# Patient Record
Sex: FEMALE | Race: WHITE | NOT HISPANIC OR LATINO | Employment: FULL TIME | URBAN - NONMETROPOLITAN AREA
[De-identification: names, ages, dates, MRNs, and addresses within clinical notes are randomized per-mention and may not be internally consistent; named-entity substitution may affect disease eponyms.]

---

## 2017-02-28 RX ORDER — PANTOPRAZOLE SODIUM 40 MG/1
40 TABLET, DELAYED RELEASE ORAL DAILY
Qty: 90 TABLET | Refills: 3 | Status: CANCELLED | OUTPATIENT
Start: 2017-02-28

## 2017-04-21 ENCOUNTER — TRANSCRIBE ORDERS (OUTPATIENT)
Dept: ADMINISTRATIVE | Facility: HOSPITAL | Age: 52
End: 2017-04-21

## 2017-04-21 ENCOUNTER — LAB (OUTPATIENT)
Dept: LAB | Facility: HOSPITAL | Age: 52
End: 2017-04-21

## 2017-04-21 DIAGNOSIS — E10.8 TYPE 1 DIABETES MELLITUS WITH COMPLICATION (HCC): ICD-10-CM

## 2017-04-21 DIAGNOSIS — I10 BENIGN HYPERTENSION: Primary | ICD-10-CM

## 2017-04-21 DIAGNOSIS — E78.2 MIXED HYPERLIPIDEMIA: ICD-10-CM

## 2017-04-21 DIAGNOSIS — I10 BENIGN HYPERTENSION: ICD-10-CM

## 2017-04-21 LAB
ALBUMIN SERPL-MCNC: 4.4 G/DL (ref 3.5–5)
ALBUMIN/GLOB SERPL: 1.4 G/DL (ref 1.5–2.5)
ALP SERPL-CCNC: 61 U/L (ref 35–104)
ALT SERPL W P-5'-P-CCNC: 25 U/L (ref 10–36)
AMYLASE SERPL-CCNC: 19 U/L (ref 28–100)
ANION GAP SERPL CALCULATED.3IONS-SCNC: 4.7 MMOL/L (ref 3.6–11.2)
ANISOCYTOSIS BLD QL: ABNORMAL
AST SERPL-CCNC: 23 U/L (ref 10–30)
BASOPHILS # BLD MANUAL: 1.34 10*3/MM3 (ref 0–0.3)
BASOPHILS NFR BLD AUTO: 6 % (ref 0–2)
BILIRUB SERPL-MCNC: 0.5 MG/DL (ref 0.2–1.8)
BUN BLD-MCNC: 16 MG/DL (ref 7–21)
BUN/CREAT SERPL: 22.9 (ref 7–25)
CALCIUM SPEC-SCNC: 9.7 MG/DL (ref 7.7–10)
CHLORIDE SERPL-SCNC: 103 MMOL/L (ref 99–112)
CHOLEST SERPL-MCNC: 199 MG/DL (ref 0–200)
CO2 SERPL-SCNC: 29.3 MMOL/L (ref 24.3–31.9)
CREAT BLD-MCNC: 0.7 MG/DL (ref 0.43–1.29)
DEPRECATED RDW RBC AUTO: 50.5 FL (ref 37–54)
EOSINOPHIL # BLD MANUAL: 0.22 10*3/MM3 (ref 0–0.7)
EOSINOPHIL NFR BLD MANUAL: 1 % (ref 0–5)
ERYTHROCYTE [DISTWIDTH] IN BLOOD BY AUTOMATED COUNT: 15.7 % (ref 11.5–14.5)
GFR SERPL CREATININE-BSD FRML MDRD: 88 ML/MIN/1.73
GLOBULIN UR ELPH-MCNC: 3.2 GM/DL
GLUCOSE BLD-MCNC: 256 MG/DL (ref 70–110)
HBA1C MFR BLD: 9.7 % (ref 4.5–5.7)
HCT VFR BLD AUTO: 39.3 % (ref 37–47)
HDLC SERPL-MCNC: 44 MG/DL (ref 60–100)
HGB BLD-MCNC: 12.9 G/DL (ref 12–16)
HYPOCHROMIA BLD QL: ABNORMAL
LDLC SERPL CALC-MCNC: ABNORMAL MG/DL (ref 0–100)
LDLC/HDLC SERPL: ABNORMAL {RATIO}
LIPASE SERPL-CCNC: 33 U/L (ref 13–60)
LYMPHOCYTES # BLD MANUAL: 4.92 10*3/MM3 (ref 1–3)
LYMPHOCYTES NFR BLD MANUAL: 22 % (ref 21–51)
LYMPHOCYTES NFR BLD MANUAL: 5 % (ref 0–10)
MCH RBC QN AUTO: 28.8 PG (ref 27–33)
MCHC RBC AUTO-ENTMCNC: 32.8 G/DL (ref 33–37)
MCV RBC AUTO: 87.7 FL (ref 80–94)
METAMYELOCYTES NFR BLD MANUAL: 1 % (ref 0–0)
MONOCYTES # BLD AUTO: 1.12 10*3/MM3 (ref 0.1–0.9)
MYELOCYTES NFR BLD MANUAL: 4 % (ref 0–0)
NEUTROPHILS # BLD AUTO: 13.41 10*3/MM3 (ref 1.4–6.5)
NEUTROPHILS NFR BLD MANUAL: 60 % (ref 30–70)
OSMOLALITY SERPL CALC.SUM OF ELEC: 283.8 MOSM/KG (ref 273–305)
PLAT MORPH BLD: NORMAL
PLATELET # BLD AUTO: 470 10*3/MM3 (ref 130–400)
PMV BLD AUTO: 10.4 FL (ref 6–10)
POTASSIUM BLD-SCNC: 4.6 MMOL/L (ref 3.5–5.3)
PROMYELOCYTES NFR BLD MANUAL: 1 % (ref 0–0)
PROT SERPL-MCNC: 7.6 G/DL (ref 6–8)
RBC # BLD AUTO: 4.48 10*6/MM3 (ref 4.2–5.4)
SCAN SLIDE: NORMAL
SODIUM BLD-SCNC: 137 MMOL/L (ref 135–153)
T3RU NFR SERPL: 24.6 % (ref 22.5–37)
T4 SERPL-MCNC: 10.1 MCG/DL (ref 4.5–10.9)
TRIGL SERPL-MCNC: 638 MG/DL (ref 0–150)
TSH SERPL DL<=0.05 MIU/L-ACNC: 1.8 MIU/ML (ref 0.55–4.78)
VLDLC SERPL-MCNC: ABNORMAL MG/DL
WBC NRBC COR # BLD: 22.35 10*3/MM3 (ref 4.5–12.5)

## 2017-04-21 PROCEDURE — 80061 LIPID PANEL: CPT

## 2017-04-21 PROCEDURE — 82150 ASSAY OF AMYLASE: CPT

## 2017-04-21 PROCEDURE — 84479 ASSAY OF THYROID (T3 OR T4): CPT

## 2017-04-21 PROCEDURE — 85060 BLOOD SMEAR INTERPRETATION: CPT

## 2017-04-21 PROCEDURE — 80053 COMPREHEN METABOLIC PANEL: CPT

## 2017-04-21 PROCEDURE — 83036 HEMOGLOBIN GLYCOSYLATED A1C: CPT

## 2017-04-21 PROCEDURE — 36415 COLL VENOUS BLD VENIPUNCTURE: CPT

## 2017-04-21 PROCEDURE — 83690 ASSAY OF LIPASE: CPT

## 2017-04-21 PROCEDURE — 84436 ASSAY OF TOTAL THYROXINE: CPT

## 2017-04-21 PROCEDURE — 85025 COMPLETE CBC W/AUTO DIFF WBC: CPT

## 2017-04-21 PROCEDURE — 84443 ASSAY THYROID STIM HORMONE: CPT

## 2017-04-21 PROCEDURE — 85007 BL SMEAR W/DIFF WBC COUNT: CPT

## 2017-04-22 LAB
CYTOLOGIST CVX/VAG CYTO: NORMAL
PATH INTERP BLD-IMP: NORMAL

## 2017-04-24 ENCOUNTER — LAB (OUTPATIENT)
Dept: LAB | Facility: HOSPITAL | Age: 52
End: 2017-04-24

## 2017-04-24 ENCOUNTER — TRANSCRIBE ORDERS (OUTPATIENT)
Dept: ADMINISTRATIVE | Facility: HOSPITAL | Age: 52
End: 2017-04-24

## 2017-04-24 DIAGNOSIS — D72.9 UNSPECIFIED DISEASE OF WHITE BLOOD CELLS: Primary | ICD-10-CM

## 2017-04-24 DIAGNOSIS — D72.9 UNSPECIFIED DISEASE OF WHITE BLOOD CELLS: ICD-10-CM

## 2017-04-24 PROCEDURE — 36415 COLL VENOUS BLD VENIPUNCTURE: CPT

## 2017-04-25 ENCOUNTER — CONSULT (OUTPATIENT)
Dept: ONCOLOGY | Facility: CLINIC | Age: 52
End: 2017-04-25

## 2017-04-25 VITALS
BODY MASS INDEX: 41.17 KG/M2 | DIASTOLIC BLOOD PRESSURE: 71 MMHG | SYSTOLIC BLOOD PRESSURE: 125 MMHG | WEIGHT: 256.2 LBS | OXYGEN SATURATION: 98 % | TEMPERATURE: 97.3 F | HEART RATE: 91 BPM | HEIGHT: 66 IN | RESPIRATION RATE: 20 BRPM

## 2017-04-25 DIAGNOSIS — R16.1 SPLENOMEGALY: ICD-10-CM

## 2017-04-25 DIAGNOSIS — D75.9 DISEASE OF BLOOD AND BLOOD-FORMING ORGANS, UNSPECIFIED: ICD-10-CM

## 2017-04-25 DIAGNOSIS — D72.829 LEUKOCYTOSIS, UNSPECIFIED TYPE: Primary | ICD-10-CM

## 2017-04-25 DIAGNOSIS — Z11.4 SCREENING FOR HIV (HUMAN IMMUNODEFICIENCY VIRUS): ICD-10-CM

## 2017-04-25 DIAGNOSIS — D75.839 THROMBOCYTOSIS: ICD-10-CM

## 2017-04-25 DIAGNOSIS — R53.82 CHRONIC FATIGUE: ICD-10-CM

## 2017-04-25 LAB
ANISOCYTOSIS BLD QL: ABNORMAL
BASOPHILS # BLD MANUAL: 0.47 10*3/MM3 (ref 0–0.3)
BASOPHILS NFR BLD AUTO: 2 % (ref 0–2)
CHROMATIN AB SERPL-ACNC: 10 IU/ML (ref 0–14)
CRP SERPL-MCNC: 1.11 MG/DL (ref 0–0.99)
DEPRECATED RDW RBC AUTO: 50.2 FL (ref 37–54)
ERYTHROCYTE [DISTWIDTH] IN BLOOD BY AUTOMATED COUNT: 15.8 % (ref 11.5–14.5)
ERYTHROCYTE [SEDIMENTATION RATE] IN BLOOD: 33 MM/HR (ref 0–30)
FERRITIN SERPL-MCNC: 122 NG/ML (ref 10–290.3)
HAV IGM SERPL QL IA: NORMAL
HBV CORE IGM SERPL QL IA: NORMAL
HBV SURFACE AG SERPL QL IA: NORMAL
HCT VFR BLD AUTO: 39.2 % (ref 37–47)
HCV AB SER DONR QL: NORMAL
HGB BLD-MCNC: 12.7 G/DL (ref 12–16)
HIV1+2 AB SER QL: NORMAL
HYPOCHROMIA BLD QL: ABNORMAL
IRON 24H UR-MRATE: 62 MCG/DL (ref 49–151)
IRON SATN MFR SERPL: 18 % (ref 15–50)
LARGE PLATELETS: ABNORMAL
LYMPHOCYTES # BLD MANUAL: 5.88 10*3/MM3 (ref 1–3)
LYMPHOCYTES NFR BLD MANUAL: 1 % (ref 0–10)
LYMPHOCYTES NFR BLD MANUAL: 25 % (ref 21–51)
MCH RBC QN AUTO: 28.2 PG (ref 27–33)
MCHC RBC AUTO-ENTMCNC: 32.4 G/DL (ref 33–37)
MCV RBC AUTO: 86.9 FL (ref 80–94)
METAMYELOCYTES NFR BLD MANUAL: 2 % (ref 0–0)
MONOCYTES # BLD AUTO: 0.24 10*3/MM3 (ref 0.1–0.9)
MYELOCYTES NFR BLD MANUAL: 2 % (ref 0–0)
NEUTROPHILS # BLD AUTO: 15.75 10*3/MM3 (ref 1.4–6.5)
NEUTROPHILS NFR BLD MANUAL: 67 % (ref 30–70)
PLATELET # BLD AUTO: 542 10*3/MM3 (ref 130–400)
PMV BLD AUTO: 10.7 FL (ref 6–10)
PROMYELOCYTES NFR BLD MANUAL: 1 % (ref 0–0)
RBC # BLD AUTO: 4.51 10*6/MM3 (ref 4.2–5.4)
SCAN SLIDE: NORMAL
SMALL PLATELETS BLD QL SMEAR: ABNORMAL
TIBC SERPL-MCNC: 351 MCG/DL (ref 241–421)
WBC NRBC COR # BLD: 23.51 10*3/MM3 (ref 4.5–12.5)

## 2017-04-25 PROCEDURE — 81219 CALR GENE COM VARIANTS: CPT | Performed by: INTERNAL MEDICINE

## 2017-04-25 PROCEDURE — 81270 JAK2 GENE: CPT | Performed by: INTERNAL MEDICINE

## 2017-04-25 PROCEDURE — 81206 BCR/ABL1 GENE MAJOR BP: CPT | Performed by: INTERNAL MEDICINE

## 2017-04-25 PROCEDURE — 85007 BL SMEAR W/DIFF WBC COUNT: CPT | Performed by: INTERNAL MEDICINE

## 2017-04-25 PROCEDURE — G0432 EIA HIV-1/HIV-2 SCREEN: HCPCS | Performed by: INTERNAL MEDICINE

## 2017-04-25 PROCEDURE — 86431 RHEUMATOID FACTOR QUANT: CPT | Performed by: INTERNAL MEDICINE

## 2017-04-25 PROCEDURE — 86140 C-REACTIVE PROTEIN: CPT | Performed by: INTERNAL MEDICINE

## 2017-04-25 PROCEDURE — 85025 COMPLETE CBC W/AUTO DIFF WBC: CPT | Performed by: INTERNAL MEDICINE

## 2017-04-25 PROCEDURE — 85652 RBC SED RATE AUTOMATED: CPT | Performed by: INTERNAL MEDICINE

## 2017-04-25 PROCEDURE — 82728 ASSAY OF FERRITIN: CPT | Performed by: INTERNAL MEDICINE

## 2017-04-25 PROCEDURE — 83540 ASSAY OF IRON: CPT | Performed by: INTERNAL MEDICINE

## 2017-04-25 PROCEDURE — 85060 BLOOD SMEAR INTERPRETATION: CPT | Performed by: INTERNAL MEDICINE

## 2017-04-25 PROCEDURE — 80074 ACUTE HEPATITIS PANEL: CPT | Performed by: INTERNAL MEDICINE

## 2017-04-25 PROCEDURE — 83550 IRON BINDING TEST: CPT | Performed by: INTERNAL MEDICINE

## 2017-04-25 PROCEDURE — 81403 MOPATH PROCEDURE LEVEL 4: CPT | Performed by: INTERNAL MEDICINE

## 2017-04-25 PROCEDURE — 86038 ANTINUCLEAR ANTIBODIES: CPT | Performed by: INTERNAL MEDICINE

## 2017-04-25 PROCEDURE — 36415 COLL VENOUS BLD VENIPUNCTURE: CPT | Performed by: INTERNAL MEDICINE

## 2017-04-25 PROCEDURE — 99205 OFFICE O/P NEW HI 60 MIN: CPT | Performed by: INTERNAL MEDICINE

## 2017-04-25 PROCEDURE — 81402 MOPATH PROCEDURE LEVEL 3: CPT | Performed by: INTERNAL MEDICINE

## 2017-04-25 PROCEDURE — 81207 BCR/ABL1 GENE MINOR BP: CPT | Performed by: INTERNAL MEDICINE

## 2017-04-25 NOTE — PROGRESS NOTES
Aric Haro  [id number]  1965  4/25/2017      Referring Provider:   EILEEN Andrade    Reason for Consultation:   1. Leukocytosis  2. Thrombocytosis     Chief Complaint:  Extreme fatigue      History of Present Illness:  Aric Haro is a very pleasant 51 y.o.  female who presents in new consultation at the request of EILEEN Andrade for further management and evaluation of leukocytosis and thrombocytosis.    Ms. Haro reports that she began experiencing extreme fatigue several weeks ago when she began sleeping multiple hours during the day. 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 On laboratory evaluation she was found to have a total white blood cell count of 22.35 and a platelet count 470 thousand. In March 2016 she was also noted to have a leukocytosis (although not as elevated) as well as a thrombocytosis, however reports that these were likely secondary to other medical issues at the time her blood work was drawn. She denies of any significant weight loss however has been gradually losing weight since gastric sleeve procedure. She denies of any fevers or frequent infections but does note that she empirically placed herself on Flagyl one month ago after her daughter was diagnosed with clostridium difficile. She also reports a short course of oral steroids for eczema one month ago. She does note fluctuating neck lymphadenopathy as well as early satiety and taste in change. She denies of any abnormal or spontaneous bleeding.    The following portions of the patient's history were reviewed and updated as appropriate: allergies, current medications, past family history, past medical history, past social history, past surgical history and problem list.      No Known Allergies    Past Medical History:   Diagnosis Date   • Anemia    • Diabetes  mellitus    • Hypertension        Past Surgical History:   Procedure Laterality Date   • SINUS SURGERY     • SLEEVE GASTROPLASTY         Social History     Social History   • Marital status:      Spouse name: N/A   • Number of children: N/A   • Years of education: N/A     Occupational History   • Not on file.     Social History Main Topics   • Smoking status: Former Smoker   • Smokeless tobacco: Never Used   • Alcohol use No   • Drug use: No   • Sexual activity: Not on file     Other Topics Concern   • Not on file     Social History Narrative   • No narrative on file   She lives with her daughter. She denies of any alcohol or illicit drug use. Previous social tobacco use more then 20 years ago.    Family History   Problem Relation Age of Onset   • Anemia Mother    • Hypertension Mother    • Cancer Mother    • COPD Father    • Heart disease Father    Maternal Uncle - CLL  Mother - Malignancy of [place]      Current Outpatient Prescriptions:   •  baclofen (LIORESAL) 20 MG tablet, Take 1 tablet by mouth 3 (Three) Times a Day., Disp: 90 tablet, Rfl: 4  •  fenofibrate 160 MG tablet, Take 1 tablet by mouth Daily., Disp: 90 tablet, Rfl: 3  •  furosemide (LASIX) 40 MG tablet, Take 1 tablet by mouth Daily., Disp: 90 tablet, Rfl: 3  •  HYDROcodone-acetaminophen (NORCO)  MG per tablet, Take 1 tablet by mouth Every 4-6 Hours As Needed., Disp: 60 tablet, Rfl: 0  •  ibuprofen (ADVIL,MOTRIN) 800 MG tablet, Take 1 tablet by mouth 3 (three) times a day as needed for pain., Disp: 270 tablet, Rfl: 3  •  Insulin Glargine (LANTUS SOLOSTAR) 100 UNIT/ML injection pen, Inject 45 units subcutaneously 2 times daily, Disp: 30 mL, Rfl: 6  •  loratadine-pseudoephedrine (CLARITIN-D 12 HOUR) 5-120 MG per 12 hr tablet, Take 1 tablet by mouth two times a day, Disp: 30 tablet, Rfl: 0  •  pantoprazole (PROTONIX) 40 MG EC tablet, Take 1 tablet by mouth Daily., Disp: 90 tablet, Rfl: 3  •  sertraline (ZOLOFT) 50 MG tablet, Take 1  tablet by mouth Daily., Disp: 90 tablet, Rfl: 3  •  TRUETRACK TEST test strip, use to test blood sugar 8 times daily, Disp: 200 each, Rfl: 5        Review of Systems  Constitutional: No fever, chills, +night sweats, + gradual weight loss since sleeve procedure, +fatigue  HEENT:  +chronic headaches, vision changes or hearing changes, no sinus drainage, sore throat.   Cardiovascular:  No chest pain, +intermittant lower extremity edema.   Pulmonary:  +shortness of breath, no cough.   Gastrointestinal: +intermittant nausea, no vomiting. Intermittent constipation and diarrhea. +change in taste, +easrly satiety, No abdominal pain. No melena or hematochezia.   Genitourinary:  No hematuria, or changes in urination.   Musculoskeletal:  No pain, or joint problems.   Skin: No rashes or pruritus.   Endocrine:  No hot flashes or chills   Hematologic:  No history of free bleeding, spontaneous bleeding or clotting problems. +fluctuations in neck LNs.  Immunologic:  +seasonal allergies, no frequent infections.   Neurologic: No numbness, tingling, or weakness.   Psychiatric:  No anxiety, +depression.       Physical Exam:  Vital Signs: These were reviewed and listed as per patient’s electronic medical chart  Vitals:    04/25/17 1356   BP: 125/71   Pulse: 91   Resp: 20   Temp: 97.3 °F (36.3 °C)   SpO2: 98%     General: Awake, alert and oriented, in no distress  HEENT: Head is atraumatic, normocephalic, extraocular movements full, oropharynx clear, no scleral icterus, pink moist mucous membranes  Neck: supple, no jvd, lymphadenopathy or masses  Cardiovascular: regular rate and rhythm without murmurs, rubs or gallops  Pulmonary: non-labored, clear to auscultation bilaterally, no wheezing  Abdomen: soft, non-tender, non-distended, normal active bowel sounds present, +splenomegaly  Extremities: No clubbing, cyanosis or edema  Lymph: No cervical, supraclavicular, axillary, adenopathy - however neck nodule  Neurologic: Mental status as  above, alert, awake and oriented, grossly non-focal exam  Skin: warm, dry, intact, ecchymosis of right upper extremity        Labs / Studies:    Lab on 04/21/2017   Component Date Value   • Glucose 04/21/2017 256*   • BUN 04/21/2017 16    • Creatinine 04/21/2017 0.70    • Sodium 04/21/2017 137    • Potassium 04/21/2017 4.6    • Chloride 04/21/2017 103    • CO2 04/21/2017 29.3    • Calcium 04/21/2017 9.7    • Total Protein 04/21/2017 7.6    • Albumin 04/21/2017 4.40    • ALT (SGPT) 04/21/2017 25    • AST (SGOT) 04/21/2017 23    • Alkaline Phosphatase 04/21/2017 61    • Total Bilirubin 04/21/2017 0.5    • eGFR Non African Amer 04/21/2017 88    • Globulin 04/21/2017 3.2    • A/G Ratio 04/21/2017 1.4*   • BUN/Creatinine Ratio 04/21/2017 22.9    • Anion Gap 04/21/2017 4.7    • Hemoglobin A1C 04/21/2017 9.70*   • T3 Uptake 04/21/2017 24.6    • T4, Total 04/21/2017 10.10    • TSH 04/21/2017 1.798    • Total Cholesterol 04/21/2017 199    • Triglycerides 04/21/2017 638*   • HDL Cholesterol 04/21/2017 44*   • LDL Cholesterol  04/21/2017     • VLDL Cholesterol 04/21/2017     • LDL/HDL Ratio 04/21/2017     • WBC 04/21/2017 22.35*   • RBC 04/21/2017 4.48    • Hemoglobin 04/21/2017 12.9    • Hematocrit 04/21/2017 39.3    • MCV 04/21/2017 87.7    • MCH 04/21/2017 28.8    • MCHC 04/21/2017 32.8*   • RDW 04/21/2017 15.7*   • RDW-SD 04/21/2017 50.5    • MPV 04/21/2017 10.4*   • Platelets 04/21/2017 470*   • Scan Slide 04/21/2017     • Osmolality Calc 04/21/2017 283.8    • Neutrophil % 04/21/2017 60.0    • Lymphocyte % 04/21/2017 22.0    • Monocyte % 04/21/2017 5.0    • Eosinophil % 04/21/2017 1.0    • Basophil % 04/21/2017 6.0*   • Metamyelocyte % 04/21/2017 1.0*   • Myelocyte % 04/21/2017 4.0*   • Promyelocyte % 04/21/2017 1.0*   • Neutrophils Absolute 04/21/2017 13.41*   • Lymphocytes Absolute 04/21/2017 4.92*   • Monocytes Absolute 04/21/2017 1.12*   • Eosinophils Absolute 04/21/2017 0.22    • Basophils Absolute 04/21/2017 1.34*    • Anisocytosis 04/21/2017 Slight/1+    • Hypochromia 04/21/2017 Slight/1+    • Platelet Morphology 04/21/2017 Normal    • Performed by: 04/21/2017 Echo Knight M.D.     • Pathologist Interpretati* 04/21/2017 Leukocytosis with mild basophilia, scattered immature granulocytes, and increased platelets, suggestive of underlying myeloproliferative disorder.  Hematologic consultation is recommended.     • Amylase 04/21/2017 19*   • Lipase 04/21/2017 33               Assessment/Plan :  Aric Haro is a very pleasant 51 y.o.  female who presents in new consultation at the request of EILEEN Andrade for further management and evaluation of leukocytosis and thrombocytosis.    1. Leukocytosis  2. Thrombocytosis    Given the findings of her peripheral smear I would be concerned for an underlying myeloproliferative disorder. I will repeat her complete blood count today as well as obtain an official peripheral smear result. Her primary provider who presents with her today already obtained a flow cytometry with results pending. I will also obtain a BCR ABL PCR. To further evaluate for a myeloproliferative disorder will assess for JAK2 mutations. To assess for underlying inflammation that may be possibly contributing will also obtain an ESR and CRP, EMMANUEL, Rheumatoid factor, as well as an acute hepatitis panel and HIV test. Thrombocytosis can also be caused by an underlying iron deficiency thus will obtain iron panel and ferritin. Will also order an abdominal ultrasound to assess for splenomegaly.    I will have the patient return in follow up appointment to review test results in two weeks. She understands that should she have any questions or concerns prior to her appointment she should give us a call at any time and I would be happy to see her sooner. It was a pleasure to see this patient in clinic today, thank you for allowing me to participate in the care of this patient.    I spent 45 minutes in  regards to this patient’s care today. More than 31 minutes of the time was spent in direct interaction with the patient for the above problems.        Evita Serrano MD  04/25/2017  2:50 PM

## 2017-04-26 LAB
ANA SER QL: NEGATIVE
LEUK/LYMPH PHENO: NORMAL

## 2017-04-27 ENCOUNTER — HOSPITAL ENCOUNTER (OUTPATIENT)
Dept: ULTRASOUND IMAGING | Facility: HOSPITAL | Age: 52
Discharge: HOME | End: 2017-04-27
Attending: INTERNAL MEDICINE | Admitting: INTERNAL MEDICINE

## 2017-04-27 LAB
CYTOLOGIST CVX/VAG CYTO: NORMAL
PATH INTERP BLD-IMP: NORMAL

## 2017-04-27 PROCEDURE — 76700 US EXAM ABDOM COMPLETE: CPT

## 2017-04-27 PROCEDURE — 76700 US EXAM ABDOM COMPLETE: CPT | Performed by: RADIOLOGY

## 2017-05-01 LAB
INTERPRETATION: ABNORMAL
JAK2 EXON 12 MUT TESTED BLD/T: ABNORMAL
LAB DIRECTOR NAME PROVIDER: ABNORMAL
REF LAB TEST METHOD: ABNORMAL
T(ABL1,BCR)B2A2/CONTROL BLD/T: 3.9 %
T(ABL1,BCR)B3A2/CONTROL BLD/T: 46.5 %
T(ABL1,BCR)E1A2/CONTROL BLD/T: 0.01 %

## 2017-05-05 LAB
CALR EXON 9 MUT ANL BLD/T: NORMAL
JAK2 EXON 12 MUT ANL BLD/T: NORMAL
JAK2 EXON 12 MUT TESTED BLD/T: NORMAL
JAK2 P.V617F BLD/T QL: NORMAL
LAB DIRECTOR NAME PROVIDER: NORMAL
Lab: NORMAL
MPL MUTATION ANALYSIS RESULT:: NORMAL
REF LAB TEST METHOD: NORMAL
REF LAB TEST METHOD: NORMAL
REFERENCE: NORMAL
REFLEX: NORMAL
SERVICE CMNT-IMP: NORMAL

## 2017-05-09 ENCOUNTER — HOSPITAL ENCOUNTER (OUTPATIENT)
Dept: RESPIRATORY THERAPY | Facility: HOSPITAL | Age: 52
Discharge: HOME | End: 2017-05-09
Attending: INTERNAL MEDICINE | Admitting: INTERNAL MEDICINE

## 2017-05-09 ENCOUNTER — OFFICE VISIT (OUTPATIENT)
Dept: ONCOLOGY | Facility: CLINIC | Age: 52
End: 2017-05-09

## 2017-05-09 ENCOUNTER — LAB (OUTPATIENT)
Dept: ONCOLOGY | Facility: CLINIC | Age: 52
End: 2017-05-09

## 2017-05-09 ENCOUNTER — APPOINTMENT (OUTPATIENT)
Dept: LAB | Facility: HOSPITAL | Age: 52
End: 2017-05-09
Attending: INTERNAL MEDICINE

## 2017-05-09 VITALS
OXYGEN SATURATION: 98 % | SYSTOLIC BLOOD PRESSURE: 126 MMHG | WEIGHT: 255.3 LBS | BODY MASS INDEX: 41.21 KG/M2 | RESPIRATION RATE: 18 BRPM | DIASTOLIC BLOOD PRESSURE: 87 MMHG | HEART RATE: 80 BPM | TEMPERATURE: 98.6 F

## 2017-05-09 DIAGNOSIS — C92.10 CML (CHRONIC MYELOCYTIC LEUKEMIA) (HCC): Primary | ICD-10-CM

## 2017-05-09 DIAGNOSIS — D75.839 THROMBOCYTOSIS: ICD-10-CM

## 2017-05-09 DIAGNOSIS — D72.829 LEUKOCYTOSIS, UNSPECIFIED TYPE: ICD-10-CM

## 2017-05-09 DIAGNOSIS — D72.829 LEUKOCYTOSIS, UNSPECIFIED TYPE: Primary | ICD-10-CM

## 2017-05-09 PROBLEM — K85.90 PANCREATITIS: Status: ACTIVE | Noted: 2017-05-09

## 2017-05-09 LAB
ALBUMIN SERPL-MCNC: 4.2 G/DL (ref 3.5–5)
ALBUMIN/GLOB SERPL: 1.4 G/DL (ref 1.5–2.5)
ALP SERPL-CCNC: 58 U/L (ref 35–104)
ALT SERPL W P-5'-P-CCNC: 23 U/L (ref 10–36)
ANION GAP SERPL CALCULATED.3IONS-SCNC: 6.2 MMOL/L (ref 3.6–11.2)
ANISOCYTOSIS BLD QL: ABNORMAL
AST SERPL-CCNC: 27 U/L (ref 10–30)
BASOPHILS # BLD MANUAL: 0.88 10*3/MM3 (ref 0–0.3)
BASOPHILS NFR BLD AUTO: 3 % (ref 0–2)
BILIRUB SERPL-MCNC: 0.4 MG/DL (ref 0.2–1.8)
BUN BLD-MCNC: 12 MG/DL (ref 7–21)
BUN/CREAT SERPL: 15.2 (ref 7–25)
CALCIUM SPEC-SCNC: 10.1 MG/DL (ref 7.7–10)
CHLORIDE SERPL-SCNC: 105 MMOL/L (ref 99–112)
CO2 SERPL-SCNC: 27.8 MMOL/L (ref 24.3–31.9)
CREAT BLD-MCNC: 0.79 MG/DL (ref 0.43–1.29)
DEPRECATED RDW RBC AUTO: 50.1 FL (ref 37–54)
EOSINOPHIL # BLD MANUAL: 0.88 10*3/MM3 (ref 0–0.7)
EOSINOPHIL NFR BLD MANUAL: 3 % (ref 0–5)
ERYTHROCYTE [DISTWIDTH] IN BLOOD BY AUTOMATED COUNT: 15.6 % (ref 11.5–14.5)
GFR SERPL CREATININE-BSD FRML MDRD: 77 ML/MIN/1.73
GLOBULIN UR ELPH-MCNC: 3 GM/DL
GLUCOSE BLD-MCNC: 186 MG/DL (ref 70–110)
HCT VFR BLD AUTO: 39 % (ref 37–47)
HGB BLD-MCNC: 12.7 G/DL (ref 12–16)
LDH SERPL-CCNC: 376 U/L (ref 135–225)
LYMPHOCYTES # BLD MANUAL: 4.42 10*3/MM3 (ref 1–3)
LYMPHOCYTES NFR BLD MANUAL: 15 % (ref 21–51)
LYMPHOCYTES NFR BLD MANUAL: 4 % (ref 0–10)
MCH RBC QN AUTO: 28.9 PG (ref 27–33)
MCHC RBC AUTO-ENTMCNC: 32.6 G/DL (ref 33–37)
MCV RBC AUTO: 88.6 FL (ref 80–94)
METAMYELOCYTES NFR BLD MANUAL: 3 % (ref 0–0)
MONOCYTES # BLD AUTO: 1.18 10*3/MM3 (ref 0.1–0.9)
MYELOCYTES NFR BLD MANUAL: 10 % (ref 0–0)
NEUTROPHILS # BLD AUTO: 17.69 10*3/MM3 (ref 1.4–6.5)
NEUTROPHILS NFR BLD MANUAL: 52 % (ref 30–70)
NEUTS BAND NFR BLD MANUAL: 8 % (ref 4–12)
OSMOLALITY SERPL CALC.SUM OF ELEC: 282.2 MOSM/KG (ref 273–305)
PLATELET # BLD AUTO: 607 10*3/MM3 (ref 130–400)
PMV BLD AUTO: 10.5 FL (ref 6–10)
POTASSIUM BLD-SCNC: 4.8 MMOL/L (ref 3.5–5.3)
PROMYELOCYTES NFR BLD MANUAL: 2 % (ref 0–0)
PROT SERPL-MCNC: 7.2 G/DL (ref 6–8)
RBC # BLD AUTO: 4.4 10*6/MM3 (ref 4.2–5.4)
SCAN SLIDE: NORMAL
SMALL PLATELETS BLD QL SMEAR: ABNORMAL
SODIUM BLD-SCNC: 139 MMOL/L (ref 135–153)
URATE SERPL-MCNC: 5.5 MG/DL (ref 2.4–5.7)
WBC NRBC COR # BLD: 29.49 10*3/MM3 (ref 4.5–12.5)

## 2017-05-09 PROCEDURE — 84550 ASSAY OF BLOOD/URIC ACID: CPT | Performed by: INTERNAL MEDICINE

## 2017-05-09 PROCEDURE — 83615 LACTATE (LD) (LDH) ENZYME: CPT | Performed by: INTERNAL MEDICINE

## 2017-05-09 PROCEDURE — 93005 ELECTROCARDIOGRAM TRACING: CPT | Performed by: INTERNAL MEDICINE

## 2017-05-09 PROCEDURE — 85025 COMPLETE CBC W/AUTO DIFF WBC: CPT | Performed by: INTERNAL MEDICINE

## 2017-05-09 PROCEDURE — 36415 COLL VENOUS BLD VENIPUNCTURE: CPT | Performed by: INTERNAL MEDICINE

## 2017-05-09 PROCEDURE — 99215 OFFICE O/P EST HI 40 MIN: CPT | Performed by: INTERNAL MEDICINE

## 2017-05-09 PROCEDURE — 93010 ELECTROCARDIOGRAM REPORT: CPT | Performed by: INTERNAL MEDICINE

## 2017-05-09 PROCEDURE — 85007 BL SMEAR W/DIFF WBC COUNT: CPT | Performed by: INTERNAL MEDICINE

## 2017-05-09 PROCEDURE — 80053 COMPREHEN METABOLIC PANEL: CPT | Performed by: INTERNAL MEDICINE

## 2017-05-10 DIAGNOSIS — C92.10 CML (CHRONIC MYELOCYTIC LEUKEMIA) (HCC): Primary | ICD-10-CM

## 2017-05-12 ENCOUNTER — LAB (OUTPATIENT)
Dept: ONCOLOGY | Facility: CLINIC | Age: 52
End: 2017-05-12

## 2017-05-12 ENCOUNTER — PROCEDURE VISIT (OUTPATIENT)
Dept: ONCOLOGY | Facility: CLINIC | Age: 52
End: 2017-05-12

## 2017-05-12 ENCOUNTER — PROCEDURE VISIT (OUTPATIENT)
Dept: ONCOLOGY | Facility: HOSPITAL | Age: 52
End: 2017-05-12

## 2017-05-12 VITALS
BODY MASS INDEX: 40.96 KG/M2 | SYSTOLIC BLOOD PRESSURE: 127 MMHG | HEART RATE: 99 BPM | WEIGHT: 253.8 LBS | DIASTOLIC BLOOD PRESSURE: 82 MMHG | RESPIRATION RATE: 20 BRPM | TEMPERATURE: 99 F | OXYGEN SATURATION: 97 %

## 2017-05-12 VITALS
HEART RATE: 75 BPM | WEIGHT: 253.8 LBS | OXYGEN SATURATION: 97 % | DIASTOLIC BLOOD PRESSURE: 79 MMHG | RESPIRATION RATE: 20 BRPM | BODY MASS INDEX: 40.96 KG/M2 | SYSTOLIC BLOOD PRESSURE: 133 MMHG | TEMPERATURE: 99 F

## 2017-05-12 DIAGNOSIS — C92.10 CML (CHRONIC MYELOCYTIC LEUKEMIA) (HCC): ICD-10-CM

## 2017-05-12 DIAGNOSIS — C92.10 CML (CHRONIC MYELOCYTIC LEUKEMIA) (HCC): Primary | ICD-10-CM

## 2017-05-12 LAB
ALBUMIN SERPL-MCNC: 4 G/DL (ref 3.5–5)
ALBUMIN/GLOB SERPL: 1.3 G/DL (ref 1.5–2.5)
ALP SERPL-CCNC: 55 U/L (ref 35–104)
ALT SERPL W P-5'-P-CCNC: 23 U/L (ref 10–36)
ANION GAP SERPL CALCULATED.3IONS-SCNC: 10.2 MMOL/L (ref 3.6–11.2)
ANISOCYTOSIS BLD QL: ABNORMAL
AST SERPL-CCNC: 26 U/L (ref 10–30)
BASOPHILS # BLD MANUAL: 1.18 10*3/MM3 (ref 0–0.3)
BASOPHILS NFR BLD AUTO: 4 % (ref 0–2)
BILIRUB SERPL-MCNC: 0.4 MG/DL (ref 0.2–1.8)
BUN BLD-MCNC: 12 MG/DL (ref 7–21)
BUN/CREAT SERPL: 18.5 (ref 7–25)
CALCIUM SPEC-SCNC: 9.1 MG/DL (ref 7.7–10)
CHLORIDE SERPL-SCNC: 102 MMOL/L (ref 99–112)
CO2 SERPL-SCNC: 26.8 MMOL/L (ref 24.3–31.9)
CREAT BLD-MCNC: 0.65 MG/DL (ref 0.43–1.29)
DEPRECATED RDW RBC AUTO: 50.1 FL (ref 37–54)
EOSINOPHIL # BLD MANUAL: 0.3 10*3/MM3 (ref 0–0.7)
EOSINOPHIL NFR BLD MANUAL: 1 % (ref 0–5)
ERYTHROCYTE [DISTWIDTH] IN BLOOD BY AUTOMATED COUNT: 15.5 % (ref 11.5–14.5)
GFR SERPL CREATININE-BSD FRML MDRD: 96 ML/MIN/1.73
GLOBULIN UR ELPH-MCNC: 3 GM/DL
GLUCOSE BLD-MCNC: 220 MG/DL (ref 70–110)
HCT VFR BLD AUTO: 39.7 % (ref 37–47)
HGB BLD-MCNC: 12.6 G/DL (ref 12–16)
HYPOCHROMIA BLD QL: ABNORMAL
LARGE PLATELETS: ABNORMAL
LYMPHOCYTES # BLD MANUAL: 4.73 10*3/MM3 (ref 1–3)
LYMPHOCYTES NFR BLD MANUAL: 16 % (ref 21–51)
LYMPHOCYTES NFR BLD MANUAL: 5 % (ref 0–10)
MCH RBC QN AUTO: 28.3 PG (ref 27–33)
MCHC RBC AUTO-ENTMCNC: 31.7 G/DL (ref 33–37)
MCV RBC AUTO: 89 FL (ref 80–94)
METAMYELOCYTES NFR BLD MANUAL: 5 % (ref 0–0)
MONOCYTES # BLD AUTO: 1.48 10*3/MM3 (ref 0.1–0.9)
MYELOCYTES NFR BLD MANUAL: 5 % (ref 0–0)
NEUTROPHILS # BLD AUTO: 18.94 10*3/MM3 (ref 1.4–6.5)
NEUTROPHILS NFR BLD MANUAL: 64 % (ref 30–70)
OSMOLALITY SERPL CALC.SUM OF ELEC: 284 MOSM/KG (ref 273–305)
PHOSPHATE SERPL-MCNC: 3.4 MG/DL (ref 2.7–4.5)
PLATELET # BLD AUTO: 525 10*3/MM3 (ref 130–400)
PMV BLD AUTO: 10.4 FL (ref 6–10)
POTASSIUM BLD-SCNC: 4.2 MMOL/L (ref 3.5–5.3)
PROT SERPL-MCNC: 7 G/DL (ref 6–8)
RBC # BLD AUTO: 4.46 10*6/MM3 (ref 4.2–5.4)
SCAN SLIDE: NORMAL
SMALL PLATELETS BLD QL SMEAR: ABNORMAL
SODIUM BLD-SCNC: 139 MMOL/L (ref 135–153)
WBC NRBC COR # BLD: 29.59 10*3/MM3 (ref 4.5–12.5)

## 2017-05-12 PROCEDURE — 38221 DX BONE MARROW BIOPSIES: CPT | Performed by: INTERNAL MEDICINE

## 2017-05-12 PROCEDURE — 25010000002 ONDANSETRON PER 1 MG

## 2017-05-12 PROCEDURE — 36415 COLL VENOUS BLD VENIPUNCTURE: CPT | Performed by: INTERNAL MEDICINE

## 2017-05-12 PROCEDURE — 38221 DX BONE MARROW BIOPSIES: CPT

## 2017-05-12 PROCEDURE — G0364 BONE MARROW ASPIRATE &BIOPSY: HCPCS | Performed by: INTERNAL MEDICINE

## 2017-05-12 PROCEDURE — 38220 DX BONE MARROW ASPIRATIONS: CPT

## 2017-05-12 PROCEDURE — 25010000002 MORPHINE SULFATE (PF) 2 MG/ML SOLUTION

## 2017-05-12 PROCEDURE — 25010000002 LORAZEPAM PER 2 MG: Performed by: INTERNAL MEDICINE

## 2017-05-12 RX ORDER — ONDANSETRON 2 MG/ML
INJECTION INTRAMUSCULAR; INTRAVENOUS
Status: COMPLETED
Start: 2017-05-12 | End: 2017-05-12

## 2017-05-12 RX ORDER — LORAZEPAM 2 MG/ML
1 INJECTION INTRAMUSCULAR ONCE
Status: COMPLETED | OUTPATIENT
Start: 2017-05-12 | End: 2017-05-12

## 2017-05-12 RX ORDER — LIDOCAINE HYDROCHLORIDE 20 MG/ML
INJECTION, SOLUTION INFILTRATION; PERINEURAL
Status: COMPLETED
Start: 2017-05-12 | End: 2017-05-12

## 2017-05-12 RX ORDER — MORPHINE SULFATE 2 MG/ML
INJECTION, SOLUTION INTRAMUSCULAR; INTRAVENOUS
Status: COMPLETED
Start: 2017-05-12 | End: 2017-05-12

## 2017-05-12 RX ADMIN — LIDOCAINE HYDROCHLORIDE 20 ML: 20 INJECTION, SOLUTION INFILTRATION; PERINEURAL at 12:27

## 2017-05-12 RX ADMIN — LORAZEPAM 1 MG: 2 INJECTION INTRAMUSCULAR; INTRAVENOUS at 12:26

## 2017-05-12 RX ADMIN — MORPHINE SULFATE 1 MG: 2 INJECTION, SOLUTION INTRAMUSCULAR; INTRAVENOUS at 12:27

## 2017-05-12 RX ADMIN — ONDANSETRON 4 MG: 2 INJECTION, SOLUTION INTRAMUSCULAR; INTRAVENOUS at 13:12

## 2017-05-15 PROCEDURE — 36415 COLL VENOUS BLD VENIPUNCTURE: CPT

## 2017-05-19 ENCOUNTER — OFFICE VISIT (OUTPATIENT)
Dept: ONCOLOGY | Facility: CLINIC | Age: 52
End: 2017-05-19

## 2017-05-19 VITALS
OXYGEN SATURATION: 98 % | HEART RATE: 86 BPM | WEIGHT: 249.4 LBS | TEMPERATURE: 98.1 F | DIASTOLIC BLOOD PRESSURE: 74 MMHG | BODY MASS INDEX: 40.25 KG/M2 | RESPIRATION RATE: 18 BRPM | SYSTOLIC BLOOD PRESSURE: 121 MMHG

## 2017-05-19 DIAGNOSIS — C92.10 CML (CHRONIC MYELOCYTIC LEUKEMIA) (HCC): Primary | ICD-10-CM

## 2017-05-19 DIAGNOSIS — K21.9 GASTROESOPHAGEAL REFLUX DISEASE WITHOUT ESOPHAGITIS: ICD-10-CM

## 2017-05-19 DIAGNOSIS — R51.9 ACUTE INTRACTABLE HEADACHE, UNSPECIFIED HEADACHE TYPE: ICD-10-CM

## 2017-05-19 PROCEDURE — 99211 OFF/OP EST MAY X REQ PHY/QHP: CPT | Performed by: INTERNAL MEDICINE

## 2017-05-19 RX ORDER — SUCRALFATE 1 G/1
1 TABLET ORAL 2 TIMES DAILY
Qty: 60 TABLET | Refills: 5 | Status: SHIPPED | OUTPATIENT
Start: 2017-05-19 | End: 2017-05-31

## 2017-05-19 RX ORDER — GRANISETRON HYDROCHLORIDE 1 MG/1
1 TABLET, FILM COATED ORAL EVERY 12 HOURS PRN
Qty: 60 TABLET | Refills: 5 | Status: SHIPPED | OUTPATIENT
Start: 2017-05-19 | End: 2018-06-19

## 2017-05-19 RX ORDER — PROCHLORPERAZINE MALEATE 10 MG
10 TABLET ORAL EVERY 6 HOURS PRN
Qty: 60 TABLET | Refills: 5 | Status: SHIPPED | OUTPATIENT
Start: 2017-05-19 | End: 2019-08-15

## 2017-05-19 RX ORDER — HYDROCODONE BITARTRATE AND ACETAMINOPHEN 10; 325 MG/1; MG/1
1 TABLET ORAL EVERY 4 HOURS PRN
Qty: 60 TABLET | Refills: 0 | Status: SHIPPED | OUTPATIENT
Start: 2017-05-19 | End: 2018-06-19

## 2017-05-22 ENCOUNTER — OFFICE VISIT (OUTPATIENT)
Dept: ONCOLOGY | Facility: CLINIC | Age: 52
End: 2017-05-22

## 2017-05-22 ENCOUNTER — LAB (OUTPATIENT)
Dept: ONCOLOGY | Facility: CLINIC | Age: 52
End: 2017-05-22

## 2017-05-22 VITALS
RESPIRATION RATE: 18 BRPM | WEIGHT: 252.4 LBS | SYSTOLIC BLOOD PRESSURE: 143 MMHG | OXYGEN SATURATION: 97 % | TEMPERATURE: 98.7 F | HEART RATE: 89 BPM | BODY MASS INDEX: 40.74 KG/M2 | DIASTOLIC BLOOD PRESSURE: 80 MMHG

## 2017-05-22 DIAGNOSIS — C92.10 CML (CHRONIC MYELOCYTIC LEUKEMIA) (HCC): ICD-10-CM

## 2017-05-22 DIAGNOSIS — K21.00 GASTROESOPHAGEAL REFLUX DISEASE WITH ESOPHAGITIS: Primary | ICD-10-CM

## 2017-05-22 LAB
ALBUMIN SERPL-MCNC: 4.2 G/DL (ref 3.5–5)
ALBUMIN/GLOB SERPL: 1.4 G/DL (ref 1.5–2.5)
ALP SERPL-CCNC: 61 U/L (ref 35–104)
ALT SERPL W P-5'-P-CCNC: 22 U/L (ref 10–36)
ANION GAP SERPL CALCULATED.3IONS-SCNC: 10.4 MMOL/L (ref 3.6–11.2)
ANISOCYTOSIS BLD QL: ABNORMAL
AST SERPL-CCNC: 20 U/L (ref 10–30)
BASOPHILS # BLD MANUAL: 0.72 10*3/MM3 (ref 0–0.3)
BASOPHILS NFR BLD AUTO: 6 % (ref 0–2)
BILIRUB SERPL-MCNC: 0.3 MG/DL (ref 0.2–1.8)
BUN BLD-MCNC: 21 MG/DL (ref 7–21)
BUN/CREAT SERPL: 24.4 (ref 7–25)
CALCIUM SPEC-SCNC: 10.3 MG/DL (ref 7.7–10)
CHLORIDE SERPL-SCNC: 102 MMOL/L (ref 99–112)
CO2 SERPL-SCNC: 23.6 MMOL/L (ref 24.3–31.9)
CREAT BLD-MCNC: 0.86 MG/DL (ref 0.43–1.29)
DEPRECATED RDW RBC AUTO: 48.6 FL (ref 37–54)
EOSINOPHIL # BLD MANUAL: 0.24 10*3/MM3 (ref 0–0.7)
EOSINOPHIL NFR BLD MANUAL: 2 % (ref 0–5)
ERYTHROCYTE [DISTWIDTH] IN BLOOD BY AUTOMATED COUNT: 15.5 % (ref 11.5–14.5)
GFR SERPL CREATININE-BSD FRML MDRD: 70 ML/MIN/1.73
GLOBULIN UR ELPH-MCNC: 3 GM/DL
GLUCOSE BLD-MCNC: 302 MG/DL (ref 70–110)
HCT VFR BLD AUTO: 37.1 % (ref 37–47)
HGB BLD-MCNC: 12.1 G/DL (ref 12–16)
HYPOCHROMIA BLD QL: ABNORMAL
LYMPHOCYTES # BLD MANUAL: 2.4 10*3/MM3 (ref 1–3)
LYMPHOCYTES NFR BLD MANUAL: 20 % (ref 21–51)
LYMPHOCYTES NFR BLD MANUAL: 6 % (ref 0–10)
MCH RBC QN AUTO: 28.4 PG (ref 27–33)
MCHC RBC AUTO-ENTMCNC: 32.6 G/DL (ref 33–37)
MCV RBC AUTO: 87.1 FL (ref 80–94)
MONOCYTES # BLD AUTO: 0.72 10*3/MM3 (ref 0.1–0.9)
MYELOCYTES NFR BLD MANUAL: 4 % (ref 0–0)
NEUTROPHILS # BLD AUTO: 7.45 10*3/MM3 (ref 1.4–6.5)
NEUTROPHILS NFR BLD MANUAL: 62 % (ref 30–70)
OSMOLALITY SERPL CALC.SUM OF ELEC: 286.2 MOSM/KG (ref 273–305)
PHOSPHATE SERPL-MCNC: 4.2 MG/DL (ref 2.7–4.5)
PLATELET # BLD AUTO: 484 10*3/MM3 (ref 130–400)
PMV BLD AUTO: 10.9 FL (ref 6–10)
POTASSIUM BLD-SCNC: 4.2 MMOL/L (ref 3.5–5.3)
PROT SERPL-MCNC: 7.2 G/DL (ref 6–8)
RBC # BLD AUTO: 4.26 10*6/MM3 (ref 4.2–5.4)
SCAN SLIDE: NORMAL
SMALL PLATELETS BLD QL SMEAR: ABNORMAL
SODIUM BLD-SCNC: 136 MMOL/L (ref 135–153)
WBC NRBC COR # BLD: 12.01 10*3/MM3 (ref 4.5–12.5)

## 2017-05-22 PROCEDURE — 80053 COMPREHEN METABOLIC PANEL: CPT | Performed by: INTERNAL MEDICINE

## 2017-05-22 PROCEDURE — 85025 COMPLETE CBC W/AUTO DIFF WBC: CPT | Performed by: INTERNAL MEDICINE

## 2017-05-22 PROCEDURE — 85007 BL SMEAR W/DIFF WBC COUNT: CPT | Performed by: INTERNAL MEDICINE

## 2017-05-22 PROCEDURE — 36415 COLL VENOUS BLD VENIPUNCTURE: CPT | Performed by: INTERNAL MEDICINE

## 2017-05-22 PROCEDURE — 84100 ASSAY OF PHOSPHORUS: CPT | Performed by: INTERNAL MEDICINE

## 2017-05-22 PROCEDURE — 99211 OFF/OP EST MAY X REQ PHY/QHP: CPT | Performed by: INTERNAL MEDICINE

## 2017-05-22 RX ORDER — IMATINIB MESYLATE 400 MG/1
400 TABLET, FILM COATED ORAL DAILY
Qty: 30 TABLET | Refills: 1 | Status: SHIPPED | OUTPATIENT
Start: 2017-05-22 | End: 2017-05-24

## 2017-05-26 RX ORDER — IMATINIB MESYLATE 400 MG/1
400 TABLET, FILM COATED ORAL DAILY
Status: DISCONTINUED | OUTPATIENT
Start: 2017-05-26 | End: 2017-05-26

## 2017-05-29 DIAGNOSIS — C92.10 CML (CHRONIC MYELOCYTIC LEUKEMIA) (HCC): Primary | ICD-10-CM

## 2017-05-30 ENCOUNTER — LAB (OUTPATIENT)
Dept: ONCOLOGY | Facility: CLINIC | Age: 52
End: 2017-05-30

## 2017-05-30 VITALS
OXYGEN SATURATION: 97 % | SYSTOLIC BLOOD PRESSURE: 123 MMHG | DIASTOLIC BLOOD PRESSURE: 77 MMHG | RESPIRATION RATE: 20 BRPM | HEART RATE: 98 BPM | TEMPERATURE: 97.9 F

## 2017-05-30 DIAGNOSIS — C92.10 CML (CHRONIC MYELOCYTIC LEUKEMIA) (HCC): ICD-10-CM

## 2017-05-30 LAB
ALBUMIN SERPL-MCNC: 3.8 G/DL (ref 3.5–5)
ALBUMIN/GLOB SERPL: 1.3 G/DL (ref 1.5–2.5)
ALP SERPL-CCNC: 63 U/L (ref 35–104)
ALT SERPL W P-5'-P-CCNC: 23 U/L (ref 10–36)
ANION GAP SERPL CALCULATED.3IONS-SCNC: 10 MMOL/L (ref 3.6–11.2)
AST SERPL-CCNC: 22 U/L (ref 10–30)
BASOPHILS # BLD AUTO: 0.13 10*3/MM3 (ref 0–0.3)
BASOPHILS NFR BLD AUTO: 1.9 % (ref 0–2)
BILIRUB SERPL-MCNC: 0.4 MG/DL (ref 0.2–1.8)
BUN BLD-MCNC: 14 MG/DL (ref 7–21)
BUN/CREAT SERPL: 20 (ref 7–25)
CALCIUM SPEC-SCNC: 8.7 MG/DL (ref 7.7–10)
CHLORIDE SERPL-SCNC: 108 MMOL/L (ref 99–112)
CO2 SERPL-SCNC: 22 MMOL/L (ref 24.3–31.9)
CREAT BLD-MCNC: 0.7 MG/DL (ref 0.43–1.29)
DEPRECATED RDW RBC AUTO: 49.1 FL (ref 37–54)
EOSINOPHIL # BLD AUTO: 0.42 10*3/MM3 (ref 0–0.7)
EOSINOPHIL NFR BLD AUTO: 6 % (ref 0–5)
ERYTHROCYTE [DISTWIDTH] IN BLOOD BY AUTOMATED COUNT: 15.5 % (ref 11.5–14.5)
GFR SERPL CREATININE-BSD FRML MDRD: 88 ML/MIN/1.73
GLOBULIN UR ELPH-MCNC: 3 GM/DL
GLUCOSE BLD-MCNC: 238 MG/DL (ref 70–110)
HCT VFR BLD AUTO: 34.1 % (ref 37–47)
HGB BLD-MCNC: 11.2 G/DL (ref 12–16)
IMM GRANULOCYTES # BLD: 0.02 10*3/MM3 (ref 0–0.03)
IMM GRANULOCYTES NFR BLD: 0.3 % (ref 0–0.5)
LYMPHOCYTES # BLD AUTO: 1.89 10*3/MM3 (ref 1–3)
LYMPHOCYTES NFR BLD AUTO: 27.1 % (ref 21–51)
MCH RBC QN AUTO: 28.9 PG (ref 27–33)
MCHC RBC AUTO-ENTMCNC: 32.8 G/DL (ref 33–37)
MCV RBC AUTO: 87.9 FL (ref 80–94)
MONOCYTES # BLD AUTO: 0.52 10*3/MM3 (ref 0.1–0.9)
MONOCYTES NFR BLD AUTO: 7.4 % (ref 0–10)
NEUTROPHILS # BLD AUTO: 4 10*3/MM3 (ref 1.4–6.5)
NEUTROPHILS NFR BLD AUTO: 57.3 % (ref 30–70)
OSMOLALITY SERPL CALC.SUM OF ELEC: 287.6 MOSM/KG (ref 273–305)
PHOSPHATE SERPL-MCNC: 2.4 MG/DL (ref 2.7–4.5)
PLATELET # BLD AUTO: 267 10*3/MM3 (ref 130–400)
PMV BLD AUTO: 11.9 FL (ref 6–10)
POTASSIUM BLD-SCNC: 4.4 MMOL/L (ref 3.5–5.3)
PROT SERPL-MCNC: 6.8 G/DL (ref 6–8)
RBC # BLD AUTO: 3.88 10*6/MM3 (ref 4.2–5.4)
SODIUM BLD-SCNC: 140 MMOL/L (ref 135–153)
WBC NRBC COR # BLD: 6.98 10*3/MM3 (ref 4.5–12.5)

## 2017-05-30 PROCEDURE — 36415 COLL VENOUS BLD VENIPUNCTURE: CPT

## 2017-05-30 PROCEDURE — 80053 COMPREHEN METABOLIC PANEL: CPT | Performed by: INTERNAL MEDICINE

## 2017-05-30 PROCEDURE — 36415 COLL VENOUS BLD VENIPUNCTURE: CPT | Performed by: INTERNAL MEDICINE

## 2017-05-30 PROCEDURE — 84100 ASSAY OF PHOSPHORUS: CPT | Performed by: INTERNAL MEDICINE

## 2017-05-30 PROCEDURE — 85025 COMPLETE CBC W/AUTO DIFF WBC: CPT | Performed by: INTERNAL MEDICINE

## 2017-05-31 ENCOUNTER — OFFICE VISIT (OUTPATIENT)
Dept: ONCOLOGY | Facility: CLINIC | Age: 52
End: 2017-05-31

## 2017-05-31 VITALS
SYSTOLIC BLOOD PRESSURE: 157 MMHG | BODY MASS INDEX: 41.67 KG/M2 | OXYGEN SATURATION: 98 % | DIASTOLIC BLOOD PRESSURE: 79 MMHG | WEIGHT: 258.2 LBS | TEMPERATURE: 98.6 F | HEART RATE: 98 BPM | RESPIRATION RATE: 20 BRPM

## 2017-05-31 DIAGNOSIS — C92.10 CML (CHRONIC MYELOCYTIC LEUKEMIA) (HCC): Primary | ICD-10-CM

## 2017-05-31 PROCEDURE — 99215 OFFICE O/P EST HI 40 MIN: CPT | Performed by: INTERNAL MEDICINE

## 2017-06-06 ENCOUNTER — HOSPITAL ENCOUNTER (OUTPATIENT)
Dept: CARDIOLOGY | Facility: HOSPITAL | Age: 52
Discharge: HOME | End: 2017-06-06
Attending: INTERNAL MEDICINE | Admitting: INTERNAL MEDICINE

## 2017-06-06 DIAGNOSIS — C92.10 CML (CHRONIC MYELOCYTIC LEUKEMIA) (HCC): ICD-10-CM

## 2017-06-06 LAB
BH CV ECHO MEAS - % IVS THICK: 26.4 %
BH CV ECHO MEAS - % LVPW THICK: 74.5 %
BH CV ECHO MEAS - ACS: 2.1 CM
BH CV ECHO MEAS - AO ROOT AREA (BSA CORRECTED): 1.3
BH CV ECHO MEAS - AO ROOT AREA: 6.6 CM^2
BH CV ECHO MEAS - AO ROOT DIAM: 2.9 CM
BH CV ECHO MEAS - BSA(HAYCOCK): 2.4 M^2
BH CV ECHO MEAS - BSA: 2.2 M^2
BH CV ECHO MEAS - BZI_BMI: 41.6 KILOGRAMS/M^2
BH CV ECHO MEAS - BZI_METRIC_HEIGHT: 167.6 CM
BH CV ECHO MEAS - BZI_METRIC_WEIGHT: 117 KG
BH CV ECHO MEAS - CONTRAST EF 4CH: 60.5 ML/M^2
BH CV ECHO MEAS - EDV(CUBED): 94.9 ML
BH CV ECHO MEAS - EDV(MOD-SP4): 43 ML
BH CV ECHO MEAS - EDV(TEICH): 95.4 ML
BH CV ECHO MEAS - EF(CUBED): 67 %
BH CV ECHO MEAS - EF(MOD-SP4): 60.5 %
BH CV ECHO MEAS - EF(TEICH): 58.7 %
BH CV ECHO MEAS - ESV(CUBED): 31.3 ML
BH CV ECHO MEAS - ESV(MOD-SP4): 17 ML
BH CV ECHO MEAS - ESV(TEICH): 39.5 ML
BH CV ECHO MEAS - FS: 30.9 %
BH CV ECHO MEAS - IVS/LVPW: 1.1
BH CV ECHO MEAS - IVSD: 1.1 CM
BH CV ECHO MEAS - IVSS: 1.4 CM
BH CV ECHO MEAS - LA DIMENSION: 3.2 CM
BH CV ECHO MEAS - LA/AO: 1.1
BH CV ECHO MEAS - LV DIASTOLIC VOL/BSA (35-75): 19.3 ML/M^2
BH CV ECHO MEAS - LV MASS(C)D: 174 GRAMS
BH CV ECHO MEAS - LV MASS(C)DI: 78.1 GRAMS/M^2
BH CV ECHO MEAS - LV MASS(C)S: 190.4 GRAMS
BH CV ECHO MEAS - LV MASS(C)SI: 85.4 GRAMS/M^2
BH CV ECHO MEAS - LV SYSTOLIC VOL/BSA (12-30): 7.6 ML/M^2
BH CV ECHO MEAS - LVIDD: 4.6 CM
BH CV ECHO MEAS - LVIDS: 3.2 CM
BH CV ECHO MEAS - LVLD AP4: 7.3 CM
BH CV ECHO MEAS - LVLS AP4: 6.7 CM
BH CV ECHO MEAS - LVOT AREA (M): 3.1 CM^2
BH CV ECHO MEAS - LVOT AREA: 3.2 CM^2
BH CV ECHO MEAS - LVOT DIAM: 2 CM
BH CV ECHO MEAS - LVPWD: 1 CM
BH CV ECHO MEAS - LVPWS: 1.8 CM
BH CV ECHO MEAS - MV A MAX VEL: 75 CM/SEC
BH CV ECHO MEAS - MV E MAX VEL: 99.7 CM/SEC
BH CV ECHO MEAS - MV E/A: 1.3
BH CV ECHO MEAS - PA ACC SLOPE: 963.8 CM/SEC^2
BH CV ECHO MEAS - PA ACC TIME: 0.14 SEC
BH CV ECHO MEAS - PA PR(ACCEL): 15.6 MMHG
BH CV ECHO MEAS - RAP SYSTOLE: 10 MMHG
BH CV ECHO MEAS - RVSP: 33.3 MMHG
BH CV ECHO MEAS - SI(CUBED): 28.6 ML/M^2
BH CV ECHO MEAS - SI(MOD-SP4): 11.7 ML/M^2
BH CV ECHO MEAS - SI(TEICH): 25.1 ML/M^2
BH CV ECHO MEAS - SV(CUBED): 63.6 ML
BH CV ECHO MEAS - SV(MOD-SP4): 26 ML
BH CV ECHO MEAS - SV(TEICH): 56 ML
BH CV ECHO MEAS - TR MAX VEL: 241.3 CM/SEC

## 2017-06-06 PROCEDURE — 93306 TTE W/DOPPLER COMPLETE: CPT

## 2017-06-06 PROCEDURE — 93306 TTE W/DOPPLER COMPLETE: CPT | Performed by: INTERNAL MEDICINE

## 2017-06-07 ENCOUNTER — OFFICE VISIT (OUTPATIENT)
Dept: ONCOLOGY | Facility: CLINIC | Age: 52
End: 2017-06-07

## 2017-06-07 ENCOUNTER — LAB (OUTPATIENT)
Dept: ONCOLOGY | Facility: CLINIC | Age: 52
End: 2017-06-07

## 2017-06-07 VITALS
HEART RATE: 93 BPM | TEMPERATURE: 98.8 F | WEIGHT: 257 LBS | DIASTOLIC BLOOD PRESSURE: 80 MMHG | SYSTOLIC BLOOD PRESSURE: 143 MMHG | OXYGEN SATURATION: 97 % | RESPIRATION RATE: 20 BRPM | BODY MASS INDEX: 41.48 KG/M2

## 2017-06-07 DIAGNOSIS — C92.10 CML (CHRONIC MYELOCYTIC LEUKEMIA) (HCC): Primary | ICD-10-CM

## 2017-06-07 DIAGNOSIS — C92.10 CML (CHRONIC MYELOCYTIC LEUKEMIA) (HCC): ICD-10-CM

## 2017-06-07 LAB
ALBUMIN SERPL-MCNC: 3.8 G/DL (ref 3.5–5)
ALBUMIN/GLOB SERPL: 1.2 G/DL (ref 1.5–2.5)
ALP SERPL-CCNC: 74 U/L (ref 35–104)
ALT SERPL W P-5'-P-CCNC: 22 U/L (ref 10–36)
ANION GAP SERPL CALCULATED.3IONS-SCNC: 5 MMOL/L (ref 3.6–11.2)
AST SERPL-CCNC: 16 U/L (ref 10–30)
BASOPHILS # BLD AUTO: 0.02 10*3/MM3 (ref 0–0.3)
BASOPHILS NFR BLD AUTO: 0.4 % (ref 0–2)
BILIRUB SERPL-MCNC: 0.5 MG/DL (ref 0.2–1.8)
BUN BLD-MCNC: 13 MG/DL (ref 7–21)
BUN/CREAT SERPL: 16.5 (ref 7–25)
CALCIUM SPEC-SCNC: 9.4 MG/DL (ref 7.7–10)
CHLORIDE SERPL-SCNC: 103 MMOL/L (ref 99–112)
CO2 SERPL-SCNC: 27 MMOL/L (ref 24.3–31.9)
CREAT BLD-MCNC: 0.79 MG/DL (ref 0.43–1.29)
DEPRECATED RDW RBC AUTO: 53.2 FL (ref 37–54)
EOSINOPHIL # BLD AUTO: 0.07 10*3/MM3 (ref 0–0.7)
EOSINOPHIL NFR BLD AUTO: 1.3 % (ref 0–5)
ERYTHROCYTE [DISTWIDTH] IN BLOOD BY AUTOMATED COUNT: 16.3 % (ref 11.5–14.5)
GFR SERPL CREATININE-BSD FRML MDRD: 77 ML/MIN/1.73
GLOBULIN UR ELPH-MCNC: 3.1 GM/DL
GLUCOSE BLD-MCNC: 262 MG/DL (ref 70–110)
HCT VFR BLD AUTO: 33.4 % (ref 37–47)
HGB BLD-MCNC: 10.8 G/DL (ref 12–16)
IMM GRANULOCYTES # BLD: 0.01 10*3/MM3 (ref 0–0.03)
IMM GRANULOCYTES NFR BLD: 0.2 % (ref 0–0.5)
LYMPHOCYTES # BLD AUTO: 1.29 10*3/MM3 (ref 1–3)
LYMPHOCYTES NFR BLD AUTO: 23.2 % (ref 21–51)
MCH RBC QN AUTO: 29 PG (ref 27–33)
MCHC RBC AUTO-ENTMCNC: 32.3 G/DL (ref 33–37)
MCV RBC AUTO: 89.5 FL (ref 80–94)
MONOCYTES # BLD AUTO: 0.35 10*3/MM3 (ref 0.1–0.9)
MONOCYTES NFR BLD AUTO: 6.3 % (ref 0–10)
NEUTROPHILS # BLD AUTO: 3.83 10*3/MM3 (ref 1.4–6.5)
NEUTROPHILS NFR BLD AUTO: 68.6 % (ref 30–70)
OSMOLALITY SERPL CALC.SUM OF ELEC: 279.3 MOSM/KG (ref 273–305)
PHOSPHATE SERPL-MCNC: 3.1 MG/DL (ref 2.7–4.5)
PLATELET # BLD AUTO: 141 10*3/MM3 (ref 130–400)
PMV BLD AUTO: 10.7 FL (ref 6–10)
POTASSIUM BLD-SCNC: 4.3 MMOL/L (ref 3.5–5.3)
PROT SERPL-MCNC: 6.9 G/DL (ref 6–8)
RBC # BLD AUTO: 3.73 10*6/MM3 (ref 4.2–5.4)
SODIUM BLD-SCNC: 135 MMOL/L (ref 135–153)
WBC NRBC COR # BLD: 5.57 10*3/MM3 (ref 4.5–12.5)

## 2017-06-07 PROCEDURE — 99211 OFF/OP EST MAY X REQ PHY/QHP: CPT | Performed by: INTERNAL MEDICINE

## 2017-06-07 PROCEDURE — 80053 COMPREHEN METABOLIC PANEL: CPT | Performed by: INTERNAL MEDICINE

## 2017-06-07 PROCEDURE — 36415 COLL VENOUS BLD VENIPUNCTURE: CPT

## 2017-06-07 PROCEDURE — 84100 ASSAY OF PHOSPHORUS: CPT | Performed by: INTERNAL MEDICINE

## 2017-06-07 PROCEDURE — 36415 COLL VENOUS BLD VENIPUNCTURE: CPT | Performed by: INTERNAL MEDICINE

## 2017-06-07 PROCEDURE — 85025 COMPLETE CBC W/AUTO DIFF WBC: CPT | Performed by: INTERNAL MEDICINE

## 2017-06-07 NOTE — PROGRESS NOTES
"Date: 2017    Patient Name: Aric Haro   : 1965   ID: [id number]    Complaint: Patient arrived to clinic for scheduled acute visit and obtain labs      VS:   /80  Pulse 93  Temp 98.8 °F (37.1 °C) (Oral)   Resp 20  Wt 257 lb (117 kg)  SpO2 97%  BMI 41.48 kg/m2       Labs:       Lab Results   Component Value Date    WBC 5.57 2017    HGB 10.8 (L) 2017    HCT 33.4 (L) 2017    MCV 89.5 2017    RDW 16.3 (H) 2017     2017    NEUTRORELPCT 68.6 2017    LYMPHORELPCT 23.2 2017    MONORELPCT 6.3 2017    EOSRELPCT 1.3 2017    BASORELPCT 0.4 2017    NEUTROABS 3.83 2017    LYMPHSABS 1.29 2017       Lab Results   Component Value Date     2017    K 4.3 2017    CO2 27.0 2017     2017    BUN 13 2017    CREATININE 0.79 2017    GLUCOSE 262 (H) 2017    CALCIUM 9.4 2017    ALKPHOS 74 2017    AST 16 2017    ALT 22 2017    BILITOT 0.5 2017    ALBUMIN 3.80 2017    PROTEINTOT 6.9 2017    PHOS 3.1 2017       Lab Results   Component Value Date     (H) 2017    URICACID 5.5 2017        Imaging Results (last 24 hours)     ** No results found for the last 24 hours. **          Meds Due: None    Action: Patient states doing much better with Gleevec since she is able to take her protonix.  Continues to have trace of extremity edema and fatigue reported.  Patient also complained of \"sinus\" issues.  No fever or congestion reported.    Nurse: Annmarie Barber RN    Physician Notes:   Aric Haro is seen today for an acute visit. Agree with RN's assessment and plan as summarized above.      Electronically Signed By:     Date Signed: 17    "

## 2017-06-14 ENCOUNTER — LAB (OUTPATIENT)
Dept: ONCOLOGY | Facility: CLINIC | Age: 52
End: 2017-06-14

## 2017-06-14 DIAGNOSIS — C92.10 CML (CHRONIC MYELOCYTIC LEUKEMIA) (HCC): ICD-10-CM

## 2017-06-14 LAB
ALBUMIN SERPL-MCNC: 3.9 G/DL (ref 3.5–5)
ALBUMIN/GLOB SERPL: 1.3 G/DL (ref 1.5–2.5)
ALP SERPL-CCNC: 91 U/L (ref 35–104)
ALT SERPL W P-5'-P-CCNC: 17 U/L (ref 10–36)
ANION GAP SERPL CALCULATED.3IONS-SCNC: 6.1 MMOL/L (ref 3.6–11.2)
AST SERPL-CCNC: 20 U/L (ref 10–30)
BASOPHILS # BLD AUTO: 0.04 10*3/MM3 (ref 0–0.3)
BASOPHILS NFR BLD AUTO: 0.9 % (ref 0–2)
BILIRUB SERPL-MCNC: 0.3 MG/DL (ref 0.2–1.8)
BUN BLD-MCNC: 16 MG/DL (ref 7–21)
BUN/CREAT SERPL: 18.8 (ref 7–25)
CALCIUM SPEC-SCNC: 9.1 MG/DL (ref 7.7–10)
CHLORIDE SERPL-SCNC: 104 MMOL/L (ref 99–112)
CO2 SERPL-SCNC: 27.9 MMOL/L (ref 24.3–31.9)
CREAT BLD-MCNC: 0.85 MG/DL (ref 0.43–1.29)
DEPRECATED RDW RBC AUTO: 53.3 FL (ref 37–54)
EOSINOPHIL # BLD AUTO: 0.13 10*3/MM3 (ref 0–0.7)
EOSINOPHIL NFR BLD AUTO: 2.9 % (ref 0–5)
ERYTHROCYTE [DISTWIDTH] IN BLOOD BY AUTOMATED COUNT: 16 % (ref 11.5–14.5)
GFR SERPL CREATININE-BSD FRML MDRD: 71 ML/MIN/1.73
GLOBULIN UR ELPH-MCNC: 3.1 GM/DL
GLUCOSE BLD-MCNC: 258 MG/DL (ref 70–110)
HCT VFR BLD AUTO: 33.2 % (ref 37–47)
HGB BLD-MCNC: 10.7 G/DL (ref 12–16)
IMM GRANULOCYTES # BLD: 0.01 10*3/MM3 (ref 0–0.03)
IMM GRANULOCYTES NFR BLD: 0.2 % (ref 0–0.5)
LYMPHOCYTES # BLD AUTO: 1.42 10*3/MM3 (ref 1–3)
LYMPHOCYTES NFR BLD AUTO: 31.1 % (ref 21–51)
MCH RBC QN AUTO: 29.2 PG (ref 27–33)
MCHC RBC AUTO-ENTMCNC: 32.2 G/DL (ref 33–37)
MCV RBC AUTO: 90.5 FL (ref 80–94)
MONOCYTES # BLD AUTO: 0.33 10*3/MM3 (ref 0.1–0.9)
MONOCYTES NFR BLD AUTO: 7.2 % (ref 0–10)
NEUTROPHILS # BLD AUTO: 2.63 10*3/MM3 (ref 1.4–6.5)
NEUTROPHILS NFR BLD AUTO: 57.7 % (ref 30–70)
OSMOLALITY SERPL CALC.SUM OF ELEC: 285.7 MOSM/KG (ref 273–305)
PHOSPHATE SERPL-MCNC: 3 MG/DL (ref 2.7–4.5)
PLATELET # BLD AUTO: 232 10*3/MM3 (ref 130–400)
PMV BLD AUTO: 10.3 FL (ref 6–10)
POTASSIUM BLD-SCNC: 4.3 MMOL/L (ref 3.5–5.3)
PROT SERPL-MCNC: 7 G/DL (ref 6–8)
RBC # BLD AUTO: 3.67 10*6/MM3 (ref 4.2–5.4)
SODIUM BLD-SCNC: 138 MMOL/L (ref 135–153)
WBC NRBC COR # BLD: 4.56 10*3/MM3 (ref 4.5–12.5)

## 2017-06-14 PROCEDURE — 85025 COMPLETE CBC W/AUTO DIFF WBC: CPT | Performed by: INTERNAL MEDICINE

## 2017-06-14 PROCEDURE — 36415 COLL VENOUS BLD VENIPUNCTURE: CPT | Performed by: INTERNAL MEDICINE

## 2017-06-14 PROCEDURE — 80053 COMPREHEN METABOLIC PANEL: CPT | Performed by: INTERNAL MEDICINE

## 2017-06-14 PROCEDURE — 84100 ASSAY OF PHOSPHORUS: CPT | Performed by: INTERNAL MEDICINE

## 2017-06-16 LAB — KARYOTYP MAR: NORMAL

## 2017-06-21 ENCOUNTER — LAB (OUTPATIENT)
Dept: ONCOLOGY | Facility: CLINIC | Age: 52
End: 2017-06-21

## 2017-06-21 ENCOUNTER — OFFICE VISIT (OUTPATIENT)
Dept: ONCOLOGY | Facility: CLINIC | Age: 52
End: 2017-06-21

## 2017-06-21 VITALS
OXYGEN SATURATION: 97 % | SYSTOLIC BLOOD PRESSURE: 119 MMHG | HEART RATE: 89 BPM | RESPIRATION RATE: 18 BRPM | TEMPERATURE: 98.3 F | DIASTOLIC BLOOD PRESSURE: 70 MMHG

## 2017-06-21 DIAGNOSIS — C92.10 CML (CHRONIC MYELOCYTIC LEUKEMIA) (HCC): Primary | ICD-10-CM

## 2017-06-21 DIAGNOSIS — C92.10 CML (CHRONIC MYELOCYTIC LEUKEMIA) (HCC): ICD-10-CM

## 2017-06-21 LAB
ALBUMIN SERPL-MCNC: 4.2 G/DL (ref 3.5–5)
ALBUMIN/GLOB SERPL: 1.4 G/DL (ref 1.5–2.5)
ALP SERPL-CCNC: 101 U/L (ref 35–104)
ALT SERPL W P-5'-P-CCNC: 15 U/L (ref 10–36)
ANION GAP SERPL CALCULATED.3IONS-SCNC: 9.6 MMOL/L (ref 3.6–11.2)
AST SERPL-CCNC: 17 U/L (ref 10–30)
BASOPHILS # BLD AUTO: 0.03 10*3/MM3 (ref 0–0.3)
BASOPHILS NFR BLD AUTO: 0.5 % (ref 0–2)
BILIRUB SERPL-MCNC: 0.3 MG/DL (ref 0.2–1.8)
BUN BLD-MCNC: 15 MG/DL (ref 7–21)
BUN/CREAT SERPL: 19 (ref 7–25)
CALCIUM SPEC-SCNC: 9.5 MG/DL (ref 7.7–10)
CHLORIDE SERPL-SCNC: 108 MMOL/L (ref 99–112)
CO2 SERPL-SCNC: 24.4 MMOL/L (ref 24.3–31.9)
CREAT BLD-MCNC: 0.79 MG/DL (ref 0.43–1.29)
DEPRECATED RDW RBC AUTO: 52 FL (ref 37–54)
EOSINOPHIL # BLD AUTO: 0.09 10*3/MM3 (ref 0–0.7)
EOSINOPHIL NFR BLD AUTO: 1.5 % (ref 0–5)
ERYTHROCYTE [DISTWIDTH] IN BLOOD BY AUTOMATED COUNT: 16 % (ref 11.5–14.5)
GFR SERPL CREATININE-BSD FRML MDRD: 77 ML/MIN/1.73
GLOBULIN UR ELPH-MCNC: 2.9 GM/DL
GLUCOSE BLD-MCNC: 345 MG/DL (ref 70–110)
HCT VFR BLD AUTO: 34.9 % (ref 37–47)
HGB BLD-MCNC: 11.2 G/DL (ref 12–16)
IMM GRANULOCYTES # BLD: 0.01 10*3/MM3 (ref 0–0.03)
IMM GRANULOCYTES NFR BLD: 0.2 % (ref 0–0.5)
LYMPHOCYTES # BLD AUTO: 1.42 10*3/MM3 (ref 1–3)
LYMPHOCYTES NFR BLD AUTO: 23.7 % (ref 21–51)
MCH RBC QN AUTO: 29.6 PG (ref 27–33)
MCHC RBC AUTO-ENTMCNC: 32.1 G/DL (ref 33–37)
MCV RBC AUTO: 92.1 FL (ref 80–94)
MONOCYTES # BLD AUTO: 0.39 10*3/MM3 (ref 0.1–0.9)
MONOCYTES NFR BLD AUTO: 6.5 % (ref 0–10)
NEUTROPHILS # BLD AUTO: 4.06 10*3/MM3 (ref 1.4–6.5)
NEUTROPHILS NFR BLD AUTO: 67.6 % (ref 30–70)
OSMOLALITY SERPL CALC.SUM OF ELEC: 297.6 MOSM/KG (ref 273–305)
PHOSPHATE SERPL-MCNC: 3.1 MG/DL (ref 2.7–4.5)
PLATELET # BLD AUTO: 331 10*3/MM3 (ref 130–400)
PMV BLD AUTO: 9.9 FL (ref 6–10)
POTASSIUM BLD-SCNC: 4.6 MMOL/L (ref 3.5–5.3)
PROT SERPL-MCNC: 7.1 G/DL (ref 6–8)
RBC # BLD AUTO: 3.79 10*6/MM3 (ref 4.2–5.4)
SODIUM BLD-SCNC: 142 MMOL/L (ref 135–153)
WBC NRBC COR # BLD: 6 10*3/MM3 (ref 4.5–12.5)

## 2017-06-21 PROCEDURE — 80053 COMPREHEN METABOLIC PANEL: CPT | Performed by: INTERNAL MEDICINE

## 2017-06-21 PROCEDURE — 99211 OFF/OP EST MAY X REQ PHY/QHP: CPT | Performed by: INTERNAL MEDICINE

## 2017-06-21 PROCEDURE — 85025 COMPLETE CBC W/AUTO DIFF WBC: CPT | Performed by: INTERNAL MEDICINE

## 2017-06-21 PROCEDURE — 84100 ASSAY OF PHOSPHORUS: CPT | Performed by: INTERNAL MEDICINE

## 2017-06-21 PROCEDURE — 36415 COLL VENOUS BLD VENIPUNCTURE: CPT | Performed by: INTERNAL MEDICINE

## 2017-06-21 NOTE — PROGRESS NOTES
Pt presented today for an acute visit and lab draw. Pt stated that her only complaint is she has occasional fatigue and would like to see if there is an over the counter vitamin she can take for energy or to keep from feeling so fatigued. She also reported mild but tolerable leg cramps. I have discussed this assessment with Dr. Julian. He said it would be ok for pt to take a women's multivitamin daily and as long as she can tolerate her mild occasional leg cramps, to keep taking her oral chemo regimen. He also wants pt to keep her appointments with Dr. Vaughn. I have discussed this with the patient and also gave her some educational material on the side effects of her medication, including leg cramps and fatigue. Patient verbalized understanding.    Agree with RN's assessment and plan as summarized above.

## 2017-06-28 ENCOUNTER — OFFICE VISIT (OUTPATIENT)
Dept: ONCOLOGY | Facility: CLINIC | Age: 52
End: 2017-06-28

## 2017-06-28 ENCOUNTER — LAB (OUTPATIENT)
Dept: ONCOLOGY | Facility: CLINIC | Age: 52
End: 2017-06-28

## 2017-06-28 VITALS
HEART RATE: 92 BPM | WEIGHT: 257.4 LBS | RESPIRATION RATE: 18 BRPM | SYSTOLIC BLOOD PRESSURE: 158 MMHG | DIASTOLIC BLOOD PRESSURE: 80 MMHG | BODY MASS INDEX: 41.55 KG/M2 | TEMPERATURE: 98.6 F | OXYGEN SATURATION: 97 %

## 2017-06-28 DIAGNOSIS — C92.10 CML (CHRONIC MYELOCYTIC LEUKEMIA) (HCC): Primary | ICD-10-CM

## 2017-06-28 DIAGNOSIS — D75.839 THROMBOCYTOSIS: ICD-10-CM

## 2017-06-28 DIAGNOSIS — D72.821 MONOCYTOSIS: ICD-10-CM

## 2017-06-28 DIAGNOSIS — C92.10 CML (CHRONIC MYELOCYTIC LEUKEMIA) (HCC): ICD-10-CM

## 2017-06-28 LAB
ALBUMIN SERPL-MCNC: 4.2 G/DL (ref 3.5–5)
ALBUMIN/GLOB SERPL: 1.4 G/DL (ref 1.5–2.5)
ALP SERPL-CCNC: 87 U/L (ref 35–104)
ALT SERPL W P-5'-P-CCNC: 19 U/L (ref 10–36)
ANION GAP SERPL CALCULATED.3IONS-SCNC: 6.5 MMOL/L (ref 3.6–11.2)
AST SERPL-CCNC: 21 U/L (ref 10–30)
BASOPHILS # BLD AUTO: 0.03 10*3/MM3 (ref 0–0.3)
BASOPHILS NFR BLD AUTO: 0.5 % (ref 0–2)
BILIRUB SERPL-MCNC: 0.4 MG/DL (ref 0.2–1.8)
BUN BLD-MCNC: 14 MG/DL (ref 7–21)
BUN/CREAT SERPL: 19.2 (ref 7–25)
CALCIUM SPEC-SCNC: 9 MG/DL (ref 7.7–10)
CHLORIDE SERPL-SCNC: 106 MMOL/L (ref 99–112)
CO2 SERPL-SCNC: 22.5 MMOL/L (ref 24.3–31.9)
CREAT BLD-MCNC: 0.73 MG/DL (ref 0.43–1.29)
DEPRECATED RDW RBC AUTO: 49.9 FL (ref 37–54)
EOSINOPHIL # BLD AUTO: 0.06 10*3/MM3 (ref 0–0.7)
EOSINOPHIL NFR BLD AUTO: 1.1 % (ref 0–5)
ERYTHROCYTE [DISTWIDTH] IN BLOOD BY AUTOMATED COUNT: 15.6 % (ref 11.5–14.5)
GFR SERPL CREATININE-BSD FRML MDRD: 84 ML/MIN/1.73
GLOBULIN UR ELPH-MCNC: 2.9 GM/DL
GLUCOSE BLD-MCNC: 238 MG/DL (ref 70–110)
HCT VFR BLD AUTO: 33.6 % (ref 37–47)
HGB BLD-MCNC: 11 G/DL (ref 12–16)
IMM GRANULOCYTES # BLD: 0.01 10*3/MM3 (ref 0–0.03)
IMM GRANULOCYTES NFR BLD: 0.2 % (ref 0–0.5)
LYMPHOCYTES # BLD AUTO: 1.64 10*3/MM3 (ref 1–3)
LYMPHOCYTES NFR BLD AUTO: 29 % (ref 21–51)
MCH RBC QN AUTO: 29.3 PG (ref 27–33)
MCHC RBC AUTO-ENTMCNC: 32.7 G/DL (ref 33–37)
MCV RBC AUTO: 89.4 FL (ref 80–94)
MONOCYTES # BLD AUTO: 0.38 10*3/MM3 (ref 0.1–0.9)
MONOCYTES NFR BLD AUTO: 6.7 % (ref 0–10)
NEUTROPHILS # BLD AUTO: 3.54 10*3/MM3 (ref 1.4–6.5)
NEUTROPHILS NFR BLD AUTO: 62.5 % (ref 30–70)
OSMOLALITY SERPL CALC.SUM OF ELEC: 278.3 MOSM/KG (ref 273–305)
PLATELET # BLD AUTO: 227 10*3/MM3 (ref 130–400)
PMV BLD AUTO: 10.5 FL (ref 6–10)
POTASSIUM BLD-SCNC: 4.1 MMOL/L (ref 3.5–5.3)
PROT SERPL-MCNC: 7.1 G/DL (ref 6–8)
RBC # BLD AUTO: 3.76 10*6/MM3 (ref 4.2–5.4)
SODIUM BLD-SCNC: 135 MMOL/L (ref 135–153)
WBC NRBC COR # BLD: 5.66 10*3/MM3 (ref 4.5–12.5)

## 2017-06-28 PROCEDURE — 80053 COMPREHEN METABOLIC PANEL: CPT | Performed by: INTERNAL MEDICINE

## 2017-06-28 PROCEDURE — 99214 OFFICE O/P EST MOD 30 MIN: CPT | Performed by: INTERNAL MEDICINE

## 2017-06-28 PROCEDURE — 36415 COLL VENOUS BLD VENIPUNCTURE: CPT | Performed by: INTERNAL MEDICINE

## 2017-06-28 PROCEDURE — 85025 COMPLETE CBC W/AUTO DIFF WBC: CPT | Performed by: INTERNAL MEDICINE

## 2017-06-28 NOTE — PROGRESS NOTES
Aric Haro  [id number]  1965  6/29/2017      Referring Provider:   EILEEN Andrade    Reason for Follow Up:   1. Chronic Phase - Chronic Myelogenous Leukemia  2. Leukocytosis  3. Thrombocytosis     Chief Complaint:  Fatigue  Muscle Cramps    History of Present Illness:  Aric Haro is a very pleasant 51 y.o.  female who presents in follow up appointment for further management and treatment of chronic phase chronic myelogenous leukemia (CML).    Ms. Haro began experiencing extreme fatigue where she was sleeping multiple hours during the day when she initially began following with me in April 2017. 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 On laboratory evaluation she was found to have a total white blood cell count of 22.35 and a platelet count 470 thousand. In March 2016 she was also noted to have a leukocytosis (although not as elevated) as well as a thrombocytosis, however reports that these were likely secondary to other medical issues at the time her blood work was drawn. She denied of any significant weight loss however has been gradually losing weight since gastric sleeve procedure. She denied of any fevers or frequent infections but did note fluctuating neck lymphadenopathy as well as early satiety and taste in change. She denied of any abnormal or spontaneous bleeding. I did obtain a BCR ABL PCR to further assess her leukocytosis and thrombocytosis and she was positive for the b2a2/b3a2 (p210) and e1a2 (p190) fusion gene transcripts consistent with a diagnosis for CML. After reviewing the toxicities of each tyrosine kinase inhibitor we decided to start Dasatinib treatment. She had a difficult time tolerating the Dasatinib as she was unable to take her PPI with this medication. Since she had to be taken off of her PPI she had worsening reflux  symptoms as well as severe nausea, vomiting, dehydration, and headaches during this period. Given the toxicities she was experiencing she was taken off Dasatinib and this was changed to Gleevac treatment. Since starting Gleevac 400mg daily she has tolerated this much better without significant side effects. The only side effect that she has been able to see is intermittent pedal edema along with some hand swelling and muscle cramps.       Treatment History:  Started Dasatinib on 05/15/17 - experienced nausea, severe reflux, headaches while on medication and had taken 9 doses. This was discontinued due to intolerability and she was thereafter started on Imatinib.   Started Imatinib on 5/26/17    Interim History:  Since the start of the medications her complete blood count continues to respond. While her fatigue is overall improved she reports a different fatigue however is able to go about her daily activities. She does note minor swelling/edema in her upper and lower extremities as well as muscle cramps.    The following portions of the patient's history were reviewed and updated as appropriate: allergies, current medications, past family history, past medical history, past social history, past surgical history and problem list.      No Known Allergies    Past Medical History:   Diagnosis Date   • Anemia    • Diabetes mellitus    • Hypertension        Past Surgical History:   Procedure Laterality Date   • SINUS SURGERY     • SLEEVE GASTROPLASTY         Social History     Social History   • Marital status:      Spouse name: N/A   • Number of children: N/A   • Years of education: N/A     Occupational History   • Not on file.     Social History Main Topics   • Smoking status: Former Smoker   • Smokeless tobacco: Never Used   • Alcohol use No   • Drug use: No   • Sexual activity: Not on file     Other Topics Concern   • Not on file     Social History Narrative   She lives with her daughter. She denies of any  alcohol or illicit drug use. Previous social tobacco use more then 20 years ago.    Family History   Problem Relation Age of Onset   • Anemia Mother    • Hypertension Mother    • Cancer Mother    • COPD Father    • Heart disease Father    Maternal Uncle - CLL  Mother - Malignancy of [place]unction      Current Outpatient Prescriptions:   •  amoxicillin-clavulanate (AUGMENTIN) 875-125 MG per tablet, Take 1 tablet by mouth 2 (Two) Times a Day., Disp: 20 tablet, Rfl: 0  •  baclofen (LIORESAL) 20 MG tablet, Take 1 tablet by mouth 3 (Three) Times a Day., Disp: 90 tablet, Rfl: 4  •  diazePAM (VALIUM) 10 MG tablet, Take 1 tablet by mouth before procedure and daily as needed., Disp: 5 tablet, Rfl: 0  •  DULoxetine (CYMBALTA) 60 MG capsule, Take 1 capsule by mouth Daily., Disp: 30 capsule, Rfl: 3  •  fenofibrate 160 MG tablet, Take 1 tablet by mouth Daily., Disp: 90 tablet, Rfl: 3  •  fluconazole (DIFLUCAN) 200 MG tablet, Take 1 tablet by mouth Daily., Disp: 30 tablet, Rfl: 1  •  furosemide (LASIX) 40 MG tablet, Take 1 tablet by mouth Daily., Disp: 90 tablet, Rfl: 3  •  granisetron (KYTRIL) 1 MG tablet, Take 1 tablet by mouth Every 12 (Twelve) Hours As Needed for Nausea or Vomiting., Disp: 60 tablet, Rfl: 5  •  HYDROcodone-acetaminophen (NORCO)  MG per tablet, Take 1 tablet by mouth Every 4-6 Hours As Needed., Disp: 60 tablet, Rfl: 0  •  ibuprofen (ADVIL,MOTRIN) 800 MG tablet, Take 1 tablet by mouth 3 (three) times a day as needed for pain., Disp: 270 tablet, Rfl: 3  •  imatinib (GLEEVEC) 400 MG chemo tablet, Take 1 tablet by mouth Daily., Disp: 30 tablet, Rfl: 3  •  Insulin Glargine (LANTUS SOLOSTAR) 100 UNIT/ML injection pen, Inject 45 units subcutaneously 2 times daily, Disp: 30 mL, Rfl: 6  •  loratadine-pseudoephedrine (CLARITIN-D 12 HOUR) 5-120 MG per 12 hr tablet, Take 1 tablet by mouth two times a day, Disp: 30 tablet, Rfl: 0  •  pantoprazole (PROTONIX) 40 MG EC tablet, Take 1 tablet by mouth Daily., Disp: 90  tablet, Rfl: 3  •  prochlorperazine (COMPAZINE) 10 MG tablet, Take 1 tablet by mouth Every 6 (Six) Hours As Needed for Nausea or Vomiting., Disp: 60 tablet, Rfl: 5  •  sertraline (ZOLOFT) 50 MG tablet, Take 1 tablet by mouth Daily., Disp: 90 tablet, Rfl: 3  •  TRUETRACK TEST test strip, use to test blood sugar 8 times daily, Disp: 200 each, Rfl: 5        Review of Systems  Constitutional: No fever, chills, no night sweats, + gradual weight loss since sleeve procedure, +fatigue  HEENT:  +chronic headaches, vision changes or hearing changes, no sinus drainage, sore throat.   Cardiovascular:  No chest pain, +intermittant lower extremity edema.   Pulmonary:  No shortness of breath, no cough.   Gastrointestinal: no nausea, no vomiting. Intermittent constipation and diarrhea. No abdominal pain. No melena or hematochezia.   Genitourinary:  No hematuria, or changes in urination.   Musculoskeletal:  No pain, or joint problems. +muscle cramps  Skin: No rashes or pruritus.   Endocrine:  No hot flashes or chills   Hematologic:  No history of free bleeding, spontaneous bleeding or clotting problems.   Immunologic:  +seasonal allergies, no frequent infections.   Neurologic: No numbness, tingling, or weakness.         Physical Exam:  Vital Signs: These were reviewed and listed as per patient’s electronic medical chart  Vitals:    06/28/17 1448   BP: 158/80   Pulse: 92   Resp: 18   Temp: 98.6 °F (37 °C)   SpO2: 97%     General: Awake, alert and oriented, in no distress  HEENT: Head is atraumatic, normocephalic, extraocular movements full, oropharynx clear, no scleral icterus, pink moist mucous membranes  Neck: supple, no jvd, lymphadenopathy or masses  Cardiovascular: regular rate and rhythm without murmurs, rubs or gallops  Pulmonary: non-labored, clear to auscultation bilaterally, no wheezing  Abdomen: soft, non-tender, non-distended, normal active bowel sounds present  Extremities: No clubbing, cyanosis or edema  Lymph: No  cervical, supraclavicular adenopathy   Neurologic: Mental status as above, alert, awake and oriented, grossly non-focal exam  Skin: warm, dry, intact        Labs / Studies:    Lab on 06/28/2017   Component Date Value   • Glucose 06/28/2017 238*   • BUN 06/28/2017 14    • Creatinine 06/28/2017 0.73    • Sodium 06/28/2017 135    • Potassium 06/28/2017 4.1    • Chloride 06/28/2017 106    • CO2 06/28/2017 22.5*   • Calcium 06/28/2017 9.0    • Total Protein 06/28/2017 7.1    • Albumin 06/28/2017 4.20    • ALT (SGPT) 06/28/2017 19    • AST (SGOT) 06/28/2017 21    • Alkaline Phosphatase 06/28/2017 87    • Total Bilirubin 06/28/2017 0.4    • eGFR Non African Amer 06/28/2017 84    • Globulin 06/28/2017 2.9    • A/G Ratio 06/28/2017 1.4*   • BUN/Creatinine Ratio 06/28/2017 19.2    • Anion Gap 06/28/2017 6.5    • WBC 06/28/2017 5.66    • RBC 06/28/2017 3.76*   • Hemoglobin 06/28/2017 11.0*   • Hematocrit 06/28/2017 33.6*   • MCV 06/28/2017 89.4    • MCH 06/28/2017 29.3    • MCHC 06/28/2017 32.7*   • RDW 06/28/2017 15.6*   • RDW-SD 06/28/2017 49.9    • MPV 06/28/2017 10.5*   • Platelets 06/28/2017 227    • Neutrophil % 06/28/2017 62.5    • Lymphocyte % 06/28/2017 29.0    • Monocyte % 06/28/2017 6.7    • Eosinophil % 06/28/2017 1.1    • Basophil % 06/28/2017 0.5    • Immature Grans % 06/28/2017 0.2    • Neutrophils, Absolute 06/28/2017 3.54    • Lymphocytes, Absolute 06/28/2017 1.64    • Monocytes, Absolute 06/28/2017 0.38    • Eosinophils, Absolute 06/28/2017 0.06    • Basophils, Absolute 06/28/2017 0.03    • Immature Grans, Absolute 06/28/2017 0.01    • Osmolality Calc 06/28/2017 278.3    Lab on 06/21/2017   Component Date Value   • Glucose 06/21/2017 345*   • BUN 06/21/2017 15    • Creatinine 06/21/2017 0.79    • Sodium 06/21/2017 142    • Potassium 06/21/2017 4.6    • Chloride 06/21/2017 108    • CO2 06/21/2017 24.4    • Calcium 06/21/2017 9.5    • Total Protein 06/21/2017 7.1    • Albumin 06/21/2017 4.20    • ALT (SGPT)  06/21/2017 15    • AST (SGOT) 06/21/2017 17    • Alkaline Phosphatase 06/21/2017 101    • Total Bilirubin 06/21/2017 0.3    • eGFR Non  Amer 06/21/2017 77    • Globulin 06/21/2017 2.9    • A/G Ratio 06/21/2017 1.4*   • BUN/Creatinine Ratio 06/21/2017 19.0    • Anion Gap 06/21/2017 9.6    • Phosphorus 06/21/2017 3.1    • WBC 06/21/2017 6.00    • RBC 06/21/2017 3.79*   • Hemoglobin 06/21/2017 11.2*   • Hematocrit 06/21/2017 34.9*   • MCV 06/21/2017 92.1    • MCH 06/21/2017 29.6    • MCHC 06/21/2017 32.1*   • RDW 06/21/2017 16.0*   • RDW-SD 06/21/2017 52.0    • MPV 06/21/2017 9.9    • Platelets 06/21/2017 331    • Neutrophil % 06/21/2017 67.6    • Lymphocyte % 06/21/2017 23.7    • Monocyte % 06/21/2017 6.5    • Eosinophil % 06/21/2017 1.5    • Basophil % 06/21/2017 0.5    • Immature Grans % 06/21/2017 0.2    • Neutrophils, Absolute 06/21/2017 4.06    • Lymphocytes, Absolute 06/21/2017 1.42    • Monocytes, Absolute 06/21/2017 0.39    • Eosinophils, Absolute 06/21/2017 0.09    • Basophils, Absolute 06/21/2017 0.03    • Immature Grans, Absolute 06/21/2017 0.01    • Osmolality Calc 06/21/2017 297.6    Lab on 06/14/2017   Component Date Value   • Glucose 06/14/2017 258*   • BUN 06/14/2017 16    • Creatinine 06/14/2017 0.85    • Sodium 06/14/2017 138    • Potassium 06/14/2017 4.3    • Chloride 06/14/2017 104    • CO2 06/14/2017 27.9    • Calcium 06/14/2017 9.1    • Total Protein 06/14/2017 7.0    • Albumin 06/14/2017 3.90    • ALT (SGPT) 06/14/2017 17    • AST (SGOT) 06/14/2017 20    • Alkaline Phosphatase 06/14/2017 91    • Total Bilirubin 06/14/2017 0.3    • eGFR Non  Amer 06/14/2017 71    • Globulin 06/14/2017 3.1    • A/G Ratio 06/14/2017 1.3*   • BUN/Creatinine Ratio 06/14/2017 18.8    • Anion Gap 06/14/2017 6.1    • Phosphorus 06/14/2017 3.0    • WBC 06/14/2017 4.56    • RBC 06/14/2017 3.67*   • Hemoglobin 06/14/2017 10.7*   • Hematocrit 06/14/2017 33.2*   • MCV 06/14/2017 90.5    • MCH 06/14/2017 29.2     • MCHC 06/14/2017 32.2*   • RDW 06/14/2017 16.0*   • RDW-SD 06/14/2017 53.3    • MPV 06/14/2017 10.3*   • Platelets 06/14/2017 232    • Neutrophil % 06/14/2017 57.7    • Lymphocyte % 06/14/2017 31.1    • Monocyte % 06/14/2017 7.2    • Eosinophil % 06/14/2017 2.9    • Basophil % 06/14/2017 0.9    • Immature Grans % 06/14/2017 0.2    • Neutrophils, Absolute 06/14/2017 2.63    • Lymphocytes, Absolute 06/14/2017 1.42    • Monocytes, Absolute 06/14/2017 0.33    • Eosinophils, Absolute 06/14/2017 0.13    • Basophils, Absolute 06/14/2017 0.04    • Immature Grans, Absolute 06/14/2017 0.01    • Osmolality Calc 06/14/2017 285.7    Lab on 06/07/2017   Component Date Value   • Glucose 06/07/2017 262*   • BUN 06/07/2017 13    • Creatinine 06/07/2017 0.79    • Sodium 06/07/2017 135    • Potassium 06/07/2017 4.3    • Chloride 06/07/2017 103    • CO2 06/07/2017 27.0    • Calcium 06/07/2017 9.4    • Total Protein 06/07/2017 6.9    • Albumin 06/07/2017 3.80    • ALT (SGPT) 06/07/2017 22    • AST (SGOT) 06/07/2017 16    • Alkaline Phosphatase 06/07/2017 74    • Total Bilirubin 06/07/2017 0.5    • eGFR Non  Amer 06/07/2017 77    • Globulin 06/07/2017 3.1    • A/G Ratio 06/07/2017 1.2*   • BUN/Creatinine Ratio 06/07/2017 16.5    • Anion Gap 06/07/2017 5.0    • Phosphorus 06/07/2017 3.1    • WBC 06/07/2017 5.57    • RBC 06/07/2017 3.73*   • Hemoglobin 06/07/2017 10.8*   • Hematocrit 06/07/2017 33.4*   • MCV 06/07/2017 89.5    • MCH 06/07/2017 29.0    • MCHC 06/07/2017 32.3*   • RDW 06/07/2017 16.3*   • RDW-SD 06/07/2017 53.2    • MPV 06/07/2017 10.7*   • Platelets 06/07/2017 141    • Neutrophil % 06/07/2017 68.6    • Lymphocyte % 06/07/2017 23.2    • Monocyte % 06/07/2017 6.3    • Eosinophil % 06/07/2017 1.3    • Basophil % 06/07/2017 0.4    • Immature Grans % 06/07/2017 0.2    • Neutrophils, Absolute 06/07/2017 3.83    • Lymphocytes, Absolute 06/07/2017 1.29    • Monocytes, Absolute 06/07/2017 0.35    • Eosinophils, Absolute  06/07/2017 0.07    • Basophils, Absolute 06/07/2017 0.02    • Immature Grans, Absolute 06/07/2017 0.01    • Osmolality Calc 06/07/2017 279.3    Hospital Outpatient Visit on 06/06/2017   Component Date Value   • BSA 06/06/2017 2.2    • IVSd 06/06/2017 1.1    • IVSs 06/06/2017 1.4    • LVIDd 06/06/2017 4.6    • LVIDs 06/06/2017 3.2    • LVPWd 06/06/2017 1.0    •  CV ECHO SHARON - LVPWS 06/06/2017 1.8    • IVS/LVPW 06/06/2017 1.1    • FS 06/06/2017 30.9    • EDV(Teich) 06/06/2017 95.4    • ESV(Teich) 06/06/2017 39.5    • EF(Teich) 06/06/2017 58.7    • EDV(cubed) 06/06/2017 94.9    • ESV(cubed) 06/06/2017 31.3    • EF(cubed) 06/06/2017 67.0    • % IVS thick 06/06/2017 26.4    • % LVPW thick 06/06/2017 74.5    • LV mass(C)d 06/06/2017 174.0    • LV mass(C)dI 06/06/2017 78.1    • LV mass(C)s 06/06/2017 190.4    • LV mass(C)sI 06/06/2017 85.4    • SV(Teich) 06/06/2017 56.0    • SI(Teich) 06/06/2017 25.1    • SV(cubed) 06/06/2017 63.6    • SI(cubed) 06/06/2017 28.6    • Ao root diam 06/06/2017 2.9    • Ao root area 06/06/2017 6.6    • ACS 06/06/2017 2.1    • LA dimension 06/06/2017 3.2    • LA/Ao 06/06/2017 1.1    • LVOT diam 06/06/2017 2.0    • LVOT area 06/06/2017 3.2    • LVOT area(traced) 06/06/2017 3.1    • LVLd ap4 06/06/2017 7.3    • EDV(MOD-sp4) 06/06/2017 43.0    • LVLs ap4 06/06/2017 6.7    • ESV(MOD-sp4) 06/06/2017 17.0    • EF(MOD-sp4) 06/06/2017 60.5    • SV(MOD-sp4) 06/06/2017 26.0    • SI(MOD-sp4) 06/06/2017 11.7    • Ao root area (BSA correc* 06/06/2017 1.3    • CONTRAST EF 4CH 06/06/2017 60.5    • LV Diastolic corrected f* 06/06/2017 19.3    • LV Systolic corrected fo* 06/06/2017 7.6    • MV E max bernarda 06/06/2017 99.7    • MV A max bernarda 06/06/2017 75.0    • MV E/A 06/06/2017 1.3    • PA acc slope 06/06/2017 963.8    • PA acc time 06/06/2017 0.14    • TR max bernarda 06/06/2017 241.3    • RVSP(TR) 06/06/2017 33.3    • RAP systole 06/06/2017 10.0    • PA pr(Accel) 06/06/2017 15.6    • BH CV ECHO SHARON - BZI_BMI  06/06/2017 41.6    •  CV ECHO SHARON - BSA(HA* 06/06/2017 2.4    •  CV ECHO SHARON - BZI_ME* 06/06/2017 117.0    •  CV ECHO SHARON - BZI_ME* 06/06/2017 167.6    Lab on 05/30/2017   Component Date Value   • Glucose 05/30/2017 238*   • BUN 05/30/2017 14    • Creatinine 05/30/2017 0.70    • Sodium 05/30/2017 140    • Potassium 05/30/2017 4.4    • Chloride 05/30/2017 108    • CO2 05/30/2017 22.0*   • Calcium 05/30/2017 8.7    • Total Protein 05/30/2017 6.8    • Albumin 05/30/2017 3.80    • ALT (SGPT) 05/30/2017 23    • AST (SGOT) 05/30/2017 22    • Alkaline Phosphatase 05/30/2017 63    • Total Bilirubin 05/30/2017 0.4    • eGFR Non African Amer 05/30/2017 88    • Globulin 05/30/2017 3.0    • A/G Ratio 05/30/2017 1.3*   • BUN/Creatinine Ratio 05/30/2017 20.0    • Anion Gap 05/30/2017 10.0    • Phosphorus 05/30/2017 2.4*   • WBC 05/30/2017 6.98    • RBC 05/30/2017 3.88*   • Hemoglobin 05/30/2017 11.2*   • Hematocrit 05/30/2017 34.1*   • MCV 05/30/2017 87.9    • MCH 05/30/2017 28.9    • MCHC 05/30/2017 32.8*   • RDW 05/30/2017 15.5*   • RDW-SD 05/30/2017 49.1    • MPV 05/30/2017 11.9*   • Platelets 05/30/2017 267    • Neutrophil % 05/30/2017 57.3    • Lymphocyte % 05/30/2017 27.1    • Monocyte % 05/30/2017 7.4    • Eosinophil % 05/30/2017 6.0*   • Basophil % 05/30/2017 1.9    • Immature Grans % 05/30/2017 0.3    • Neutrophils, Absolute 05/30/2017 4.00    • Lymphocytes, Absolute 05/30/2017 1.89    • Monocytes, Absolute 05/30/2017 0.52    • Eosinophils, Absolute 05/30/2017 0.42    • Basophils, Absolute 05/30/2017 0.13    • Immature Grans, Absolute 05/30/2017 0.02    • Osmolality Calc 05/30/2017 287.6    Office Visit on 05/22/2017   Component Date Value   • Glucose 05/22/2017 302*   • BUN 05/22/2017 21    • Creatinine 05/22/2017 0.86    • Sodium 05/22/2017 136    • Potassium 05/22/2017 4.2    • Chloride 05/22/2017 102    • CO2 05/22/2017 23.6*   • Calcium 05/22/2017 10.3*   • Total Protein 05/22/2017 7.2    • Albumin  05/22/2017 4.20    • ALT (SGPT) 05/22/2017 22    • AST (SGOT) 05/22/2017 20    • Alkaline Phosphatase 05/22/2017 61    • Total Bilirubin 05/22/2017 0.3    • eGFR Non  Amer 05/22/2017 70    • Globulin 05/22/2017 3.0    • A/G Ratio 05/22/2017 1.4*   • BUN/Creatinine Ratio 05/22/2017 24.4    • Anion Gap 05/22/2017 10.4    • Phosphorus 05/22/2017 4.2    • WBC 05/22/2017 12.01    • RBC 05/22/2017 4.26    • Hemoglobin 05/22/2017 12.1    • Hematocrit 05/22/2017 37.1    • MCV 05/22/2017 87.1    • MCH 05/22/2017 28.4    • MCHC 05/22/2017 32.6*   • RDW 05/22/2017 15.5*   • RDW-SD 05/22/2017 48.6    • MPV 05/22/2017 10.9*   • Platelets 05/22/2017 484*   • Scan Slide 05/22/2017     • Osmolality Calc 05/22/2017 286.2    • Neutrophil % 05/22/2017 62.0    • Lymphocyte % 05/22/2017 20.0*   • Monocyte % 05/22/2017 6.0    • Eosinophil % 05/22/2017 2.0    • Basophil % 05/22/2017 6.0*   • Myelocyte % 05/22/2017 4.0*   • Neutrophils Absolute 05/22/2017 7.45*   • Lymphocytes Absolute 05/22/2017 2.40    • Monocytes Absolute 05/22/2017 0.72    • Eosinophils Absolute 05/22/2017 0.24    • Basophils Absolute 05/22/2017 0.72*   • Anisocytosis 05/22/2017 Slight/1+    • Hypochromia 05/22/2017 Slight/1+    • Platelet Estimate 05/22/2017 Increased    Lab on 05/12/2017   Component Date Value   • Glucose 05/12/2017 220*   • BUN 05/12/2017 12    • Creatinine 05/12/2017 0.65    • Sodium 05/12/2017 139    • Potassium 05/12/2017 4.2    • Chloride 05/12/2017 102    • CO2 05/12/2017 26.8    • Calcium 05/12/2017 9.1    • Total Protein 05/12/2017 7.0    • Albumin 05/12/2017 4.00    • ALT (SGPT) 05/12/2017 23    • AST (SGOT) 05/12/2017 26    • Alkaline Phosphatase 05/12/2017 55    • Total Bilirubin 05/12/2017 0.4    • eGFR Non  Amer 05/12/2017 96    • Globulin 05/12/2017 3.0    • A/G Ratio 05/12/2017 1.3*   • BUN/Creatinine Ratio 05/12/2017 18.5    • Anion Gap 05/12/2017 10.2    • Phosphorus 05/12/2017 3.4    • WBC 05/12/2017 29.59*   • RBC  05/12/2017 4.46    • Hemoglobin 05/12/2017 12.6    • Hematocrit 05/12/2017 39.7    • MCV 05/12/2017 89.0    • MCH 05/12/2017 28.3    • MCHC 05/12/2017 31.7*   • RDW 05/12/2017 15.5*   • RDW-SD 05/12/2017 50.1    • MPV 05/12/2017 10.4*   • Platelets 05/12/2017 525*   • Scan Slide 05/12/2017     • Neutrophil % 05/12/2017 64.0    • Lymphocyte % 05/12/2017 16.0*   • Monocyte % 05/12/2017 5.0    • Eosinophil % 05/12/2017 1.0    • Basophil % 05/12/2017 4.0*   • Metamyelocyte % 05/12/2017 5.0*   • Myelocyte % 05/12/2017 5.0*   • Neutrophils Absolute 05/12/2017 18.94*   • Lymphocytes Absolute 05/12/2017 4.73*   • Monocytes Absolute 05/12/2017 1.48*   • Eosinophils Absolute 05/12/2017 0.30    • Basophils Absolute 05/12/2017 1.18*   • Anisocytosis 05/12/2017 Slight/1+    • Hypochromia 05/12/2017 Slight/1+    • Platelet Estimate 05/12/2017 Increased    • Large Platelets 05/12/2017 Slight/1+    • Osmolality Calc 05/12/2017 284.0    Lab on 05/09/2017   Component Date Value   • Uric Acid 05/09/2017 5.5    • LDH 05/09/2017 376*   • Glucose 05/09/2017 186*   • BUN 05/09/2017 12    • Creatinine 05/09/2017 0.79    • Sodium 05/09/2017 139    • Potassium 05/09/2017 4.8    • Chloride 05/09/2017 105    • CO2 05/09/2017 27.8    • Calcium 05/09/2017 10.1*   • Total Protein 05/09/2017 7.2    • Albumin 05/09/2017 4.20    • ALT (SGPT) 05/09/2017 23    • AST (SGOT) 05/09/2017 27    • Alkaline Phosphatase 05/09/2017 58    • Total Bilirubin 05/09/2017 0.4    • eGFR Non African Amer 05/09/2017 77    • Globulin 05/09/2017 3.0    • A/G Ratio 05/09/2017 1.4*   • BUN/Creatinine Ratio 05/09/2017 15.2    • Anion Gap 05/09/2017 6.2    • WBC 05/09/2017 29.49*   • RBC 05/09/2017 4.40    • Hemoglobin 05/09/2017 12.7    • Hematocrit 05/09/2017 39.0    • MCV 05/09/2017 88.6    • MCH 05/09/2017 28.9    • MCHC 05/09/2017 32.6*   • RDW 05/09/2017 15.6*   • RDW-SD 05/09/2017 50.1    • MPV 05/09/2017 10.5*   • Platelets 05/09/2017 607*   • Scan Slide 05/09/2017      • Neutrophil % 05/09/2017 52.0    • Lymphocyte % 05/09/2017 15.0*   • Monocyte % 05/09/2017 4.0    • Eosinophil % 05/09/2017 3.0    • Basophil % 05/09/2017 3.0*   • Bands %  05/09/2017 8.0    • Metamyelocyte % 05/09/2017 3.0*   • Myelocyte % 05/09/2017 10.0*   • Promyelocyte % 05/09/2017 2.0*   • Neutrophils Absolute 05/09/2017 17.69*   • Lymphocytes Absolute 05/09/2017 4.42*   • Monocytes Absolute 05/09/2017 1.18*   • Eosinophils Absolute 05/09/2017 0.88*   • Basophils Absolute 05/09/2017 0.88*   • Anisocytosis 05/09/2017 Slight/1+    • Platelet Estimate 05/09/2017 Increased    • Osmolality Calc 05/09/2017 282.2    Consult on 04/25/2017   Component Date Value   • Performed by: 04/25/2017 Radha Thrasher    • Pathologist Interpretati* 04/25/2017 See scanned report    • C-Reactive Protein 04/25/2017 1.11*   • Sed Rate 04/25/2017 33*   • Ferritin 04/25/2017 122.00    • Iron 04/25/2017 62    • TIBC 04/25/2017 351    • Iron Saturation 04/25/2017 18    • B2A2 transcript 04/25/2017 3.902    • B3A2 transcript 04/25/2017 46.501    • E1A2 transcript 04/25/2017 0.009    • Interpretation 04/25/2017 Comment*   • Director Review 04/25/2017 Comment    • Background 04/25/2017 Comment    • Methodology 04/25/2017 Comment    • JAK2 [id number] Mutation 04/25/2017 Comment    • Background: 04/25/2017 Comment    • Director Review 04/25/2017 Comment    • Reflex 04/25/2017 Comment    • Hepatitis B Surface Ag 04/25/2017 Non-Reactive    • Hep A IgM 04/25/2017 Non-Reactive    • Hep B C IgM 04/25/2017 Non-Reactive    • Hepatitis C Ab 04/25/2017 Non-Reactive    • EMMANUEL Direct 04/25/2017 Negative    • WBC 04/25/2017 23.51*   • RBC 04/25/2017 4.51    • Hemoglobin 04/25/2017 12.7    • Hematocrit 04/25/2017 39.2    • MCV 04/25/2017 86.9    • MCH 04/25/2017 28.2    • MCHC 04/25/2017 32.4*   • RDW 04/25/2017 15.8*   • RDW-SD 04/25/2017 50.2    • MPV 04/25/2017 10.7*   • Platelets 04/25/2017 542*   • HIV-1/ HIV-2 04/25/2017 Non-Reactive    • Scan Slide  04/25/2017     • Rheumatoid Factor Quanti* 04/25/2017 10.0    • Neutrophil % 04/25/2017 67.0    • Lymphocyte % 04/25/2017 25.0    • Monocyte % 04/25/2017 1.0    • Basophil % 04/25/2017 2.0    • Metamyelocyte % 04/25/2017 2.0*   • Myelocyte % 04/25/2017 2.0*   • Promyelocyte % 04/25/2017 1.0*   • Neutrophils Absolute 04/25/2017 15.75*   • Lymphocytes Absolute 04/25/2017 5.88*   • Monocytes Absolute 04/25/2017 0.24    • Basophils Absolute 04/25/2017 0.47*   • Anisocytosis 04/25/2017 Slight/1+    • Hypochromia 04/25/2017 Slight/1+    • Platelet Estimate 04/25/2017 Increased    • Large Platelets 04/25/2017 Mod/2+    • CALR Mutation Detection * 04/25/2017 Comment    • Background: 04/25/2017 Comment    • Methodology 04/25/2017 Comment    • Reference: 04/25/2017 Comment    • Director Review 04/25/2017 Comment    • JAK2 Exon 12 Analysis Re* 04/25/2017 Comment    • Background 04/25/2017 Comment    • Director Review 04/25/2017 Comment    • MPL Mutation Analysis Re* 04/25/2017 Comment    • Background 04/25/2017 Comment    • Method: 04/25/2017 Comment    • Reference 04/25/2017 Comment    • Director Review: 04/25/2017 Comment    There may be more visits with results that are not included.   PATHOLOGY:    05/15/17: Bone Marrow Biopsy & Aspirate                         Assessment/Plan :  Aric Haro is a very pleasant 51 y.o.  female who presents in follow up appointment for further management and treatment of chronic phase chronic myelogenous leukemia.    1. Chronic Myelogenous Leukemia - CML (chronic phase)    2. Leukocytosis  3. Thrombocytosis    Given the findings of her peripheral smear I was concerned for an underlying myeloproliferative disorder. Her primary provider obtained a flow cytometry and this did not show any significant abnormalities. I did also obtain a BCR ABL PCR which was positive for the b2a2/b3a2 (p210) and e1a2 (p190) fusion gene transcripts consistent with a diagnosis for CML. I did also  perform bone marrow biopsy on initial diagnosis prior to starting Dasatinib treatment with results above and consistent with chronic phase CMP.     To further evaluate for a myeloproliferative disorder I did also assess for JAK2 (V617F and exon 12)/MPL/CALR mutations which were negative. To assess for underlying inflammation that may be possibly contributing will also obtain an ESR and CRP, EMMANUEL, Rheumatoid factor, as well as an acute hepatitis panel and HIV test which were all negative. Thrombocytosis can also be caused by an underlying iron deficiency thus will obtain iron panel and ferritin which were normal. I did also order an abdominal ultrasound to assess for splenomegaly which showed spleen size to be 13.9cm.     Given the findings of the BCR ABL PCR I did start the patient on Dasatinib 100mg oral daily. Patient does have a history of pancreatitis and therefore I held on using Nilotinib for now. Patient however was unable to tolerate the medication secondary to worsening reflux while being off of her PPI, nausea, vomiting, and dehydration. She was then started on Imatinib 400mg daily and has been tolerating this well. Her only complaint today is minor peripheral edema as well as intermittent muscle cramps. I have advised her of Ibuprofen and Tylenol use for her myalgias. I did order baseline Echo which showed an intact EF. She will need a complete blood count every two weeks for another two months and will monitor monthly thereafter as well as intermittent liver function tests monitored.     I will have the patient return for CBC, CMP every 2 weeks, with an acute visit in one month and follow up in two months. She understands that should she have any questions or concerns prior to her appointment she should give us a call at any time and I would be happy to see her sooner. It was a pleasure to see this patient in clinic today, thank you for allowing me to participate in the care of this patient.    I spent 26  minutes in regards to this patient’s care today. More than 19 minutes of the time was spent in direct interaction with the patient for the above problems.        Evita Serrano MD  06/28/2017  12:02 PM

## 2017-06-29 PROBLEM — D72.821 MONOCYTOSIS: Status: ACTIVE | Noted: 2017-06-29

## 2017-06-29 PROBLEM — D75.839 THROMBOCYTOSIS: Status: ACTIVE | Noted: 2017-06-29

## 2017-07-12 ENCOUNTER — LAB (OUTPATIENT)
Dept: ONCOLOGY | Facility: CLINIC | Age: 52
End: 2017-07-12

## 2017-07-12 VITALS
SYSTOLIC BLOOD PRESSURE: 131 MMHG | TEMPERATURE: 98.6 F | OXYGEN SATURATION: 97 % | RESPIRATION RATE: 18 BRPM | DIASTOLIC BLOOD PRESSURE: 87 MMHG | HEART RATE: 91 BPM

## 2017-07-12 DIAGNOSIS — C92.10 CML (CHRONIC MYELOCYTIC LEUKEMIA) (HCC): ICD-10-CM

## 2017-07-12 LAB
ALBUMIN SERPL-MCNC: 4.3 G/DL (ref 3.5–5)
ALBUMIN/GLOB SERPL: 1.5 G/DL (ref 1.5–2.5)
ALP SERPL-CCNC: 92 U/L (ref 35–104)
ALT SERPL W P-5'-P-CCNC: 19 U/L (ref 10–36)
ANION GAP SERPL CALCULATED.3IONS-SCNC: 5.6 MMOL/L (ref 3.6–11.2)
AST SERPL-CCNC: 18 U/L (ref 10–30)
BASOPHILS # BLD AUTO: 0.05 10*3/MM3 (ref 0–0.3)
BASOPHILS NFR BLD AUTO: 0.7 % (ref 0–2)
BILIRUB SERPL-MCNC: 0.3 MG/DL (ref 0.2–1.8)
BUN BLD-MCNC: 14 MG/DL (ref 7–21)
BUN/CREAT SERPL: 20.9 (ref 7–25)
CALCIUM SPEC-SCNC: 9.6 MG/DL (ref 7.7–10)
CHLORIDE SERPL-SCNC: 103 MMOL/L (ref 99–112)
CO2 SERPL-SCNC: 27.4 MMOL/L (ref 24.3–31.9)
CREAT BLD-MCNC: 0.67 MG/DL (ref 0.43–1.29)
DEPRECATED RDW RBC AUTO: 52 FL (ref 37–54)
EOSINOPHIL # BLD AUTO: 0.15 10*3/MM3 (ref 0–0.7)
EOSINOPHIL NFR BLD AUTO: 2.2 % (ref 0–5)
ERYTHROCYTE [DISTWIDTH] IN BLOOD BY AUTOMATED COUNT: 15.7 % (ref 11.5–14.5)
GFR SERPL CREATININE-BSD FRML MDRD: 93 ML/MIN/1.73
GLOBULIN UR ELPH-MCNC: 2.9 GM/DL
GLUCOSE BLD-MCNC: 292 MG/DL (ref 70–110)
HCT VFR BLD AUTO: 35.4 % (ref 37–47)
HGB BLD-MCNC: 11.5 G/DL (ref 12–16)
IMM GRANULOCYTES # BLD: 0.01 10*3/MM3 (ref 0–0.03)
IMM GRANULOCYTES NFR BLD: 0.1 % (ref 0–0.5)
LYMPHOCYTES # BLD AUTO: 2.17 10*3/MM3 (ref 1–3)
LYMPHOCYTES NFR BLD AUTO: 31.7 % (ref 21–51)
MCH RBC QN AUTO: 29.4 PG (ref 27–33)
MCHC RBC AUTO-ENTMCNC: 32.5 G/DL (ref 33–37)
MCV RBC AUTO: 90.5 FL (ref 80–94)
MONOCYTES # BLD AUTO: 0.53 10*3/MM3 (ref 0.1–0.9)
MONOCYTES NFR BLD AUTO: 7.7 % (ref 0–10)
NEUTROPHILS # BLD AUTO: 3.94 10*3/MM3 (ref 1.4–6.5)
NEUTROPHILS NFR BLD AUTO: 57.6 % (ref 30–70)
OSMOLALITY SERPL CALC.SUM OF ELEC: 283.2 MOSM/KG (ref 273–305)
PHOSPHATE SERPL-MCNC: 2.8 MG/DL (ref 2.7–4.5)
PLATELET # BLD AUTO: 252 10*3/MM3 (ref 130–400)
PMV BLD AUTO: 10.5 FL (ref 6–10)
POTASSIUM BLD-SCNC: 4.3 MMOL/L (ref 3.5–5.3)
PROT SERPL-MCNC: 7.2 G/DL (ref 6–8)
RBC # BLD AUTO: 3.91 10*6/MM3 (ref 4.2–5.4)
SODIUM BLD-SCNC: 136 MMOL/L (ref 135–153)
WBC NRBC COR # BLD: 6.85 10*3/MM3 (ref 4.5–12.5)

## 2017-07-12 PROCEDURE — 84100 ASSAY OF PHOSPHORUS: CPT | Performed by: INTERNAL MEDICINE

## 2017-07-12 PROCEDURE — 85025 COMPLETE CBC W/AUTO DIFF WBC: CPT | Performed by: INTERNAL MEDICINE

## 2017-07-12 PROCEDURE — 80053 COMPREHEN METABOLIC PANEL: CPT | Performed by: INTERNAL MEDICINE

## 2017-07-27 ENCOUNTER — OFFICE VISIT (OUTPATIENT)
Dept: ONCOLOGY | Facility: CLINIC | Age: 52
End: 2017-07-27

## 2017-07-27 ENCOUNTER — LAB (OUTPATIENT)
Dept: ONCOLOGY | Facility: CLINIC | Age: 52
End: 2017-07-27

## 2017-07-27 VITALS
DIASTOLIC BLOOD PRESSURE: 79 MMHG | HEART RATE: 86 BPM | WEIGHT: 258.9 LBS | BODY MASS INDEX: 41.79 KG/M2 | TEMPERATURE: 97.5 F | SYSTOLIC BLOOD PRESSURE: 136 MMHG | OXYGEN SATURATION: 98 % | RESPIRATION RATE: 18 BRPM

## 2017-07-27 DIAGNOSIS — C92.10 CML (CHRONIC MYELOCYTIC LEUKEMIA) (HCC): ICD-10-CM

## 2017-07-27 LAB
ALBUMIN SERPL-MCNC: 4.3 G/DL (ref 3.5–5)
ALBUMIN/GLOB SERPL: 1.5 G/DL (ref 1.5–2.5)
ALP SERPL-CCNC: 84 U/L (ref 35–104)
ALT SERPL W P-5'-P-CCNC: 21 U/L (ref 10–36)
ANION GAP SERPL CALCULATED.3IONS-SCNC: 8.7 MMOL/L (ref 3.6–11.2)
AST SERPL-CCNC: 24 U/L (ref 10–30)
BASOPHILS # BLD AUTO: 0.03 10*3/MM3 (ref 0–0.3)
BASOPHILS NFR BLD AUTO: 0.4 % (ref 0–2)
BILIRUB SERPL-MCNC: 0.3 MG/DL (ref 0.2–1.8)
BUN BLD-MCNC: 20 MG/DL (ref 7–21)
BUN/CREAT SERPL: 25.3 (ref 7–25)
CALCIUM SPEC-SCNC: 9.3 MG/DL (ref 7.7–10)
CHLORIDE SERPL-SCNC: 107 MMOL/L (ref 99–112)
CO2 SERPL-SCNC: 22.3 MMOL/L (ref 24.3–31.9)
CREAT BLD-MCNC: 0.79 MG/DL (ref 0.43–1.29)
DEPRECATED RDW RBC AUTO: 48.5 FL (ref 37–54)
EOSINOPHIL # BLD AUTO: 0.19 10*3/MM3 (ref 0–0.7)
EOSINOPHIL NFR BLD AUTO: 2.7 % (ref 0–5)
ERYTHROCYTE [DISTWIDTH] IN BLOOD BY AUTOMATED COUNT: 15 % (ref 11.5–14.5)
GFR SERPL CREATININE-BSD FRML MDRD: 77 ML/MIN/1.73
GLOBULIN UR ELPH-MCNC: 2.9 GM/DL
GLUCOSE BLD-MCNC: 263 MG/DL (ref 70–110)
HCT VFR BLD AUTO: 34.6 % (ref 37–47)
HGB BLD-MCNC: 11.4 G/DL (ref 12–16)
IMM GRANULOCYTES # BLD: 0.01 10*3/MM3 (ref 0–0.03)
IMM GRANULOCYTES NFR BLD: 0.1 % (ref 0–0.5)
LYMPHOCYTES # BLD AUTO: 1.88 10*3/MM3 (ref 1–3)
LYMPHOCYTES NFR BLD AUTO: 26.4 % (ref 21–51)
MCH RBC QN AUTO: 29.7 PG (ref 27–33)
MCHC RBC AUTO-ENTMCNC: 32.9 G/DL (ref 33–37)
MCV RBC AUTO: 90.1 FL (ref 80–94)
MONOCYTES # BLD AUTO: 0.61 10*3/MM3 (ref 0.1–0.9)
MONOCYTES NFR BLD AUTO: 8.6 % (ref 0–10)
NEUTROPHILS # BLD AUTO: 4.39 10*3/MM3 (ref 1.4–6.5)
NEUTROPHILS NFR BLD AUTO: 61.8 % (ref 30–70)
OSMOLALITY SERPL CALC.SUM OF ELEC: 287.4 MOSM/KG (ref 273–305)
PHOSPHATE SERPL-MCNC: 3 MG/DL (ref 2.7–4.5)
PLATELET # BLD AUTO: 255 10*3/MM3 (ref 130–400)
PMV BLD AUTO: 10.3 FL (ref 6–10)
POTASSIUM BLD-SCNC: 4.1 MMOL/L (ref 3.5–5.3)
PROT SERPL-MCNC: 7.2 G/DL (ref 6–8)
RBC # BLD AUTO: 3.84 10*6/MM3 (ref 4.2–5.4)
SODIUM BLD-SCNC: 138 MMOL/L (ref 135–153)
WBC NRBC COR # BLD: 7.11 10*3/MM3 (ref 4.5–12.5)

## 2017-07-27 PROCEDURE — 85025 COMPLETE CBC W/AUTO DIFF WBC: CPT | Performed by: INTERNAL MEDICINE

## 2017-07-27 PROCEDURE — 84100 ASSAY OF PHOSPHORUS: CPT | Performed by: INTERNAL MEDICINE

## 2017-07-27 PROCEDURE — 99213 OFFICE O/P EST LOW 20 MIN: CPT | Performed by: NURSE PRACTITIONER

## 2017-07-27 PROCEDURE — 80053 COMPREHEN METABOLIC PANEL: CPT | Performed by: INTERNAL MEDICINE

## 2017-07-27 NOTE — PROGRESS NOTES
Aric Haro  [id number]  1965  7/27/2017    Referring Provider:   EILEEN Andrade    Reason for Follow Up:   1. Chronic Phase - Chronic Myelogenous Leukemia  2. Leukocytosis  3. Thrombocytosis     Chief Complaint:  Follow up of CML    History of Present Illness:  Aric Haro is a very pleasant 51 y.o.  female who presents in follow up appointment for further management and treatment of chronic phase chronic myelogenous leukemia (CML).    Ms. Haro began experiencing extreme fatigue where she was sleeping multiple hours during the day when she initially began following with me in April 2017. 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 On laboratory evaluation she was found to have a total white blood cell count of 22.35 and a platelet count 470 thousand. In March 2016 she was also noted to have a leukocytosis (although not as elevated) as well as a thrombocytosis, however reports that these were likely secondary to other medical issues at the time her blood work was drawn. She denied of any significant weight loss however has been gradually losing weight since gastric sleeve procedure. She denied of any fevers or frequent infections but did note fluctuating neck lymphadenopathy as well as early satiety and taste in change. She denied of any abnormal or spontaneous bleeding. I did obtain a BCR ABL PCR to further assess her leukocytosis and thrombocytosis and she was positive for the b2a2/b3a2 (p210) and e1a2 (p190) fusion gene transcripts consistent with a diagnosis for CML. After reviewing the toxicities of each tyrosine kinase inhibitor we decided to start Dasatinib treatment. She had a difficult time tolerating the Dasatinib as she was unable to take her PPI with this medication. Since she had to be taken off of her PPI she had worsening reflux symptoms  as well as severe nausea, vomiting, dehydration, and headaches during this period. Given the toxicities she was experiencing she was taken off Dasatinib and this was changed to Gleevac treatment. Since starting Gleevac 400mg daily she has tolerated this much better without significant side effects. The only side effect that she has been able to see is intermittent pedal edema along with some hand swelling and muscle cramps.     Treatment History:  Started Dasatinib on 05/15/17 - experienced nausea, severe reflux, headaches while on medication and had taken 9 doses. This was discontinued due to intolerability and she was thereafter started on Imatinib.   Started Imatinib on 5/26/17    Interim History:  Ms. Haro presents today for follow up of CML. Clinically she is doing well. She continues to have repeat labs every two weeks. Since the start of the medication, her complete blood count continues to respond. She continues to complain of chronic fatigue but denies any worsening. She does report that her swelling of bilateral upper and lower extremities as well as muscle cramps have improved. She denies any new complaints. She denies any fevers, chills or drenching night sweats. She reports fair appetite and stable weight.     No Known Allergies    Past Medical History:   Diagnosis Date   • Anemia    • Diabetes mellitus    • Hypertension        Past Surgical History:   Procedure Laterality Date   • SINUS SURGERY     • SLEEVE GASTROPLASTY         Social History     Social History   • Marital status:      Spouse name: N/A   • Number of children: N/A   • Years of education: N/A     Occupational History   • Not on file.     Social History Main Topics   • Smoking status: Former Smoker   • Smokeless tobacco: Never Used   • Alcohol use No   • Drug use: No   • Sexual activity: Not on file     Other Topics Concern   • Not on file     Social History Narrative   She lives with her daughter. She denies of any alcohol or  illicit drug use. Previous social tobacco use more then 20 years ago.    Family History   Problem Relation Age of Onset   • Anemia Mother    • Hypertension Mother    • Cancer Mother    • COPD Father    • Heart disease Father    Maternal Uncle - CLL  Mother - Malignancy of [place]      Current Outpatient Prescriptions:   •  amoxicillin-clavulanate (AUGMENTIN) 875-125 MG per tablet, Take 1 tablet by mouth 2 (Two) Times a Day., Disp: 20 tablet, Rfl: 0  •  baclofen (LIORESAL) 20 MG tablet, Take 1 tablet by mouth 3 (Three) Times a Day., Disp: 90 tablet, Rfl: 4  •  diazePAM (VALIUM) 10 MG tablet, Take 1 tablet by mouth before procedure and daily as needed., Disp: 5 tablet, Rfl: 0  •  DULoxetine (CYMBALTA) 60 MG capsule, Take 1 capsule by mouth Daily., Disp: 30 capsule, Rfl: 3  •  fenofibrate 160 MG tablet, Take 1 tablet by mouth Daily., Disp: 90 tablet, Rfl: 3  •  fluconazole (DIFLUCAN) 200 MG tablet, Take 1 tablet by mouth Daily., Disp: 30 tablet, Rfl: 1  •  furosemide (LASIX) 40 MG tablet, Take 1 tablet by mouth Daily., Disp: 90 tablet, Rfl: 3  •  glucose blood (FREESTYLE LITE) test strip, Use 1 strip to test blood sugar twice daily, Disp: 60 each, Rfl: 6  •  granisetron (KYTRIL) 1 MG tablet, Take 1 tablet by mouth Every 12 (Twelve) Hours As Needed for Nausea or Vomiting., Disp: 60 tablet, Rfl: 5  •  HYDROcodone-acetaminophen (NORCO)  MG per tablet, Take 1 tablet by mouth Every 4-6 Hours As Needed., Disp: 60 tablet, Rfl: 0  •  ibuprofen (ADVIL,MOTRIN) 800 MG tablet, Take 1 tablet by mouth 3 (three) times a day as needed for pain., Disp: 270 tablet, Rfl: 3  •  imatinib (GLEEVEC) 400 MG chemo tablet, Take 1 tablet by mouth Daily., Disp: 30 tablet, Rfl: 3  •  Insulin Glargine (LANTUS SOLOSTAR) 100 UNIT/ML injection pen, Inject 45 units subcutaneously 2 times daily, Disp: 30 mL, Rfl: 6  •  loratadine-pseudoephedrine (CLARITIN-D 12 HOUR) 5-120 MG per 12 hr tablet, Take 1 tablet by mouth two times a day, Disp: 30  tablet, Rfl: 0  •  pantoprazole (PROTONIX) 40 MG EC tablet, Take 1 tablet by mouth Daily., Disp: 90 tablet, Rfl: 3  •  prochlorperazine (COMPAZINE) 10 MG tablet, Take 1 tablet by mouth Every 6 (Six) Hours As Needed for Nausea or Vomiting., Disp: 60 tablet, Rfl: 5  •  sertraline (ZOLOFT) 50 MG tablet, Take 1 tablet by mouth Daily., Disp: 90 tablet, Rfl: 3  •  TRUETRACK TEST test strip, use to test blood sugar 8 times daily, Disp: 200 each, Rfl: 5    Review of Systems  Constitutional: No fever, chills, no night sweats, + gradual weight loss since sleeve procedure, +fatigue  HEENT:  +chronic headaches, vision changes or hearing changes, no sinus drainage, sore throat.   Cardiovascular:  No chest pain, +intermittent lower extremity edema which is improved.   Pulmonary:  No shortness of breath, no cough.   Gastrointestinal: no nausea, no vomiting. Intermittent constipation and diarrhea. No abdominal pain. No melena or hematochezia.   Genitourinary:  No hematuria, or changes in urination.   Musculoskeletal:  No pain, or joint problems. +muscle cramps, now improved.   Skin: No rashes or pruritus.   Endocrine:  No hot flashes or chills   Hematologic:  No history of free bleeding, spontaneous bleeding or clotting problems.   Immunologic:  +seasonal allergies, no frequent infections.   Neurologic: No numbness, tingling, or weakness.     Physical Exam:  Vital Signs: These were reviewed and listed as per patient’s electronic medical chart  Vitals:    07/27/17 1447   BP: 136/79   Pulse: 86   Resp: 18   Temp: 97.5 °F (36.4 °C)   SpO2: 98%   General: Awake, alert and oriented, in no distress  HEENT: Head is atraumatic, normocephalic, extraocular movements full, oropharynx clear, no scleral icterus, pink moist mucous membranes  Neck: supple, no jvd, lymphadenopathy or masses  Cardiovascular: regular rate and rhythm without murmurs, rubs or gallops  Pulmonary: non-labored, clear to auscultation bilaterally, no wheezing  Abdomen:  soft, non-tender, non-distended, normal active bowel sounds present  Extremities: No clubbing, cyanosis or edema  Lymph: No cervical, supraclavicular adenopathy   Neurologic: Mental status as above, alert, awake and oriented, grossly non-focal exam  Skin: warm, dry, intact      Labs / Studies:  WBC   Date Value Ref Range Status   07/27/2017 7.11 4.50 - 12.50 10*3/mm3 Final     RBC   Date Value Ref Range Status   07/27/2017 3.84 (L) 4.20 - 5.40 10*6/mm3 Final     Hemoglobin   Date Value Ref Range Status   07/27/2017 11.4 (L) 12.0 - 16.0 g/dL Final     Hematocrit   Date Value Ref Range Status   07/27/2017 34.6 (L) 37.0 - 47.0 % Final     MCV   Date Value Ref Range Status   07/27/2017 90.1 80.0 - 94.0 fL Final     MCH   Date Value Ref Range Status   07/27/2017 29.7 27.0 - 33.0 pg Final     MCHC   Date Value Ref Range Status   07/27/2017 32.9 (L) 33.0 - 37.0 g/dL Final     RDW   Date Value Ref Range Status   07/27/2017 15.0 (H) 11.5 - 14.5 % Final     RDW-SD   Date Value Ref Range Status   07/27/2017 48.5 37.0 - 54.0 fl Final     MPV   Date Value Ref Range Status   07/27/2017 10.3 (H) 6.0 - 10.0 fL Final     Platelets   Date Value Ref Range Status   07/27/2017 255 130 - 400 10*3/mm3 Final     Neutrophil %   Date Value Ref Range Status   07/27/2017 61.8 30.0 - 70.0 % Final     Lymphocyte %   Date Value Ref Range Status   07/27/2017 26.4 21.0 - 51.0 % Final     Monocyte %   Date Value Ref Range Status   07/27/2017 8.6 0.0 - 10.0 % Final     Eosinophil %   Date Value Ref Range Status   07/27/2017 2.7 0.0 - 5.0 % Final     Basophil %   Date Value Ref Range Status   07/27/2017 0.4 0.0 - 2.0 % Final     Immature Grans %   Date Value Ref Range Status   07/27/2017 0.1 0.0 - 0.5 % Final     Neutrophils, Absolute   Date Value Ref Range Status   07/27/2017 4.39 1.40 - 6.50 10*3/mm3 Final     Lymphocytes, Absolute   Date Value Ref Range Status   07/27/2017 1.88 1.00 - 3.00 10*3/mm3 Final     Monocytes, Absolute   Date Value Ref  Range Status   07/27/2017 0.61 0.10 - 0.90 10*3/mm3 Final     Eosinophils, Absolute   Date Value Ref Range Status   07/27/2017 0.19 0.00 - 0.70 10*3/mm3 Final     Basophils, Absolute   Date Value Ref Range Status   07/27/2017 0.03 0.00 - 0.30 10*3/mm3 Final     Immature Grans, Absolute   Date Value Ref Range Status   07/27/2017 0.01 0.00 - 0.03 10*3/mm3 Final     Lab Results   Component Value Date    GLUCOSE 292 (H) 07/12/2017    BUN 14 07/12/2017    CREATININE 0.67 07/12/2017    EGFRIFNONA 93 07/12/2017    BCR 20.9 07/12/2017    K 4.3 07/12/2017    CO2 27.4 07/12/2017    CALCIUM 9.6 07/12/2017    ALBUMIN 4.30 07/12/2017    LABIL2 1.5 07/12/2017    AST 18 07/12/2017    ALT 19 07/12/2017     PATHOLOGY:    05/15/17: Bone Marrow Biopsy & Aspirate                         Assessment/Plan :  Aric Haro is a very pleasant 51 y.o.  female who presents in follow up appointment for further management and treatment of chronic phase chronic myelogenous leukemia.    1. Chronic Myelogenous Leukemia - CML (chronic phase)    2. Leukocytosis  3. Thrombocytosis    Given the findings of her peripheral smear, we were concerned for an underlying myeloproliferative disorder. Her primary provider obtained a flow cytometry and this did not show any significant abnormalities. Also obtain a BCR ABL PCR which was positive for the b2a2/b3a2 (p210) and e1a2 (p190) fusion gene transcripts consistent with a diagnosis for CML. We performed bone marrow biopsy on initial diagnosis prior to starting Dasatinib treatment with results above and consistent with chronic phase CMP.     To further evaluate for a myeloproliferative disorder, also assessed for JAK2 (V617F and exon 12)/MPL/CALR mutations which were negative. To assess for underlying inflammation that may be possibly contributing also obtained an ESR and CRP, EMMANUEL, Rheumatoid factor, as well as an acute hepatitis panel and HIV test which were all negative. Thrombocytosis can also be  caused by an underlying iron deficiency thus obtained iron panel and ferritin which were normal. Also order an abdominal ultrasound to assess for splenomegaly which showed spleen size to be 13.9cm.     Given the findings of the BCR ABL PCR, started the patient on Dasatinib 100mg oral daily. Patient does have a history of pancreatitis and therefore held on using Nilotinib for now. Patient however was unable to tolerate the medication secondary to worsening reflux while being off of her PPI, nausea, vomiting, and dehydration. She was then started on Imatinib 400mg daily and has been tolerating this well. Her only complaint has been minor peripheral edema as well as intermittent muscle cramps which has improved since last presentation. She was advised to continue Ibuprofen and Tylenol for her myalgias. Baseline Echo showed an intact EF.     Since the start of the medication, her complete blood count continues to respond. We will continue with a complete blood count every two weeks for another month and will monitor monthly thereafter.      I will have the patient return for CBC, CMP every 2 weeks and follow up as scheduled in one month with Dr. Serrano.    . She understands that should she have any questions or concerns prior to her appointment she should give us a call at any time and I would be happy to see her sooner. It was a pleasure to see this patient in clinic today, thank you for allowing me to participate in the care of this patient.    I spent 15 minutes in regards to this patient’s care today. More than 9 minutes of the time was spent in direct interaction with the patient for the above problems.        Mariajose Humphries, EILEEN  06/28/2017  2:41 PM

## 2017-07-31 DIAGNOSIS — C92.10 CML (CHRONIC MYELOCYTIC LEUKEMIA) (HCC): ICD-10-CM

## 2017-08-09 ENCOUNTER — LAB (OUTPATIENT)
Dept: ONCOLOGY | Facility: CLINIC | Age: 52
End: 2017-08-09

## 2017-08-09 DIAGNOSIS — C92.10 CML (CHRONIC MYELOCYTIC LEUKEMIA) (HCC): ICD-10-CM

## 2017-08-09 LAB
ALBUMIN SERPL-MCNC: 4.3 G/DL (ref 3.5–5)
ALBUMIN/GLOB SERPL: 1.6 G/DL (ref 1.5–2.5)
ALP SERPL-CCNC: 75 U/L (ref 35–104)
ALT SERPL W P-5'-P-CCNC: 23 U/L (ref 10–36)
ANION GAP SERPL CALCULATED.3IONS-SCNC: 8.3 MMOL/L (ref 3.6–11.2)
AST SERPL-CCNC: 30 U/L (ref 10–30)
BASOPHILS # BLD AUTO: 0.04 10*3/MM3 (ref 0–0.3)
BASOPHILS NFR BLD AUTO: 0.6 % (ref 0–2)
BILIRUB SERPL-MCNC: 0.4 MG/DL (ref 0.2–1.8)
BUN BLD-MCNC: 15 MG/DL (ref 7–21)
BUN/CREAT SERPL: 22.1 (ref 7–25)
CALCIUM SPEC-SCNC: 9.7 MG/DL (ref 7.7–10)
CHLORIDE SERPL-SCNC: 103 MMOL/L (ref 99–112)
CO2 SERPL-SCNC: 24.7 MMOL/L (ref 24.3–31.9)
CREAT BLD-MCNC: 0.68 MG/DL (ref 0.43–1.29)
DEPRECATED RDW RBC AUTO: 46.7 FL (ref 37–54)
EOSINOPHIL # BLD AUTO: 0.2 10*3/MM3 (ref 0–0.7)
EOSINOPHIL NFR BLD AUTO: 2.8 % (ref 0–5)
ERYTHROCYTE [DISTWIDTH] IN BLOOD BY AUTOMATED COUNT: 14.5 % (ref 11.5–14.5)
GFR SERPL CREATININE-BSD FRML MDRD: 91 ML/MIN/1.73
GLOBULIN UR ELPH-MCNC: 2.7 GM/DL
GLUCOSE BLD-MCNC: 232 MG/DL (ref 70–110)
HCT VFR BLD AUTO: 35.2 % (ref 37–47)
HGB BLD-MCNC: 11.5 G/DL (ref 12–16)
IMM GRANULOCYTES # BLD: 0.01 10*3/MM3 (ref 0–0.03)
IMM GRANULOCYTES NFR BLD: 0.1 % (ref 0–0.5)
LYMPHOCYTES # BLD AUTO: 2.27 10*3/MM3 (ref 1–3)
LYMPHOCYTES NFR BLD AUTO: 31.3 % (ref 21–51)
MCH RBC QN AUTO: 29.6 PG (ref 27–33)
MCHC RBC AUTO-ENTMCNC: 32.7 G/DL (ref 33–37)
MCV RBC AUTO: 90.7 FL (ref 80–94)
MONOCYTES # BLD AUTO: 0.74 10*3/MM3 (ref 0.1–0.9)
MONOCYTES NFR BLD AUTO: 10.2 % (ref 0–10)
NEUTROPHILS # BLD AUTO: 4 10*3/MM3 (ref 1.4–6.5)
NEUTROPHILS NFR BLD AUTO: 55 % (ref 30–70)
OSMOLALITY SERPL CALC.SUM OF ELEC: 280.2 MOSM/KG (ref 273–305)
PLATELET # BLD AUTO: 245 10*3/MM3 (ref 130–400)
PMV BLD AUTO: 10.3 FL (ref 6–10)
POTASSIUM BLD-SCNC: 4.2 MMOL/L (ref 3.5–5.3)
PROT SERPL-MCNC: 7 G/DL (ref 6–8)
RBC # BLD AUTO: 3.88 10*6/MM3 (ref 4.2–5.4)
SODIUM BLD-SCNC: 136 MMOL/L (ref 135–153)
WBC NRBC COR # BLD: 7.26 10*3/MM3 (ref 4.5–12.5)

## 2017-08-09 PROCEDURE — 85025 COMPLETE CBC W/AUTO DIFF WBC: CPT | Performed by: INTERNAL MEDICINE

## 2017-08-09 PROCEDURE — 36415 COLL VENOUS BLD VENIPUNCTURE: CPT

## 2017-08-09 PROCEDURE — 80053 COMPREHEN METABOLIC PANEL: CPT | Performed by: INTERNAL MEDICINE

## 2017-08-30 ENCOUNTER — OFFICE VISIT (OUTPATIENT)
Dept: ONCOLOGY | Facility: CLINIC | Age: 52
End: 2017-08-30

## 2017-08-30 ENCOUNTER — LAB (OUTPATIENT)
Dept: ONCOLOGY | Facility: CLINIC | Age: 52
End: 2017-08-30

## 2017-08-30 VITALS
SYSTOLIC BLOOD PRESSURE: 147 MMHG | TEMPERATURE: 98.4 F | DIASTOLIC BLOOD PRESSURE: 78 MMHG | RESPIRATION RATE: 18 BRPM | HEART RATE: 96 BPM | OXYGEN SATURATION: 97 %

## 2017-08-30 DIAGNOSIS — C92.10 CML (CHRONIC MYELOCYTIC LEUKEMIA) (HCC): Primary | ICD-10-CM

## 2017-08-30 DIAGNOSIS — E11.00 TYPE 2 DIABETES MELLITUS WITH HYPEROSMOLARITY WITHOUT COMA, WITH LONG-TERM CURRENT USE OF INSULIN (HCC): ICD-10-CM

## 2017-08-30 DIAGNOSIS — Z79.4 TYPE 2 DIABETES MELLITUS WITH HYPEROSMOLARITY WITHOUT COMA, WITH LONG-TERM CURRENT USE OF INSULIN (HCC): ICD-10-CM

## 2017-08-30 DIAGNOSIS — C92.10 CML (CHRONIC MYELOCYTIC LEUKEMIA) (HCC): ICD-10-CM

## 2017-08-30 DIAGNOSIS — D75.839 THROMBOCYTOSIS: ICD-10-CM

## 2017-08-30 LAB
ALBUMIN SERPL-MCNC: 4.1 G/DL (ref 3.5–5)
ALBUMIN/GLOB SERPL: 1.4 G/DL (ref 1.5–2.5)
ALP SERPL-CCNC: 82 U/L (ref 35–104)
ALT SERPL W P-5'-P-CCNC: 29 U/L (ref 10–36)
ANION GAP SERPL CALCULATED.3IONS-SCNC: 13.8 MMOL/L (ref 3.6–11.2)
AST SERPL-CCNC: 27 U/L (ref 10–30)
BASOPHILS # BLD AUTO: 0.02 10*3/MM3 (ref 0–0.3)
BASOPHILS NFR BLD AUTO: 0.3 % (ref 0–2)
BILIRUB SERPL-MCNC: 0.4 MG/DL (ref 0.2–1.8)
BUN BLD-MCNC: 14 MG/DL (ref 7–21)
BUN/CREAT SERPL: 21.5 (ref 7–25)
CALCIUM SPEC-SCNC: 9.3 MG/DL (ref 7.7–10)
CHLORIDE SERPL-SCNC: 101 MMOL/L (ref 99–112)
CO2 SERPL-SCNC: 22.2 MMOL/L (ref 24.3–31.9)
CREAT BLD-MCNC: 0.65 MG/DL (ref 0.43–1.29)
DEPRECATED RDW RBC AUTO: 46.4 FL (ref 37–54)
EOSINOPHIL # BLD AUTO: 0.2 10*3/MM3 (ref 0–0.7)
EOSINOPHIL NFR BLD AUTO: 2.8 % (ref 0–5)
ERYTHROCYTE [DISTWIDTH] IN BLOOD BY AUTOMATED COUNT: 14.5 % (ref 11.5–14.5)
GFR SERPL CREATININE-BSD FRML MDRD: 96 ML/MIN/1.73
GLOBULIN UR ELPH-MCNC: 2.9 GM/DL
GLUCOSE BLD-MCNC: 228 MG/DL (ref 70–110)
HBA1C MFR BLD: 7.5 % (ref 4.5–5.7)
HCT VFR BLD AUTO: 34.7 % (ref 37–47)
HGB BLD-MCNC: 11.4 G/DL (ref 12–16)
IMM GRANULOCYTES # BLD: 0.01 10*3/MM3 (ref 0–0.03)
IMM GRANULOCYTES NFR BLD: 0.1 % (ref 0–0.5)
LDH SERPL-CCNC: 177 U/L (ref 135–225)
LYMPHOCYTES # BLD AUTO: 2.27 10*3/MM3 (ref 1–3)
LYMPHOCYTES NFR BLD AUTO: 31.7 % (ref 21–51)
MCH RBC QN AUTO: 30.2 PG (ref 27–33)
MCHC RBC AUTO-ENTMCNC: 32.9 G/DL (ref 33–37)
MCV RBC AUTO: 91.8 FL (ref 80–94)
MONOCYTES # BLD AUTO: 0.62 10*3/MM3 (ref 0.1–0.9)
MONOCYTES NFR BLD AUTO: 8.6 % (ref 0–10)
NEUTROPHILS # BLD AUTO: 4.05 10*3/MM3 (ref 1.4–6.5)
NEUTROPHILS NFR BLD AUTO: 56.5 % (ref 30–70)
OSMOLALITY SERPL CALC.SUM OF ELEC: 281.5 MOSM/KG (ref 273–305)
PLATELET # BLD AUTO: 260 10*3/MM3 (ref 130–400)
PMV BLD AUTO: 10.4 FL (ref 6–10)
POTASSIUM BLD-SCNC: 4.1 MMOL/L (ref 3.5–5.3)
PROT SERPL-MCNC: 7 G/DL (ref 6–8)
RBC # BLD AUTO: 3.78 10*6/MM3 (ref 4.2–5.4)
SODIUM BLD-SCNC: 137 MMOL/L (ref 135–153)
URATE SERPL-MCNC: 4.1 MG/DL (ref 2.4–5.7)
WBC NRBC COR # BLD: 7.17 10*3/MM3 (ref 4.5–12.5)

## 2017-08-30 PROCEDURE — 81479 UNLISTED MOLECULAR PATHOLOGY: CPT

## 2017-08-30 PROCEDURE — 84550 ASSAY OF BLOOD/URIC ACID: CPT | Performed by: INTERNAL MEDICINE

## 2017-08-30 PROCEDURE — 83615 LACTATE (LD) (LDH) ENZYME: CPT | Performed by: INTERNAL MEDICINE

## 2017-08-30 PROCEDURE — 81207 BCR/ABL1 GENE MINOR BP: CPT | Performed by: INTERNAL MEDICINE

## 2017-08-30 PROCEDURE — 80053 COMPREHEN METABOLIC PANEL: CPT | Performed by: INTERNAL MEDICINE

## 2017-08-30 PROCEDURE — 83036 HEMOGLOBIN GLYCOSYLATED A1C: CPT | Performed by: INTERNAL MEDICINE

## 2017-08-30 PROCEDURE — 81206 BCR/ABL1 GENE MAJOR BP: CPT | Performed by: INTERNAL MEDICINE

## 2017-08-30 PROCEDURE — 85025 COMPLETE CBC W/AUTO DIFF WBC: CPT | Performed by: INTERNAL MEDICINE

## 2017-08-30 PROCEDURE — 99215 OFFICE O/P EST HI 40 MIN: CPT | Performed by: INTERNAL MEDICINE

## 2017-08-30 NOTE — PROGRESS NOTES
Airc Haro  [id number]  1965  8/30/2017      Referring Provider:   EILEEN Andrade    Reason for Follow Up:   1. Chronic Phase - Chronic Myelogenous Leukemia  2. Leukocytosis  3. Thrombocytosis     Chief Complaint:  Fatigue  Muscle Cramps    History of Present Illness:  Aric Haro is a very pleasant 51 y.o.  female who presents in follow up appointment for further management and treatment of chronic phase chronic myelogenous leukemia (CML).    Ms. Haro began experiencing extreme fatigue where she was sleeping multiple hours during the day when she initially began following with me in April 2017. 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 On laboratory evaluation she was found to have a total white blood cell count of 22.35 and a platelet count 470 thousand. In March 2016 she was also noted to have a leukocytosis (although not as elevated) as well as a thrombocytosis, however reports that these were likely secondary to other medical issues at the time her blood work was drawn. She denied of any significant weight loss however has been gradually losing weight since gastric sleeve procedure. She denied of any fevers or frequent infections but did note fluctuating neck lymphadenopathy as well as early satiety and taste in change. She denied of any abnormal or spontaneous bleeding. I did obtain a BCR ABL PCR to further assess her leukocytosis and thrombocytosis and she was positive for the b2a2/b3a2 (p210) and e1a2 (p190) fusion gene transcripts consistent with a diagnosis for CML. After reviewing the toxicities of each tyrosine kinase inhibitor we decided to start Dasatinib treatment. She had a difficult time tolerating the Dasatinib as she was unable to take her PPI with this medication. Since she had to be taken off of her PPI she had worsening reflux  symptoms as well as severe nausea, vomiting, dehydration, and headaches during this period. Given the toxicities she was experiencing she was taken off Dasatinib and this was changed to Gleevec treatment. Since starting Gleevac 400mg daily she has tolerated this much better without significant side effects. The only side effect that she has been able to see is intermittent pedal edema along with some hand swelling and muscle cramps.       Treatment History:  Started Dasatinib on 05/15/17 - experienced nausea, severe reflux, headaches while on medication and had taken 9 doses. This was discontinued due to intolerability and she was thereafter started on Imatinib.   Started Imatinib on 5/26/17    Interim History:  Since the start of Dasatinib and later Gleevec she has had a response and normalization in her complete blood counts. She does report ongoing fatigue however notes that this has significantly improved since her initial consultation. She denies of any significant edema however does have intermittent lower extremity edema. She has noted cramping in her extremities since the start of Gleevec however notes that this has improved since her last visit. She does note more frequent loose stools since start of Gleevac which has been stable. She denies of any fevers or infections however notes some upper respiratory symptoms that she relates to seasonal allergies. She denies of any weight changes and notes that her night sweats have significantly improved.      The following portions of the patient's history were reviewed and updated as appropriate: allergies, current medications, past family history, past medical history, past social history, past surgical history and problem list.      No Known Allergies    Past Medical History:   Diagnosis Date   • Anemia    • Diabetes mellitus    • Hypertension        Past Surgical History:   Procedure Laterality Date   • SINUS SURGERY     • SLEEVE GASTROPLASTY         Social  History     Social History   • Marital status:      Spouse name: N/A   • Number of children: N/A   • Years of education: N/A     Occupational History   • Not on file.     Social History Main Topics   • Smoking status: Former Smoker   • Smokeless tobacco: Never Used   • Alcohol use No   • Drug use: No   • Sexual activity: Not on file     Other Topics Concern   • Not on file     Social History Narrative   She lives with her daughter. She denies of any alcohol or illicit drug use. Previous social tobacco use more then 20 years ago.    Family History   Problem Relation Age of Onset   • Anemia Mother    • Hypertension Mother    • Cancer Mother    • COPD Father    • Heart disease Father    Maternal Uncle - CLL  Mother - Malignancy of [place]unction      Current Outpatient Prescriptions:   •  amoxicillin-clavulanate (AUGMENTIN) 875-125 MG per tablet, Take 1 tablet by mouth 2 (Two) Times a Day., Disp: 20 tablet, Rfl: 0  •  baclofen (LIORESAL) 20 MG tablet, Take 1 tablet by mouth 3 (Three) Times a Day., Disp: 90 tablet, Rfl: 4  •  diazePAM (VALIUM) 10 MG tablet, Take 1 tablet by mouth before procedure and daily as needed., Disp: 5 tablet, Rfl: 0  •  DULoxetine (CYMBALTA) 60 MG capsule, Take 1 capsule by mouth Daily., Disp: 30 capsule, Rfl: 3  •  fenofibrate 160 MG tablet, Take 1 tablet by mouth Daily., Disp: 90 tablet, Rfl: 3  •  fluconazole (DIFLUCAN) 200 MG tablet, Take 1 tablet by mouth Daily., Disp: 30 tablet, Rfl: 1  •  furosemide (LASIX) 40 MG tablet, Take 1 tablet by mouth Daily., Disp: 90 tablet, Rfl: 3  •  glucose blood (FREESTYLE LITE) test strip, Use 1 strip to test blood sugar twice daily, Disp: 60 each, Rfl: 6  •  granisetron (KYTRIL) 1 MG tablet, Take 1 tablet by mouth Every 12 (Twelve) Hours As Needed for Nausea or Vomiting., Disp: 60 tablet, Rfl: 5  •  HYDROcodone-acetaminophen (NORCO)  MG per tablet, Take 1 tablet by mouth Every 4-6 Hours As Needed., Disp: 60 tablet, Rfl: 0  •  ibuprofen  (ADVIL,MOTRIN) 800 MG tablet, Take 1 tablet by mouth 3 (three) times a day as needed for pain., Disp: 270 tablet, Rfl: 3  •  imatinib (GLEEVEC) 400 MG chemo tablet, Take 1 tablet by mouth Daily., Disp: 30 tablet, Rfl: 3  •  Insulin Glargine (LANTUS SOLOSTAR) 100 UNIT/ML injection pen, Inject 45 units subcutaneously 2 times daily, Disp: 30 mL, Rfl: 6  •  loratadine-pseudoephedrine (CLARITIN-D 12 HOUR) 5-120 MG per 12 hr tablet, Take 1 tablet by mouth two times a day, Disp: 30 tablet, Rfl: 0  •  pantoprazole (PROTONIX) 40 MG EC tablet, Take 1 tablet by mouth Daily., Disp: 90 tablet, Rfl: 3  •  prochlorperazine (COMPAZINE) 10 MG tablet, Take 1 tablet by mouth Every 6 (Six) Hours As Needed for Nausea or Vomiting., Disp: 60 tablet, Rfl: 5  •  sertraline (ZOLOFT) 50 MG tablet, Take 1 tablet by mouth Daily., Disp: 90 tablet, Rfl: 3  •  TRUETRACK TEST test strip, use to test blood sugar 8 times daily, Disp: 200 each, Rfl: 5        Review of Systems  Constitutional: No fever, chills, no night sweats, + gradual weight loss since sleeve procedure, +fatigue improved  HEENT:  No headaches, vision changes or hearing changes, no sinus drainage, sore throat.   Cardiovascular:  No chest pain, +intermittant lower extremity edema.   Pulmonary:  No shortness of breath, no cough.   Gastrointestinal: no nausea, no vomiting. +diarrhea. No abdominal pain. No melena or hematochezia.   Genitourinary:  No hematuria, or changes in urination.   Musculoskeletal:  No pain, or joint problems. +muscle cramps  Skin: No rashes or pruritus.   Endocrine:  No hot flashes or chills   Hematologic:  No history of free bleeding, spontaneous bleeding or clotting problems.   Immunologic:  +seasonal allergies, no frequent infections.   Neurologic: No numbness, tingling, or weakness.         Physical Exam:  Vital Signs: These were reviewed and listed as per patient’s electronic medical chart  Vitals:    08/30/17 1509   BP: 147/78   Pulse: 96   Resp: 18   Temp:  98.4 °F (36.9 °C)   SpO2: 97%     General: Awake, alert and oriented, in no distress  HEENT: Head is atraumatic, normocephalic, extraocular movements full, oropharynx clear, no scleral icterus, pink moist mucous membranes  Neck: supple, no jvd, lymphadenopathy or masses  Cardiovascular: regular rate and rhythm without murmurs, rubs or gallops  Pulmonary: non-labored, clear to auscultation bilaterally, no wheezing  Abdomen: soft, non-tender, non-distended, normal active bowel sounds present  Extremities: No clubbing, cyanosis or edema  Lymph: No cervical, supraclavicular adenopathy   Neurologic: Mental status as above, alert, awake and oriented, grossly non-focal exam  Skin: warm, dry, intact        Labs / Studies:    Lab on 08/09/2017   Component Date Value   • Glucose 08/09/2017 232*   • BUN 08/09/2017 15    • Creatinine 08/09/2017 0.68    • Sodium 08/09/2017 136    • Potassium 08/09/2017 4.2    • Chloride 08/09/2017 103    • CO2 08/09/2017 24.7    • Calcium 08/09/2017 9.7    • Total Protein 08/09/2017 7.0    • Albumin 08/09/2017 4.30    • ALT (SGPT) 08/09/2017 23    • AST (SGOT) 08/09/2017 30    • Alkaline Phosphatase 08/09/2017 75    • Total Bilirubin 08/09/2017 0.4    • eGFR Non African Amer 08/09/2017 91    • Globulin 08/09/2017 2.7    • A/G Ratio 08/09/2017 1.6    • BUN/Creatinine Ratio 08/09/2017 22.1    • Anion Gap 08/09/2017 8.3    • WBC 08/09/2017 7.26    • RBC 08/09/2017 3.88*   • Hemoglobin 08/09/2017 11.5*   • Hematocrit 08/09/2017 35.2*   • MCV 08/09/2017 90.7    • MCH 08/09/2017 29.6    • MCHC 08/09/2017 32.7*   • RDW 08/09/2017 14.5    • RDW-SD 08/09/2017 46.7    • MPV 08/09/2017 10.3*   • Platelets 08/09/2017 245    • Neutrophil % 08/09/2017 55.0    • Lymphocyte % 08/09/2017 31.3    • Monocyte % 08/09/2017 10.2*   • Eosinophil % 08/09/2017 2.8    • Basophil % 08/09/2017 0.6    • Immature Grans % 08/09/2017 0.1    • Neutrophils, Absolute 08/09/2017 4.00    • Lymphocytes, Absolute 08/09/2017 2.27    •  Monocytes, Absolute 08/09/2017 0.74    • Eosinophils, Absolute 08/09/2017 0.20    • Basophils, Absolute 08/09/2017 0.04    • Immature Grans, Absolute 08/09/2017 0.01    • Osmolality Calc 08/09/2017 280.2    Lab on 07/27/2017   Component Date Value   • Glucose 07/27/2017 263*   • BUN 07/27/2017 20    • Creatinine 07/27/2017 0.79    • Sodium 07/27/2017 138    • Potassium 07/27/2017 4.1    • Chloride 07/27/2017 107    • CO2 07/27/2017 22.3*   • Calcium 07/27/2017 9.3    • Total Protein 07/27/2017 7.2    • Albumin 07/27/2017 4.30    • ALT (SGPT) 07/27/2017 21    • AST (SGOT) 07/27/2017 24    • Alkaline Phosphatase 07/27/2017 84    • Total Bilirubin 07/27/2017 0.3    • eGFR Non  Amer 07/27/2017 77    • Globulin 07/27/2017 2.9    • A/G Ratio 07/27/2017 1.5    • BUN/Creatinine Ratio 07/27/2017 25.3*   • Anion Gap 07/27/2017 8.7    • Phosphorus 07/27/2017 3.0    • WBC 07/27/2017 7.11    • RBC 07/27/2017 3.84*   • Hemoglobin 07/27/2017 11.4*   • Hematocrit 07/27/2017 34.6*   • MCV 07/27/2017 90.1    • MCH 07/27/2017 29.7    • MCHC 07/27/2017 32.9*   • RDW 07/27/2017 15.0*   • RDW-SD 07/27/2017 48.5    • MPV 07/27/2017 10.3*   • Platelets 07/27/2017 255    • Neutrophil % 07/27/2017 61.8    • Lymphocyte % 07/27/2017 26.4    • Monocyte % 07/27/2017 8.6    • Eosinophil % 07/27/2017 2.7    • Basophil % 07/27/2017 0.4    • Immature Grans % 07/27/2017 0.1    • Neutrophils, Absolute 07/27/2017 4.39    • Lymphocytes, Absolute 07/27/2017 1.88    • Monocytes, Absolute 07/27/2017 0.61    • Eosinophils, Absolute 07/27/2017 0.19    • Basophils, Absolute 07/27/2017 0.03    • Immature Grans, Absolute 07/27/2017 0.01    • Osmolality Calc 07/27/2017 287.4    Lab on 07/12/2017   Component Date Value   • Glucose 07/12/2017 292*   • BUN 07/12/2017 14    • Creatinine 07/12/2017 0.67    • Sodium 07/12/2017 136    • Potassium 07/12/2017 4.3    • Chloride 07/12/2017 103    • CO2 07/12/2017 27.4    • Calcium 07/12/2017 9.6    • Total Protein  07/12/2017 7.2    • Albumin 07/12/2017 4.30    • ALT (SGPT) 07/12/2017 19    • AST (SGOT) 07/12/2017 18    • Alkaline Phosphatase 07/12/2017 92    • Total Bilirubin 07/12/2017 0.3    • eGFR Non African Amer 07/12/2017 93    • Globulin 07/12/2017 2.9    • A/G Ratio 07/12/2017 1.5    • BUN/Creatinine Ratio 07/12/2017 20.9    • Anion Gap 07/12/2017 5.6    • Phosphorus 07/12/2017 2.8    • WBC 07/12/2017 6.85    • RBC 07/12/2017 3.91*   • Hemoglobin 07/12/2017 11.5*   • Hematocrit 07/12/2017 35.4*   • MCV 07/12/2017 90.5    • MCH 07/12/2017 29.4    • MCHC 07/12/2017 32.5*   • RDW 07/12/2017 15.7*   • RDW-SD 07/12/2017 52.0    • MPV 07/12/2017 10.5*   • Platelets 07/12/2017 252    • Neutrophil % 07/12/2017 57.6    • Lymphocyte % 07/12/2017 31.7    • Monocyte % 07/12/2017 7.7    • Eosinophil % 07/12/2017 2.2    • Basophil % 07/12/2017 0.7    • Immature Grans % 07/12/2017 0.1    • Neutrophils, Absolute 07/12/2017 3.94    • Lymphocytes, Absolute 07/12/2017 2.17    • Monocytes, Absolute 07/12/2017 0.53    • Eosinophils, Absolute 07/12/2017 0.15    • Basophils, Absolute 07/12/2017 0.05    • Immature Grans, Absolute 07/12/2017 0.01    • Osmolality Calc 07/12/2017 283.2    Lab on 06/28/2017   Component Date Value   • Glucose 06/28/2017 238*   • BUN 06/28/2017 14    • Creatinine 06/28/2017 0.73    • Sodium 06/28/2017 135    • Potassium 06/28/2017 4.1    • Chloride 06/28/2017 106    • CO2 06/28/2017 22.5*   • Calcium 06/28/2017 9.0    • Total Protein 06/28/2017 7.1    • Albumin 06/28/2017 4.20    • ALT (SGPT) 06/28/2017 19    • AST (SGOT) 06/28/2017 21    • Alkaline Phosphatase 06/28/2017 87    • Total Bilirubin 06/28/2017 0.4    • eGFR Non African Amer 06/28/2017 84    • Globulin 06/28/2017 2.9    • A/G Ratio 06/28/2017 1.4*   • BUN/Creatinine Ratio 06/28/2017 19.2    • Anion Gap 06/28/2017 6.5    • WBC 06/28/2017 5.66    • RBC 06/28/2017 3.76*   • Hemoglobin 06/28/2017 11.0*   • Hematocrit 06/28/2017 33.6*   • MCV 06/28/2017  89.4    • MCH 06/28/2017 29.3    • MCHC 06/28/2017 32.7*   • RDW 06/28/2017 15.6*   • RDW-SD 06/28/2017 49.9    • MPV 06/28/2017 10.5*   • Platelets 06/28/2017 227    • Neutrophil % 06/28/2017 62.5    • Lymphocyte % 06/28/2017 29.0    • Monocyte % 06/28/2017 6.7    • Eosinophil % 06/28/2017 1.1    • Basophil % 06/28/2017 0.5    • Immature Grans % 06/28/2017 0.2    • Neutrophils, Absolute 06/28/2017 3.54    • Lymphocytes, Absolute 06/28/2017 1.64    • Monocytes, Absolute 06/28/2017 0.38    • Eosinophils, Absolute 06/28/2017 0.06    • Basophils, Absolute 06/28/2017 0.03    • Immature Grans, Absolute 06/28/2017 0.01    • Osmolality Calc 06/28/2017 278.3    Lab on 06/21/2017   Component Date Value   • Glucose 06/21/2017 345*   • BUN 06/21/2017 15    • Creatinine 06/21/2017 0.79    • Sodium 06/21/2017 142    • Potassium 06/21/2017 4.6    • Chloride 06/21/2017 108    • CO2 06/21/2017 24.4    • Calcium 06/21/2017 9.5    • Total Protein 06/21/2017 7.1    • Albumin 06/21/2017 4.20    • ALT (SGPT) 06/21/2017 15    • AST (SGOT) 06/21/2017 17    • Alkaline Phosphatase 06/21/2017 101    • Total Bilirubin 06/21/2017 0.3    • eGFR Non  Amer 06/21/2017 77    • Globulin 06/21/2017 2.9    • A/G Ratio 06/21/2017 1.4*   • BUN/Creatinine Ratio 06/21/2017 19.0    • Anion Gap 06/21/2017 9.6    • Phosphorus 06/21/2017 3.1    • WBC 06/21/2017 6.00    • RBC 06/21/2017 3.79*   • Hemoglobin 06/21/2017 11.2*   • Hematocrit 06/21/2017 34.9*   • MCV 06/21/2017 92.1    • MCH 06/21/2017 29.6    • MCHC 06/21/2017 32.1*   • RDW 06/21/2017 16.0*   • RDW-SD 06/21/2017 52.0    • MPV 06/21/2017 9.9    • Platelets 06/21/2017 331    • Neutrophil % 06/21/2017 67.6    • Lymphocyte % 06/21/2017 23.7    • Monocyte % 06/21/2017 6.5    • Eosinophil % 06/21/2017 1.5    • Basophil % 06/21/2017 0.5    • Immature Grans % 06/21/2017 0.2    • Neutrophils, Absolute 06/21/2017 4.06    • Lymphocytes, Absolute 06/21/2017 1.42    • Monocytes, Absolute 06/21/2017  0.39    • Eosinophils, Absolute 06/21/2017 0.09    • Basophils, Absolute 06/21/2017 0.03    • Immature Grans, Absolute 06/21/2017 0.01    • Osmolality Calc 06/21/2017 297.6    Lab on 06/14/2017   Component Date Value   • Glucose 06/14/2017 258*   • BUN 06/14/2017 16    • Creatinine 06/14/2017 0.85    • Sodium 06/14/2017 138    • Potassium 06/14/2017 4.3    • Chloride 06/14/2017 104    • CO2 06/14/2017 27.9    • Calcium 06/14/2017 9.1    • Total Protein 06/14/2017 7.0    • Albumin 06/14/2017 3.90    • ALT (SGPT) 06/14/2017 17    • AST (SGOT) 06/14/2017 20    • Alkaline Phosphatase 06/14/2017 91    • Total Bilirubin 06/14/2017 0.3    • eGFR Non  Amer 06/14/2017 71    • Globulin 06/14/2017 3.1    • A/G Ratio 06/14/2017 1.3*   • BUN/Creatinine Ratio 06/14/2017 18.8    • Anion Gap 06/14/2017 6.1    • Phosphorus 06/14/2017 3.0    • WBC 06/14/2017 4.56    • RBC 06/14/2017 3.67*   • Hemoglobin 06/14/2017 10.7*   • Hematocrit 06/14/2017 33.2*   • MCV 06/14/2017 90.5    • MCH 06/14/2017 29.2    • MCHC 06/14/2017 32.2*   • RDW 06/14/2017 16.0*   • RDW-SD 06/14/2017 53.3    • MPV 06/14/2017 10.3*   • Platelets 06/14/2017 232    • Neutrophil % 06/14/2017 57.7    • Lymphocyte % 06/14/2017 31.1    • Monocyte % 06/14/2017 7.2    • Eosinophil % 06/14/2017 2.9    • Basophil % 06/14/2017 0.9    • Immature Grans % 06/14/2017 0.2    • Neutrophils, Absolute 06/14/2017 2.63    • Lymphocytes, Absolute 06/14/2017 1.42    • Monocytes, Absolute 06/14/2017 0.33    • Eosinophils, Absolute 06/14/2017 0.13    • Basophils, Absolute 06/14/2017 0.04    • Immature Grans, Absolute 06/14/2017 0.01    • Osmolality Calc 06/14/2017 285.7    Lab on 06/07/2017   Component Date Value   • Glucose 06/07/2017 262*   • BUN 06/07/2017 13    • Creatinine 06/07/2017 0.79    • Sodium 06/07/2017 135    • Potassium 06/07/2017 4.3    • Chloride 06/07/2017 103    • CO2 06/07/2017 27.0    • Calcium 06/07/2017 9.4    • Total Protein 06/07/2017 6.9    • Albumin  06/07/2017 3.80    • ALT (SGPT) 06/07/2017 22    • AST (SGOT) 06/07/2017 16    • Alkaline Phosphatase 06/07/2017 74    • Total Bilirubin 06/07/2017 0.5    • eGFR Non  Amer 06/07/2017 77    • Globulin 06/07/2017 3.1    • A/G Ratio 06/07/2017 1.2*   • BUN/Creatinine Ratio 06/07/2017 16.5    • Anion Gap 06/07/2017 5.0    • Phosphorus 06/07/2017 3.1    • WBC 06/07/2017 5.57    • RBC 06/07/2017 3.73*   • Hemoglobin 06/07/2017 10.8*   • Hematocrit 06/07/2017 33.4*   • MCV 06/07/2017 89.5    • MCH 06/07/2017 29.0    • MCHC 06/07/2017 32.3*   • RDW 06/07/2017 16.3*   • RDW-SD 06/07/2017 53.2    • MPV 06/07/2017 10.7*   • Platelets 06/07/2017 141    • Neutrophil % 06/07/2017 68.6    • Lymphocyte % 06/07/2017 23.2    • Monocyte % 06/07/2017 6.3    • Eosinophil % 06/07/2017 1.3    • Basophil % 06/07/2017 0.4    • Immature Grans % 06/07/2017 0.2    • Neutrophils, Absolute 06/07/2017 3.83    • Lymphocytes, Absolute 06/07/2017 1.29    • Monocytes, Absolute 06/07/2017 0.35    • Eosinophils, Absolute 06/07/2017 0.07    • Basophils, Absolute 06/07/2017 0.02    • Immature Grans, Absolute 06/07/2017 0.01    • Osmolality Calc 06/07/2017 279.3    Hospital Outpatient Visit on 06/06/2017   Component Date Value   • BSA 06/06/2017 2.2    • IVSd 06/06/2017 1.1    • IVSs 06/06/2017 1.4    • LVIDd 06/06/2017 4.6    • LVIDs 06/06/2017 3.2    • LVPWd 06/06/2017 1.0    • BH CV ECHO SHARON - LVPWS 06/06/2017 1.8    • IVS/LVPW 06/06/2017 1.1    • FS 06/06/2017 30.9    • EDV(Teich) 06/06/2017 95.4    • ESV(Teich) 06/06/2017 39.5    • EF(Teich) 06/06/2017 58.7    • EDV(cubed) 06/06/2017 94.9    • ESV(cubed) 06/06/2017 31.3    • EF(cubed) 06/06/2017 67.0    • % IVS thick 06/06/2017 26.4    • % LVPW thick 06/06/2017 74.5    • LV mass(C)d 06/06/2017 174.0    • LV mass(C)dI 06/06/2017 78.1    • LV mass(C)s 06/06/2017 190.4    • LV mass(C)sI 06/06/2017 85.4    • SV(Teich) 06/06/2017 56.0    • SI(Teich) 06/06/2017 25.1    • SV(cubed) 06/06/2017 63.6     • SI(cubed) 06/06/2017 28.6    • Ao root diam 06/06/2017 2.9    • Ao root area 06/06/2017 6.6    • ACS 06/06/2017 2.1    • LA dimension 06/06/2017 3.2    • LA/Ao 06/06/2017 1.1    • LVOT diam 06/06/2017 2.0    • LVOT area 06/06/2017 3.2    • LVOT area(traced) 06/06/2017 3.1    • LVLd ap4 06/06/2017 7.3    • EDV(MOD-sp4) 06/06/2017 43.0    • LVLs ap4 06/06/2017 6.7    • ESV(MOD-sp4) 06/06/2017 17.0    • EF(MOD-sp4) 06/06/2017 60.5    • SV(MOD-sp4) 06/06/2017 26.0    • SI(MOD-sp4) 06/06/2017 11.7    • Ao root area (BSA correc* 06/06/2017 1.3    • CONTRAST EF 4CH 06/06/2017 60.5    • LV Diastolic corrected f* 06/06/2017 19.3    • LV Systolic corrected fo* 06/06/2017 7.6    • MV E max bernarda 06/06/2017 99.7    • MV A max bernarda 06/06/2017 75.0    • MV E/A 06/06/2017 1.3    • PA acc slope 06/06/2017 963.8    • PA acc time 06/06/2017 0.14    • TR max bernarda 06/06/2017 241.3    • RVSP(TR) 06/06/2017 33.3    • RAP systole 06/06/2017 10.0    • PA pr(Accel) 06/06/2017 15.6    • BH CV ECHO SHARON - BZI_BMI 06/06/2017 41.6    • BH CV ECHO SHARON - BSA(HA* 06/06/2017 2.4    • BH CV ECHO SHARON - BZI_ME* 06/06/2017 117.0    • BH CV ECHO SHARON - BZI_ME* 06/06/2017 167.6    Lab on 05/30/2017   Component Date Value   • Glucose 05/30/2017 238*   • BUN 05/30/2017 14    • Creatinine 05/30/2017 0.70    • Sodium 05/30/2017 140    • Potassium 05/30/2017 4.4    • Chloride 05/30/2017 108    • CO2 05/30/2017 22.0*   • Calcium 05/30/2017 8.7    • Total Protein 05/30/2017 6.8    • Albumin 05/30/2017 3.80    • ALT (SGPT) 05/30/2017 23    • AST (SGOT) 05/30/2017 22    • Alkaline Phosphatase 05/30/2017 63    • Total Bilirubin 05/30/2017 0.4    • eGFR Non African Amer 05/30/2017 88    • Globulin 05/30/2017 3.0    • A/G Ratio 05/30/2017 1.3*   • BUN/Creatinine Ratio 05/30/2017 20.0    • Anion Gap 05/30/2017 10.0    • Phosphorus 05/30/2017 2.4*   • WBC 05/30/2017 6.98    • RBC 05/30/2017 3.88*   • Hemoglobin 05/30/2017 11.2*   • Hematocrit 05/30/2017 34.1*   •  MCV 05/30/2017 87.9    • MCH 05/30/2017 28.9    • MCHC 05/30/2017 32.8*   • RDW 05/30/2017 15.5*   • RDW-SD 05/30/2017 49.1    • MPV 05/30/2017 11.9*   • Platelets 05/30/2017 267    • Neutrophil % 05/30/2017 57.3    • Lymphocyte % 05/30/2017 27.1    • Monocyte % 05/30/2017 7.4    • Eosinophil % 05/30/2017 6.0*   • Basophil % 05/30/2017 1.9    • Immature Grans % 05/30/2017 0.3    • Neutrophils, Absolute 05/30/2017 4.00    • Lymphocytes, Absolute 05/30/2017 1.89    • Monocytes, Absolute 05/30/2017 0.52    • Eosinophils, Absolute 05/30/2017 0.42    • Basophils, Absolute 05/30/2017 0.13    • Immature Grans, Absolute 05/30/2017 0.02    • Osmolality Calc 05/30/2017 287.6    Office Visit on 05/22/2017   Component Date Value   • Glucose 05/22/2017 302*   • BUN 05/22/2017 21    • Creatinine 05/22/2017 0.86    • Sodium 05/22/2017 136    • Potassium 05/22/2017 4.2    • Chloride 05/22/2017 102    • CO2 05/22/2017 23.6*   • Calcium 05/22/2017 10.3*   • Total Protein 05/22/2017 7.2    • Albumin 05/22/2017 4.20    • ALT (SGPT) 05/22/2017 22    • AST (SGOT) 05/22/2017 20    • Alkaline Phosphatase 05/22/2017 61    • Total Bilirubin 05/22/2017 0.3    • eGFR Non  Amer 05/22/2017 70    • Globulin 05/22/2017 3.0    • A/G Ratio 05/22/2017 1.4*   • BUN/Creatinine Ratio 05/22/2017 24.4    • Anion Gap 05/22/2017 10.4    • Phosphorus 05/22/2017 4.2    • WBC 05/22/2017 12.01    • RBC 05/22/2017 4.26    • Hemoglobin 05/22/2017 12.1    • Hematocrit 05/22/2017 37.1    • MCV 05/22/2017 87.1    • MCH 05/22/2017 28.4    • MCHC 05/22/2017 32.6*   • RDW 05/22/2017 15.5*   • RDW-SD 05/22/2017 48.6    • MPV 05/22/2017 10.9*   • Platelets 05/22/2017 484*   • Scan Slide 05/22/2017     • Osmolality Calc 05/22/2017 286.2    • Neutrophil % 05/22/2017 62.0    • Lymphocyte % 05/22/2017 20.0*   • Monocyte % 05/22/2017 6.0    • Eosinophil % 05/22/2017 2.0    • Basophil % 05/22/2017 6.0*   • Myelocyte % 05/22/2017 4.0*   • Neutrophils Absolute 05/22/2017  7.45*   • Lymphocytes Absolute 05/22/2017 2.40    • Monocytes Absolute 05/22/2017 0.72    • Eosinophils Absolute 05/22/2017 0.24    • Basophils Absolute 05/22/2017 0.72*   • Anisocytosis 05/22/2017 Slight/1+    • Hypochromia 05/22/2017 Slight/1+    • Platelet Estimate 05/22/2017 Increased    There may be more visits with results that are not included.   PATHOLOGY:    05/15/17: Bone Marrow Biopsy & Aspirate                         Assessment/Plan :  Arci Haro is a very pleasant 51 y.o.  female who presents in follow up appointment for further management and treatment of chronic phase chronic myelogenous leukemia.    1. Chronic Myelogenous Leukemia - CML (chronic phase)    2. Leukocytosis  3. Thrombocytosis  4. Diabetes Mellitus Type II    Given the findings of her peripheral smear I was concerned for an underlying myeloproliferative disorder. Her primary provider obtained a flow cytometry and this did not show any significant abnormalities. I did also obtain a BCR ABL PCR which was positive for the b2a2/b3a2 (p210) and e1a2 (p190) fusion gene transcripts consistent with a diagnosis for CML. Bone marrow biopsy was performed on initial diagnosis prior to starting Dasatinib treatment with results above and consistent with chronic phase CML.     To further evaluate for a myeloproliferative disorder I did also assess for JAK2 (V617F and exon 12)/MPL/CALR mutations which were negative. To assess for underlying inflammation that may be possibly contributing will also obtain an ESR and CRP, EMMANUEL, Rheumatoid factor, as well as an acute hepatitis panel and HIV test which were all negative. Thrombocytosis can also be caused by an underlying iron deficiency however iron panel and ferritin were normal. I did also order an abdominal ultrasound to assess for splenomegaly which showed spleen size to be 13.9cm.     Given the findings of the BCR ABL PCR I did start the patient on Dasatinib 100mg oral daily. Patient does  have a history of pancreatitis and therefore I held on using Nilotinib. Patient however was unable to tolerate the medication secondary to worsening reflux while being off of her PPI, and experienced severe nausea, vomiting, and dehydration. She was then started on Imatinib 400mg daily and has been tolerating this well. Her only complaint today is minor peripheral edema as well as intermittent muscle cramps which appears to have improved over time. I have advised her of Ibuprofen and Tylenol use for her myalgias. I did order baseline Echo which showed an intact EF. She will need a complete blood count every month given then her blood counts have stabilized as well as intermittent liver function tests monitored.     Routine Healthcare/Diabetes Mellitus Type II  She was strongly advised of routine healthcare maintenance and screening. She reports that it has been four years since her last mammogram and she is unsure as to when her last endoscopic evaluation. She will make a mammogram appointment and discuss with her surgeon whether she is due for repeat colonoscopy. I have also advised her of yearly influenza vaccine. Her glucose has always been elevated on her complete metabolic panel and I explained at length the importance of trying to keep her glucose under better control as well as continuing with routine health care checks and follow ups. I will check an HbA1c at the request of her primary provider which will be followed by her PCP.     I will have the patient return for CBC, CMP every month, with a follow up visit in three months. She understands that should she have any questions or concerns prior to her appointment she should give us a call at any time and I would be happy to see her sooner. It was a pleasure to see this patient in clinic today, thank you for allowing me to participate in the care of this patient.    I spent 41 minutes in regards to this patient’s care today. More than 27 minutes of the time  was spent in direct interaction with the patient for the above problems.      Evita Serrano MD  08/30/2017  3:55 PM

## 2017-09-01 ENCOUNTER — APPOINTMENT (OUTPATIENT)
Dept: MAMMOGRAPHY | Facility: HOSPITAL | Age: 52
End: 2017-09-01

## 2017-09-05 LAB
INTERPRETATION: ABNORMAL
JAK2 EXON 12 MUT TESTED BLD/T: ABNORMAL
LAB DIRECTOR NAME PROVIDER: ABNORMAL
REF LAB TEST METHOD: ABNORMAL
T(ABL1,BCR)B2A2/CONTROL BLD/T: ABNORMAL %
T(ABL1,BCR)B3A2/CONTROL BLD/T: 0.14 %
T(ABL1,BCR)E1A2/CONTROL BLD/T: ABNORMAL %

## 2017-09-11 ENCOUNTER — APPOINTMENT (OUTPATIENT)
Dept: MAMMOGRAPHY | Facility: HOSPITAL | Age: 52
End: 2017-09-11

## 2017-09-25 ENCOUNTER — HOSPITAL ENCOUNTER (OUTPATIENT)
Dept: MAMMOGRAPHY | Facility: HOSPITAL | Age: 52
Discharge: HOME | End: 2017-09-25
Admitting: NURSE PRACTITIONER

## 2017-09-25 DIAGNOSIS — Z13.9 SCREENING: ICD-10-CM

## 2017-09-25 PROCEDURE — 77063 BREAST TOMOSYNTHESIS BI: CPT | Performed by: RADIOLOGY

## 2017-09-25 PROCEDURE — 77063 BREAST TOMOSYNTHESIS BI: CPT

## 2017-09-25 PROCEDURE — G0202 SCR MAMMO BI INCL CAD: HCPCS

## 2017-09-25 PROCEDURE — 77067 SCR MAMMO BI INCL CAD: CPT | Performed by: RADIOLOGY

## 2017-09-25 RX ORDER — IBUPROFEN 800 MG/1
800 TABLET ORAL 3 TIMES DAILY PRN
Qty: 270 TABLET | Refills: 3 | Status: CANCELLED | OUTPATIENT
Start: 2017-09-25

## 2017-09-29 ENCOUNTER — LAB (OUTPATIENT)
Dept: ONCOLOGY | Facility: CLINIC | Age: 52
End: 2017-09-29

## 2017-09-29 DIAGNOSIS — C92.10 CML (CHRONIC MYELOCYTIC LEUKEMIA) (HCC): ICD-10-CM

## 2017-09-29 LAB
ALBUMIN SERPL-MCNC: 4.1 G/DL (ref 3.5–5)
ALBUMIN/GLOB SERPL: 1.6 G/DL (ref 1.5–2.5)
ALP SERPL-CCNC: 102 U/L (ref 35–104)
ALT SERPL W P-5'-P-CCNC: 28 U/L (ref 10–36)
ANION GAP SERPL CALCULATED.3IONS-SCNC: 5.9 MMOL/L (ref 3.6–11.2)
AST SERPL-CCNC: 27 U/L (ref 10–30)
BASOPHILS # BLD AUTO: 0.05 10*3/MM3 (ref 0–0.3)
BASOPHILS NFR BLD AUTO: 0.6 % (ref 0–2)
BILIRUB SERPL-MCNC: 0.2 MG/DL (ref 0.2–1.8)
BUN BLD-MCNC: 14 MG/DL (ref 7–21)
BUN/CREAT SERPL: 18.9 (ref 7–25)
CALCIUM SPEC-SCNC: 8.8 MG/DL (ref 7.7–10)
CHLORIDE SERPL-SCNC: 107 MMOL/L (ref 99–112)
CO2 SERPL-SCNC: 23.1 MMOL/L (ref 24.3–31.9)
CREAT BLD-MCNC: 0.74 MG/DL (ref 0.43–1.29)
DEPRECATED RDW RBC AUTO: 49.2 FL (ref 37–54)
EOSINOPHIL # BLD AUTO: 0.27 10*3/MM3 (ref 0–0.7)
EOSINOPHIL NFR BLD AUTO: 3.3 % (ref 0–5)
ERYTHROCYTE [DISTWIDTH] IN BLOOD BY AUTOMATED COUNT: 14.7 % (ref 11.5–14.5)
GFR SERPL CREATININE-BSD FRML MDRD: 83 ML/MIN/1.73
GLOBULIN UR ELPH-MCNC: 2.5 GM/DL
GLUCOSE BLD-MCNC: 316 MG/DL (ref 70–110)
HCT VFR BLD AUTO: 36.6 % (ref 37–47)
HGB BLD-MCNC: 11.8 G/DL (ref 12–16)
IMM GRANULOCYTES # BLD: 0.03 10*3/MM3 (ref 0–0.03)
IMM GRANULOCYTES NFR BLD: 0.4 % (ref 0–0.5)
LYMPHOCYTES # BLD AUTO: 2.19 10*3/MM3 (ref 1–3)
LYMPHOCYTES NFR BLD AUTO: 27 % (ref 21–51)
MCH RBC QN AUTO: 31.1 PG (ref 27–33)
MCHC RBC AUTO-ENTMCNC: 32.2 G/DL (ref 33–37)
MCV RBC AUTO: 96.3 FL (ref 80–94)
MONOCYTES # BLD AUTO: 0.68 10*3/MM3 (ref 0.1–0.9)
MONOCYTES NFR BLD AUTO: 8.4 % (ref 0–10)
NEUTROPHILS # BLD AUTO: 4.9 10*3/MM3 (ref 1.4–6.5)
NEUTROPHILS NFR BLD AUTO: 60.3 % (ref 30–70)
OSMOLALITY SERPL CALC.SUM OF ELEC: 284.5 MOSM/KG (ref 273–305)
PHOSPHATE SERPL-MCNC: 2.7 MG/DL (ref 2.7–4.5)
PLATELET # BLD AUTO: 247 10*3/MM3 (ref 130–400)
PMV BLD AUTO: 10.9 FL (ref 6–10)
POTASSIUM BLD-SCNC: 4.4 MMOL/L (ref 3.5–5.3)
PROT SERPL-MCNC: 6.6 G/DL (ref 6–8)
RBC # BLD AUTO: 3.8 10*6/MM3 (ref 4.2–5.4)
SODIUM BLD-SCNC: 136 MMOL/L (ref 135–153)
WBC NRBC COR # BLD: 8.12 10*3/MM3 (ref 4.5–12.5)

## 2017-09-29 PROCEDURE — 85025 COMPLETE CBC W/AUTO DIFF WBC: CPT | Performed by: INTERNAL MEDICINE

## 2017-09-29 PROCEDURE — 80053 COMPREHEN METABOLIC PANEL: CPT | Performed by: INTERNAL MEDICINE

## 2017-09-29 PROCEDURE — 84100 ASSAY OF PHOSPHORUS: CPT | Performed by: INTERNAL MEDICINE

## 2017-10-17 ENCOUNTER — SPECIALTY PHARMACY (OUTPATIENT)
Dept: ONCOLOGY | Facility: HOSPITAL | Age: 52
End: 2017-10-17

## 2017-10-17 DIAGNOSIS — C92.10 CML (CHRONIC MYELOCYTIC LEUKEMIA) (HCC): ICD-10-CM

## 2017-10-17 RX ORDER — IMATINIB MESYLATE 400 MG/1
400 TABLET, FILM COATED ORAL DAILY
Qty: 30 TABLET | Refills: 3 | Status: SHIPPED | OUTPATIENT
Start: 2017-10-17 | End: 2018-02-08 | Stop reason: SDUPTHER

## 2017-10-17 NOTE — PROGRESS NOTES
Patient being followed by pharmacy for Gleevec therapy. Refill due today with no refills remaining on previous Rx. New Rx was E-prescribed to [facility] ([facility] today. Patient is scheduled to have office visit with Dr. Serrano in November. Next refill will be due 11/14/17. Pharmacy will follow up and refill Rx on that date if treatment is to continue.

## 2017-10-25 ENCOUNTER — LAB (OUTPATIENT)
Dept: ONCOLOGY | Facility: CLINIC | Age: 52
End: 2017-10-25

## 2017-10-25 DIAGNOSIS — C92.10 CML (CHRONIC MYELOCYTIC LEUKEMIA) (HCC): ICD-10-CM

## 2017-10-25 LAB
ALBUMIN SERPL-MCNC: 3.9 G/DL (ref 3.5–5)
ALBUMIN/GLOB SERPL: 1.3 G/DL (ref 1.5–2.5)
ALP SERPL-CCNC: 76 U/L (ref 35–104)
ALT SERPL W P-5'-P-CCNC: 24 U/L (ref 10–36)
ANION GAP SERPL CALCULATED.3IONS-SCNC: 6 MMOL/L (ref 3.6–11.2)
AST SERPL-CCNC: 28 U/L (ref 10–30)
BASOPHILS # BLD AUTO: 0.04 10*3/MM3 (ref 0–0.3)
BASOPHILS NFR BLD AUTO: 0.7 % (ref 0–2)
BILIRUB SERPL-MCNC: 0.3 MG/DL (ref 0.2–1.8)
BUN BLD-MCNC: 17 MG/DL (ref 7–21)
BUN/CREAT SERPL: 18.3 (ref 7–25)
CALCIUM SPEC-SCNC: 9.2 MG/DL (ref 7.7–10)
CHLORIDE SERPL-SCNC: 107 MMOL/L (ref 99–112)
CO2 SERPL-SCNC: 25 MMOL/L (ref 24.3–31.9)
CREAT BLD-MCNC: 0.93 MG/DL (ref 0.43–1.29)
DEPRECATED RDW RBC AUTO: 43.2 FL (ref 37–54)
EOSINOPHIL # BLD AUTO: 0.22 10*3/MM3 (ref 0–0.7)
EOSINOPHIL NFR BLD AUTO: 3.6 % (ref 0–5)
ERYTHROCYTE [DISTWIDTH] IN BLOOD BY AUTOMATED COUNT: 13.3 % (ref 11.5–14.5)
GFR SERPL CREATININE-BSD FRML MDRD: 63 ML/MIN/1.73
GLOBULIN UR ELPH-MCNC: 2.9 GM/DL
GLUCOSE BLD-MCNC: 223 MG/DL (ref 70–110)
HCT VFR BLD AUTO: 34.6 % (ref 37–47)
HGB BLD-MCNC: 11.2 G/DL (ref 12–16)
IMM GRANULOCYTES # BLD: 0.01 10*3/MM3 (ref 0–0.03)
IMM GRANULOCYTES NFR BLD: 0.2 % (ref 0–0.5)
LYMPHOCYTES # BLD AUTO: 1.61 10*3/MM3 (ref 1–3)
LYMPHOCYTES NFR BLD AUTO: 26.6 % (ref 21–51)
MCH RBC QN AUTO: 29.7 PG (ref 27–33)
MCHC RBC AUTO-ENTMCNC: 32.4 G/DL (ref 33–37)
MCV RBC AUTO: 91.8 FL (ref 80–94)
MONOCYTES # BLD AUTO: 0.6 10*3/MM3 (ref 0.1–0.9)
MONOCYTES NFR BLD AUTO: 9.9 % (ref 0–10)
NEUTROPHILS # BLD AUTO: 3.57 10*3/MM3 (ref 1.4–6.5)
NEUTROPHILS NFR BLD AUTO: 59 % (ref 30–70)
OSMOLALITY SERPL CALC.SUM OF ELEC: 284.1 MOSM/KG (ref 273–305)
PHOSPHATE SERPL-MCNC: 3.6 MG/DL (ref 2.7–4.5)
PLATELET # BLD AUTO: 280 10*3/MM3 (ref 130–400)
PMV BLD AUTO: 10.2 FL (ref 6–10)
POTASSIUM BLD-SCNC: 4.2 MMOL/L (ref 3.5–5.3)
PROT SERPL-MCNC: 6.8 G/DL (ref 6–8)
RBC # BLD AUTO: 3.77 10*6/MM3 (ref 4.2–5.4)
SODIUM BLD-SCNC: 138 MMOL/L (ref 135–153)
WBC NRBC COR # BLD: 6.05 10*3/MM3 (ref 4.5–12.5)

## 2017-10-25 PROCEDURE — 84100 ASSAY OF PHOSPHORUS: CPT | Performed by: INTERNAL MEDICINE

## 2017-10-25 PROCEDURE — 85025 COMPLETE CBC W/AUTO DIFF WBC: CPT | Performed by: INTERNAL MEDICINE

## 2017-10-25 PROCEDURE — 80053 COMPREHEN METABOLIC PANEL: CPT | Performed by: INTERNAL MEDICINE

## 2017-10-30 DIAGNOSIS — C92.10 CML (CHRONIC MYELOCYTIC LEUKEMIA) (HCC): Primary | ICD-10-CM

## 2017-12-05 ENCOUNTER — LAB (OUTPATIENT)
Dept: LAB | Facility: HOSPITAL | Age: 52
End: 2017-12-05

## 2017-12-05 ENCOUNTER — TRANSCRIBE ORDERS (OUTPATIENT)
Dept: ADMINISTRATIVE | Facility: HOSPITAL | Age: 52
End: 2017-12-05

## 2017-12-05 DIAGNOSIS — E10.8 TYPE 1 DIABETES MELLITUS WITH COMPLICATION (HCC): Primary | ICD-10-CM

## 2017-12-05 DIAGNOSIS — E55.9 VITAMIN D DEFICIENCY: ICD-10-CM

## 2017-12-05 DIAGNOSIS — E78.2 MIXED HYPERLIPIDEMIA: ICD-10-CM

## 2017-12-05 DIAGNOSIS — E10.8 TYPE 1 DIABETES MELLITUS WITH COMPLICATION (HCC): ICD-10-CM

## 2017-12-05 DIAGNOSIS — I10 BENIGN HYPERTENSION: ICD-10-CM

## 2017-12-05 DIAGNOSIS — E53.8 VITAMIN B 12 DEFICIENCY: ICD-10-CM

## 2017-12-05 DIAGNOSIS — R53.81 MALAISE AND FATIGUE: ICD-10-CM

## 2017-12-05 DIAGNOSIS — R53.83 MALAISE AND FATIGUE: ICD-10-CM

## 2017-12-05 LAB
ALBUMIN SERPL-MCNC: 4.2 G/DL (ref 3.5–5)
ALBUMIN/GLOB SERPL: 1.4 G/DL (ref 1.5–2.5)
ALP SERPL-CCNC: 73 U/L (ref 35–104)
ALT SERPL W P-5'-P-CCNC: 27 U/L (ref 10–36)
ANION GAP SERPL CALCULATED.3IONS-SCNC: 9.2 MMOL/L (ref 3.6–11.2)
AST SERPL-CCNC: 25 U/L (ref 10–30)
BASOPHILS # BLD AUTO: 0.03 10*3/MM3 (ref 0–0.3)
BASOPHILS NFR BLD AUTO: 0.6 % (ref 0–2)
BILIRUB SERPL-MCNC: 0.6 MG/DL (ref 0.2–1.8)
BUN BLD-MCNC: 14 MG/DL (ref 7–21)
BUN/CREAT SERPL: 16.9 (ref 7–25)
CALCIUM SPEC-SCNC: 9.1 MG/DL (ref 7.7–10)
CHLORIDE SERPL-SCNC: 106 MMOL/L (ref 99–112)
CHOLEST SERPL-MCNC: 177 MG/DL (ref 0–200)
CO2 SERPL-SCNC: 24.8 MMOL/L (ref 24.3–31.9)
CREAT BLD-MCNC: 0.83 MG/DL (ref 0.43–1.29)
DEPRECATED RDW RBC AUTO: 44.7 FL (ref 37–54)
EOSINOPHIL # BLD AUTO: 0.21 10*3/MM3 (ref 0–0.7)
EOSINOPHIL NFR BLD AUTO: 3.9 % (ref 0–5)
ERYTHROCYTE [DISTWIDTH] IN BLOOD BY AUTOMATED COUNT: 13.9 % (ref 11.5–14.5)
FOLATE SERPL-MCNC: 11.33 NG/ML (ref 5.4–20)
GFR SERPL CREATININE-BSD FRML MDRD: 72 ML/MIN/1.73
GLOBULIN UR ELPH-MCNC: 2.9 GM/DL
GLUCOSE BLD-MCNC: 288 MG/DL (ref 70–110)
HBA1C MFR BLD: 8 % (ref 4.5–5.7)
HCT VFR BLD AUTO: 38.4 % (ref 37–47)
HDLC SERPL-MCNC: 49 MG/DL (ref 60–100)
HGB BLD-MCNC: 12.8 G/DL (ref 12–16)
IMM GRANULOCYTES # BLD: 0 10*3/MM3 (ref 0–0.03)
IMM GRANULOCYTES NFR BLD: 0 % (ref 0–0.5)
LDLC SERPL CALC-MCNC: 71 MG/DL (ref 0–100)
LDLC/HDLC SERPL: 1.44 {RATIO}
LYMPHOCYTES # BLD AUTO: 1.72 10*3/MM3 (ref 1–3)
LYMPHOCYTES NFR BLD AUTO: 32.1 % (ref 21–51)
MCH RBC QN AUTO: 29.8 PG (ref 27–33)
MCHC RBC AUTO-ENTMCNC: 33.3 G/DL (ref 33–37)
MCV RBC AUTO: 89.5 FL (ref 80–94)
MONOCYTES # BLD AUTO: 0.61 10*3/MM3 (ref 0.1–0.9)
MONOCYTES NFR BLD AUTO: 11.4 % (ref 0–10)
NEUTROPHILS # BLD AUTO: 2.78 10*3/MM3 (ref 1.4–6.5)
NEUTROPHILS NFR BLD AUTO: 52 % (ref 30–70)
OSMOLALITY SERPL CALC.SUM OF ELEC: 290.4 MOSM/KG (ref 273–305)
PLATELET # BLD AUTO: 249 10*3/MM3 (ref 130–400)
PMV BLD AUTO: 10.6 FL (ref 6–10)
POTASSIUM BLD-SCNC: 4.1 MMOL/L (ref 3.5–5.3)
PROT SERPL-MCNC: 7.1 G/DL (ref 6–8)
RBC # BLD AUTO: 4.29 10*6/MM3 (ref 4.2–5.4)
SODIUM BLD-SCNC: 140 MMOL/L (ref 135–153)
T3RU NFR SERPL: 33 % (ref 22.5–37)
T4 SERPL-MCNC: 10.4 MCG/DL (ref 4.5–10.9)
TRIGL SERPL-MCNC: 287 MG/DL (ref 0–150)
TSH SERPL DL<=0.05 MIU/L-ACNC: 1.19 MIU/ML (ref 0.55–4.78)
VIT B12 BLD-MCNC: 345 PG/ML (ref 211–911)
VLDLC SERPL-MCNC: 57.4 MG/DL
WBC NRBC COR # BLD: 5.35 10*3/MM3 (ref 4.5–12.5)

## 2017-12-05 PROCEDURE — 83036 HEMOGLOBIN GLYCOSYLATED A1C: CPT

## 2017-12-05 PROCEDURE — 84479 ASSAY OF THYROID (T3 OR T4): CPT

## 2017-12-05 PROCEDURE — 82746 ASSAY OF FOLIC ACID SERUM: CPT

## 2017-12-05 PROCEDURE — 84443 ASSAY THYROID STIM HORMONE: CPT

## 2017-12-05 PROCEDURE — 80061 LIPID PANEL: CPT

## 2017-12-05 PROCEDURE — 84436 ASSAY OF TOTAL THYROXINE: CPT

## 2017-12-05 PROCEDURE — 85025 COMPLETE CBC W/AUTO DIFF WBC: CPT

## 2017-12-05 PROCEDURE — 82607 VITAMIN B-12: CPT

## 2017-12-05 PROCEDURE — 36415 COLL VENOUS BLD VENIPUNCTURE: CPT

## 2017-12-05 PROCEDURE — 82306 VITAMIN D 25 HYDROXY: CPT

## 2017-12-05 PROCEDURE — 80053 COMPREHEN METABOLIC PANEL: CPT

## 2017-12-05 RX ORDER — IBUPROFEN 800 MG/1
800 TABLET ORAL 3 TIMES DAILY PRN
Qty: 270 TABLET | Refills: 3 | Status: CANCELLED | OUTPATIENT
Start: 2017-12-05

## 2017-12-07 LAB — 25(OH)D3 SERPL-MCNC: 11.1 NG/ML

## 2017-12-13 ENCOUNTER — OFFICE VISIT (OUTPATIENT)
Dept: ONCOLOGY | Facility: CLINIC | Age: 52
End: 2017-12-13

## 2017-12-13 ENCOUNTER — LAB (OUTPATIENT)
Dept: ONCOLOGY | Facility: CLINIC | Age: 52
End: 2017-12-13

## 2017-12-13 VITALS
RESPIRATION RATE: 18 BRPM | TEMPERATURE: 97.6 F | SYSTOLIC BLOOD PRESSURE: 141 MMHG | DIASTOLIC BLOOD PRESSURE: 88 MMHG | OXYGEN SATURATION: 98 % | HEART RATE: 90 BPM

## 2017-12-13 VITALS
BODY MASS INDEX: 42.74 KG/M2 | DIASTOLIC BLOOD PRESSURE: 88 MMHG | SYSTOLIC BLOOD PRESSURE: 141 MMHG | HEART RATE: 90 BPM | OXYGEN SATURATION: 90 % | WEIGHT: 264.8 LBS | RESPIRATION RATE: 18 BRPM | TEMPERATURE: 97.6 F

## 2017-12-13 DIAGNOSIS — Z00.00 HEALTHCARE MAINTENANCE: ICD-10-CM

## 2017-12-13 DIAGNOSIS — D75.839 THROMBOCYTOSIS: ICD-10-CM

## 2017-12-13 DIAGNOSIS — Z79.4 TYPE 2 DIABETES MELLITUS WITH HYPEROSMOLARITY WITHOUT COMA, WITH LONG-TERM CURRENT USE OF INSULIN (HCC): ICD-10-CM

## 2017-12-13 DIAGNOSIS — E11.00 TYPE 2 DIABETES MELLITUS WITH HYPEROSMOLARITY WITHOUT COMA, WITH LONG-TERM CURRENT USE OF INSULIN (HCC): ICD-10-CM

## 2017-12-13 DIAGNOSIS — C92.10 CML (CHRONIC MYELOCYTIC LEUKEMIA) (HCC): ICD-10-CM

## 2017-12-13 DIAGNOSIS — C92.10 CML (CHRONIC MYELOCYTIC LEUKEMIA) (HCC): Primary | ICD-10-CM

## 2017-12-13 DIAGNOSIS — R53.82 CHRONIC FATIGUE: ICD-10-CM

## 2017-12-13 LAB
ALBUMIN SERPL-MCNC: 4.2 G/DL (ref 3.5–5)
ALBUMIN/GLOB SERPL: 1.5 G/DL (ref 1.5–2.5)
ALP SERPL-CCNC: 81 U/L (ref 35–104)
ALT SERPL W P-5'-P-CCNC: 26 U/L (ref 10–36)
ANION GAP SERPL CALCULATED.3IONS-SCNC: 8.1 MMOL/L (ref 3.6–11.2)
AST SERPL-CCNC: 23 U/L (ref 10–30)
BASOPHILS # BLD AUTO: 0.03 10*3/MM3 (ref 0–0.3)
BASOPHILS NFR BLD AUTO: 0.5 % (ref 0–2)
BILIRUB SERPL-MCNC: 0.6 MG/DL (ref 0.2–1.8)
BUN BLD-MCNC: 12 MG/DL (ref 7–21)
BUN/CREAT SERPL: 16.9 (ref 7–25)
CALCIUM SPEC-SCNC: 9.2 MG/DL (ref 7.7–10)
CHLORIDE SERPL-SCNC: 102 MMOL/L (ref 99–112)
CO2 SERPL-SCNC: 26.9 MMOL/L (ref 24.3–31.9)
CREAT BLD-MCNC: 0.71 MG/DL (ref 0.43–1.29)
DEPRECATED RDW RBC AUTO: 44.3 FL (ref 37–54)
EOSINOPHIL # BLD AUTO: 0.23 10*3/MM3 (ref 0–0.7)
EOSINOPHIL NFR BLD AUTO: 3.8 % (ref 0–5)
ERYTHROCYTE [DISTWIDTH] IN BLOOD BY AUTOMATED COUNT: 13.7 % (ref 11.5–14.5)
GFR SERPL CREATININE-BSD FRML MDRD: 86 ML/MIN/1.73
GLOBULIN UR ELPH-MCNC: 2.8 GM/DL
GLUCOSE BLD-MCNC: 287 MG/DL (ref 70–110)
HCT VFR BLD AUTO: 37.2 % (ref 37–47)
HGB BLD-MCNC: 12.4 G/DL (ref 12–16)
IMM GRANULOCYTES # BLD: 0 10*3/MM3 (ref 0–0.03)
IMM GRANULOCYTES NFR BLD: 0 % (ref 0–0.5)
LYMPHOCYTES # BLD AUTO: 1.46 10*3/MM3 (ref 1–3)
LYMPHOCYTES NFR BLD AUTO: 23.9 % (ref 21–51)
MCH RBC QN AUTO: 30 PG (ref 27–33)
MCHC RBC AUTO-ENTMCNC: 33.3 G/DL (ref 33–37)
MCV RBC AUTO: 90.1 FL (ref 80–94)
MONOCYTES # BLD AUTO: 0.69 10*3/MM3 (ref 0.1–0.9)
MONOCYTES NFR BLD AUTO: 11.3 % (ref 0–10)
NEUTROPHILS # BLD AUTO: 3.71 10*3/MM3 (ref 1.4–6.5)
NEUTROPHILS NFR BLD AUTO: 60.5 % (ref 30–70)
OSMOLALITY SERPL CALC.SUM OF ELEC: 284.1 MOSM/KG (ref 273–305)
PLATELET # BLD AUTO: 206 10*3/MM3 (ref 130–400)
PMV BLD AUTO: 10.8 FL (ref 6–10)
POTASSIUM BLD-SCNC: 4.7 MMOL/L (ref 3.5–5.3)
PROT SERPL-MCNC: 7 G/DL (ref 6–8)
RBC # BLD AUTO: 4.13 10*6/MM3 (ref 4.2–5.4)
SODIUM BLD-SCNC: 137 MMOL/L (ref 135–153)
WBC NRBC COR # BLD: 6.12 10*3/MM3 (ref 4.5–12.5)

## 2017-12-13 PROCEDURE — 85025 COMPLETE CBC W/AUTO DIFF WBC: CPT | Performed by: INTERNAL MEDICINE

## 2017-12-13 PROCEDURE — 81207 BCR/ABL1 GENE MINOR BP: CPT | Performed by: INTERNAL MEDICINE

## 2017-12-13 PROCEDURE — 80053 COMPREHEN METABOLIC PANEL: CPT | Performed by: INTERNAL MEDICINE

## 2017-12-13 PROCEDURE — 81479 UNLISTED MOLECULAR PATHOLOGY: CPT

## 2017-12-13 PROCEDURE — 99214 OFFICE O/P EST MOD 30 MIN: CPT | Performed by: INTERNAL MEDICINE

## 2017-12-13 PROCEDURE — 81206 BCR/ABL1 GENE MAJOR BP: CPT | Performed by: INTERNAL MEDICINE

## 2017-12-13 NOTE — PROGRESS NOTES
Aric Haro  [id number]  1965  12/13/2017      Referring Provider:   EILEEN Andrade    Reason for Follow Up:   1. Chronic Phase - Chronic Myelogenous Leukemia  2. Leukocytosis  3. Thrombocytosis     Chief Complaint:  Fatigue  Muscle Cramps    History of Present Illness:  Aric Haro is a very pleasant 51 y.o.  female who presents in follow up appointment for further management and treatment of chronic phase chronic myelogenous leukemia (CML).    Ms. Haro began experiencing extreme fatigue where she was sleeping multiple hours during the day when she initially began following with me in April 2017. 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 On laboratory evaluation she was found to have a total white blood cell count of 22.35 and a platelet count 470 thousand. In March 2016 she was also noted to have a leukocytosis (although not as elevated) as well as a thrombocytosis, however reports that these were likely secondary to other medical issues at the time her blood work was drawn. She denied of any significant weight loss however has been gradually losing weight since gastric sleeve procedure. She denied of any fevers or frequent infections but did note fluctuating neck lymphadenopathy as well as early satiety and taste in change. She denied of any abnormal or spontaneous bleeding. I did obtain a BCR ABL PCR to further assess her leukocytosis and thrombocytosis and she was positive for the b2a2/b3a2 (p210) and e1a2 (p190) fusion gene transcripts consistent with a diagnosis for CML. After reviewing the toxicities of each tyrosine kinase inhibitor we decided to start Dasatinib treatment. She had a difficult time tolerating the Dasatinib as she was unable to take her PPI with this medication. Since she had to be taken off of her PPI she had worsening reflux  symptoms as well as severe nausea, vomiting, dehydration, and headaches during this period. Given the toxicities she was experiencing she was taken off Dasatinib and this was changed to Gleevec treatment. Since starting Gleevac 400mg daily she has tolerated this much better without significant side effects. The only side effect that she has been able to see is intermittent pedal edema along with some hand swelling and muscle cramps.       Treatment History:  Started Dasatinib on 05/15/17 - experienced nausea, severe reflux, headaches while on medication and had taken 9 doses. This was discontinued due to intolerability and she was thereafter started on Imatinib.   Started Imatinib on 5/26/17    Interim History:  Since the start of Dasatinib and later Gleevec she has had a response and normalization in her complete blood counts with improvement in her BCR ABL PCR. She did report that her fatigue had significantly improved since start of treatment however she recently has been experiencing fatigue again. She denies of any night sweats, weight loss, fevers, infections or lymphadenopathy. She has been tolerating Gleevac. She has been working with her PCP in regards to her Diabetes and her insulin was recently changed back to her previous insulin as her glucose levels have been higher.      The following portions of the patient's history were reviewed and updated as appropriate: allergies, current medications, past family history, past medical history, past social history, past surgical history and problem list.      No Known Allergies    Past Medical History:   Diagnosis Date   • Anemia    • Diabetes mellitus    • Hypertension        Past Surgical History:   Procedure Laterality Date   • SINUS SURGERY     • SLEEVE GASTROPLASTY         Social History     Social History   • Marital status:      Spouse name: N/A   • Number of children: N/A   • Years of education: N/A     Occupational History   • Not on file.      Social History Main Topics   • Smoking status: Former Smoker   • Smokeless tobacco: Never Used   • Alcohol use No   • Drug use: No   • Sexual activity: Not on file     Other Topics Concern   • Not on file     Social History Narrative   She lives with her daughter. She denies of any alcohol or illicit drug use. Previous social tobacco use more then 20 years ago.    Family History   Problem Relation Age of Onset   • Anemia Mother    • Hypertension Mother    • Cancer Mother    • COPD Father    • Heart disease Father    • Breast cancer Neg Hx    Maternal Uncle - CLL  Mother - Malignancy of [place] Junction      Current Outpatient Prescriptions:   •  baclofen (LIORESAL) 20 MG tablet, Take 1 tablet by mouth 3 (Three) Times a Day., Disp: 90 tablet, Rfl: 4  •  diazePAM (VALIUM) 10 MG tablet, Take 1 tablet by mouth before procedure and daily as needed., Disp: 5 tablet, Rfl: 0  •  DULoxetine (CYMBALTA) 60 MG capsule, Take 1 capsule by mouth Daily., Disp: 90 capsule, Rfl: 1  •  fenofibrate 160 MG tablet, Take 1 tablet by mouth Daily., Disp: 90 tablet, Rfl: 3  •  fluconazole (DIFLUCAN) 200 MG tablet, Take 1 tablet by mouth Daily., Disp: 30 tablet, Rfl: 1  •  furosemide (LASIX) 40 MG tablet, Take 1 tablet by mouth Daily., Disp: 90 tablet, Rfl: 3  •  furosemide (LASIX) 40 MG tablet, Take 1 tablet by mouth Daily., Disp: 90 tablet, Rfl: 3  •  glucose blood (FREESTYLE LITE) test strip, Use 1 strip to test blood sugar twice daily, Disp: 60 each, Rfl: 6  •  granisetron (KYTRIL) 1 MG tablet, Take 1 tablet by mouth Every 12 (Twelve) Hours As Needed for Nausea or Vomiting., Disp: 60 tablet, Rfl: 5  •  HYDROcodone-acetaminophen (NORCO)  MG per tablet, Take 1 tablet by mouth Every 4-6 Hours As Needed., Disp: 60 tablet, Rfl: 0  •  ibuprofen (ADVIL,MOTRIN) 800 MG tablet, Take 1 tablet by mouth 3 (Three) Times a Day As Needed., Disp: 270 tablet, Rfl: 3  •  imatinib (GLEEVEC) 400 MG chemo tablet, Take 1 tablet by mouth Daily., Disp: 30  tablet, Rfl: 3  •  Insulin Glargine (LANTUS SOLOSTAR) 100 UNIT/ML injection pen, Inject 45 units subcutaneously 2 times daily, Disp: 30 mL, Rfl: 6  •  loratadine-pseudoephedrine (CLARITIN-D 12 HOUR) 5-120 MG per 12 hr tablet, Take 1 tablet by mouth two times a day, Disp: 30 tablet, Rfl: 0  •  pantoprazole (PROTONIX) 40 MG EC tablet, Take 1 tablet by mouth Daily., Disp: 90 tablet, Rfl: 3  •  prochlorperazine (COMPAZINE) 10 MG tablet, Take 1 tablet by mouth Every 6 (Six) Hours As Needed for Nausea or Vomiting., Disp: 60 tablet, Rfl: 5  •  sertraline (ZOLOFT) 50 MG tablet, Take 1 tablet by mouth Daily., Disp: 90 tablet, Rfl: 3  •  SUMAtriptan (IMITREX) 100 MG tablet, Take 1 tablet by mouth after onset of migraine. May repeat after 2 hours if headache returns. Do not exceed 200mg in 24 hours., Disp: 10 tablet, Rfl: 2  •  TRUETRACK TEST test strip, use to test blood sugar 8 times daily, Disp: 200 each, Rfl: 5  •  vitamin D (ERGOCALCIFEROL) 46331 units capsule capsule, Take 1 capsule by mouth once weekly., Disp: 12 capsule, Rfl: 3        Review of Systems  Constitutional: No fever, chills, no night sweats, +fatigue  HEENT:  No headaches, vision changes or hearing changes, no sinus drainage, sore throat.   Cardiovascular:  No chest pain, no edema  Pulmonary:  No shortness of breath, no cough.   Gastrointestinal: no nausea, no vomiting. No diarrhea. No abdominal pain. No melena or hematochezia.   Genitourinary:  No hematuria, or changes in urination.   Musculoskeletal:  No pain, or joint problems.   Skin: No rashes or pruritus.   Endocrine:  No hot flashes or chills   Hematologic:  No history of free bleeding, spontaneous bleeding or clotting problems.   Immunologic:  +seasonal allergies, no frequent infections.   Neurologic: No numbness, tingling, or weakness.         Physical Exam:  Vital Signs: These were reviewed and listed as per patient’s electronic medical chart  Vitals:    12/13/17 1603   BP: 141/88   Pulse: 90    Resp: 18   Temp: 97.6 °F (36.4 °C)   SpO2: 90%     General: Awake, alert and oriented, in no distress  HEENT: Head is atraumatic, normocephalic, extraocular movements full, oropharynx clear, no scleral icterus, pink moist mucous membranes  Neck: supple, no jvd, lymphadenopathy or masses  Cardiovascular: regular rate and rhythm without murmurs, rubs or gallops  Pulmonary: non-labored, clear to auscultation bilaterally, no wheezing  Abdomen: soft, non-tender, non-distended, normal active bowel sounds present  Extremities: No clubbing, cyanosis or edema  Lymph: No cervical, supraclavicular adenopathy   Neurologic: Mental status as above, alert, awake and oriented, grossly non-focal exam  Skin: warm, dry, intact        Labs / Studies:    Lab on 12/13/2017   Component Date Value   • Glucose 12/13/2017 287*   • BUN 12/13/2017 12    • Creatinine 12/13/2017 0.71    • Sodium 12/13/2017 137    • Potassium 12/13/2017 4.7    • Chloride 12/13/2017 102    • CO2 12/13/2017 26.9    • Calcium 12/13/2017 9.2    • Total Protein 12/13/2017 7.0    • Albumin 12/13/2017 4.20    • ALT (SGPT) 12/13/2017 26    • AST (SGOT) 12/13/2017 23    • Alkaline Phosphatase 12/13/2017 81    • Total Bilirubin 12/13/2017 0.6    • eGFR Non African Amer 12/13/2017 86    • Globulin 12/13/2017 2.8    • A/G Ratio 12/13/2017 1.5    • BUN/Creatinine Ratio 12/13/2017 16.9    • Anion Gap 12/13/2017 8.1    • WBC 12/13/2017 6.12    • RBC 12/13/2017 4.13*   • Hemoglobin 12/13/2017 12.4    • Hematocrit 12/13/2017 37.2    • MCV 12/13/2017 90.1    • MCH 12/13/2017 30.0    • MCHC 12/13/2017 33.3    • RDW 12/13/2017 13.7    • RDW-SD 12/13/2017 44.3    • MPV 12/13/2017 10.8*   • Platelets 12/13/2017 206    • Neutrophil % 12/13/2017 60.5    • Lymphocyte % 12/13/2017 23.9    • Monocyte % 12/13/2017 11.3*   • Eosinophil % 12/13/2017 3.8    • Basophil % 12/13/2017 0.5    • Immature Grans % 12/13/2017 0.0    • Neutrophils, Absolute 12/13/2017 3.71    • Lymphocytes, Absolute  12/13/2017 1.46    • Monocytes, Absolute 12/13/2017 0.69    • Eosinophils, Absolute 12/13/2017 0.23    • Basophils, Absolute 12/13/2017 0.03    • Immature Grans, Absolute 12/13/2017 0.00    • Osmolality Calc 12/13/2017 284.1    Lab on 12/05/2017   Component Date Value   • Glucose 12/05/2017 288*   • BUN 12/05/2017 14    • Creatinine 12/05/2017 0.83    • Sodium 12/05/2017 140    • Potassium 12/05/2017 4.1    • Chloride 12/05/2017 106    • CO2 12/05/2017 24.8    • Calcium 12/05/2017 9.1    • Total Protein 12/05/2017 7.1    • Albumin 12/05/2017 4.20    • ALT (SGPT) 12/05/2017 27    • AST (SGOT) 12/05/2017 25    • Alkaline Phosphatase 12/05/2017 73    • Total Bilirubin 12/05/2017 0.6    • eGFR Non  Amer 12/05/2017 72    • Globulin 12/05/2017 2.9    • A/G Ratio 12/05/2017 1.4*   • BUN/Creatinine Ratio 12/05/2017 16.9    • Anion Gap 12/05/2017 9.2    • Hemoglobin A1C 12/05/2017 8.00*   • Total Cholesterol 12/05/2017 177    • Triglycerides 12/05/2017 287*   • HDL Cholesterol 12/05/2017 49*   • LDL Cholesterol  12/05/2017 71    • VLDL Cholesterol 12/05/2017 57.4    • LDL/HDL Ratio 12/05/2017 1.44    • Vitamin B-12 12/05/2017 345    • Folate 12/05/2017 11.33    • 25 Hydroxy, Vitamin D 12/05/2017 11.1    • TSH 12/05/2017 1.194    • T4, Total 12/05/2017 10.40    • T3 Uptake 12/05/2017 33.0    • WBC 12/05/2017 5.35    • RBC 12/05/2017 4.29    • Hemoglobin 12/05/2017 12.8    • Hematocrit 12/05/2017 38.4    • MCV 12/05/2017 89.5    • MCH 12/05/2017 29.8    • MCHC 12/05/2017 33.3    • RDW 12/05/2017 13.9    • RDW-SD 12/05/2017 44.7    • MPV 12/05/2017 10.6*   • Platelets 12/05/2017 249    • Neutrophil % 12/05/2017 52.0    • Lymphocyte % 12/05/2017 32.1    • Monocyte % 12/05/2017 11.4*   • Eosinophil % 12/05/2017 3.9    • Basophil % 12/05/2017 0.6    • Immature Grans % 12/05/2017 0.0    • Neutrophils, Absolute 12/05/2017 2.78    • Lymphocytes, Absolute 12/05/2017 1.72    • Monocytes, Absolute 12/05/2017 0.61    •  Eosinophils, Absolute 12/05/2017 0.21    • Basophils, Absolute 12/05/2017 0.03    • Immature Grans, Absolute 12/05/2017 0.00    • Osmolality Calc 12/05/2017 290.4    Lab on 10/25/2017   Component Date Value   • Glucose 10/25/2017 223*   • BUN 10/25/2017 17    • Creatinine 10/25/2017 0.93    • Sodium 10/25/2017 138    • Potassium 10/25/2017 4.2    • Chloride 10/25/2017 107    • CO2 10/25/2017 25.0    • Calcium 10/25/2017 9.2    • Total Protein 10/25/2017 6.8    • Albumin 10/25/2017 3.90    • ALT (SGPT) 10/25/2017 24    • AST (SGOT) 10/25/2017 28    • Alkaline Phosphatase 10/25/2017 76    • Total Bilirubin 10/25/2017 0.3    • eGFR Non  Amer 10/25/2017 63    • Globulin 10/25/2017 2.9    • A/G Ratio 10/25/2017 1.3*   • BUN/Creatinine Ratio 10/25/2017 18.3    • Anion Gap 10/25/2017 6.0    • Phosphorus 10/25/2017 3.6    • WBC 10/25/2017 6.05    • RBC 10/25/2017 3.77*   • Hemoglobin 10/25/2017 11.2*   • Hematocrit 10/25/2017 34.6*   • MCV 10/25/2017 91.8    • MCH 10/25/2017 29.7    • MCHC 10/25/2017 32.4*   • RDW 10/25/2017 13.3    • RDW-SD 10/25/2017 43.2    • MPV 10/25/2017 10.2*   • Platelets 10/25/2017 280    • Neutrophil % 10/25/2017 59.0    • Lymphocyte % 10/25/2017 26.6    • Monocyte % 10/25/2017 9.9    • Eosinophil % 10/25/2017 3.6    • Basophil % 10/25/2017 0.7    • Immature Grans % 10/25/2017 0.2    • Neutrophils, Absolute 10/25/2017 3.57    • Lymphocytes, Absolute 10/25/2017 1.61    • Monocytes, Absolute 10/25/2017 0.60    • Eosinophils, Absolute 10/25/2017 0.22    • Basophils, Absolute 10/25/2017 0.04    • Immature Grans, Absolute 10/25/2017 0.01    • Osmolality Calc 10/25/2017 284.1    Lab on 09/29/2017   Component Date Value   • Glucose 09/29/2017 316*   • BUN 09/29/2017 14    • Creatinine 09/29/2017 0.74    • Sodium 09/29/2017 136    • Potassium 09/29/2017 4.4    • Chloride 09/29/2017 107    • CO2 09/29/2017 23.1*   • Calcium 09/29/2017 8.8    • Total Protein 09/29/2017 6.6    • Albumin 09/29/2017  4.10    • ALT (SGPT) 09/29/2017 28    • AST (SGOT) 09/29/2017 27    • Alkaline Phosphatase 09/29/2017 102    • Total Bilirubin 09/29/2017 0.2    • eGFR Non African Amer 09/29/2017 83    • Globulin 09/29/2017 2.5    • A/G Ratio 09/29/2017 1.6    • BUN/Creatinine Ratio 09/29/2017 18.9    • Anion Gap 09/29/2017 5.9    • Phosphorus 09/29/2017 2.7    • WBC 09/29/2017 8.12    • RBC 09/29/2017 3.80*   • Hemoglobin 09/29/2017 11.8*   • Hematocrit 09/29/2017 36.6*   • MCV 09/29/2017 96.3*   • MCH 09/29/2017 31.1    • MCHC 09/29/2017 32.2*   • RDW 09/29/2017 14.7*   • RDW-SD 09/29/2017 49.2    • MPV 09/29/2017 10.9*   • Platelets 09/29/2017 247    • Neutrophil % 09/29/2017 60.3    • Lymphocyte % 09/29/2017 27.0    • Monocyte % 09/29/2017 8.4    • Eosinophil % 09/29/2017 3.3    • Basophil % 09/29/2017 0.6    • Immature Grans % 09/29/2017 0.4    • Neutrophils, Absolute 09/29/2017 4.90    • Lymphocytes, Absolute 09/29/2017 2.19    • Monocytes, Absolute 09/29/2017 0.68    • Eosinophils, Absolute 09/29/2017 0.27    • Basophils, Absolute 09/29/2017 0.05    • Immature Grans, Absolute 09/29/2017 0.03    • Osmolality Calc 09/29/2017 284.5    Office Visit on 08/30/2017   Component Date Value   • Glucose 08/30/2017 228*   • BUN 08/30/2017 14    • Creatinine 08/30/2017 0.65    • Sodium 08/30/2017 137    • Potassium 08/30/2017 4.1    • Chloride 08/30/2017 101    • CO2 08/30/2017 22.2*   • Calcium 08/30/2017 9.3    • Total Protein 08/30/2017 7.0    • Albumin 08/30/2017 4.10    • ALT (SGPT) 08/30/2017 29    • AST (SGOT) 08/30/2017 27    • Alkaline Phosphatase 08/30/2017 82    • Total Bilirubin 08/30/2017 0.4    • eGFR Non  Amer 08/30/2017 96    • Globulin 08/30/2017 2.9    • A/G Ratio 08/30/2017 1.4*   • BUN/Creatinine Ratio 08/30/2017 21.5    • Anion Gap 08/30/2017 13.8*   • Uric Acid 08/30/2017 4.1    • LDH 08/30/2017 177    • B2A2 transcript 08/30/2017 Comment    • B3A2 transcript 08/30/2017 0.135    • E1A2 transcript 08/30/2017  Comment    • Interpretation 08/30/2017 Comment*   • Director Review 08/30/2017 Comment    • Background 08/30/2017 Comment    • Methodology 08/30/2017 Comment    • Hemoglobin A1C 08/30/2017 7.50*   • WBC 08/30/2017 7.17    • RBC 08/30/2017 3.78*   • Hemoglobin 08/30/2017 11.4*   • Hematocrit 08/30/2017 34.7*   • MCV 08/30/2017 91.8    • MCH 08/30/2017 30.2    • MCHC 08/30/2017 32.9*   • RDW 08/30/2017 14.5    • RDW-SD 08/30/2017 46.4    • MPV 08/30/2017 10.4*   • Platelets 08/30/2017 260    • Neutrophil % 08/30/2017 56.5    • Lymphocyte % 08/30/2017 31.7    • Monocyte % 08/30/2017 8.6    • Eosinophil % 08/30/2017 2.8    • Basophil % 08/30/2017 0.3    • Immature Grans % 08/30/2017 0.1    • Neutrophils, Absolute 08/30/2017 4.05    • Lymphocytes, Absolute 08/30/2017 2.27    • Monocytes, Absolute 08/30/2017 0.62    • Eosinophils, Absolute 08/30/2017 0.20    • Basophils, Absolute 08/30/2017 0.02    • Immature Grans, Absolute 08/30/2017 0.01    • Osmolality Calc 08/30/2017 281.5    Lab on 08/09/2017   Component Date Value   • Glucose 08/09/2017 232*   • BUN 08/09/2017 15    • Creatinine 08/09/2017 0.68    • Sodium 08/09/2017 136    • Potassium 08/09/2017 4.2    • Chloride 08/09/2017 103    • CO2 08/09/2017 24.7    • Calcium 08/09/2017 9.7    • Total Protein 08/09/2017 7.0    • Albumin 08/09/2017 4.30    • ALT (SGPT) 08/09/2017 23    • AST (SGOT) 08/09/2017 30    • Alkaline Phosphatase 08/09/2017 75    • Total Bilirubin 08/09/2017 0.4    • eGFR Non African Amer 08/09/2017 91    • Globulin 08/09/2017 2.7    • A/G Ratio 08/09/2017 1.6    • BUN/Creatinine Ratio 08/09/2017 22.1    • Anion Gap 08/09/2017 8.3    • WBC 08/09/2017 7.26    • RBC 08/09/2017 3.88*   • Hemoglobin 08/09/2017 11.5*   • Hematocrit 08/09/2017 35.2*   • MCV 08/09/2017 90.7    • MCH 08/09/2017 29.6    • MCHC 08/09/2017 32.7*   • RDW 08/09/2017 14.5    • RDW-SD 08/09/2017 46.7    • MPV 08/09/2017 10.3*   • Platelets 08/09/2017 245    • Neutrophil % 08/09/2017  55.0    • Lymphocyte % 08/09/2017 31.3    • Monocyte % 08/09/2017 10.2*   • Eosinophil % 08/09/2017 2.8    • Basophil % 08/09/2017 0.6    • Immature Grans % 08/09/2017 0.1    • Neutrophils, Absolute 08/09/2017 4.00    • Lymphocytes, Absolute 08/09/2017 2.27    • Monocytes, Absolute 08/09/2017 0.74    • Eosinophils, Absolute 08/09/2017 0.20    • Basophils, Absolute 08/09/2017 0.04    • Immature Grans, Absolute 08/09/2017 0.01    • Osmolality Calc 08/09/2017 280.2    Lab on 07/27/2017   Component Date Value   • Glucose 07/27/2017 263*   • BUN 07/27/2017 20    • Creatinine 07/27/2017 0.79    • Sodium 07/27/2017 138    • Potassium 07/27/2017 4.1    • Chloride 07/27/2017 107    • CO2 07/27/2017 22.3*   • Calcium 07/27/2017 9.3    • Total Protein 07/27/2017 7.2    • Albumin 07/27/2017 4.30    • ALT (SGPT) 07/27/2017 21    • AST (SGOT) 07/27/2017 24    • Alkaline Phosphatase 07/27/2017 84    • Total Bilirubin 07/27/2017 0.3    • eGFR Non  Amer 07/27/2017 77    • Globulin 07/27/2017 2.9    • A/G Ratio 07/27/2017 1.5    • BUN/Creatinine Ratio 07/27/2017 25.3*   • Anion Gap 07/27/2017 8.7    • Phosphorus 07/27/2017 3.0    • WBC 07/27/2017 7.11    • RBC 07/27/2017 3.84*   • Hemoglobin 07/27/2017 11.4*   • Hematocrit 07/27/2017 34.6*   • MCV 07/27/2017 90.1    • MCH 07/27/2017 29.7    • MCHC 07/27/2017 32.9*   • RDW 07/27/2017 15.0*   • RDW-SD 07/27/2017 48.5    • MPV 07/27/2017 10.3*   • Platelets 07/27/2017 255    • Neutrophil % 07/27/2017 61.8    • Lymphocyte % 07/27/2017 26.4    • Monocyte % 07/27/2017 8.6    • Eosinophil % 07/27/2017 2.7    • Basophil % 07/27/2017 0.4    • Immature Grans % 07/27/2017 0.1    • Neutrophils, Absolute 07/27/2017 4.39    • Lymphocytes, Absolute 07/27/2017 1.88    • Monocytes, Absolute 07/27/2017 0.61    • Eosinophils, Absolute 07/27/2017 0.19    • Basophils, Absolute 07/27/2017 0.03    • Immature Grans, Absolute 07/27/2017 0.01    • Osmolality Calc 07/27/2017 287.4    Lab on 07/12/2017    Component Date Value   • Glucose 07/12/2017 292*   • BUN 07/12/2017 14    • Creatinine 07/12/2017 0.67    • Sodium 07/12/2017 136    • Potassium 07/12/2017 4.3    • Chloride 07/12/2017 103    • CO2 07/12/2017 27.4    • Calcium 07/12/2017 9.6    • Total Protein 07/12/2017 7.2    • Albumin 07/12/2017 4.30    • ALT (SGPT) 07/12/2017 19    • AST (SGOT) 07/12/2017 18    • Alkaline Phosphatase 07/12/2017 92    • Total Bilirubin 07/12/2017 0.3    • eGFR Non African Amer 07/12/2017 93    • Globulin 07/12/2017 2.9    • A/G Ratio 07/12/2017 1.5    • BUN/Creatinine Ratio 07/12/2017 20.9    • Anion Gap 07/12/2017 5.6    • Phosphorus 07/12/2017 2.8    • WBC 07/12/2017 6.85    • RBC 07/12/2017 3.91*   • Hemoglobin 07/12/2017 11.5*   • Hematocrit 07/12/2017 35.4*   • MCV 07/12/2017 90.5    • MCH 07/12/2017 29.4    • MCHC 07/12/2017 32.5*   • RDW 07/12/2017 15.7*   • RDW-SD 07/12/2017 52.0    • MPV 07/12/2017 10.5*   • Platelets 07/12/2017 252    • Neutrophil % 07/12/2017 57.6    • Lymphocyte % 07/12/2017 31.7    • Monocyte % 07/12/2017 7.7    • Eosinophil % 07/12/2017 2.2    • Basophil % 07/12/2017 0.7    • Immature Grans % 07/12/2017 0.1    • Neutrophils, Absolute 07/12/2017 3.94    • Lymphocytes, Absolute 07/12/2017 2.17    • Monocytes, Absolute 07/12/2017 0.53    • Eosinophils, Absolute 07/12/2017 0.15    • Basophils, Absolute 07/12/2017 0.05    • Immature Grans, Absolute 07/12/2017 0.01    • Osmolality Calc 07/12/2017 283.2    Lab on 06/28/2017   Component Date Value   • Glucose 06/28/2017 238*   • BUN 06/28/2017 14    • Creatinine 06/28/2017 0.73    • Sodium 06/28/2017 135    • Potassium 06/28/2017 4.1    • Chloride 06/28/2017 106    • CO2 06/28/2017 22.5*   • Calcium 06/28/2017 9.0    • Total Protein 06/28/2017 7.1    • Albumin 06/28/2017 4.20    • ALT (SGPT) 06/28/2017 19    • AST (SGOT) 06/28/2017 21    • Alkaline Phosphatase 06/28/2017 87    • Total Bilirubin 06/28/2017 0.4    • eGFR Non African Amer 06/28/2017 84    •  Globulin 06/28/2017 2.9    • A/G Ratio 06/28/2017 1.4*   • BUN/Creatinine Ratio 06/28/2017 19.2    • Anion Gap 06/28/2017 6.5    • WBC 06/28/2017 5.66    • RBC 06/28/2017 3.76*   • Hemoglobin 06/28/2017 11.0*   • Hematocrit 06/28/2017 33.6*   • MCV 06/28/2017 89.4    • MCH 06/28/2017 29.3    • MCHC 06/28/2017 32.7*   • RDW 06/28/2017 15.6*   • RDW-SD 06/28/2017 49.9    • MPV 06/28/2017 10.5*   • Platelets 06/28/2017 227    • Neutrophil % 06/28/2017 62.5    • Lymphocyte % 06/28/2017 29.0    • Monocyte % 06/28/2017 6.7    • Eosinophil % 06/28/2017 1.1    • Basophil % 06/28/2017 0.5    • Immature Grans % 06/28/2017 0.2    • Neutrophils, Absolute 06/28/2017 3.54    • Lymphocytes, Absolute 06/28/2017 1.64    • Monocytes, Absolute 06/28/2017 0.38    • Eosinophils, Absolute 06/28/2017 0.06    • Basophils, Absolute 06/28/2017 0.03    • Immature Grans, Absolute 06/28/2017 0.01    • Osmolality Calc 06/28/2017 278.3    Lab on 06/21/2017   Component Date Value   • Glucose 06/21/2017 345*   • BUN 06/21/2017 15    • Creatinine 06/21/2017 0.79    • Sodium 06/21/2017 142    • Potassium 06/21/2017 4.6    • Chloride 06/21/2017 108    • CO2 06/21/2017 24.4    • Calcium 06/21/2017 9.5    • Total Protein 06/21/2017 7.1    • Albumin 06/21/2017 4.20    • ALT (SGPT) 06/21/2017 15    • AST (SGOT) 06/21/2017 17    • Alkaline Phosphatase 06/21/2017 101    • Total Bilirubin 06/21/2017 0.3    • eGFR Non  Amer 06/21/2017 77    • Globulin 06/21/2017 2.9    • A/G Ratio 06/21/2017 1.4*   • BUN/Creatinine Ratio 06/21/2017 19.0    • Anion Gap 06/21/2017 9.6    • Phosphorus 06/21/2017 3.1    • WBC 06/21/2017 6.00    • RBC 06/21/2017 3.79*   • Hemoglobin 06/21/2017 11.2*   • Hematocrit 06/21/2017 34.9*   • MCV 06/21/2017 92.1    • MCH 06/21/2017 29.6    • MCHC 06/21/2017 32.1*   • RDW 06/21/2017 16.0*   • RDW-SD 06/21/2017 52.0    • MPV 06/21/2017 9.9    • Platelets 06/21/2017 331    • Neutrophil % 06/21/2017 67.6    • Lymphocyte % 06/21/2017  23.7    • Monocyte % 2017 6.5    • Eosinophil % 2017 1.5    • Basophil % 2017 0.5    • Immature Grans % 2017 0.2    • Neutrophils, Absolute 2017 4.06    • Lymphocytes, Absolute 2017 1.42    • Monocytes, Absolute 2017 0.39    • Eosinophils, Absolute 2017 0.09    • Basophils, Absolute 2017 0.03    • Immature Grans, Absolute 2017 0.01    • Osmolality Calc 2017 297.6    There may be more visits with results that are not included.         [id number]: US ABDOMEN COMPLETE: Visualized pancreas is unremarkable. The imaged portion of the abdominal aorta is nondilated. The liver is homogeneous. There is no intrahepatic ductal dilatation or focal hepatic mass. The imaged portion of the hepatic vessels and inferior vena cava are patent. The gallbladder has been removed. The common bile duct is normal, measuring 5.7 mm. The kidneys demonstrate no evidence of hydronephrosis or solid renal mass. The spleen is homogeneous and measures 13 cm. IMPRESSION: Spleen measures 13 cm and is homogeneous.           PATHOLOGY:    05/15/17: Bone Marrow Biopsy & Aspirate                     17: BCR ABL PCR        3017: BCR ABL PCR            Assessment/Plan :  Aric Haro is a very pleasant 51 y.o.  female who presents in follow up appointment for further management and treatment of chronic phase chronic myelogenous leukemia.    1. Chronic Myelogenous Leukemia - CML (chronic phase)    2. Leukocytosis  3. Thrombocytosis  4. Healthcare Maintenance    Given the findings of her peripheral smear I was concerned for an underlying myeloproliferative disorder. Her primary provider obtained a flow cytometry and this did not show any significant abnormalities. I did also obtain a BCR ABL PCR which was positive for the b2a2/b3a2 (p210) and e1a2 (p190) fusion gene transcripts consistent with a diagnosis for CML. Bone marrow biopsy was performed on initial diagnosis  prior to starting Dasatinib treatment with results above and consistent with chronic phase CML.     To further evaluate for a myeloproliferative disorder I did also assess for JAK2 (V617F and exon 12)/MPL/CALR mutations which were negative. To assess for underlying inflammation that may be possibly contributing will also obtain an ESR and CRP, EMMANUEL, Rheumatoid factor, as well as an acute hepatitis panel and HIV test which were all negative. Thrombocytosis can also be caused by an underlying iron deficiency however iron panel and ferritin were normal. I did also order an abdominal ultrasound to assess for splenomegaly which showed spleen size to be 13.9cm.     Given the findings of the BCR ABL PCR I did start the patient on Dasatinib 100mg oral daily. Patient does have a history of pancreatitis and therefore I held on using Nilotinib. Patient however was unable to tolerate the medication secondary to worsening reflux while being off of her PPI, and experienced severe nausea, vomiting, and dehydration. She was then started on Imatinib 400mg daily and has been tolerating this well. Her only complaint today is minor peripheral edema as well as intermittent muscle cramps which appears to have improved over time. I have advised her of Ibuprofen and Tylenol use for her myalgias. I did order baseline Echo which showed an intact EF.     She has had a complete hematological response, and BCR ABL PCR has significantly improved since start of Gleevac and this will need to continue to be monitored every three months to assess ongoing  molecular response. She will need a complete blood count every month given then her blood counts have stabilized as well as intermittent liver function tests monitored.       Healthcare Maintenance  She was strongly advised of routine healthcare maintenance and screening. She reports that she has had a mammogram which was negative and reports that she has not yet had a colonoscopy however will  discuss with her primary provider. She has had influenza vaccine. She has also been working on better glucose control with her primary provider.    I will have the patient return for CBC every month, with a follow up visit in three months. She understands that should she have any questions or concerns prior to her appointment she should give us a call at any time and I would be happy to see her sooner. It was a pleasure to see this patient in clinic today, thank you for allowing me to participate in the care of this patient.    I spent 25 minutes in regards to this patient’s care today. More than 20 minutes of the time was spent in direct interaction with the patient for the above problems.      Evtia Serrano MD  12/13/2017  5:12 PM

## 2017-12-15 DIAGNOSIS — C92.10 CML (CHRONIC MYELOCYTIC LEUKEMIA) (HCC): Primary | ICD-10-CM

## 2017-12-19 LAB
INTERPRETATION: ABNORMAL
JAK2 EXON 12 MUT TESTED BLD/T: ABNORMAL
LAB DIRECTOR NAME PROVIDER: ABNORMAL
REF LAB TEST METHOD: ABNORMAL
T(ABL1,BCR)B2A2/CONTROL BLD/T: ABNORMAL %
T(ABL1,BCR)B3A2/CONTROL BLD/T: 0.1 %
T(ABL1,BCR)E1A2/CONTROL BLD/T: ABNORMAL %

## 2018-01-18 ENCOUNTER — LAB (OUTPATIENT)
Dept: ONCOLOGY | Facility: CLINIC | Age: 53
End: 2018-01-18

## 2018-01-18 DIAGNOSIS — C92.10 CML (CHRONIC MYELOCYTIC LEUKEMIA) (HCC): ICD-10-CM

## 2018-01-18 LAB
ALBUMIN SERPL-MCNC: 4.3 G/DL (ref 3.5–5)
ALBUMIN/GLOB SERPL: 1.5 G/DL (ref 1.5–2.5)
ALP SERPL-CCNC: 60 U/L (ref 35–104)
ALT SERPL W P-5'-P-CCNC: 28 U/L (ref 10–36)
ANION GAP SERPL CALCULATED.3IONS-SCNC: 8 MMOL/L (ref 3.6–11.2)
AST SERPL-CCNC: 25 U/L (ref 10–30)
BASOPHILS # BLD AUTO: 0.05 10*3/MM3 (ref 0–0.3)
BASOPHILS NFR BLD AUTO: 0.8 % (ref 0–2)
BILIRUB SERPL-MCNC: 0.5 MG/DL (ref 0.2–1.8)
BUN BLD-MCNC: 16 MG/DL (ref 7–21)
BUN/CREAT SERPL: 25 (ref 7–25)
CALCIUM SPEC-SCNC: 9.3 MG/DL (ref 7.7–10)
CHLORIDE SERPL-SCNC: 106 MMOL/L (ref 99–112)
CO2 SERPL-SCNC: 24 MMOL/L (ref 24.3–31.9)
CREAT BLD-MCNC: 0.64 MG/DL (ref 0.43–1.29)
DEPRECATED RDW RBC AUTO: 46.1 FL (ref 37–54)
EOSINOPHIL # BLD AUTO: 0.25 10*3/MM3 (ref 0–0.7)
EOSINOPHIL NFR BLD AUTO: 4.1 % (ref 0–5)
ERYTHROCYTE [DISTWIDTH] IN BLOOD BY AUTOMATED COUNT: 14 % (ref 11.5–14.5)
GFR SERPL CREATININE-BSD FRML MDRD: 97 ML/MIN/1.73
GLOBULIN UR ELPH-MCNC: 2.9 GM/DL
GLUCOSE BLD-MCNC: 158 MG/DL (ref 70–110)
HCT VFR BLD AUTO: 36.9 % (ref 37–47)
HGB BLD-MCNC: 11.9 G/DL (ref 12–16)
IMM GRANULOCYTES # BLD: 0.01 10*3/MM3 (ref 0–0.03)
IMM GRANULOCYTES NFR BLD: 0.2 % (ref 0–0.5)
LYMPHOCYTES # BLD AUTO: 2.03 10*3/MM3 (ref 1–3)
LYMPHOCYTES NFR BLD AUTO: 33.7 % (ref 21–51)
MCH RBC QN AUTO: 29.5 PG (ref 27–33)
MCHC RBC AUTO-ENTMCNC: 32.2 G/DL (ref 33–37)
MCV RBC AUTO: 91.3 FL (ref 80–94)
MONOCYTES # BLD AUTO: 0.63 10*3/MM3 (ref 0.1–0.9)
MONOCYTES NFR BLD AUTO: 10.4 % (ref 0–10)
NEUTROPHILS # BLD AUTO: 3.06 10*3/MM3 (ref 1.4–6.5)
NEUTROPHILS NFR BLD AUTO: 50.8 % (ref 30–70)
OSMOLALITY SERPL CALC.SUM OF ELEC: 280.2 MOSM/KG (ref 273–305)
PHOSPHATE SERPL-MCNC: 3.4 MG/DL (ref 2.7–4.5)
PLATELET # BLD AUTO: 274 10*3/MM3 (ref 130–400)
PMV BLD AUTO: 10.5 FL (ref 6–10)
POTASSIUM BLD-SCNC: 4.2 MMOL/L (ref 3.5–5.3)
PROT SERPL-MCNC: 7.2 G/DL (ref 6–8)
RBC # BLD AUTO: 4.04 10*6/MM3 (ref 4.2–5.4)
SODIUM BLD-SCNC: 138 MMOL/L (ref 135–153)
WBC NRBC COR # BLD: 6.03 10*3/MM3 (ref 4.5–12.5)

## 2018-01-18 PROCEDURE — 36415 COLL VENOUS BLD VENIPUNCTURE: CPT

## 2018-01-18 PROCEDURE — 84100 ASSAY OF PHOSPHORUS: CPT | Performed by: INTERNAL MEDICINE

## 2018-01-18 PROCEDURE — 85025 COMPLETE CBC W/AUTO DIFF WBC: CPT | Performed by: INTERNAL MEDICINE

## 2018-01-18 PROCEDURE — 80053 COMPREHEN METABOLIC PANEL: CPT | Performed by: INTERNAL MEDICINE

## 2018-02-08 DIAGNOSIS — C92.10 CML (CHRONIC MYELOCYTIC LEUKEMIA) (HCC): ICD-10-CM

## 2018-02-08 RX ORDER — IMATINIB MESYLATE 400 MG/1
400 TABLET, FILM COATED ORAL DAILY
Qty: 30 TABLET | Refills: 5 | Status: SHIPPED | OUTPATIENT
Start: 2018-02-08 | End: 2018-08-01 | Stop reason: SDUPTHER

## 2018-02-15 ENCOUNTER — LAB (OUTPATIENT)
Dept: ONCOLOGY | Facility: CLINIC | Age: 53
End: 2018-02-15

## 2018-02-15 DIAGNOSIS — C92.10 CML (CHRONIC MYELOCYTIC LEUKEMIA) (HCC): ICD-10-CM

## 2018-02-15 LAB
ALBUMIN SERPL-MCNC: 4 G/DL (ref 3.5–5)
ALBUMIN/GLOB SERPL: 1.5 G/DL (ref 1.5–2.5)
ALP SERPL-CCNC: 62 U/L (ref 35–104)
ALT SERPL W P-5'-P-CCNC: 27 U/L (ref 10–36)
ANION GAP SERPL CALCULATED.3IONS-SCNC: 9 MMOL/L (ref 3.6–11.2)
AST SERPL-CCNC: 25 U/L (ref 10–30)
BASOPHILS # BLD AUTO: 0.04 10*3/MM3 (ref 0–0.3)
BASOPHILS NFR BLD AUTO: 0.5 % (ref 0–2)
BILIRUB SERPL-MCNC: 0.4 MG/DL (ref 0.2–1.8)
BUN BLD-MCNC: 13 MG/DL (ref 7–21)
BUN/CREAT SERPL: 17.1 (ref 7–25)
CALCIUM SPEC-SCNC: 8.8 MG/DL (ref 7.7–10)
CHLORIDE SERPL-SCNC: 104 MMOL/L (ref 99–112)
CO2 SERPL-SCNC: 23 MMOL/L (ref 24.3–31.9)
CREAT BLD-MCNC: 0.76 MG/DL (ref 0.43–1.29)
DEPRECATED RDW RBC AUTO: 44 FL (ref 37–54)
EOSINOPHIL # BLD AUTO: 0.29 10*3/MM3 (ref 0–0.7)
EOSINOPHIL NFR BLD AUTO: 3.9 % (ref 0–5)
ERYTHROCYTE [DISTWIDTH] IN BLOOD BY AUTOMATED COUNT: 13.6 % (ref 11.5–14.5)
GFR SERPL CREATININE-BSD FRML MDRD: 80 ML/MIN/1.73
GLOBULIN UR ELPH-MCNC: 2.6 GM/DL
GLUCOSE BLD-MCNC: 217 MG/DL (ref 70–110)
HCT VFR BLD AUTO: 36.3 % (ref 37–47)
HGB BLD-MCNC: 12 G/DL (ref 12–16)
IMM GRANULOCYTES # BLD: 0.01 10*3/MM3 (ref 0–0.03)
IMM GRANULOCYTES NFR BLD: 0.1 % (ref 0–0.5)
LYMPHOCYTES # BLD AUTO: 2.22 10*3/MM3 (ref 1–3)
LYMPHOCYTES NFR BLD AUTO: 30.2 % (ref 21–51)
MCH RBC QN AUTO: 30.1 PG (ref 27–33)
MCHC RBC AUTO-ENTMCNC: 33.1 G/DL (ref 33–37)
MCV RBC AUTO: 91 FL (ref 80–94)
MONOCYTES # BLD AUTO: 0.68 10*3/MM3 (ref 0.1–0.9)
MONOCYTES NFR BLD AUTO: 9.3 % (ref 0–10)
NEUTROPHILS # BLD AUTO: 4.11 10*3/MM3 (ref 1.4–6.5)
NEUTROPHILS NFR BLD AUTO: 56 % (ref 30–70)
OSMOLALITY SERPL CALC.SUM OF ELEC: 278.7 MOSM/KG (ref 273–305)
PHOSPHATE SERPL-MCNC: 3.5 MG/DL (ref 2.7–4.5)
PLATELET # BLD AUTO: 273 10*3/MM3 (ref 130–400)
PMV BLD AUTO: 10.5 FL (ref 6–10)
POTASSIUM BLD-SCNC: 4.2 MMOL/L (ref 3.5–5.3)
PROT SERPL-MCNC: 6.6 G/DL (ref 6–8)
RBC # BLD AUTO: 3.99 10*6/MM3 (ref 4.2–5.4)
SODIUM BLD-SCNC: 136 MMOL/L (ref 135–153)
WBC NRBC COR # BLD: 7.35 10*3/MM3 (ref 4.5–12.5)

## 2018-02-15 PROCEDURE — 85025 COMPLETE CBC W/AUTO DIFF WBC: CPT | Performed by: INTERNAL MEDICINE

## 2018-02-15 PROCEDURE — 80053 COMPREHEN METABOLIC PANEL: CPT | Performed by: INTERNAL MEDICINE

## 2018-02-15 PROCEDURE — 84100 ASSAY OF PHOSPHORUS: CPT | Performed by: INTERNAL MEDICINE

## 2018-02-19 ENCOUNTER — TELEPHONE (OUTPATIENT)
Dept: GASTROENTEROLOGY | Facility: CLINIC | Age: 53
End: 2018-02-19

## 2018-02-19 ENCOUNTER — DOCUMENTATION (OUTPATIENT)
Dept: GASTROENTEROLOGY | Facility: CLINIC | Age: 53
End: 2018-02-19

## 2018-02-19 DIAGNOSIS — K63.5 POLYP OF COLON, UNSPECIFIED PART OF COLON, UNSPECIFIED TYPE: ICD-10-CM

## 2018-02-19 DIAGNOSIS — Z12.11 COLON CANCER SCREENING: Primary | ICD-10-CM

## 2018-02-19 NOTE — PROGRESS NOTES
Patient called and said that she had spoken to her PCP's office and they wanted her to have a Colonoscopy. I looked through her chart and did not see a referral. She wanted to go ahead and schedule it. I scheduled her for 3-27-18 at 8:30. She stated that she had a polyp removed in the past. I asked Dr. Esparza to put in a case request in to have it as it will NOT be a screening. She asked if she could have Magnesium citrate. I went over instructions with her as well as mailed them to her.

## 2018-02-19 NOTE — TELEPHONE ENCOUNTER
Patient called this morning and said her PCP wanted her to have a Colonoscopy. She stated it was time to repeat the one she had previously. She said that the last one she had done, she had a polyp removed. Can you please put a case request in for patient to have this. She is going to use Magnesium Citrate bowel prep kit if this is ok.      Thank you

## 2018-02-20 PROBLEM — K63.5 POLYP OF COLON: Status: ACTIVE | Noted: 2018-02-20

## 2018-02-20 PROBLEM — Z12.11 COLON CANCER SCREENING: Status: ACTIVE | Noted: 2018-02-20

## 2018-02-26 ENCOUNTER — TRANSCRIBE ORDERS (OUTPATIENT)
Dept: ADMINISTRATIVE | Facility: HOSPITAL | Age: 53
End: 2018-02-26

## 2018-02-26 ENCOUNTER — LAB (OUTPATIENT)
Dept: LAB | Facility: HOSPITAL | Age: 53
End: 2018-02-26

## 2018-02-26 DIAGNOSIS — E78.5 HYPERLIPIDEMIA, UNSPECIFIED HYPERLIPIDEMIA TYPE: ICD-10-CM

## 2018-02-26 DIAGNOSIS — I10 BENIGN HYPERTENSION: Primary | ICD-10-CM

## 2018-02-26 DIAGNOSIS — I10 BENIGN HYPERTENSION: ICD-10-CM

## 2018-02-26 DIAGNOSIS — E55.9 VITAMIN D DEFICIENCY: ICD-10-CM

## 2018-02-26 DIAGNOSIS — E53.8 VITAMIN B 12 DEFICIENCY: ICD-10-CM

## 2018-02-26 DIAGNOSIS — E10.8 TYPE 1 DIABETES MELLITUS WITH COMPLICATION (HCC): ICD-10-CM

## 2018-02-26 LAB
25(OH)D3 SERPL-MCNC: 28 NG/ML
ALBUMIN SERPL-MCNC: 4.1 G/DL (ref 3.5–5)
ALBUMIN/GLOB SERPL: 1.5 G/DL (ref 1.5–2.5)
ALP SERPL-CCNC: 60 U/L (ref 35–104)
ALT SERPL W P-5'-P-CCNC: 24 U/L (ref 10–36)
ANION GAP SERPL CALCULATED.3IONS-SCNC: 8.1 MMOL/L (ref 3.6–11.2)
AST SERPL-CCNC: 22 U/L (ref 10–30)
BASOPHILS # BLD AUTO: 0.03 10*3/MM3 (ref 0–0.3)
BASOPHILS NFR BLD AUTO: 0.5 % (ref 0–2)
BILIRUB SERPL-MCNC: 0.4 MG/DL (ref 0.2–1.8)
BUN BLD-MCNC: 12 MG/DL (ref 7–21)
BUN/CREAT SERPL: 20.3 (ref 7–25)
CALCIUM SPEC-SCNC: 9.1 MG/DL (ref 7.7–10)
CHLORIDE SERPL-SCNC: 103 MMOL/L (ref 99–112)
CHOLEST SERPL-MCNC: 160 MG/DL (ref 0–200)
CO2 SERPL-SCNC: 26.9 MMOL/L (ref 24.3–31.9)
CREAT BLD-MCNC: 0.59 MG/DL (ref 0.43–1.29)
DEPRECATED RDW RBC AUTO: 44.3 FL (ref 37–54)
EOSINOPHIL # BLD AUTO: 0.23 10*3/MM3 (ref 0–0.7)
EOSINOPHIL NFR BLD AUTO: 4 % (ref 0–5)
ERYTHROCYTE [DISTWIDTH] IN BLOOD BY AUTOMATED COUNT: 13.6 % (ref 11.5–14.5)
FOLATE SERPL-MCNC: 13.84 NG/ML (ref 5.4–20)
GFR SERPL CREATININE-BSD FRML MDRD: 107 ML/MIN/1.73
GLOBULIN UR ELPH-MCNC: 2.7 GM/DL
GLUCOSE BLD-MCNC: 148 MG/DL (ref 70–110)
HBA1C MFR BLD: 7.4 % (ref 4.5–5.7)
HCT VFR BLD AUTO: 37.6 % (ref 37–47)
HDLC SERPL-MCNC: 69 MG/DL (ref 60–100)
HGB BLD-MCNC: 12.3 G/DL (ref 12–16)
IMM GRANULOCYTES # BLD: 0.01 10*3/MM3 (ref 0–0.03)
IMM GRANULOCYTES NFR BLD: 0.2 % (ref 0–0.5)
LDLC SERPL CALC-MCNC: 64 MG/DL (ref 0–100)
LDLC/HDLC SERPL: 0.93 {RATIO}
LYMPHOCYTES # BLD AUTO: 1.94 10*3/MM3 (ref 1–3)
LYMPHOCYTES NFR BLD AUTO: 33.8 % (ref 21–51)
MCH RBC QN AUTO: 29.6 PG (ref 27–33)
MCHC RBC AUTO-ENTMCNC: 32.7 G/DL (ref 33–37)
MCV RBC AUTO: 90.6 FL (ref 80–94)
MONOCYTES # BLD AUTO: 0.66 10*3/MM3 (ref 0.1–0.9)
MONOCYTES NFR BLD AUTO: 11.5 % (ref 0–10)
NEUTROPHILS # BLD AUTO: 2.87 10*3/MM3 (ref 1.4–6.5)
NEUTROPHILS NFR BLD AUTO: 50 % (ref 30–70)
OSMOLALITY SERPL CALC.SUM OF ELEC: 278.2 MOSM/KG (ref 273–305)
PLATELET # BLD AUTO: 274 10*3/MM3 (ref 130–400)
PMV BLD AUTO: 10.3 FL (ref 6–10)
POTASSIUM BLD-SCNC: 4.6 MMOL/L (ref 3.5–5.3)
PROT SERPL-MCNC: 6.8 G/DL (ref 6–8)
RBC # BLD AUTO: 4.15 10*6/MM3 (ref 4.2–5.4)
SODIUM BLD-SCNC: 138 MMOL/L (ref 135–153)
T3RU NFR SERPL: 30.6 % (ref 22.5–37)
T4 SERPL-MCNC: 10 MCG/DL (ref 4.5–10.9)
TRIGL SERPL-MCNC: 134 MG/DL (ref 0–150)
TSH SERPL DL<=0.05 MIU/L-ACNC: 1.09 MIU/ML (ref 0.55–4.78)
VIT B12 BLD-MCNC: 335 PG/ML (ref 211–911)
VLDLC SERPL-MCNC: 26.8 MG/DL
WBC NRBC COR # BLD: 5.74 10*3/MM3 (ref 4.5–12.5)

## 2018-02-26 PROCEDURE — 80053 COMPREHEN METABOLIC PANEL: CPT

## 2018-02-26 PROCEDURE — 82746 ASSAY OF FOLIC ACID SERUM: CPT

## 2018-02-26 PROCEDURE — 84443 ASSAY THYROID STIM HORMONE: CPT

## 2018-02-26 PROCEDURE — 83036 HEMOGLOBIN GLYCOSYLATED A1C: CPT

## 2018-02-26 PROCEDURE — 82306 VITAMIN D 25 HYDROXY: CPT

## 2018-02-26 PROCEDURE — 84479 ASSAY OF THYROID (T3 OR T4): CPT

## 2018-02-26 PROCEDURE — 85025 COMPLETE CBC W/AUTO DIFF WBC: CPT

## 2018-02-26 PROCEDURE — 80061 LIPID PANEL: CPT

## 2018-02-26 PROCEDURE — 36415 COLL VENOUS BLD VENIPUNCTURE: CPT

## 2018-02-26 PROCEDURE — 82607 VITAMIN B-12: CPT

## 2018-02-26 PROCEDURE — 84436 ASSAY OF TOTAL THYROXINE: CPT

## 2018-03-20 ENCOUNTER — LAB (OUTPATIENT)
Dept: ONCOLOGY | Facility: CLINIC | Age: 53
End: 2018-03-20

## 2018-03-20 VITALS
HEART RATE: 86 BPM | DIASTOLIC BLOOD PRESSURE: 82 MMHG | RESPIRATION RATE: 18 BRPM | TEMPERATURE: 97.6 F | SYSTOLIC BLOOD PRESSURE: 140 MMHG | OXYGEN SATURATION: 98 %

## 2018-03-20 DIAGNOSIS — D75.839 THROMBOCYTOSIS: ICD-10-CM

## 2018-03-20 DIAGNOSIS — Z79.4 TYPE 2 DIABETES MELLITUS WITH HYPEROSMOLARITY WITHOUT COMA, WITH LONG-TERM CURRENT USE OF INSULIN (HCC): ICD-10-CM

## 2018-03-20 DIAGNOSIS — C92.10 CML (CHRONIC MYELOCYTIC LEUKEMIA) (HCC): ICD-10-CM

## 2018-03-20 DIAGNOSIS — E11.00 TYPE 2 DIABETES MELLITUS WITH HYPEROSMOLARITY WITHOUT COMA, WITH LONG-TERM CURRENT USE OF INSULIN (HCC): ICD-10-CM

## 2018-03-20 LAB
ALBUMIN SERPL-MCNC: 3.8 G/DL (ref 3.5–5)
ALBUMIN/GLOB SERPL: 1.4 G/DL (ref 1.5–2.5)
ALP SERPL-CCNC: 60 U/L (ref 35–104)
ALT SERPL W P-5'-P-CCNC: 21 U/L (ref 10–36)
ANION GAP SERPL CALCULATED.3IONS-SCNC: 6.7 MMOL/L (ref 3.6–11.2)
AST SERPL-CCNC: 21 U/L (ref 10–30)
BASOPHILS # BLD AUTO: 0.04 10*3/MM3 (ref 0–0.3)
BASOPHILS NFR BLD AUTO: 0.5 % (ref 0–2)
BILIRUB SERPL-MCNC: 0.4 MG/DL (ref 0.2–1.8)
BUN BLD-MCNC: 17 MG/DL (ref 7–21)
BUN/CREAT SERPL: 23.9 (ref 7–25)
CALCIUM SPEC-SCNC: 8.8 MG/DL (ref 7.7–10)
CHLORIDE SERPL-SCNC: 106 MMOL/L (ref 99–112)
CO2 SERPL-SCNC: 25.3 MMOL/L (ref 24.3–31.9)
CREAT BLD-MCNC: 0.71 MG/DL (ref 0.43–1.29)
DEPRECATED RDW RBC AUTO: 44.8 FL (ref 37–54)
EOSINOPHIL # BLD AUTO: 0.31 10*3/MM3 (ref 0–0.7)
EOSINOPHIL NFR BLD AUTO: 4.2 % (ref 0–5)
ERYTHROCYTE [DISTWIDTH] IN BLOOD BY AUTOMATED COUNT: 14 % (ref 11.5–14.5)
GFR SERPL CREATININE-BSD FRML MDRD: 86 ML/MIN/1.73
GLOBULIN UR ELPH-MCNC: 2.8 GM/DL
GLUCOSE BLD-MCNC: 157 MG/DL (ref 70–110)
HCT VFR BLD AUTO: 37 % (ref 37–47)
HGB BLD-MCNC: 12.2 G/DL (ref 12–16)
IMM GRANULOCYTES # BLD: 0.02 10*3/MM3 (ref 0–0.03)
IMM GRANULOCYTES NFR BLD: 0.3 % (ref 0–0.5)
LYMPHOCYTES # BLD AUTO: 2.18 10*3/MM3 (ref 1–3)
LYMPHOCYTES NFR BLD AUTO: 29.5 % (ref 21–51)
MCH RBC QN AUTO: 29.5 PG (ref 27–33)
MCHC RBC AUTO-ENTMCNC: 33 G/DL (ref 33–37)
MCV RBC AUTO: 89.4 FL (ref 80–94)
MONOCYTES # BLD AUTO: 0.82 10*3/MM3 (ref 0.1–0.9)
MONOCYTES NFR BLD AUTO: 11.1 % (ref 0–10)
NEUTROPHILS # BLD AUTO: 4.03 10*3/MM3 (ref 1.4–6.5)
NEUTROPHILS NFR BLD AUTO: 54.4 % (ref 30–70)
OSMOLALITY SERPL CALC.SUM OF ELEC: 280.5 MOSM/KG (ref 273–305)
PLATELET # BLD AUTO: 268 10*3/MM3 (ref 130–400)
PMV BLD AUTO: 10.7 FL (ref 6–10)
POTASSIUM BLD-SCNC: 4.3 MMOL/L (ref 3.5–5.3)
PROT SERPL-MCNC: 6.6 G/DL (ref 6–8)
RBC # BLD AUTO: 4.14 10*6/MM3 (ref 4.2–5.4)
SODIUM BLD-SCNC: 138 MMOL/L (ref 135–153)
WBC NRBC COR # BLD: 7.4 10*3/MM3 (ref 4.5–12.5)

## 2018-03-20 PROCEDURE — 81206 BCR/ABL1 GENE MAJOR BP: CPT | Performed by: INTERNAL MEDICINE

## 2018-03-20 PROCEDURE — 80053 COMPREHEN METABOLIC PANEL: CPT | Performed by: INTERNAL MEDICINE

## 2018-03-20 PROCEDURE — 81207 BCR/ABL1 GENE MINOR BP: CPT | Performed by: INTERNAL MEDICINE

## 2018-03-20 PROCEDURE — 85025 COMPLETE CBC W/AUTO DIFF WBC: CPT | Performed by: INTERNAL MEDICINE

## 2018-03-20 PROCEDURE — 36415 COLL VENOUS BLD VENIPUNCTURE: CPT

## 2018-03-21 ENCOUNTER — OFFICE VISIT (OUTPATIENT)
Dept: ONCOLOGY | Facility: CLINIC | Age: 53
End: 2018-03-21

## 2018-03-21 VITALS
HEART RATE: 94 BPM | BODY MASS INDEX: 43.55 KG/M2 | DIASTOLIC BLOOD PRESSURE: 80 MMHG | WEIGHT: 269.8 LBS | TEMPERATURE: 98.4 F | RESPIRATION RATE: 18 BRPM | OXYGEN SATURATION: 97 % | SYSTOLIC BLOOD PRESSURE: 133 MMHG

## 2018-03-21 DIAGNOSIS — C92.10 CML (CHRONIC MYELOCYTIC LEUKEMIA) (HCC): Primary | ICD-10-CM

## 2018-03-21 DIAGNOSIS — D75.839 THROMBOCYTOSIS: ICD-10-CM

## 2018-03-21 DIAGNOSIS — R16.1 SPLENOMEGALY: ICD-10-CM

## 2018-03-21 DIAGNOSIS — D72.829 LEUKOCYTOSIS, UNSPECIFIED TYPE: ICD-10-CM

## 2018-03-21 PROCEDURE — 99214 OFFICE O/P EST MOD 30 MIN: CPT | Performed by: INTERNAL MEDICINE

## 2018-03-23 LAB
INTERPRETATION: NORMAL
LAB DIRECTOR NAME PROVIDER: NORMAL
LABORATORY COMMENT REPORT: NORMAL
Lab: NORMAL
T(ABL1,BCR)B2A2/CONTROL (IS) BLD/T: NORMAL %
T(ABL1,BCR)B3A2 MAR QL: 0.04 %
T(ABL1,BCR)E1A2/CONTROL BLD/T: NORMAL %

## 2018-03-26 NOTE — PROGRESS NOTES
Aric Haro  [id number]  1965  3/26/2018      Referring Provider:   EILEEN Andrade    Reason for Follow Up:   1. Chronic Phase - Chronic Myelogenous Leukemia  2. Leukocytosis  3. Thrombocytosis     Chief Complaint:  Fatigue    History of Present Illness:  Aric Haro is a very pleasant 51 y.o.  female who presents in follow up appointment for further management and treatment of chronic phase chronic myelogenous leukemia (CML).    Ms. Haro began experiencing extreme fatigue where she was sleeping multiple hours during the day when she initially began following with me in April 2017. 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 On laboratory evaluation she was found to have a total white blood cell count of 22.35 and a platelet count 470 thousand. In March 2016 she was also noted to have a leukocytosis (although not as elevated) as well as a thrombocytosis, however reports that these were likely secondary to other medical issues at the time her blood work was drawn. She denied of any significant weight loss however has been gradually losing weight since gastric sleeve procedure. She denied of any fevers or frequent infections but did note fluctuating neck lymphadenopathy as well as early satiety and taste in change. She denied of any abnormal or spontaneous bleeding. I did obtain a BCR ABL PCR to further assess her leukocytosis and thrombocytosis and she was positive for the b2a2/b3a2 (p210) and e1a2 (p190) fusion gene transcripts consistent with a diagnosis for CML. After reviewing the toxicities of each tyrosine kinase inhibitor we decided to start Dasatinib treatment. She had a difficult time tolerating the Dasatinib as she was unable to take her PPI with this medication. Since she had to be taken off of her PPI she had worsening reflux symptoms as well  as severe nausea, vomiting, dehydration, and headaches during this period. Given the toxicities she was experiencing she was taken off Dasatinib and this was changed to Gleevec treatment. Since starting Gleevac 400mg daily she has tolerated this much better without significant side effects. The only side effect that she has been able to see is intermittent pedal edema along with some hand swelling and muscle cramps.       Treatment History:  Started Dasatinib on 05/15/17 - experienced nausea, severe reflux, headaches while on medication and had taken 9 doses. This was discontinued due to intolerability and she was thereafter started on Imatinib.   Started Imatinib on 5/26/17    Interim History:  Since the start of Dasatinib and later Gleevec she has had a good response and normalization in her complete blood counts with improvement in her BCR ABL PCR. She did report that her fatigue had significantly improved since start of treatment however she does continue to have fatigue however attributes this to working more which she previously would not have been able to do. She denies of any night sweats, weight loss, fevers, infections or lymphadenopathy. She has been tolerating Gleevac. She denies of any edema or muscle cramping today. She has been working with her PCP in regards to her Diabetes and is excited that her HbA1c has improved as she is currently working on her health.      The following portions of the patient's history were reviewed and updated as appropriate: allergies, current medications, past family history, past medical history, past social history, past surgical history and problem list.      No Known Allergies    Past Medical History:   Diagnosis Date   • Anemia    • Diabetes mellitus    • Hypertension        Past Surgical History:   Procedure Laterality Date   • SINUS SURGERY     • SLEEVE GASTROPLASTY         Social History     Social History   • Marital status:      Spouse name: N/A   •  Number of children: N/A   • Years of education: N/A     Occupational History   • Not on file.     Social History Main Topics   • Smoking status: Former Smoker   • Smokeless tobacco: Never Used   • Alcohol use No   • Drug use: No   • Sexual activity: Not on file     Other Topics Concern   • Not on file     Social History Narrative   • No narrative on file   She lives with her daughter. She denies of any alcohol or illicit drug use. Previous social tobacco use more then 20 years ago.    Family History   Problem Relation Age of Onset   • Anemia Mother    • Hypertension Mother    • Cancer Mother    • COPD Father    • Heart disease Father    • Breast cancer Neg Hx    Maternal Uncle - CLL  Mother - Malignancy of [place]      Current Outpatient Prescriptions:   •  baclofen (LIORESAL) 20 MG tablet, Take 1 tablet by mouth 3 (Three) Times a Day., Disp: 90 tablet, Rfl: 4  •  celecoxib (CELEBREX) 200 MG capsule, Take 1 capsule by mouth 2 (Two) Times a Day., Disp: 180 capsule, Rfl: 0  •  diazePAM (VALIUM) 10 MG tablet, Take 1 tablet by mouth before procedure and daily as needed., Disp: 5 tablet, Rfl: 0  •  DULoxetine (CYMBALTA) 60 MG capsule, Take 1 capsule by mouth Daily., Disp: 90 capsule, Rfl: 1  •  fenofibrate 160 MG tablet, Take 1 tablet by mouth Daily., Disp: 90 tablet, Rfl: 3  •  fluconazole (DIFLUCAN) 200 MG tablet, Take 1 tablet by mouth Daily., Disp: 30 tablet, Rfl: 1  •  furosemide (LASIX) 40 MG tablet, Take 1 tablet by mouth Daily., Disp: 90 tablet, Rfl: 3  •  furosemide (LASIX) 40 MG tablet, Take 1 tablet by mouth Daily., Disp: 90 tablet, Rfl: 3  •  glucose blood (FREESTYLE LITE) test strip, Use 1 strip to test blood sugar twice daily, Disp: 60 each, Rfl: 6  •  granisetron (KYTRIL) 1 MG tablet, Take 1 tablet by mouth Every 12 (Twelve) Hours As Needed for Nausea or Vomiting., Disp: 60 tablet, Rfl: 5  •  HYDROcodone-acetaminophen (NORCO)  MG per tablet, Take 1 tablet by mouth Every 4-6 Hours As Needed.,  Disp: 60 tablet, Rfl: 0  •  ibuprofen (ADVIL,MOTRIN) 800 MG tablet, Take 1 tablet by mouth 3 (Three) Times a Day As Needed., Disp: 270 tablet, Rfl: 3  •  imatinib (GLEEVEC) 400 MG chemo tablet, Take 1 tablet by mouth Daily., Disp: 30 tablet, Rfl: 5  •  Insulin Glargine (LANTUS SOLOSTAR) 100 UNIT/ML injection pen, Inject 45 units subcutaneously 2 times daily, Disp: 30 mL, Rfl: 6  •  Insulin Glargine (LANTUS SOLOSTAR) 100 UNIT/ML injection pen, Inject 45 units Subcutaneously two times daily, Disp: 30 mL, Rfl: 6  •  loratadine-pseudoephedrine (CLARITIN-D 12 HOUR) 5-120 MG per 12 hr tablet, Take 1 tablet by mouth two times a day, Disp: 30 tablet, Rfl: 0  •  pantoprazole (PROTONIX) 40 MG EC tablet, Take 1 tablet by mouth Daily., Disp: 90 tablet, Rfl: 3  •  prochlorperazine (COMPAZINE) 10 MG tablet, Take 1 tablet by mouth Every 6 (Six) Hours As Needed for Nausea or Vomiting., Disp: 60 tablet, Rfl: 5  •  sertraline (ZOLOFT) 50 MG tablet, Take 1 tablet by mouth Daily., Disp: 90 tablet, Rfl: 3  •  SUMAtriptan (IMITREX) 100 MG tablet, Take 1 tablet by mouth after onset of migraine. May repeat after 2 hours if headache returns. Do not exceed 200mg in 24 hours., Disp: 10 tablet, Rfl: 2  •  TRUETRACK TEST test strip, use to test blood sugar 8 times daily, Disp: 200 each, Rfl: 5  •  vitamin D (ERGOCALCIFEROL) 42417 units capsule capsule, Take 1 capsule by mouth once weekly., Disp: 12 capsule, Rfl: 3        Review of Systems  Constitutional: No fever, chills, no night sweats, +fatigue  HEENT:  No headaches, vision changes or hearing changes, no sinus drainage, sore throat.   Cardiovascular:  No chest pain, no edema  Pulmonary:  No shortness of breath, no cough.   Gastrointestinal: no nausea, no vomiting. No diarrhea. No abdominal pain. No melena or hematochezia.   Genitourinary:  No hematuria, or changes in urination.   Musculoskeletal:  No pain, or joint problems.   Skin: No rashes or pruritus.   Endocrine:  No hot flashes or  chills   Hematologic:  No history of free bleeding, spontaneous bleeding or clotting problems.   Immunologic:  No current allergies and no frequent infections.   Neurologic: No numbness, tingling, or weakness.         Physical Exam:  Vital Signs: These were reviewed and listed as per patient’s electronic medical chart  Vitals:    03/21/18 1035   BP: 133/80   Pulse: 94   Resp: 18   Temp: 98.4 °F (36.9 °C)   SpO2: 97%     General: Awake, alert and oriented, in no distress  HEENT: Head is atraumatic, normocephalic, extraocular movements full, oropharynx clear, no scleral icterus, pink moist mucous membranes  Neck: supple, no jvd, lymphadenopathy or masses  Cardiovascular: regular rate and rhythm without murmurs, rubs or gallops  Pulmonary: non-labored, clear to auscultation bilaterally, no wheezing  Abdomen: soft, non-tender, non-distended, normal active bowel sounds present  Extremities: No clubbing, cyanosis or edema  Lymph: No cervical, supraclavicular adenopathy   Neurologic: Mental status as above, alert, awake and oriented, grossly non-focal exam  Skin: warm, dry, intact        Labs / Studies:    Lab on 03/20/2018   Component Date Value   • Glucose 03/20/2018 157*   • BUN 03/20/2018 17    • Creatinine 03/20/2018 0.71    • Sodium 03/20/2018 138    • Potassium 03/20/2018 4.3    • Chloride 03/20/2018 106    • CO2 03/20/2018 25.3    • Calcium 03/20/2018 8.8    • Total Protein 03/20/2018 6.6    • Albumin 03/20/2018 3.80    • ALT (SGPT) 03/20/2018 21    • AST (SGOT) 03/20/2018 21    • Alkaline Phosphatase 03/20/2018 60    • Total Bilirubin 03/20/2018 0.4    • eGFR Non  Amer 03/20/2018 86    • Globulin 03/20/2018 2.8    • A/G Ratio 03/20/2018 1.4*   • BUN/Creatinine Ratio 03/20/2018 23.9    • Anion Gap 03/20/2018 6.7    • WBC 03/20/2018 7.40    • RBC 03/20/2018 4.14*   • Hemoglobin 03/20/2018 12.2    • Hematocrit 03/20/2018 37.0    • MCV 03/20/2018 89.4    • MCH 03/20/2018 29.5    • MCHC 03/20/2018 33.0    • RDW  03/20/2018 14.0    • RDW-SD 03/20/2018 44.8    • MPV 03/20/2018 10.7*   • Platelets 03/20/2018 268    • Neutrophil % 03/20/2018 54.4    • Lymphocyte % 03/20/2018 29.5    • Monocyte % 03/20/2018 11.1*   • Eosinophil % 03/20/2018 4.2    • Basophil % 03/20/2018 0.5    • Immature Grans % 03/20/2018 0.3    • Neutrophils, Absolute 03/20/2018 4.03    • Lymphocytes, Absolute 03/20/2018 2.18    • Monocytes, Absolute 03/20/2018 0.82    • Eosinophils, Absolute 03/20/2018 0.31    • Basophils, Absolute 03/20/2018 0.04    • Immature Grans, Absolute 03/20/2018 0.02    • e13a2 (b2a2) Transcript 03/23/2018 Comment    • e14a2 (b3a2) Transcript 03/23/2018 0.0415    • E1A2 transcript 03/23/2018 Comment    • Interpretation 03/23/2018 Comment    • Director Review 03/23/2018 Comment    • Background: 03/23/2018 Comment    • Methodology: 03/23/2018 Comment    • Osmolality Calc 03/20/2018 280.5    Lab on 02/26/2018   Component Date Value   • Glucose 02/26/2018 148*   • BUN 02/26/2018 12    • Creatinine 02/26/2018 0.59    • Sodium 02/26/2018 138    • Potassium 02/26/2018 4.6    • Chloride 02/26/2018 103    • CO2 02/26/2018 26.9    • Calcium 02/26/2018 9.1    • Total Protein 02/26/2018 6.8    • Albumin 02/26/2018 4.10    • ALT (SGPT) 02/26/2018 24    • AST (SGOT) 02/26/2018 22    • Alkaline Phosphatase 02/26/2018 60    • Total Bilirubin 02/26/2018 0.4    • eGFR Non  Amer 02/26/2018 107    • Globulin 02/26/2018 2.7    • A/G Ratio 02/26/2018 1.5    • BUN/Creatinine Ratio 02/26/2018 20.3    • Anion Gap 02/26/2018 8.1    • Total Cholesterol 02/26/2018 160    • Triglycerides 02/26/2018 134    • HDL Cholesterol 02/26/2018 69    • LDL Cholesterol  02/26/2018 64    • VLDL Cholesterol 02/26/2018 26.8    • LDL/HDL Ratio 02/26/2018 0.93    • TSH 02/26/2018 1.087    • T4, Total 02/26/2018 10.00    • T3 Uptake 02/26/2018 30.6    • 25 Hydroxy, Vitamin D 02/26/2018 28.0    • Vitamin B-12 02/26/2018 335    • Folate 02/26/2018 13.84    • Hemoglobin A1C  02/26/2018 7.40*   • WBC 02/26/2018 5.74    • RBC 02/26/2018 4.15*   • Hemoglobin 02/26/2018 12.3    • Hematocrit 02/26/2018 37.6    • MCV 02/26/2018 90.6    • MCH 02/26/2018 29.6    • MCHC 02/26/2018 32.7*   • RDW 02/26/2018 13.6    • RDW-SD 02/26/2018 44.3    • MPV 02/26/2018 10.3*   • Platelets 02/26/2018 274    • Neutrophil % 02/26/2018 50.0    • Lymphocyte % 02/26/2018 33.8    • Monocyte % 02/26/2018 11.5*   • Eosinophil % 02/26/2018 4.0    • Basophil % 02/26/2018 0.5    • Immature Grans % 02/26/2018 0.2    • Neutrophils, Absolute 02/26/2018 2.87    • Lymphocytes, Absolute 02/26/2018 1.94    • Monocytes, Absolute 02/26/2018 0.66    • Eosinophils, Absolute 02/26/2018 0.23    • Basophils, Absolute 02/26/2018 0.03    • Immature Grans, Absolute 02/26/2018 0.01    • Osmolality Calc 02/26/2018 278.2    Lab on 02/15/2018   Component Date Value   • Glucose 02/15/2018 217*   • BUN 02/15/2018 13    • Creatinine 02/15/2018 0.76    • Sodium 02/15/2018 136    • Potassium 02/15/2018 4.2    • Chloride 02/15/2018 104    • CO2 02/15/2018 23.0*   • Calcium 02/15/2018 8.8    • Total Protein 02/15/2018 6.6    • Albumin 02/15/2018 4.00    • ALT (SGPT) 02/15/2018 27    • AST (SGOT) 02/15/2018 25    • Alkaline Phosphatase 02/15/2018 62    • Total Bilirubin 02/15/2018 0.4    • eGFR Non African Amer 02/15/2018 80    • Globulin 02/15/2018 2.6    • A/G Ratio 02/15/2018 1.5    • BUN/Creatinine Ratio 02/15/2018 17.1    • Anion Gap 02/15/2018 9.0    • Phosphorus 02/15/2018 3.5    • WBC 02/15/2018 7.35    • RBC 02/15/2018 3.99*   • Hemoglobin 02/15/2018 12.0    • Hematocrit 02/15/2018 36.3*   • MCV 02/15/2018 91.0    • MCH 02/15/2018 30.1    • MCHC 02/15/2018 33.1    • RDW 02/15/2018 13.6    • RDW-SD 02/15/2018 44.0    • MPV 02/15/2018 10.5*   • Platelets 02/15/2018 273    • Neutrophil % 02/15/2018 56.0    • Lymphocyte % 02/15/2018 30.2    • Monocyte % 02/15/2018 9.3    • Eosinophil % 02/15/2018 3.9    • Basophil % 02/15/2018 0.5    •  Immature Grans % 02/15/2018 0.1    • Neutrophils, Absolute 02/15/2018 4.11    • Lymphocytes, Absolute 02/15/2018 2.22    • Monocytes, Absolute 02/15/2018 0.68    • Eosinophils, Absolute 02/15/2018 0.29    • Basophils, Absolute 02/15/2018 0.04    • Immature Grans, Absolute 02/15/2018 0.01    • Osmolality Calc 02/15/2018 278.7    Lab on 01/18/2018   Component Date Value   • Glucose 01/18/2018 158*   • BUN 01/18/2018 16    • Creatinine 01/18/2018 0.64    • Sodium 01/18/2018 138    • Potassium 01/18/2018 4.2    • Chloride 01/18/2018 106    • CO2 01/18/2018 24.0*   • Calcium 01/18/2018 9.3    • Total Protein 01/18/2018 7.2    • Albumin 01/18/2018 4.30    • ALT (SGPT) 01/18/2018 28    • AST (SGOT) 01/18/2018 25    • Alkaline Phosphatase 01/18/2018 60    • Total Bilirubin 01/18/2018 0.5    • eGFR Non  Amer 01/18/2018 97    • Globulin 01/18/2018 2.9    • A/G Ratio 01/18/2018 1.5    • BUN/Creatinine Ratio 01/18/2018 25.0    • Anion Gap 01/18/2018 8.0    • Phosphorus 01/18/2018 3.4    • WBC 01/18/2018 6.03    • RBC 01/18/2018 4.04*   • Hemoglobin 01/18/2018 11.9*   • Hematocrit 01/18/2018 36.9*   • MCV 01/18/2018 91.3    • MCH 01/18/2018 29.5    • MCHC 01/18/2018 32.2*   • RDW 01/18/2018 14.0    • RDW-SD 01/18/2018 46.1    • MPV 01/18/2018 10.5*   • Platelets 01/18/2018 274    • Neutrophil % 01/18/2018 50.8    • Lymphocyte % 01/18/2018 33.7    • Monocyte % 01/18/2018 10.4*   • Eosinophil % 01/18/2018 4.1    • Basophil % 01/18/2018 0.8    • Immature Grans % 01/18/2018 0.2    • Neutrophils, Absolute 01/18/2018 3.06    • Lymphocytes, Absolute 01/18/2018 2.03    • Monocytes, Absolute 01/18/2018 0.63    • Eosinophils, Absolute 01/18/2018 0.25    • Basophils, Absolute 01/18/2018 0.05    • Immature Grans, Absolute 01/18/2018 0.01    • Osmolality Calc 01/18/2018 280.2    Office Visit on 12/13/2017   Component Date Value   • B2A2 transcript 12/19/2017 Comment    • B3A2 transcript 12/19/2017 0.100    • E1A2 transcript 12/19/2017  Comment    • Interpretation 12/19/2017 Comment*   • Director Review 12/19/2017 Comment    • Background 12/19/2017 Comment    • Methodology 12/19/2017 Comment    Lab on 12/13/2017   Component Date Value   • Glucose 12/13/2017 287*   • BUN 12/13/2017 12    • Creatinine 12/13/2017 0.71    • Sodium 12/13/2017 137    • Potassium 12/13/2017 4.7    • Chloride 12/13/2017 102    • CO2 12/13/2017 26.9    • Calcium 12/13/2017 9.2    • Total Protein 12/13/2017 7.0    • Albumin 12/13/2017 4.20    • ALT (SGPT) 12/13/2017 26    • AST (SGOT) 12/13/2017 23    • Alkaline Phosphatase 12/13/2017 81    • Total Bilirubin 12/13/2017 0.6    • eGFR Non African Amer 12/13/2017 86    • Globulin 12/13/2017 2.8    • A/G Ratio 12/13/2017 1.5    • BUN/Creatinine Ratio 12/13/2017 16.9    • Anion Gap 12/13/2017 8.1    • WBC 12/13/2017 6.12    • RBC 12/13/2017 4.13*   • Hemoglobin 12/13/2017 12.4    • Hematocrit 12/13/2017 37.2    • MCV 12/13/2017 90.1    • MCH 12/13/2017 30.0    • MCHC 12/13/2017 33.3    • RDW 12/13/2017 13.7    • RDW-SD 12/13/2017 44.3    • MPV 12/13/2017 10.8*   • Platelets 12/13/2017 206    • Neutrophil % 12/13/2017 60.5    • Lymphocyte % 12/13/2017 23.9    • Monocyte % 12/13/2017 11.3*   • Eosinophil % 12/13/2017 3.8    • Basophil % 12/13/2017 0.5    • Immature Grans % 12/13/2017 0.0    • Neutrophils, Absolute 12/13/2017 3.71    • Lymphocytes, Absolute 12/13/2017 1.46    • Monocytes, Absolute 12/13/2017 0.69    • Eosinophils, Absolute 12/13/2017 0.23    • Basophils, Absolute 12/13/2017 0.03    • Immature Grans, Absolute 12/13/2017 0.00    • Osmolality Calc 12/13/2017 284.1    Lab on 12/05/2017   Component Date Value   • Glucose 12/05/2017 288*   • BUN 12/05/2017 14    • Creatinine 12/05/2017 0.83    • Sodium 12/05/2017 140    • Potassium 12/05/2017 4.1    • Chloride 12/05/2017 106    • CO2 12/05/2017 24.8    • Calcium 12/05/2017 9.1    • Total Protein 12/05/2017 7.1    • Albumin 12/05/2017 4.20    • ALT (SGPT) 12/05/2017 27     • AST (SGOT) 12/05/2017 25    • Alkaline Phosphatase 12/05/2017 73    • Total Bilirubin 12/05/2017 0.6    • eGFR Non  Amer 12/05/2017 72    • Globulin 12/05/2017 2.9    • A/G Ratio 12/05/2017 1.4*   • BUN/Creatinine Ratio 12/05/2017 16.9    • Anion Gap 12/05/2017 9.2    • Hemoglobin A1C 12/05/2017 8.00*   • Total Cholesterol 12/05/2017 177    • Triglycerides 12/05/2017 287*   • HDL Cholesterol 12/05/2017 49*   • LDL Cholesterol  12/05/2017 71    • VLDL Cholesterol 12/05/2017 57.4    • LDL/HDL Ratio 12/05/2017 1.44    • Vitamin B-12 12/05/2017 345    • Folate 12/05/2017 11.33    • 25 Hydroxy, Vitamin D 12/07/2017 11.1    • TSH 12/05/2017 1.194    • T4, Total 12/05/2017 10.40    • T3 Uptake 12/05/2017 33.0    • WBC 12/05/2017 5.35    • RBC 12/05/2017 4.29    • Hemoglobin 12/05/2017 12.8    • Hematocrit 12/05/2017 38.4    • MCV 12/05/2017 89.5    • MCH 12/05/2017 29.8    • MCHC 12/05/2017 33.3    • RDW 12/05/2017 13.9    • RDW-SD 12/05/2017 44.7    • MPV 12/05/2017 10.6*   • Platelets 12/05/2017 249    • Neutrophil % 12/05/2017 52.0    • Lymphocyte % 12/05/2017 32.1    • Monocyte % 12/05/2017 11.4*   • Eosinophil % 12/05/2017 3.9    • Basophil % 12/05/2017 0.6    • Immature Grans % 12/05/2017 0.0    • Neutrophils, Absolute 12/05/2017 2.78    • Lymphocytes, Absolute 12/05/2017 1.72    • Monocytes, Absolute 12/05/2017 0.61    • Eosinophils, Absolute 12/05/2017 0.21    • Basophils, Absolute 12/05/2017 0.03    • Immature Grans, Absolute 12/05/2017 0.00    • Osmolality Calc 12/05/2017 290.4    Lab on 10/25/2017   Component Date Value   • Glucose 10/25/2017 223*   • BUN 10/25/2017 17    • Creatinine 10/25/2017 0.93    • Sodium 10/25/2017 138    • Potassium 10/25/2017 4.2    • Chloride 10/25/2017 107    • CO2 10/25/2017 25.0    • Calcium 10/25/2017 9.2    • Total Protein 10/25/2017 6.8    • Albumin 10/25/2017 3.90    • ALT (SGPT) 10/25/2017 24    • AST (SGOT) 10/25/2017 28    • Alkaline Phosphatase 10/25/2017 76    •  Total Bilirubin 10/25/2017 0.3    • eGFR Non  Amer 10/25/2017 63    • Globulin 10/25/2017 2.9    • A/G Ratio 10/25/2017 1.3*   • BUN/Creatinine Ratio 10/25/2017 18.3    • Anion Gap 10/25/2017 6.0    • Phosphorus 10/25/2017 3.6    • WBC 10/25/2017 6.05    • RBC 10/25/2017 3.77*   • Hemoglobin 10/25/2017 11.2*   • Hematocrit 10/25/2017 34.6*   • MCV 10/25/2017 91.8    • MCH 10/25/2017 29.7    • MCHC 10/25/2017 32.4*   • RDW 10/25/2017 13.3    • RDW-SD 10/25/2017 43.2    • MPV 10/25/2017 10.2*   • Platelets 10/25/2017 280    • Neutrophil % 10/25/2017 59.0    • Lymphocyte % 10/25/2017 26.6    • Monocyte % 10/25/2017 9.9    • Eosinophil % 10/25/2017 3.6    • Basophil % 10/25/2017 0.7    • Immature Grans % 10/25/2017 0.2    • Neutrophils, Absolute 10/25/2017 3.57    • Lymphocytes, Absolute 10/25/2017 1.61    • Monocytes, Absolute 10/25/2017 0.60    • Eosinophils, Absolute 10/25/2017 0.22    • Basophils, Absolute 10/25/2017 0.04    • Immature Grans, Absolute 10/25/2017 0.01    • Osmolality Calc 10/25/2017 284.1    Lab on 09/29/2017   Component Date Value   • Glucose 09/29/2017 316*   • BUN 09/29/2017 14    • Creatinine 09/29/2017 0.74    • Sodium 09/29/2017 136    • Potassium 09/29/2017 4.4    • Chloride 09/29/2017 107    • CO2 09/29/2017 23.1*   • Calcium 09/29/2017 8.8    • Total Protein 09/29/2017 6.6    • Albumin 09/29/2017 4.10    • ALT (SGPT) 09/29/2017 28    • AST (SGOT) 09/29/2017 27    • Alkaline Phosphatase 09/29/2017 102    • Total Bilirubin 09/29/2017 0.2    • eGFR Non African Amer 09/29/2017 83    • Globulin 09/29/2017 2.5    • A/G Ratio 09/29/2017 1.6    • BUN/Creatinine Ratio 09/29/2017 18.9    • Anion Gap 09/29/2017 5.9    • Phosphorus 09/29/2017 2.7    • WBC 09/29/2017 8.12    • RBC 09/29/2017 3.80*   • Hemoglobin 09/29/2017 11.8*   • Hematocrit 09/29/2017 36.6*   • MCV 09/29/2017 96.3*   • MCH 09/29/2017 31.1    • MCHC 09/29/2017 32.2*   • RDW 09/29/2017 14.7*   • RDW-SD 09/29/2017 49.2    • MPV  2017 10.9*   • Platelets 2017 247    • Neutrophil % 2017 60.3    • Lymphocyte % 2017 27.0    • Monocyte % 2017 8.4    • Eosinophil % 2017 3.3    • Basophil % 2017 0.6    • Immature Grans % 2017 0.4    • Neutrophils, Absolute 2017 4.90    • Lymphocytes, Absolute 2017 2.19    • Monocytes, Absolute 2017 0.68    • Eosinophils, Absolute 2017 0.27    • Basophils, Absolute 2017 0.05    • Immature Grans, Absolute 2017 0.03    • Osmolality Calc 2017 284.5             [id number]: US ABDOMEN COMPLETE: Visualized pancreas is unremarkable. The imaged portion of the abdominal aorta is nondilated. The liver is homogeneous. There is no intrahepatic ductal dilatation or focal hepatic mass. The imaged portion of the hepatic vessels and inferior vena cava are patent. The gallbladder has been removed. The common bile duct is normal, measuring 5.7 mm. The kidneys demonstrate no evidence of hydronephrosis or solid renal mass. The spleen is homogeneous and measures 13 cm. IMPRESSION: Spleen measures 13 cm and is homogeneous.           PATHOLOGY:    05/15/17: Bone Marrow Biopsy & Aspirate                     17: BCR ABL PCR        17: BCR ABL PCR        17: BCR ABL PCR        18: BCR ABL PCR:              Assessment/Plan :  Aric Haro is a very pleasant 51 y.o.  female who presents in follow up appointment for further management and treatment of chronic phase chronic myelogenous leukemia.    1. Chronic Myelogenous Leukemia - CML (chronic phase)    2. Leukocytosis  3. Thrombocytosis  4. Healthcare Maintenance    Given the findings of her peripheral smear I was concerned for an underlying myeloproliferative disorder. Her primary provider obtained a flow cytometry and this did not show any significant abnormalities. I did also obtain a BCR ABL PCR which was positive for the b2a2/b3a2 (p210) and e1a2 (p190)  fusion gene transcripts consistent with a diagnosis for CML. Bone marrow biopsy was performed on initial diagnosis prior to starting Dasatinib treatment with results above and consistent with chronic phase CML.     To further evaluate for a myeloproliferative disorder I did also assess for JAK2 (V617F and exon 12)/MPL/CALR mutations which were negative. To assess for underlying inflammation that may be possibly contributing will also obtain an ESR and CRP, EMMANUEL, Rheumatoid factor, as well as an acute hepatitis panel and HIV test which were all negative. Thrombocytosis can also be caused by an underlying iron deficiency however iron panel and ferritin were normal. I did also order an abdominal ultrasound to assess for splenomegaly which showed spleen size to be 13.9cm.     Given the findings of the BCR ABL PCR I did start the patient on Dasatinib 100mg oral daily. Patient does have a history of pancreatitis and therefore I held on using Nilotinib. Patient however was unable to tolerate the medication secondary to worsening reflux while being off of her PPI, and experienced severe nausea, vomiting, and dehydration. She was then started on Imatinib 400mg daily and has been tolerating this well. Her only complaint today is fatigue which has significantly improved since start of treatment. I have previously advised her of Ibuprofen and Tylenol use for her myalgias howefer she denies these today. I did order baseline Echo which showed an intact EF and she has no cardiac symptoms.     She has had a complete hematological response, and BCR ABL PCR has significantly improved since start of Gleevac and at less then <12 months her BCR ABL has improved to 0.0415% this will need to continue to be monitored every three months to assess ongoing  molecular response. She will need a complete blood count every month given then her blood counts have stabilized as well as intermittent liver function tests monitored.     ACO Quality  measures  - Aric Haro does not use tobacco products.  - Discussed the patient's BMI with her. BMI is above normal parameters. Follow-up plan includes:  exercise counseling and nutrition counseling. We discussed diet and exercise during her appointment as well as weight loss to help improve her ongoing medical issues such as diabetes.  - Current outpatient and discharge medications have been reconciled for the patient.        I will have the patient return for CBC every month, with a follow up visit in three months. She understands that should she have any questions or concerns prior to her appointment she should give us a call at any time and I would be happy to see her sooner. It was a pleasure to see this patient in clinic today, thank you for allowing me to participate in the care of this patient.    I spent 25 minutes in regards to this patient’s care today. More than 19 minutes of the time was spent in direct interaction with the patient for the above problems.      Evita Serrano MD  03/21/18  9:14 AM

## 2018-03-27 ENCOUNTER — HOSPITAL ENCOUNTER (OUTPATIENT)
Facility: HOSPITAL | Age: 53
Setting detail: HOSPITAL OUTPATIENT SURGERY
Discharge: HOME | End: 2018-03-27
Attending: INTERNAL MEDICINE | Admitting: INTERNAL MEDICINE

## 2018-03-27 ENCOUNTER — ANESTHESIA EVENT (OUTPATIENT)
Dept: PERIOP | Facility: HOSPITAL | Age: 53
End: 2018-03-27

## 2018-03-27 ENCOUNTER — ANESTHESIA (OUTPATIENT)
Dept: PERIOP | Facility: HOSPITAL | Age: 53
End: 2018-03-27

## 2018-03-27 VITALS
RESPIRATION RATE: 20 BRPM | WEIGHT: 269 LBS | HEIGHT: 66 IN | TEMPERATURE: 97.6 F | HEART RATE: 100 BPM | SYSTOLIC BLOOD PRESSURE: 91 MMHG | OXYGEN SATURATION: 97 % | BODY MASS INDEX: 43.23 KG/M2 | DIASTOLIC BLOOD PRESSURE: 66 MMHG

## 2018-03-27 DIAGNOSIS — K63.5 POLYP OF COLON, UNSPECIFIED PART OF COLON, UNSPECIFIED TYPE: ICD-10-CM

## 2018-03-27 DIAGNOSIS — Z12.11 COLON CANCER SCREENING: ICD-10-CM

## 2018-03-27 LAB
B-HCG UR QL: NEGATIVE
GLUCOSE BLDC GLUCOMTR-MCNC: 196 MG/DL (ref 70–130)
INTERNAL NEGATIVE CONTROL: NEGATIVE
INTERNAL POSITIVE CONTROL: POSITIVE
Lab: NORMAL

## 2018-03-27 PROCEDURE — 82962 GLUCOSE BLOOD TEST: CPT

## 2018-03-27 PROCEDURE — 25010000002 PROPOFOL 10 MG/ML EMULSION: Performed by: NURSE ANESTHETIST, CERTIFIED REGISTERED

## 2018-03-27 PROCEDURE — 25010000002 FENTANYL CITRATE (PF) 100 MCG/2ML SOLUTION: Performed by: NURSE ANESTHETIST, CERTIFIED REGISTERED

## 2018-03-27 PROCEDURE — 45380 COLONOSCOPY AND BIOPSY: CPT | Performed by: INTERNAL MEDICINE

## 2018-03-27 PROCEDURE — 45385 COLONOSCOPY W/LESION REMOVAL: CPT | Performed by: INTERNAL MEDICINE

## 2018-03-27 PROCEDURE — 25010000002 MIDAZOLAM PER 1 MG: Performed by: NURSE ANESTHETIST, CERTIFIED REGISTERED

## 2018-03-27 RX ORDER — PROPOFOL 10 MG/ML
VIAL (ML) INTRAVENOUS AS NEEDED
Status: DISCONTINUED | OUTPATIENT
Start: 2018-03-27 | End: 2018-03-27 | Stop reason: SURG

## 2018-03-27 RX ORDER — FENTANYL CITRATE 50 UG/ML
INJECTION, SOLUTION INTRAMUSCULAR; INTRAVENOUS AS NEEDED
Status: DISCONTINUED | OUTPATIENT
Start: 2018-03-27 | End: 2018-03-27 | Stop reason: SURG

## 2018-03-27 RX ORDER — FENTANYL CITRATE 50 UG/ML
50 INJECTION, SOLUTION INTRAMUSCULAR; INTRAVENOUS
Status: DISCONTINUED | OUTPATIENT
Start: 2018-03-27 | End: 2018-03-27 | Stop reason: HOSPADM

## 2018-03-27 RX ORDER — SODIUM CHLORIDE 0.9 % (FLUSH) 0.9 %
1-10 SYRINGE (ML) INJECTION AS NEEDED
Status: DISCONTINUED | OUTPATIENT
Start: 2018-03-27 | End: 2018-03-27 | Stop reason: HOSPADM

## 2018-03-27 RX ORDER — IPRATROPIUM BROMIDE AND ALBUTEROL SULFATE 2.5; .5 MG/3ML; MG/3ML
3 SOLUTION RESPIRATORY (INHALATION) ONCE AS NEEDED
Status: DISCONTINUED | OUTPATIENT
Start: 2018-03-27 | End: 2018-03-27 | Stop reason: HOSPADM

## 2018-03-27 RX ORDER — SODIUM CHLORIDE, SODIUM LACTATE, POTASSIUM CHLORIDE, CALCIUM CHLORIDE 600; 310; 30; 20 MG/100ML; MG/100ML; MG/100ML; MG/100ML
125 INJECTION, SOLUTION INTRAVENOUS CONTINUOUS
Status: DISCONTINUED | OUTPATIENT
Start: 2018-03-27 | End: 2018-03-27 | Stop reason: HOSPADM

## 2018-03-27 RX ORDER — ONDANSETRON 2 MG/ML
4 INJECTION INTRAMUSCULAR; INTRAVENOUS ONCE AS NEEDED
Status: DISCONTINUED | OUTPATIENT
Start: 2018-03-27 | End: 2018-03-27 | Stop reason: HOSPADM

## 2018-03-27 RX ORDER — MIDAZOLAM HYDROCHLORIDE 1 MG/ML
INJECTION INTRAMUSCULAR; INTRAVENOUS AS NEEDED
Status: DISCONTINUED | OUTPATIENT
Start: 2018-03-27 | End: 2018-03-27 | Stop reason: SURG

## 2018-03-27 RX ADMIN — PROPOFOL 50 MG: 10 INJECTION, EMULSION INTRAVENOUS at 07:53

## 2018-03-27 RX ADMIN — MIDAZOLAM HYDROCHLORIDE 2 MG: 1 INJECTION, SOLUTION INTRAMUSCULAR; INTRAVENOUS at 07:42

## 2018-03-27 RX ADMIN — PROPOFOL 50 MG: 10 INJECTION, EMULSION INTRAVENOUS at 07:45

## 2018-03-27 RX ADMIN — FENTANYL CITRATE 100 MCG: 50 INJECTION INTRAMUSCULAR; INTRAVENOUS at 07:42

## 2018-03-27 RX ADMIN — PROPOFOL 80 MG: 10 INJECTION, EMULSION INTRAVENOUS at 07:49

## 2018-03-27 RX ADMIN — PROPOFOL 50 MG: 10 INJECTION, EMULSION INTRAVENOUS at 07:57

## 2018-03-27 RX ADMIN — SODIUM CHLORIDE, POTASSIUM CHLORIDE, SODIUM LACTATE AND CALCIUM CHLORIDE: 600; 310; 30; 20 INJECTION, SOLUTION INTRAVENOUS at 07:43

## 2018-03-27 RX ADMIN — PROPOFOL 50 MG: 10 INJECTION, EMULSION INTRAVENOUS at 08:01

## 2018-03-27 NOTE — OP NOTE
03/27/18    COLONOSCOPY FOR SCREENING with polypectomy Procedure Note    Aric Haro  3/27/2018    Dr. Esparza      Pre-op Diagnosis: History of colon polyps, colon cancer screening, last colonoscopy was performed about 10 years ago      Post-op Diagnosis: Colonic polyps, described below        Anesthesia: Per Anesthesia service, general anesthesia      Estimated Blood Loss: Negligible      Findings: Normal rectal examination.  Colonoscopy was performed to the cecum, confirmed by identification of typical cecal anatomy.  Bowel preparation was satisfactory.  All areas were examined carefully.  The scope was retroflexed within the rectum.  A benign-appearing sessile polyp (estimated 1 cm in size) was found in the rectum and this was removed using the cold snare.  A tiny (5 mm) benign-appearing sessile polyp was found in the proximal transverse colon and this was removed using the cold biopsy forceps.  No other abnormality was seen.  She tolerated the procedure well and there were no complications.  She left the OR in stable and satisfactory condition.      Specimens: Rectal polyp, transverse colon polyp      Grafts/Implants: N/A      Complications: None      Recommendations:   Findings discussed with the patient  Repeat screening/surveillance colonoscopy in 3-5 years      Drew Esparza III, MD     Date: 3/27/2018  Time: 8:06 AM

## 2018-03-27 NOTE — ANESTHESIA PREPROCEDURE EVALUATION
Anesthesia Evaluation     Patient summary reviewed and Nursing notes reviewed   no history of anesthetic complications:  NPO Solid Status: > 8 hours  NPO Liquid Status: > 8 hours           Airway   Mallampati: II  TM distance: >3 FB  Neck ROM: full  No difficulty expected  Dental - normal exam     Pulmonary - negative pulmonary ROS and normal exam   Cardiovascular - normal exam    (+) hypertension,       Neuro/Psych- negative ROS  GI/Hepatic/Renal/Endo    (+)   diabetes mellitus,     Musculoskeletal     Abdominal  - normal exam   Substance History      OB/GYN          Other                        Anesthesia Plan    ASA 3     general     intravenous induction   Anesthetic plan and risks discussed with patient.

## 2018-03-27 NOTE — ANESTHESIA POSTPROCEDURE EVALUATION
Patient: Aric Haro    Procedure Summary     Date:  03/27/18 Room / Location:  [facility] OR  /  [facility] OR    Anesthesia Start:  0743 Anesthesia Stop:  0808    Procedure:  COLONOSCOPY FOR SCREENING  CPTCODE:78831 (N/A ) Diagnosis:       Colon cancer screening      Polyp of colon, unspecified part of colon, unspecified type      (Colon cancer screening [Z12.11])      (Polyp of colon, unspecified part of colon, unspecified type [K63.5])    Surgeon:  Drew Esparza III, MD Provider:  Cirilo Goel MD    Anesthesia Type:  general ASA Status:  3          Anesthesia Type: general  Last vitals  BP   91/66 (03/27/18 0840)   Temp   97.6 °F (36.4 °C) (03/27/18 0810)   Pulse   100 (03/27/18 0840)   Resp   20 (03/27/18 0840)     SpO2   97 % (03/27/18 0840)     Post Anesthesia Care and Evaluation    Patient location during evaluation: PHASE II  Patient participation: complete - patient participated  Level of consciousness: awake  Pain score: 0  Pain management: adequate  Airway patency: patent  Anesthetic complications: No anesthetic complications  PONV Status: none  Cardiovascular status: acceptable  Respiratory status: acceptable  Hydration status: acceptable    Comments: Possible small amount reflux . Patient breathing fine post op

## 2018-03-27 NOTE — H&P
History of presenting illness: 52-year-old white female presents for screening/surveillance colonoscopy.  She has history of colonic polyps.  Last colonoscopy was performed about 10 years ago.  There is no family history of colon cancer.    Review of Systems:   Negative and noncontributory    Past History:  Past Medical History:   Diagnosis Date   • Anemia    • Cancer    • Diabetes mellitus    • Hypertension      Past Surgical History:   Procedure Laterality Date   • SINUS SURGERY     • SINUS SURGERY     • SLEEVE GASTROPLASTY       Family History   Problem Relation Age of Onset   • Anemia Mother    • Hypertension Mother    • Cancer Mother    • COPD Father    • Heart disease Father    • Breast cancer Neg Hx      Social History     Social History   • Marital status:      Social History Main Topics   • Smoking status: Former Smoker     Packs/day: 0.25     Years: 4.00   • Smokeless tobacco: Never Used   • Alcohol use No   • Drug use: No   • Sexual activity: Defer     Other Topics Concern   • Not on file     Prior to Admission medications    Medication Sig Start Date End Date Taking? Authorizing Provider   celecoxib (CELEBREX) 200 MG capsule Take 1 capsule by mouth 2 (Two) Times a Day. 3/9/18  Yes    DULoxetine (CYMBALTA) 60 MG capsule Take 1 capsule by mouth Daily. 12/11/17  Yes    fenofibrate 160 MG tablet Take 1 tablet by mouth Daily. 11/2/17  Yes    imatinib (GLEEVEC) 400 MG chemo tablet Take 1 tablet by mouth Daily. 2/8/18  Yes Evita Serrano MD   Insulin Glargine (LANTUS SOLOSTAR) 100 UNIT/ML injection pen Inject 45 units subcutaneously 2 times daily 11/17/16  Yes EILEEN Andrade   pantoprazole (PROTONIX) 40 MG EC tablet Take 1 tablet by mouth Daily. 11/2/17  Yes    prochlorperazine (COMPAZINE) 10 MG tablet Take 1 tablet by mouth Every 6 (Six) Hours As Needed for Nausea or Vomiting. 5/19/17  Yes Hanh B MD Johana   baclofen (LIORESAL) 20 MG tablet Take 1 tablet by mouth 3 (Three) Times a Day.  11/29/16      fluconazole (DIFLUCAN) 200 MG tablet Take 1 tablet by mouth Daily. 6/8/17      furosemide (LASIX) 40 MG tablet Take 1 tablet by mouth Daily. 2/16/17   EILEEN Andrade   furosemide (LASIX) 40 MG tablet Take 1 tablet by mouth Daily. 10/13/17      glucose blood (FREESTYLE LITE) test strip Use 1 strip to test blood sugar twice daily 7/10/17      granisetron (KYTRIL) 1 MG tablet Take 1 tablet by mouth Every 12 (Twelve) Hours As Needed for Nausea or Vomiting. 5/19/17   Hanhcodie Vaughn MD   HYDROcodone-acetaminophen (NORCO)  MG per tablet Take 1 tablet by mouth Every 4-6 Hours As Needed. 5/19/17   Hanh Vaughn MD   ibuprofen (ADVIL,MOTRIN) 800 MG tablet Take 1 tablet by mouth 3 (Three) Times a Day As Needed. 12/12/17      Insulin Glargine (LANTUS SOLOSTAR) 100 UNIT/ML injection pen Inject 45 units Subcutaneously two times daily 3/2/18      loratadine-pseudoephedrine (CLARITIN-D 12 HOUR) 5-120 MG per 12 hr tablet Take 1 tablet by mouth two times a day 3/15/18      SUMAtriptan (IMITREX) 100 MG tablet Take 1 tablet by mouth after onset of migraine. May repeat after 2 hours if headache returns. Do not exceed 200mg in 24 hours. 10/2/17      TRUETRACK TEST test strip use to test blood sugar 8 times daily 7/22/16   EILEEN Andrade   vitamin D (ERGOCALCIFEROL) 53291 units capsule capsule Take 1 capsule by mouth once weekly. 12/12/17   EILEEN Andrade   diazePAM (VALIUM) 10 MG tablet Take 1 tablet by mouth before procedure and daily as needed. 5/9/17 3/27/18  EILEEN Andrade   sertraline (ZOLOFT) 50 MG tablet Take 1 tablet by mouth Daily. 9/8/16 3/27/18  EILEEN Andrade       Current medication:  I have reviewed the list of current medications.    Allergies:   Review of patient's allergies indicates no known allergies.    Vital Signs  Temp:  [97.9 °F (36.6 °C)] 97.9 °F (36.6 °C)  Heart Rate:  [94] 94  Resp:  [20] 20  BP: (141)/(65) 141/65    Physical  exam:  Alert, oriented ×3  HEENT: Normal  Neck: No mass  Chest: Clear  Heart: Regular rhythm  Abdomen: Soft, nontender, active BS  Extremities: No edema  Neuro: No focal deficit    Assessment/Plan:   History of colon polyps  Colon cancer screening    REC  Screening/surveillance colonoscopy is planned.  Procedure, benefits, risks and alternatives were discussed with the patient.      Drew Esparza III, MD  03/27/18  7:39 AM

## 2018-03-29 LAB
LAB AP CASE REPORT: NORMAL
Lab: NORMAL
PATH REPORT.FINAL DX SPEC: NORMAL

## 2018-04-03 ENCOUNTER — TELEPHONE (OUTPATIENT)
Dept: GASTROENTEROLOGY | Facility: CLINIC | Age: 53
End: 2018-04-03

## 2018-04-03 NOTE — TELEPHONE ENCOUNTER
Spoke with patient and let her know her results. She voiced understanding. I will place her on a recall list for 4 years for colonoscopy.

## 2018-04-03 NOTE — TELEPHONE ENCOUNTER
Colonoscopy showed 2 small benign adenomatous polyps which I removed.  There was no evidence of cancer.  She should have another colonoscopy performed in about 4-5 years.  RADHA

## 2018-05-15 ENCOUNTER — LAB (OUTPATIENT)
Dept: ONCOLOGY | Facility: CLINIC | Age: 53
End: 2018-05-15

## 2018-05-15 VITALS
TEMPERATURE: 97.5 F | OXYGEN SATURATION: 98 % | HEART RATE: 90 BPM | SYSTOLIC BLOOD PRESSURE: 156 MMHG | RESPIRATION RATE: 18 BRPM | DIASTOLIC BLOOD PRESSURE: 89 MMHG

## 2018-05-15 DIAGNOSIS — Z79.4 TYPE 2 DIABETES MELLITUS WITH HYPEROSMOLARITY WITHOUT COMA, WITH LONG-TERM CURRENT USE OF INSULIN (HCC): ICD-10-CM

## 2018-05-15 DIAGNOSIS — E11.00 TYPE 2 DIABETES MELLITUS WITH HYPEROSMOLARITY WITHOUT COMA, WITH LONG-TERM CURRENT USE OF INSULIN (HCC): ICD-10-CM

## 2018-05-15 DIAGNOSIS — C92.10 CML (CHRONIC MYELOCYTIC LEUKEMIA) (HCC): ICD-10-CM

## 2018-05-15 DIAGNOSIS — D75.839 THROMBOCYTOSIS: ICD-10-CM

## 2018-05-15 LAB
ALBUMIN SERPL-MCNC: 4 G/DL (ref 3.5–5)
ALBUMIN/GLOB SERPL: 1.5 G/DL (ref 1.5–2.5)
ALP SERPL-CCNC: 63 U/L (ref 35–104)
ALT SERPL W P-5'-P-CCNC: 20 U/L (ref 10–36)
ANION GAP SERPL CALCULATED.3IONS-SCNC: 7.8 MMOL/L (ref 3.6–11.2)
AST SERPL-CCNC: 20 U/L (ref 10–30)
BASOPHILS # BLD AUTO: 0.02 10*3/MM3 (ref 0–0.3)
BASOPHILS NFR BLD AUTO: 0.3 % (ref 0–2)
BILIRUB SERPL-MCNC: 0.5 MG/DL (ref 0.2–1.8)
BUN BLD-MCNC: 12 MG/DL (ref 7–21)
BUN/CREAT SERPL: 19 (ref 7–25)
CALCIUM SPEC-SCNC: 8.6 MG/DL (ref 7.7–10)
CHLORIDE SERPL-SCNC: 106 MMOL/L (ref 99–112)
CO2 SERPL-SCNC: 23.2 MMOL/L (ref 24.3–31.9)
CREAT BLD-MCNC: 0.63 MG/DL (ref 0.43–1.29)
DEPRECATED RDW RBC AUTO: 45.6 FL (ref 37–54)
EOSINOPHIL # BLD AUTO: 0.26 10*3/MM3 (ref 0–0.7)
EOSINOPHIL NFR BLD AUTO: 4.2 % (ref 0–5)
ERYTHROCYTE [DISTWIDTH] IN BLOOD BY AUTOMATED COUNT: 14.2 % (ref 11.5–14.5)
GFR SERPL CREATININE-BSD FRML MDRD: 99 ML/MIN/1.73
GLOBULIN UR ELPH-MCNC: 2.7 GM/DL
GLUCOSE BLD-MCNC: 167 MG/DL (ref 70–110)
HCT VFR BLD AUTO: 36.4 % (ref 37–47)
HGB BLD-MCNC: 11.9 G/DL (ref 12–16)
IMM GRANULOCYTES # BLD: 0.01 10*3/MM3 (ref 0–0.03)
IMM GRANULOCYTES NFR BLD: 0.2 % (ref 0–0.5)
LYMPHOCYTES # BLD AUTO: 1.82 10*3/MM3 (ref 1–3)
LYMPHOCYTES NFR BLD AUTO: 29.6 % (ref 21–51)
MCH RBC QN AUTO: 28.8 PG (ref 27–33)
MCHC RBC AUTO-ENTMCNC: 32.7 G/DL (ref 33–37)
MCV RBC AUTO: 88.1 FL (ref 80–94)
MONOCYTES # BLD AUTO: 0.59 10*3/MM3 (ref 0.1–0.9)
MONOCYTES NFR BLD AUTO: 9.6 % (ref 0–10)
NEUTROPHILS # BLD AUTO: 3.44 10*3/MM3 (ref 1.4–6.5)
NEUTROPHILS NFR BLD AUTO: 56.1 % (ref 30–70)
OSMOLALITY SERPL CALC.SUM OF ELEC: 277.4 MOSM/KG (ref 273–305)
PLATELET # BLD AUTO: 246 10*3/MM3 (ref 130–400)
PMV BLD AUTO: 10.4 FL (ref 6–10)
POTASSIUM BLD-SCNC: 4.2 MMOL/L (ref 3.5–5.3)
PROT SERPL-MCNC: 6.7 G/DL (ref 6–8)
RBC # BLD AUTO: 4.13 10*6/MM3 (ref 4.2–5.4)
SODIUM BLD-SCNC: 137 MMOL/L (ref 135–153)
WBC NRBC COR # BLD: 6.14 10*3/MM3 (ref 4.5–12.5)

## 2018-05-15 PROCEDURE — 85025 COMPLETE CBC W/AUTO DIFF WBC: CPT | Performed by: INTERNAL MEDICINE

## 2018-05-15 PROCEDURE — 80053 COMPREHEN METABOLIC PANEL: CPT | Performed by: INTERNAL MEDICINE

## 2018-05-23 DIAGNOSIS — C92.10 CML (CHRONIC MYELOCYTIC LEUKEMIA) (HCC): ICD-10-CM

## 2018-06-18 NOTE — PROGRESS NOTES
Aric Haro  [id number]  1965  6/21/2018      Referring Provider:   EILEEN Andrade    Reason for Follow Up:   1. Chronic Phase - Chronic Myelogenous Leukemia  2. Leukocytosis  3. Thrombocytosis     Chief Complaint:  Fatigue    History of Present Illness:  Aric Haro is a very pleasant 52 y.o.  female who presents in follow up appointment for further management and treatment of chronic phase chronic myelogenous leukemia (CML).    Ms. Haro began experiencing extreme fatigue where she was sleeping multiple hours during the day when she initially began following with me in April 2017. 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 On laboratory evaluation she was found to have a total white blood cell count of 22.35 and a platelet count 470 thousand. In March 2016 she was also noted to have a leukocytosis (although not as elevated) as well as a thrombocytosis, however reports that these were likely secondary to other medical issues at the time her blood work was drawn. She denied of any significant weight loss however has been gradually losing weight since gastric sleeve procedure. She denied of any fevers or frequent infections but did note fluctuating neck lymphadenopathy as well as early satiety and taste in change. She denied of any abnormal or spontaneous bleeding. I did obtain a BCR ABL PCR to further assess her leukocytosis and thrombocytosis and she was positive for the b2a2/b3a2 (p210) and e1a2 (p190) fusion gene transcripts consistent with a diagnosis for CML. After reviewing the toxicities of each tyrosine kinase inhibitor we decided to start Dasatinib treatment. She had a difficult time tolerating the Dasatinib as she was unable to take her PPI with this medication. Since she had to be taken off of her PPI she had worsening reflux symptoms as well  as severe nausea, vomiting, dehydration, and headaches during this period. Given the toxicities she was experiencing she was taken off Dasatinib and this was changed to Gleevec treatment. Since starting Gleevac 400mg daily she has tolerated this much better without significant side effects. The only side effect that she has been able to see is intermittent pedal edema along with some hand swelling and muscle cramps.       Treatment History:  Started Dasatinib on 05/15/17 - experienced nausea, severe reflux, headaches while on medication and had taken 9 doses. This was discontinued due to intolerability and she was thereafter started on Imatinib.   Started Imatinib on 5/26/17    Interim History:  Since the start of Dasatinib and later Gleevec she has had a good response and normalization in her complete blood counts with improvement in her BCR ABL PCR. She does report ongoing fatigue which has worsened lately and believes this may be due to the heat however has also been worried that this may be due to her CML. She denies of any significant changes and notes that her lower extremity edema is worsened by the heat.       The following portions of the patient's history were reviewed and updated as appropriate: allergies, current medications, past family history, past medical history, past social history, past surgical history and problem list.      No Known Allergies    Past Medical History:   Diagnosis Date   • Anemia    • Cancer     leukemia   • Diabetes mellitus    • Hypertension    • PONV (postoperative nausea and vomiting)        Past Surgical History:   Procedure Laterality Date   • COLONOSCOPY N/A 3/27/2018    Procedure: COLONOSCOPY FOR SCREENING  CPTCODE:24111;  Surgeon: Drew Esparza III, MD;  Location: [facility];  Service: Gastroenterology   • SINUS SURGERY     • SINUS SURGERY     • SLEEVE GASTROPLASTY         Social History     Social History   • Marital status:      Spouse name: N/A   • Number  of children: N/A   • Years of education: N/A     Occupational History   • Not on file.     Social History Main Topics   • Smoking status: Former Smoker     Packs/day: 0.25     Years: 4.00   • Smokeless tobacco: Never Used   • Alcohol use No   • Drug use: No   • Sexual activity: Defer     Other Topics Concern   • Not on file     Social History Narrative   • No narrative on file   She lives with her daughter. She denies of any alcohol or illicit drug use. Previous social tobacco use more then 20 years ago.    Family History   Problem Relation Age of Onset   • Anemia Mother    • Hypertension Mother    • Cancer Mother    • COPD Father    • Heart disease Father    • Breast cancer Neg Hx    Maternal Uncle - CLL  Mother - Malignancy of GE Junction      Current Outpatient Prescriptions:   •  baclofen (LIORESAL) 20 MG tablet, Take 1 tablet by mouth 3 (Three) Times a Day., Disp: 90 tablet, Rfl: 4  •  celecoxib (CELEBREX) 200 MG capsule, Take 1 capsule by mouth 2 (Two) Times a Day., Disp: 180 capsule, Rfl: 0  •  DULoxetine (CYMBALTA) 60 MG capsule, Take 1 capsule by mouth Daily., Disp: 90 capsule, Rfl: 1  •  fenofibrate 160 MG tablet, Take 1 tablet by mouth Daily., Disp: 90 tablet, Rfl: 3  •  fluconazole (DIFLUCAN) 200 MG tablet, Take 1 tablet by mouth Daily., Disp: 30 tablet, Rfl: 1  •  furosemide (LASIX) 40 MG tablet, Take 1 tablet by mouth Daily., Disp: 90 tablet, Rfl: 3  •  furosemide (LASIX) 40 MG tablet, Take 1 tablet by mouth Daily., Disp: 90 tablet, Rfl: 3  •  glucose blood (FREESTYLE LITE) test strip, Use 1 strip to test blood sugar twice daily, Disp: 60 each, Rfl: 6  •  ibuprofen (ADVIL,MOTRIN) 800 MG tablet, Take 1 tablet by mouth 3 (Three) Times a Day As Needed., Disp: 270 tablet, Rfl: 3  •  imatinib (GLEEVEC) 400 MG chemo tablet, Take 1 tablet by mouth Daily., Disp: 30 tablet, Rfl: 5  •  Insulin Glargine (LANTUS SOLOSTAR) 100 UNIT/ML injection pen, Inject 45 units subcutaneously 2 times daily, Disp: 30 mL, Rfl:  6  •  Insulin Glargine (LANTUS SOLOSTAR) 100 UNIT/ML injection pen, Inject 45 units Subcutaneously two times daily, Disp: 30 mL, Rfl: 6  •  ketotifen (ZADITOR) 0.025 % ophthalmic solution, INSTILL 1 DROP INTO BOTH EYES 2 TIMES DAILY., Disp: 5 mL, Rfl: 3  •  loratadine-pseudoephedrine (CLARITIN-D 12 HOUR) 5-120 MG per 12 hr tablet, Take 1 tablet by mouth two times a day, Disp: 30 tablet, Rfl: 0  •  pantoprazole (PROTONIX) 40 MG EC tablet, Take 1 tablet by mouth Daily., Disp: 90 tablet, Rfl: 3  •  prochlorperazine (COMPAZINE) 10 MG tablet, Take 1 tablet by mouth Every 6 (Six) Hours As Needed for Nausea or Vomiting., Disp: 60 tablet, Rfl: 5  •  SUMAtriptan (IMITREX) 100 MG tablet, Take 1 tablet by mouth after onset of migraine. May repeat after 2 hours if headache returns. Do not exceed 200mg in 24 hours., Disp: 10 tablet, Rfl: 2  •  triamcinolone (KENALOG) 0.1 % cream, Apply topically to affected area(s) 2 Times a Day., Disp: 30 g, Rfl: 0  •  TRUETRACK TEST test strip, use to test blood sugar 8 times daily, Disp: 200 each, Rfl: 5  •  vitamin D (ERGOCALCIFEROL) 49331 units capsule capsule, Take 1 capsule by mouth once weekly., Disp: 12 capsule, Rfl: 3        Review of Systems  Constitutional: No fever, chills, no night sweats, +fatigue  HEENT:  No headaches, vision changes or hearing changes, no sinus drainage, sore throat.   Cardiovascular:  No chest pain, no edema  Pulmonary:  No shortness of breath, no cough.   Gastrointestinal: no nausea, no vomiting. No diarrhea. No abdominal pain. No melena or hematochezia.   Genitourinary:  No hematuria, or changes in urination.   Musculoskeletal:  No pain, or joint problems.   Skin: No rashes or pruritus.   Endocrine:  No hot flashes or chills   Hematologic:  No history of free bleeding, spontaneous bleeding or clotting problems.   Immunologic:  No current allergies and no frequent infections.   Neurologic: No numbness, tingling, or weakness.         Physical Exam:  Vital  Signs: These were reviewed and listed as per patient’s electronic medical chart  Vitals:    06/20/18 1511   BP: 153/87   Pulse: 104   Resp: 20   Temp: 99 °F (37.2 °C)   SpO2: 96%     General: Awake, alert and oriented, in no distress, obese  HEENT: Head is atraumatic, normocephalic, extraocular movements full, oropharynx clear, no scleral icterus, pink moist mucous membranes  Neck: supple, no jvd, lymphadenopathy or masses  Cardiovascular: regular rhythm, tachycardic, without murmurs, rubs or gallops  Pulmonary: non-labored, clear to auscultation bilaterally, no wheezing  Abdomen: soft, non-tender, non-distended, normal active bowel sounds present  Extremities: No clubbing, cyanosis or edema  Lymph: No cervical, supraclavicular adenopathy   Neurologic: Mental status as above, alert, awake and oriented, grossly non-focal exam  Skin: warm, dry, intact        Labs / Studies:    Lab on 06/20/2018   Component Date Value   • Glucose 06/20/2018 137*   • BUN 06/20/2018 11    • Creatinine 06/20/2018 0.61    • Sodium 06/20/2018 139    • Potassium 06/20/2018 4.7    • Chloride 06/20/2018 106    • CO2 06/20/2018 26.2    • Calcium 06/20/2018 8.6    • Total Protein 06/20/2018 6.7    • Albumin 06/20/2018 4.00    • ALT (SGPT) 06/20/2018 19    • AST (SGOT) 06/20/2018 22    • Alkaline Phosphatase 06/20/2018 70    • Total Bilirubin 06/20/2018 0.6    • eGFR Non  Amer 06/20/2018 103    • Globulin 06/20/2018 2.7    • A/G Ratio 06/20/2018 1.5    • BUN/Creatinine Ratio 06/20/2018 18.0    • Anion Gap 06/20/2018 6.8    • WBC 06/20/2018 6.17    • RBC 06/20/2018 4.09*   • Hemoglobin 06/20/2018 12.2    • Hematocrit 06/20/2018 36.9*   • MCV 06/20/2018 90.2    • MCH 06/20/2018 29.8    • MCHC 06/20/2018 33.1    • RDW 06/20/2018 14.0    • RDW-SD 06/20/2018 45.2    • MPV 06/20/2018 10.3*   • Platelets 06/20/2018 252    • Neutrophil % 06/20/2018 57.8    • Lymphocyte % 06/20/2018 28.7    • Monocyte % 06/20/2018 9.9    • Eosinophil % 06/20/2018  2.9    • Basophil % 06/20/2018 0.5    • Immature Grans % 06/20/2018 0.2    • Neutrophils, Absolute 06/20/2018 3.57    • Lymphocytes, Absolute 06/20/2018 1.77    • Monocytes, Absolute 06/20/2018 0.61    • Eosinophils, Absolute 06/20/2018 0.18    • Basophils, Absolute 06/20/2018 0.03    • Immature Grans, Absolute 06/20/2018 0.01    • Osmolality Calc 06/20/2018 279.1    Lab on 05/15/2018   Component Date Value   • Glucose 05/15/2018 167*   • BUN 05/15/2018 12    • Creatinine 05/15/2018 0.63    • Sodium 05/15/2018 137    • Potassium 05/15/2018 4.2    • Chloride 05/15/2018 106    • CO2 05/15/2018 23.2*   • Calcium 05/15/2018 8.6    • Total Protein 05/15/2018 6.7    • Albumin 05/15/2018 4.00    • ALT (SGPT) 05/15/2018 20    • AST (SGOT) 05/15/2018 20    • Alkaline Phosphatase 05/15/2018 63    • Total Bilirubin 05/15/2018 0.5    • eGFR Non African Amer 05/15/2018 99    • Globulin 05/15/2018 2.7    • A/G Ratio 05/15/2018 1.5    • BUN/Creatinine Ratio 05/15/2018 19.0    • Anion Gap 05/15/2018 7.8    • WBC 05/15/2018 6.14    • RBC 05/15/2018 4.13*   • Hemoglobin 05/15/2018 11.9*   • Hematocrit 05/15/2018 36.4*   • MCV 05/15/2018 88.1    • MCH 05/15/2018 28.8    • MCHC 05/15/2018 32.7*   • RDW 05/15/2018 14.2    • RDW-SD 05/15/2018 45.6    • MPV 05/15/2018 10.4*   • Platelets 05/15/2018 246    • Neutrophil % 05/15/2018 56.1    • Lymphocyte % 05/15/2018 29.6    • Monocyte % 05/15/2018 9.6    • Eosinophil % 05/15/2018 4.2    • Basophil % 05/15/2018 0.3    • Immature Grans % 05/15/2018 0.2    • Neutrophils, Absolute 05/15/2018 3.44    • Lymphocytes, Absolute 05/15/2018 1.82    • Monocytes, Absolute 05/15/2018 0.59    • Eosinophils, Absolute 05/15/2018 0.26    • Basophils, Absolute 05/15/2018 0.02    • Immature Grans, Absolute 05/15/2018 0.01    • Osmolality Calc 05/15/2018 277.4    Admission on 03/27/2018, Discharged on 03/27/2018   Component Date Value   • HCG, Urine, QL 03/27/2018 Negative    • Lot Number 03/27/2018 [id number]     • Internal Positive Control 03/27/2018 Positive    • Internal Negative Control 03/27/2018 Negative    • Glucose 03/27/2018 196*   • Case Report 03/27/2018                      Value:Surgical Pathology Report                         Case: [id number]                                  Authorizing Provider:  Drew Esparza III,   Collected:           03/27/2018 08:00 AM                                 MD                                                                           Ordering Location:     [facility]      Received:            03/27/2018 10:13 AM                                 OPERATING ROOM DEPARTMENT                                                    Pathologist:           Rinku Sorenson MD                                                           Specimens:   1) - Large Intestine, Transverse Colon                                                              2) - Large Intestine, Rectum                                                              • Final Diagnosis 03/27/2018                      Value:This result contains rich text formatting which cannot be displayed here.   Lab on 03/20/2018   Component Date Value   • Glucose 03/20/2018 157*   • BUN 03/20/2018 17    • Creatinine 03/20/2018 0.71    • Sodium 03/20/2018 138    • Potassium 03/20/2018 4.3    • Chloride 03/20/2018 106    • CO2 03/20/2018 25.3    • Calcium 03/20/2018 8.8    • Total Protein 03/20/2018 6.6    • Albumin 03/20/2018 3.80    • ALT (SGPT) 03/20/2018 21    • AST (SGOT) 03/20/2018 21    • Alkaline Phosphatase 03/20/2018 60    • Total Bilirubin 03/20/2018 0.4    • eGFR Non  Amer 03/20/2018 86    • Globulin 03/20/2018 2.8    • A/G Ratio 03/20/2018 1.4*   • BUN/Creatinine Ratio 03/20/2018 23.9    • Anion Gap 03/20/2018 6.7    • WBC 03/20/2018 7.40    • RBC 03/20/2018 4.14*   • Hemoglobin 03/20/2018 12.2    • Hematocrit 03/20/2018 37.0    • MCV 03/20/2018 89.4    • MCH 03/20/2018 29.5    • MCHC 03/20/2018 33.0    •  RDW 03/20/2018 14.0    • RDW-SD 03/20/2018 44.8    • MPV 03/20/2018 10.7*   • Platelets 03/20/2018 268    • Neutrophil % 03/20/2018 54.4    • Lymphocyte % 03/20/2018 29.5    • Monocyte % 03/20/2018 11.1*   • Eosinophil % 03/20/2018 4.2    • Basophil % 03/20/2018 0.5    • Immature Grans % 03/20/2018 0.3    • Neutrophils, Absolute 03/20/2018 4.03    • Lymphocytes, Absolute 03/20/2018 2.18    • Monocytes, Absolute 03/20/2018 0.82    • Eosinophils, Absolute 03/20/2018 0.31    • Basophils, Absolute 03/20/2018 0.04    • Immature Grans, Absolute 03/20/2018 0.02    • e13a2 (b2a2) Transcript 03/20/2018 Comment    • e14a2 (b3a2) Transcript 03/20/2018 0.0415    • E1A2 transcript 03/20/2018 Comment    • Interpretation 03/20/2018 Comment    • Director Review 03/20/2018 Comment    • Background: 03/20/2018 Comment    • Methodology: 03/20/2018 Comment    • Osmolality Calc 03/20/2018 280.5    Lab on 02/26/2018   Component Date Value   • Glucose 02/26/2018 148*   • BUN 02/26/2018 12    • Creatinine 02/26/2018 0.59    • Sodium 02/26/2018 138    • Potassium 02/26/2018 4.6    • Chloride 02/26/2018 103    • CO2 02/26/2018 26.9    • Calcium 02/26/2018 9.1    • Total Protein 02/26/2018 6.8    • Albumin 02/26/2018 4.10    • ALT (SGPT) 02/26/2018 24    • AST (SGOT) 02/26/2018 22    • Alkaline Phosphatase 02/26/2018 60    • Total Bilirubin 02/26/2018 0.4    • eGFR Non  Amer 02/26/2018 107    • Globulin 02/26/2018 2.7    • A/G Ratio 02/26/2018 1.5    • BUN/Creatinine Ratio 02/26/2018 20.3    • Anion Gap 02/26/2018 8.1    • Total Cholesterol 02/26/2018 160    • Triglycerides 02/26/2018 134    • HDL Cholesterol 02/26/2018 69    • LDL Cholesterol  02/26/2018 64    • VLDL Cholesterol 02/26/2018 26.8    • LDL/HDL Ratio 02/26/2018 0.93    • TSH 02/26/2018 1.087    • T4, Total 02/26/2018 10.00    • T3 Uptake 02/26/2018 30.6    • 25 Hydroxy, Vitamin D 02/26/2018 28.0    • Vitamin B-12 02/26/2018 335    • Folate 02/26/2018 13.84    • Hemoglobin  A1C 02/26/2018 7.40*   • WBC 02/26/2018 5.74    • RBC 02/26/2018 4.15*   • Hemoglobin 02/26/2018 12.3    • Hematocrit 02/26/2018 37.6    • MCV 02/26/2018 90.6    • MCH 02/26/2018 29.6    • MCHC 02/26/2018 32.7*   • RDW 02/26/2018 13.6    • RDW-SD 02/26/2018 44.3    • MPV 02/26/2018 10.3*   • Platelets 02/26/2018 274    • Neutrophil % 02/26/2018 50.0    • Lymphocyte % 02/26/2018 33.8    • Monocyte % 02/26/2018 11.5*   • Eosinophil % 02/26/2018 4.0    • Basophil % 02/26/2018 0.5    • Immature Grans % 02/26/2018 0.2    • Neutrophils, Absolute 02/26/2018 2.87    • Lymphocytes, Absolute 02/26/2018 1.94    • Monocytes, Absolute 02/26/2018 0.66    • Eosinophils, Absolute 02/26/2018 0.23    • Basophils, Absolute 02/26/2018 0.03    • Immature Grans, Absolute 02/26/2018 0.01    • Osmolality Calc 02/26/2018 278.2    Lab on 02/15/2018   Component Date Value   • Glucose 02/15/2018 217*   • BUN 02/15/2018 13    • Creatinine 02/15/2018 0.76    • Sodium 02/15/2018 136    • Potassium 02/15/2018 4.2    • Chloride 02/15/2018 104    • CO2 02/15/2018 23.0*   • Calcium 02/15/2018 8.8    • Total Protein 02/15/2018 6.6    • Albumin 02/15/2018 4.00    • ALT (SGPT) 02/15/2018 27    • AST (SGOT) 02/15/2018 25    • Alkaline Phosphatase 02/15/2018 62    • Total Bilirubin 02/15/2018 0.4    • eGFR Non African Amer 02/15/2018 80    • Globulin 02/15/2018 2.6    • A/G Ratio 02/15/2018 1.5    • BUN/Creatinine Ratio 02/15/2018 17.1    • Anion Gap 02/15/2018 9.0    • Phosphorus 02/15/2018 3.5    • WBC 02/15/2018 7.35    • RBC 02/15/2018 3.99*   • Hemoglobin 02/15/2018 12.0    • Hematocrit 02/15/2018 36.3*   • MCV 02/15/2018 91.0    • MCH 02/15/2018 30.1    • MCHC 02/15/2018 33.1    • RDW 02/15/2018 13.6    • RDW-SD 02/15/2018 44.0    • MPV 02/15/2018 10.5*   • Platelets 02/15/2018 273    • Neutrophil % 02/15/2018 56.0    • Lymphocyte % 02/15/2018 30.2    • Monocyte % 02/15/2018 9.3    • Eosinophil % 02/15/2018 3.9    • Basophil % 02/15/2018 0.5    •  Immature Grans % 02/15/2018 0.1    • Neutrophils, Absolute 02/15/2018 4.11    • Lymphocytes, Absolute 02/15/2018 2.22    • Monocytes, Absolute 02/15/2018 0.68    • Eosinophils, Absolute 02/15/2018 0.29    • Basophils, Absolute 02/15/2018 0.04    • Immature Grans, Absolute 02/15/2018 0.01    • Osmolality Calc 02/15/2018 278.7    Lab on 2018   Component Date Value   • Glucose 2018 158*   • BUN 2018 16    • Creatinine 2018 0.64    • Sodium 2018 138    • Potassium 2018 4.2    • Chloride 2018 106    • CO2 2018 24.0*   • Calcium 2018 9.3    • Total Protein 2018 7.2    • Albumin 2018 4.30    • ALT (SGPT) 2018 28    • AST (SGOT) 2018 25    • Alkaline Phosphatase 2018 60    • Total Bilirubin 2018 0.5    • eGFR Non  Amer 2018 97    • Globulin 2018 2.9    • A/G Ratio 2018 1.5    • BUN/Creatinine Ratio 2018 25.0    • Anion Gap 2018 8.0    • Phosphorus 2018 3.4    • WBC 2018 6.03    • RBC 2018 4.04*   • Hemoglobin 2018 11.9*   • Hematocrit 2018 36.9*   • MCV 2018 91.3    • MCH 2018 29.5    • MCHC 2018 32.2*   • RDW 2018 14.0    • RDW-SD 2018 46.1    • MPV 2018 10.5*   • Platelets 2018 274    • Neutrophil % 2018 50.8    • Lymphocyte % 2018 33.7    • Monocyte % 2018 10.4*   • Eosinophil % 2018 4.1    • Basophil % 2018 0.8    • Immature Grans % 2018 0.2    • Neutrophils, Absolute 2018 3.06    • Lymphocytes, Absolute 2018 2.03    • Monocytes, Absolute 2018 0.63    • Eosinophils, Absolute 2018 0.25    • Basophils, Absolute 2018 0.05    • Immature Grans, Absolute 2018 0.01    • Osmolality Calc 2018 280.2             [id number]: US ABDOMEN COMPLETE: Visualized pancreas is unremarkable. The imaged portion of the abdominal aorta is nondilated. The liver is  homogeneous. There is no intrahepatic ductal dilatation or focal hepatic mass. The imaged portion of the hepatic vessels and inferior vena cava are patent. The gallbladder has been removed. The common bile duct is normal, measuring 5.7 mm. The kidneys demonstrate no evidence of hydronephrosis or solid renal mass. The spleen is homogeneous and measures 13 cm. IMPRESSION: Spleen measures 13 cm and is homogeneous.           PATHOLOGY:    05/15/17: Bone Marrow Biopsy & Aspirate                     04/25/17: BCR ABL PCR        08/30/17: BCR ABL PCR        12/13/17: BCR ABL PCR        03/20/18: BCR ABL PCR:              Assessment/Plan :  Aric Haro is a very pleasant 52 y.o.  female who presents in follow up appointment for further management and treatment of chronic phase chronic myelogenous leukemia.    1. Chronic Myelogenous Leukemia - CML (chronic phase)    2. Leukocytosis  3. Thrombocytosis  4. Healthcare Maintenance    Given the findings of her peripheral smear I was concerned for an underlying myeloproliferative disorder. Her primary provider obtained a flow cytometry and this did not show any significant abnormalities. I did also obtain a BCR ABL PCR which was positive for the b2a2/b3a2 (p210) and e1a2 (p190) fusion gene transcripts consistent with a diagnosis for CML. Bone marrow biopsy was performed on initial diagnosis prior to starting Dasatinib treatment with results above and consistent with chronic phase CML.     To further evaluate for a myeloproliferative disorder I did also assess for JAK2 (V617F and exon 12)/MPL/CALR mutations which were negative. To assess for underlying inflammation that may be possibly contributing will also obtain an ESR and CRP, EMMANUEL, Rheumatoid factor, as well as an acute hepatitis panel and HIV test which were all negative. Thrombocytosis can also be caused by an underlying iron deficiency however iron panel and ferritin were normal. I did also order an abdominal  ultrasound to assess for splenomegaly which showed spleen size to be 13.9cm.     Given the findings of the BCR ABL PCR I did start the patient on Dasatinib 100mg oral daily. Patient does have a history of pancreatitis and therefore I held on using Nilotinib. Patient however was unable to tolerate the medication secondary to worsening reflux while being off of her PPI, and experienced severe nausea, vomiting, and dehydration. She was then started on Imatinib 400mg daily and has been tolerating this well. Her only complaint today is fatigue which has significantly improved since start of treatment. I have previously advised her of Ibuprofen and Tylenol use for her myalgias howefer she denies these today. I did order baseline Echo which showed an intact EF and she has no cardiac symptoms.     She has had a complete hematological response, and BCR ABL PCR has significantly improved since start of Gleevac and at less then <12 months her BCR ABL has improved to 0.0415% this will need to continue to be monitored every three months to assess ongoing  molecular response. The blood counts have now stabilized therefore will monitor every 3 months given stability.    ACO Quality measures  - Aric Haro does not use tobacco products.  - Discussed the patient's BMI with her. BMI is above normal parameters. Follow-up plan includes:  exercise counseling and nutrition counseling. I again discussed diet and exercise during her appointment as well as weight loss to help improve her ongoing medical issues such as diabetes.  - Current outpatient and discharge medications have been reconciled for the patient.        I will have the patient return for follow up visit in three months. She understands that should she have any questions or concerns prior to her appointment she should give us a call at any time and I would be happy to see her sooner. It was a pleasure to see this patient in clinic today, thank you for allowing me to  participate in the care of this patient.    I spent 27 minutes in regards to this patient’s care today. More than 20 minutes of the time was spent in direct interaction with the patient for the above problems.      Evita Serrano MD  06/20/18  10:34 AM

## 2018-06-20 ENCOUNTER — OFFICE VISIT (OUTPATIENT)
Dept: ONCOLOGY | Facility: CLINIC | Age: 53
End: 2018-06-20

## 2018-06-20 ENCOUNTER — LAB (OUTPATIENT)
Dept: ONCOLOGY | Facility: CLINIC | Age: 53
End: 2018-06-20

## 2018-06-20 VITALS
OXYGEN SATURATION: 96 % | HEART RATE: 104 BPM | DIASTOLIC BLOOD PRESSURE: 87 MMHG | WEIGHT: 277.3 LBS | SYSTOLIC BLOOD PRESSURE: 153 MMHG | RESPIRATION RATE: 20 BRPM | TEMPERATURE: 99 F | BODY MASS INDEX: 44.76 KG/M2

## 2018-06-20 VITALS
HEART RATE: 94 BPM | DIASTOLIC BLOOD PRESSURE: 92 MMHG | SYSTOLIC BLOOD PRESSURE: 153 MMHG | TEMPERATURE: 98.1 F | RESPIRATION RATE: 20 BRPM | OXYGEN SATURATION: 100 %

## 2018-06-20 DIAGNOSIS — E11.00 TYPE 2 DIABETES MELLITUS WITH HYPEROSMOLARITY WITHOUT COMA, WITH LONG-TERM CURRENT USE OF INSULIN (HCC): ICD-10-CM

## 2018-06-20 DIAGNOSIS — C92.10 CML (CHRONIC MYELOCYTIC LEUKEMIA) (HCC): ICD-10-CM

## 2018-06-20 DIAGNOSIS — C92.10 CML (CHRONIC MYELOCYTIC LEUKEMIA) (HCC): Primary | ICD-10-CM

## 2018-06-20 DIAGNOSIS — C92.10: Primary | ICD-10-CM

## 2018-06-20 DIAGNOSIS — D75.839 THROMBOCYTOSIS: ICD-10-CM

## 2018-06-20 DIAGNOSIS — Z79.4 TYPE 2 DIABETES MELLITUS WITH HYPEROSMOLARITY WITHOUT COMA, WITH LONG-TERM CURRENT USE OF INSULIN (HCC): ICD-10-CM

## 2018-06-20 LAB
ALBUMIN SERPL-MCNC: 4 G/DL (ref 3.5–5)
ALBUMIN/GLOB SERPL: 1.5 G/DL (ref 1.5–2.5)
ALP SERPL-CCNC: 70 U/L (ref 35–104)
ALT SERPL W P-5'-P-CCNC: 19 U/L (ref 10–36)
ANION GAP SERPL CALCULATED.3IONS-SCNC: 6.8 MMOL/L (ref 3.6–11.2)
AST SERPL-CCNC: 22 U/L (ref 10–30)
BASOPHILS # BLD AUTO: 0.03 10*3/MM3 (ref 0–0.3)
BASOPHILS NFR BLD AUTO: 0.5 % (ref 0–2)
BILIRUB SERPL-MCNC: 0.6 MG/DL (ref 0.2–1.8)
BUN BLD-MCNC: 11 MG/DL (ref 7–21)
BUN/CREAT SERPL: 18 (ref 7–25)
CALCIUM SPEC-SCNC: 8.6 MG/DL (ref 7.7–10)
CHLORIDE SERPL-SCNC: 106 MMOL/L (ref 99–112)
CO2 SERPL-SCNC: 26.2 MMOL/L (ref 24.3–31.9)
CREAT BLD-MCNC: 0.61 MG/DL (ref 0.43–1.29)
DEPRECATED RDW RBC AUTO: 45.2 FL (ref 37–54)
EOSINOPHIL # BLD AUTO: 0.18 10*3/MM3 (ref 0–0.7)
EOSINOPHIL NFR BLD AUTO: 2.9 % (ref 0–5)
ERYTHROCYTE [DISTWIDTH] IN BLOOD BY AUTOMATED COUNT: 14 % (ref 11.5–14.5)
GFR SERPL CREATININE-BSD FRML MDRD: 103 ML/MIN/1.73
GLOBULIN UR ELPH-MCNC: 2.7 GM/DL
GLUCOSE BLD-MCNC: 137 MG/DL (ref 70–110)
HCT VFR BLD AUTO: 36.9 % (ref 37–47)
HGB BLD-MCNC: 12.2 G/DL (ref 12–16)
IMM GRANULOCYTES # BLD: 0.01 10*3/MM3 (ref 0–0.03)
IMM GRANULOCYTES NFR BLD: 0.2 % (ref 0–0.5)
LYMPHOCYTES # BLD AUTO: 1.77 10*3/MM3 (ref 1–3)
LYMPHOCYTES NFR BLD AUTO: 28.7 % (ref 21–51)
MCH RBC QN AUTO: 29.8 PG (ref 27–33)
MCHC RBC AUTO-ENTMCNC: 33.1 G/DL (ref 33–37)
MCV RBC AUTO: 90.2 FL (ref 80–94)
MONOCYTES # BLD AUTO: 0.61 10*3/MM3 (ref 0.1–0.9)
MONOCYTES NFR BLD AUTO: 9.9 % (ref 0–10)
NEUTROPHILS # BLD AUTO: 3.57 10*3/MM3 (ref 1.4–6.5)
NEUTROPHILS NFR BLD AUTO: 57.8 % (ref 30–70)
OSMOLALITY SERPL CALC.SUM OF ELEC: 279.1 MOSM/KG (ref 273–305)
PLATELET # BLD AUTO: 252 10*3/MM3 (ref 130–400)
PMV BLD AUTO: 10.3 FL (ref 6–10)
POTASSIUM BLD-SCNC: 4.7 MMOL/L (ref 3.5–5.3)
PROT SERPL-MCNC: 6.7 G/DL (ref 6–8)
RBC # BLD AUTO: 4.09 10*6/MM3 (ref 4.2–5.4)
SODIUM BLD-SCNC: 139 MMOL/L (ref 135–153)
WBC NRBC COR # BLD: 6.17 10*3/MM3 (ref 4.5–12.5)

## 2018-06-20 PROCEDURE — 85025 COMPLETE CBC W/AUTO DIFF WBC: CPT | Performed by: INTERNAL MEDICINE

## 2018-06-20 PROCEDURE — 81206 BCR/ABL1 GENE MAJOR BP: CPT | Performed by: INTERNAL MEDICINE

## 2018-06-20 PROCEDURE — 80053 COMPREHEN METABOLIC PANEL: CPT | Performed by: INTERNAL MEDICINE

## 2018-06-20 PROCEDURE — 99214 OFFICE O/P EST MOD 30 MIN: CPT | Performed by: INTERNAL MEDICINE

## 2018-06-20 PROCEDURE — 81207 BCR/ABL1 GENE MINOR BP: CPT | Performed by: INTERNAL MEDICINE

## 2018-06-22 ENCOUNTER — ANESTHESIA EVENT (OUTPATIENT)
Dept: PERIOP | Facility: HOSPITAL | Age: 53
End: 2018-06-22

## 2018-06-22 ENCOUNTER — HOSPITAL ENCOUNTER (OUTPATIENT)
Facility: HOSPITAL | Age: 53
Setting detail: HOSPITAL OUTPATIENT SURGERY
Discharge: HOME | End: 2018-06-22
Attending: OPHTHALMOLOGY | Admitting: OPHTHALMOLOGY

## 2018-06-22 ENCOUNTER — ANESTHESIA (OUTPATIENT)
Dept: PERIOP | Facility: HOSPITAL | Age: 53
End: 2018-06-22

## 2018-06-22 VITALS
HEIGHT: 66 IN | TEMPERATURE: 97.1 F | DIASTOLIC BLOOD PRESSURE: 81 MMHG | RESPIRATION RATE: 20 BRPM | SYSTOLIC BLOOD PRESSURE: 148 MMHG | OXYGEN SATURATION: 96 % | BODY MASS INDEX: 43.39 KG/M2 | HEART RATE: 81 BPM | WEIGHT: 270 LBS

## 2018-06-22 LAB
B-HCG UR QL: NEGATIVE
GLUCOSE BLDC GLUCOMTR-MCNC: 126 MG/DL (ref 70–130)
INTERNAL NEGATIVE CONTROL: NEGATIVE
INTERNAL POSITIVE CONTROL: POSITIVE
Lab: NORMAL

## 2018-06-22 PROCEDURE — 82962 GLUCOSE BLOOD TEST: CPT

## 2018-06-22 PROCEDURE — 25010000002 FENTANYL CITRATE (PF) 100 MCG/2ML SOLUTION: Performed by: NURSE ANESTHETIST, CERTIFIED REGISTERED

## 2018-06-22 PROCEDURE — 25010000002 MIDAZOLAM PER 1 MG: Performed by: NURSE ANESTHETIST, CERTIFIED REGISTERED

## 2018-06-22 PROCEDURE — 25010000002 ONDANSETRON PER 1 MG: Performed by: NURSE ANESTHETIST, CERTIFIED REGISTERED

## 2018-06-22 PROCEDURE — 25010000002 PROPOFOL 10 MG/ML EMULSION: Performed by: NURSE ANESTHETIST, CERTIFIED REGISTERED

## 2018-06-22 RX ORDER — TETRACAINE HYDROCHLORIDE 5 MG/ML
SOLUTION OPHTHALMIC AS NEEDED
Status: DISCONTINUED | OUTPATIENT
Start: 2018-06-22 | End: 2018-06-22 | Stop reason: HOSPADM

## 2018-06-22 RX ORDER — SODIUM CHLORIDE, SODIUM LACTATE, POTASSIUM CHLORIDE, CALCIUM CHLORIDE 600; 310; 30; 20 MG/100ML; MG/100ML; MG/100ML; MG/100ML
125 INJECTION, SOLUTION INTRAVENOUS CONTINUOUS
Status: DISCONTINUED | OUTPATIENT
Start: 2018-06-22 | End: 2018-06-22 | Stop reason: HOSPADM

## 2018-06-22 RX ORDER — MIDAZOLAM HYDROCHLORIDE 1 MG/ML
INJECTION INTRAMUSCULAR; INTRAVENOUS AS NEEDED
Status: DISCONTINUED | OUTPATIENT
Start: 2018-06-22 | End: 2018-06-22 | Stop reason: SURG

## 2018-06-22 RX ORDER — ONDANSETRON 2 MG/ML
INJECTION INTRAMUSCULAR; INTRAVENOUS AS NEEDED
Status: DISCONTINUED | OUTPATIENT
Start: 2018-06-22 | End: 2018-06-22 | Stop reason: SURG

## 2018-06-22 RX ORDER — POLYMYXIN B SULFATE AND TRIMETHOPRIM 1; 10000 MG/ML; [USP'U]/ML
1 SOLUTION OPHTHALMIC
Status: COMPLETED | OUTPATIENT
Start: 2018-06-22 | End: 2018-06-22

## 2018-06-22 RX ORDER — LABETALOL HYDROCHLORIDE 5 MG/ML
INJECTION, SOLUTION INTRAVENOUS AS NEEDED
Status: DISCONTINUED | OUTPATIENT
Start: 2018-06-22 | End: 2018-06-22 | Stop reason: SURG

## 2018-06-22 RX ORDER — BALANCED SALT SOLUTION 6.4; .75; .48; .3; 3.9; 1.7 MG/ML; MG/ML; MG/ML; MG/ML; MG/ML; MG/ML
SOLUTION OPHTHALMIC AS NEEDED
Status: DISCONTINUED | OUTPATIENT
Start: 2018-06-22 | End: 2018-06-22 | Stop reason: HOSPADM

## 2018-06-22 RX ORDER — FENTANYL CITRATE 50 UG/ML
INJECTION, SOLUTION INTRAMUSCULAR; INTRAVENOUS AS NEEDED
Status: DISCONTINUED | OUTPATIENT
Start: 2018-06-22 | End: 2018-06-22 | Stop reason: SURG

## 2018-06-22 RX ORDER — ONDANSETRON 2 MG/ML
4 INJECTION INTRAMUSCULAR; INTRAVENOUS ONCE AS NEEDED
Status: DISCONTINUED | OUTPATIENT
Start: 2018-06-22 | End: 2018-06-22 | Stop reason: HOSPADM

## 2018-06-22 RX ORDER — SODIUM CHLORIDE 0.9 % (FLUSH) 0.9 %
1-10 SYRINGE (ML) INJECTION AS NEEDED
Status: DISCONTINUED | OUTPATIENT
Start: 2018-06-22 | End: 2018-06-22 | Stop reason: HOSPADM

## 2018-06-22 RX ORDER — METOPROLOL TARTRATE 5 MG/5ML
3 INJECTION INTRAVENOUS EVERY 6 HOURS
Status: DISCONTINUED | OUTPATIENT
Start: 2018-06-22 | End: 2018-06-22 | Stop reason: HOSPADM

## 2018-06-22 RX ORDER — IPRATROPIUM BROMIDE AND ALBUTEROL SULFATE 2.5; .5 MG/3ML; MG/3ML
3 SOLUTION RESPIRATORY (INHALATION) ONCE AS NEEDED
Status: DISCONTINUED | OUTPATIENT
Start: 2018-06-22 | End: 2018-06-22 | Stop reason: HOSPADM

## 2018-06-22 RX ORDER — OXYCODONE HYDROCHLORIDE AND ACETAMINOPHEN 5; 325 MG/1; MG/1
1 TABLET ORAL ONCE AS NEEDED
Status: DISCONTINUED | OUTPATIENT
Start: 2018-06-22 | End: 2018-06-22 | Stop reason: HOSPADM

## 2018-06-22 RX ORDER — FENTANYL CITRATE 50 UG/ML
50 INJECTION, SOLUTION INTRAMUSCULAR; INTRAVENOUS
Status: DISCONTINUED | OUTPATIENT
Start: 2018-06-22 | End: 2018-06-22 | Stop reason: HOSPADM

## 2018-06-22 RX ORDER — VERAPAMIL HYDROCHLORIDE 2.5 MG/ML
5 INJECTION, SOLUTION INTRAVENOUS ONCE
Status: COMPLETED | OUTPATIENT
Start: 2018-06-22 | End: 2018-06-22

## 2018-06-22 RX ORDER — PROPOFOL 10 MG/ML
VIAL (ML) INTRAVENOUS AS NEEDED
Status: DISCONTINUED | OUTPATIENT
Start: 2018-06-22 | End: 2018-06-22 | Stop reason: SURG

## 2018-06-22 RX ORDER — FAMOTIDINE 10 MG/ML
INJECTION, SOLUTION INTRAVENOUS AS NEEDED
Status: DISCONTINUED | OUTPATIENT
Start: 2018-06-22 | End: 2018-06-22 | Stop reason: SURG

## 2018-06-22 RX ORDER — LIDOCAINE HYDROCHLORIDE AND EPINEPHRINE BITARTRATE 20; .01 MG/ML; MG/ML
INJECTION, SOLUTION SUBCUTANEOUS AS NEEDED
Status: DISCONTINUED | OUTPATIENT
Start: 2018-06-22 | End: 2018-06-22 | Stop reason: HOSPADM

## 2018-06-22 RX ORDER — MEPERIDINE HYDROCHLORIDE 50 MG/ML
12.5 INJECTION INTRAMUSCULAR; INTRAVENOUS; SUBCUTANEOUS
Status: DISCONTINUED | OUTPATIENT
Start: 2018-06-22 | End: 2018-06-22 | Stop reason: HOSPADM

## 2018-06-22 RX ORDER — TETRACAINE HYDROCHLORIDE 5 MG/ML
1 SOLUTION OPHTHALMIC ONCE
Status: COMPLETED | OUTPATIENT
Start: 2018-06-22 | End: 2018-06-22

## 2018-06-22 RX ADMIN — MIDAZOLAM HYDROCHLORIDE 1 MG: 1 INJECTION, SOLUTION INTRAMUSCULAR; INTRAVENOUS at 08:59

## 2018-06-22 RX ADMIN — METOROPROLOL TARTRATE 3 MG: 5 INJECTION, SOLUTION INTRAVENOUS at 10:50

## 2018-06-22 RX ADMIN — SODIUM CHLORIDE, POTASSIUM CHLORIDE, SODIUM LACTATE AND CALCIUM CHLORIDE 125 ML/HR: 600; 310; 30; 20 INJECTION, SOLUTION INTRAVENOUS at 07:49

## 2018-06-22 RX ADMIN — MIDAZOLAM HYDROCHLORIDE 2 MG: 1 INJECTION, SOLUTION INTRAMUSCULAR; INTRAVENOUS at 08:28

## 2018-06-22 RX ADMIN — MIDAZOLAM HYDROCHLORIDE 1 MG: 1 INJECTION, SOLUTION INTRAMUSCULAR; INTRAVENOUS at 09:42

## 2018-06-22 RX ADMIN — POLYMYXIN B SULFATE AND TRIMETHOPRIM 1 DROP: 1; 10000 SOLUTION OPHTHALMIC at 08:04

## 2018-06-22 RX ADMIN — LABETALOL HYDROCHLORIDE 5 MG: 5 INJECTION, SOLUTION INTRAVENOUS at 09:29

## 2018-06-22 RX ADMIN — MIDAZOLAM HYDROCHLORIDE 1 MG: 1 INJECTION, SOLUTION INTRAMUSCULAR; INTRAVENOUS at 09:35

## 2018-06-22 RX ADMIN — PROPOFOL 50 MG: 10 INJECTION, EMULSION INTRAVENOUS at 08:45

## 2018-06-22 RX ADMIN — POLYMYXIN B SULFATE AND TRIMETHOPRIM 1 DROP: 1; 10000 SOLUTION OPHTHALMIC at 07:49

## 2018-06-22 RX ADMIN — FENTANYL CITRATE 100 MCG: 50 INJECTION INTRAMUSCULAR; INTRAVENOUS at 08:28

## 2018-06-22 RX ADMIN — VERAPAMIL HYDROCHLORIDE 5 MG: 2.5 INJECTION, SOLUTION INTRAVENOUS at 10:34

## 2018-06-22 RX ADMIN — LABETALOL HYDROCHLORIDE 5 MG: 5 INJECTION, SOLUTION INTRAVENOUS at 09:03

## 2018-06-22 RX ADMIN — MIDAZOLAM HYDROCHLORIDE 2 MG: 1 INJECTION, SOLUTION INTRAMUSCULAR; INTRAVENOUS at 08:22

## 2018-06-22 RX ADMIN — ONDANSETRON 4 MG: 2 INJECTION, SOLUTION INTRAMUSCULAR; INTRAVENOUS at 08:28

## 2018-06-22 RX ADMIN — TETRACAINE HYDROCHLORIDE 1 DROP: 5 SOLUTION OPHTHALMIC at 07:49

## 2018-06-22 RX ADMIN — FENTANYL CITRATE 50 MCG: 50 INJECTION INTRAMUSCULAR; INTRAVENOUS at 11:07

## 2018-06-22 RX ADMIN — FAMOTIDINE 20 MG: 10 INJECTION, SOLUTION INTRAVENOUS at 08:31

## 2018-06-22 RX ADMIN — POLYMYXIN B SULFATE AND TRIMETHOPRIM 1 DROP: 1; 10000 SOLUTION OPHTHALMIC at 07:55

## 2018-06-22 RX ADMIN — MIDAZOLAM HYDROCHLORIDE 1 MG: 1 INJECTION, SOLUTION INTRAMUSCULAR; INTRAVENOUS at 08:36

## 2018-06-22 NOTE — ANESTHESIA PREPROCEDURE EVALUATION
Anesthesia Evaluation     Patient summary reviewed and Nursing notes reviewed   history of anesthetic complications: PONV  NPO Solid Status: > 8 hours  NPO Liquid Status: > 8 hours           Airway   Mallampati: II  TM distance: >3 FB  Neck ROM: full  No difficulty expected  Dental - normal exam     Pulmonary - negative pulmonary ROS and normal exam   Cardiovascular - normal exam    (+) hypertension,       Neuro/Psych- negative ROS  GI/Hepatic/Renal/Endo    (+)  GERD,  diabetes mellitus,     Musculoskeletal     Abdominal  - normal exam   Substance History      OB/GYN          Other      history of cancer active                      Anesthesia Plan    ASA 3     general and MAC     intravenous induction   Anesthetic plan and risks discussed with patient.    Plan discussed with CRNA.

## 2018-06-22 NOTE — ANESTHESIA POSTPROCEDURE EVALUATION
Patient: Aric Haro    Procedure Summary     Date:  06/22/18 Room / Location:   [facility] /  [facility]    Anesthesia Start:  0828 Anesthesia Stop:  1009    Procedure:  BLEPHAROPLASTY BOTH EYES UPPER EYELIDS (PT REQUESTED TERESITA WILKES (Bilateral Eye) Diagnosis:  (H02.831)    Surgeon:  Chris Haile MD Provider:  Cirilo Goel MD    Anesthesia Type:  general, MAC ASA Status:  3          Anesthesia Type: general, MAC  Last vitals  BP   (!) 108/106 (Dr Mckinley notified, orders noted.) (06/22/18 1025)   Temp   97.1 °F (36.2 °C) (06/22/18 1010)   Pulse   91 (06/22/18 1025)   Resp   20 (06/22/18 1025)     SpO2   97 % (06/22/18 1025)     Post Anesthesia Care and Evaluation    Patient location during evaluation: PHASE II  Patient participation: complete - patient participated  Level of consciousness: awake and alert  Pain score: 1  Pain management: adequate  Airway patency: patent  Anesthetic complications: No anesthetic complications  PONV Status: controlled  Cardiovascular status: acceptable  Respiratory status: acceptable  Hydration status: acceptable

## 2018-06-22 NOTE — ANESTHESIA POSTPROCEDURE EVALUATION
Patient: Aric Haro    Procedure Summary     Date:  06/22/18 Room / Location:   [facility] /  [facility]    Anesthesia Start:  0828 Anesthesia Stop:  1009    Procedure:  BLEPHAROPLASTY BOTH EYES UPPER EYELIDS (PT REQUESTED TERESITA WILKES (Bilateral Eye) Diagnosis:  (H02.831)    Surgeon:  Chris Haile MD Provider:  Cirilo Goel MD    Anesthesia Type:  general, MAC ASA Status:  3          Anesthesia Type: general, MAC  Last vitals  BP   (!) 108/106 (Dr Mckinley notified, orders noted.) (06/22/18 1025)   Temp   97.1 °F (36.2 °C) (06/22/18 1010)   Pulse   91 (06/22/18 1025)   Resp   20 (06/22/18 1025)     SpO2   97 % (06/22/18 1025)     Post Anesthesia Care and Evaluation    Patient location during evaluation: PHASE II  Patient participation: complete - patient participated  Level of consciousness: awake and alert  Pain score: 0  Pain management: adequate  Airway patency: patent  Anesthetic complications: No anesthetic complications    Cardiovascular status: acceptable  Respiratory status: acceptable  Hydration status: acceptable

## 2018-06-25 LAB
INTERPRETATION: NORMAL
LAB DIRECTOR NAME PROVIDER: NORMAL
LABORATORY COMMENT REPORT: NORMAL
Lab: NORMAL
T(ABL1,BCR)B2A2/CONTROL (IS) BLD/T: NORMAL %
T(ABL1,BCR)B3A2 MAR QL: 0.02 %
T(ABL1,BCR)E1A2/CONTROL BLD/T: NORMAL %

## 2018-09-10 ENCOUNTER — LAB (OUTPATIENT)
Dept: LAB | Facility: HOSPITAL | Age: 53
End: 2018-09-10

## 2018-09-10 ENCOUNTER — TRANSCRIBE ORDERS (OUTPATIENT)
Dept: ADMINISTRATIVE | Facility: HOSPITAL | Age: 53
End: 2018-09-10

## 2018-09-10 DIAGNOSIS — C92.10 CML (CHRONIC MYELOCYTIC LEUKEMIA) (HCC): ICD-10-CM

## 2018-09-10 DIAGNOSIS — E53.8 VITAMIN B12 DEFICIENCY (NON ANEMIC): ICD-10-CM

## 2018-09-10 DIAGNOSIS — J32.9 CHRONIC SINUSITIS, UNSPECIFIED LOCATION: ICD-10-CM

## 2018-09-10 DIAGNOSIS — I10 ESSENTIAL HYPERTENSION, BENIGN: ICD-10-CM

## 2018-09-10 DIAGNOSIS — I10 ESSENTIAL HYPERTENSION, BENIGN: Primary | ICD-10-CM

## 2018-09-10 DIAGNOSIS — E10.9 TYPE 1 DIABETES MELLITUS WITHOUT COMPLICATION (HCC): ICD-10-CM

## 2018-09-10 DIAGNOSIS — E61.1 IRON DEFICIENCY: ICD-10-CM

## 2018-09-10 LAB
25(OH)D3 SERPL-MCNC: 16 NG/ML
ALBUMIN SERPL-MCNC: 4.2 G/DL (ref 3.5–5)
ALBUMIN/GLOB SERPL: 1.6 G/DL (ref 1.5–2.5)
ALP SERPL-CCNC: 77 U/L (ref 35–104)
ALT SERPL W P-5'-P-CCNC: 21 U/L (ref 10–36)
ANION GAP SERPL CALCULATED.3IONS-SCNC: 6.8 MMOL/L (ref 3.6–11.2)
AST SERPL-CCNC: 20 U/L (ref 10–30)
BASOPHILS # BLD AUTO: 0.03 10*3/MM3 (ref 0–0.3)
BASOPHILS NFR BLD AUTO: 0.3 % (ref 0–2)
BILIRUB SERPL-MCNC: 0.3 MG/DL (ref 0.2–1.8)
BUN BLD-MCNC: 14 MG/DL (ref 7–21)
BUN/CREAT SERPL: 16.7 (ref 7–25)
CALCIUM SPEC-SCNC: 9.1 MG/DL (ref 7.7–10)
CHLORIDE SERPL-SCNC: 107 MMOL/L (ref 99–112)
CO2 SERPL-SCNC: 24.2 MMOL/L (ref 24.3–31.9)
CREAT BLD-MCNC: 0.84 MG/DL (ref 0.43–1.29)
DEPRECATED RDW RBC AUTO: 45 FL (ref 37–54)
EOSINOPHIL # BLD AUTO: 0.2 10*3/MM3 (ref 0–0.7)
EOSINOPHIL NFR BLD AUTO: 2.1 % (ref 0–5)
ERYTHROCYTE [DISTWIDTH] IN BLOOD BY AUTOMATED COUNT: 13.9 % (ref 11.5–14.5)
FOLATE SERPL-MCNC: 17.5 NG/ML (ref 5.4–20)
GFR SERPL CREATININE-BSD FRML MDRD: 71 ML/MIN/1.73
GLOBULIN UR ELPH-MCNC: 2.7 GM/DL
GLUCOSE BLD-MCNC: 225 MG/DL (ref 70–110)
HBA1C MFR BLD: 7.6 % (ref 4.5–5.7)
HCT VFR BLD AUTO: 36.1 % (ref 37–47)
HETEROPH AB SER QL LA: NEGATIVE
HGB BLD-MCNC: 11.9 G/DL (ref 12–16)
IMM GRANULOCYTES # BLD: 0.02 10*3/MM3 (ref 0–0.03)
IMM GRANULOCYTES NFR BLD: 0.2 % (ref 0–0.5)
IRON 24H UR-MRATE: 36 MCG/DL (ref 49–151)
IRON SATN MFR SERPL: 9 % (ref 15–50)
LYMPHOCYTES # BLD AUTO: 2.22 10*3/MM3 (ref 1–3)
LYMPHOCYTES NFR BLD AUTO: 23 % (ref 21–51)
MCH RBC QN AUTO: 29.2 PG (ref 27–33)
MCHC RBC AUTO-ENTMCNC: 33 G/DL (ref 33–37)
MCV RBC AUTO: 88.5 FL (ref 80–94)
MONOCYTES # BLD AUTO: 0.83 10*3/MM3 (ref 0.1–0.9)
MONOCYTES NFR BLD AUTO: 8.6 % (ref 0–10)
NEUTROPHILS # BLD AUTO: 6.34 10*3/MM3 (ref 1.4–6.5)
NEUTROPHILS NFR BLD AUTO: 65.8 % (ref 30–70)
OSMOLALITY SERPL CALC.SUM OF ELEC: 283.2 MOSM/KG (ref 273–305)
PLATELET # BLD AUTO: 265 10*3/MM3 (ref 130–400)
PMV BLD AUTO: 10.6 FL (ref 6–10)
POTASSIUM BLD-SCNC: 4.2 MMOL/L (ref 3.5–5.3)
PROT SERPL-MCNC: 6.9 G/DL (ref 6–8)
RBC # BLD AUTO: 4.08 10*6/MM3 (ref 4.2–5.4)
SODIUM BLD-SCNC: 138 MMOL/L (ref 135–153)
T3RU NFR SERPL: 26.5 % (ref 22.5–37)
T4 SERPL-MCNC: 9.5 MCG/DL (ref 4.5–10.9)
TIBC SERPL-MCNC: 386 MCG/DL (ref 241–421)
TSH SERPL DL<=0.05 MIU/L-ACNC: 1.37 MIU/ML (ref 0.55–4.78)
VIT B12 BLD-MCNC: 299 PG/ML (ref 211–911)
WBC NRBC COR # BLD: 9.64 10*3/MM3 (ref 4.5–12.5)

## 2018-09-10 PROCEDURE — 82746 ASSAY OF FOLIC ACID SERUM: CPT

## 2018-09-10 PROCEDURE — 80053 COMPREHEN METABOLIC PANEL: CPT

## 2018-09-10 PROCEDURE — 84436 ASSAY OF TOTAL THYROXINE: CPT

## 2018-09-10 PROCEDURE — 85025 COMPLETE CBC W/AUTO DIFF WBC: CPT

## 2018-09-10 PROCEDURE — 82306 VITAMIN D 25 HYDROXY: CPT

## 2018-09-10 PROCEDURE — 86308 HETEROPHILE ANTIBODY SCREEN: CPT

## 2018-09-10 PROCEDURE — 84479 ASSAY OF THYROID (T3 OR T4): CPT

## 2018-09-10 PROCEDURE — 83550 IRON BINDING TEST: CPT

## 2018-09-10 PROCEDURE — 82607 VITAMIN B-12: CPT

## 2018-09-10 PROCEDURE — 83540 ASSAY OF IRON: CPT

## 2018-09-10 PROCEDURE — 36415 COLL VENOUS BLD VENIPUNCTURE: CPT

## 2018-09-10 PROCEDURE — 84443 ASSAY THYROID STIM HORMONE: CPT

## 2018-09-10 PROCEDURE — 83036 HEMOGLOBIN GLYCOSYLATED A1C: CPT

## 2018-10-03 ENCOUNTER — OFFICE VISIT (OUTPATIENT)
Dept: ONCOLOGY | Facility: CLINIC | Age: 53
End: 2018-10-03

## 2018-10-03 ENCOUNTER — LAB (OUTPATIENT)
Dept: ONCOLOGY | Facility: CLINIC | Age: 53
End: 2018-10-03

## 2018-10-03 VITALS
BODY MASS INDEX: 44.74 KG/M2 | SYSTOLIC BLOOD PRESSURE: 174 MMHG | DIASTOLIC BLOOD PRESSURE: 81 MMHG | WEIGHT: 277.2 LBS | TEMPERATURE: 97.6 F | OXYGEN SATURATION: 100 % | HEART RATE: 87 BPM | RESPIRATION RATE: 18 BRPM

## 2018-10-03 VITALS
SYSTOLIC BLOOD PRESSURE: 174 MMHG | TEMPERATURE: 97.6 F | HEART RATE: 87 BPM | DIASTOLIC BLOOD PRESSURE: 81 MMHG | OXYGEN SATURATION: 100 % | RESPIRATION RATE: 18 BRPM

## 2018-10-03 DIAGNOSIS — C92.10 CML (CHRONIC MYELOCYTIC LEUKEMIA) (HCC): ICD-10-CM

## 2018-10-03 DIAGNOSIS — C92.10 CML (CHRONIC MYELOCYTIC LEUKEMIA) (HCC): Primary | ICD-10-CM

## 2018-10-03 LAB
ALBUMIN SERPL-MCNC: 4.2 G/DL (ref 3.5–5)
ALBUMIN/GLOB SERPL: 1.5 G/DL (ref 1.5–2.5)
ALP SERPL-CCNC: 60 U/L (ref 35–104)
ALT SERPL W P-5'-P-CCNC: 24 U/L (ref 10–36)
ANION GAP SERPL CALCULATED.3IONS-SCNC: 6.4 MMOL/L (ref 3.6–11.2)
AST SERPL-CCNC: 26 U/L (ref 10–30)
BASOPHILS # BLD AUTO: 0.04 10*3/MM3 (ref 0–0.3)
BASOPHILS NFR BLD AUTO: 0.6 % (ref 0–2)
BILIRUB SERPL-MCNC: 0.5 MG/DL (ref 0.2–1.8)
BUN BLD-MCNC: 13 MG/DL (ref 7–21)
BUN/CREAT SERPL: 19.1 (ref 7–25)
CALCIUM SPEC-SCNC: 9 MG/DL (ref 7.7–10)
CHLORIDE SERPL-SCNC: 104 MMOL/L (ref 99–112)
CO2 SERPL-SCNC: 28.6 MMOL/L (ref 24.3–31.9)
CREAT BLD-MCNC: 0.68 MG/DL (ref 0.43–1.29)
DEPRECATED RDW RBC AUTO: 45.5 FL (ref 37–54)
EOSINOPHIL # BLD AUTO: 0.19 10*3/MM3 (ref 0–0.7)
EOSINOPHIL NFR BLD AUTO: 2.7 % (ref 0–5)
ERYTHROCYTE [DISTWIDTH] IN BLOOD BY AUTOMATED COUNT: 14.1 % (ref 11.5–14.5)
GFR SERPL CREATININE-BSD FRML MDRD: 91 ML/MIN/1.73
GLOBULIN UR ELPH-MCNC: 2.8 GM/DL
GLUCOSE BLD-MCNC: 142 MG/DL (ref 70–110)
HCT VFR BLD AUTO: 37.3 % (ref 37–47)
HGB BLD-MCNC: 12.4 G/DL (ref 12–16)
IMM GRANULOCYTES # BLD: 0.01 10*3/MM3 (ref 0–0.03)
IMM GRANULOCYTES NFR BLD: 0.1 % (ref 0–0.5)
LYMPHOCYTES # BLD AUTO: 2.28 10*3/MM3 (ref 1–3)
LYMPHOCYTES NFR BLD AUTO: 32.6 % (ref 21–51)
MCH RBC QN AUTO: 29.6 PG (ref 27–33)
MCHC RBC AUTO-ENTMCNC: 33.2 G/DL (ref 33–37)
MCV RBC AUTO: 89 FL (ref 80–94)
MONOCYTES # BLD AUTO: 0.97 10*3/MM3 (ref 0.1–0.9)
MONOCYTES NFR BLD AUTO: 13.9 % (ref 0–10)
NEUTROPHILS # BLD AUTO: 3.51 10*3/MM3 (ref 1.4–6.5)
NEUTROPHILS NFR BLD AUTO: 50.1 % (ref 30–70)
OSMOLALITY SERPL CALC.SUM OF ELEC: 280.1 MOSM/KG (ref 273–305)
PHOSPHATE SERPL-MCNC: 3.4 MG/DL (ref 2.7–4.5)
PLATELET # BLD AUTO: 258 10*3/MM3 (ref 130–400)
PMV BLD AUTO: 10.5 FL (ref 6–10)
POTASSIUM BLD-SCNC: 4.4 MMOL/L (ref 3.5–5.3)
PROT SERPL-MCNC: 7 G/DL (ref 6–8)
RBC # BLD AUTO: 4.19 10*6/MM3 (ref 4.2–5.4)
SODIUM BLD-SCNC: 139 MMOL/L (ref 135–153)
WBC NRBC COR # BLD: 7 10*3/MM3 (ref 4.5–12.5)

## 2018-10-03 PROCEDURE — 99214 OFFICE O/P EST MOD 30 MIN: CPT | Performed by: INTERNAL MEDICINE

## 2018-10-03 PROCEDURE — 85025 COMPLETE CBC W/AUTO DIFF WBC: CPT | Performed by: INTERNAL MEDICINE

## 2018-10-03 PROCEDURE — 36415 COLL VENOUS BLD VENIPUNCTURE: CPT

## 2018-10-03 PROCEDURE — 80053 COMPREHEN METABOLIC PANEL: CPT | Performed by: INTERNAL MEDICINE

## 2018-10-03 PROCEDURE — 84100 ASSAY OF PHOSPHORUS: CPT | Performed by: INTERNAL MEDICINE

## 2018-10-03 NOTE — PROGRESS NOTES
Aric Haro  [id number]  1965  10/3/2018      Referring Provider:   EILEEN Andrade    Reason for Follow Up:   1. Chronic Phase - Chronic Myelogenous Leukemia  2. Leukocytosis  3. Thrombocytosis     Chief Complaint:  Fatigue    History of Present Illness:  Aric Haro is a very pleasant 52 y.o.  female who presents in follow up appointment for further management and treatment of chronic phase chronic myelogenous leukemia (CML).    Ms. Haro began experiencing extreme fatigue where she was sleeping multiple hours during the day when she initially began following with me in April 2017. 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 On laboratory evaluation she was found to have a total white blood cell count of 22.35 and a platelet count 470 thousand. In March 2016 she was also noted to have a leukocytosis (although not as elevated) as well as a thrombocytosis, however reports that these were likely secondary to other medical issues at the time her blood work was drawn. She denied of any significant weight loss however has been gradually losing weight since gastric sleeve procedure. She denied of any fevers or frequent infections but did note fluctuating neck lymphadenopathy as well as early satiety and taste in change. She denied of any abnormal or spontaneous bleeding. I did obtain a BCR ABL PCR to further assess her leukocytosis and thrombocytosis and she was positive for the b2a2/b3a2 (p210) and e1a2 (p190) fusion gene transcripts consistent with a diagnosis for CML. After reviewing the toxicities of each tyrosine kinase inhibitor we decided to start Dasatinib treatment. She had a difficult time tolerating the Dasatinib as she was unable to take her PPI with this medication. Since she had to be taken off of her PPI she had worsening reflux symptoms as well  as severe nausea, vomiting, dehydration, and headaches during this period. Given the toxicities she was experiencing she was taken off Dasatinib and this was changed to Gleevec treatment. Since starting Gleevac 400mg daily she has tolerated this much better without significant side effects. The only side effect that she has been able to see is intermittent pedal edema along with some hand swelling and muscle cramps.     Treatment History:  Started Dasatinib on 05/15/17 - experienced nausea, severe reflux, headaches while on medication and had taken 9 doses. This was discontinued due to intolerability and she was thereafter started on Imatinib.   Started Imatinib on 5/26/17    Interim History:  Since the start of Dasatinib and later Gleevec she has had a good response and normalization in her complete blood counts with improvement in her BCR ABL PCR.   She continues to note fatigue which has intermittently been worse however she was recently found to be iron, B12 and Vitamin D deficient and has started replacement with oral iron once daily, oral Vitamin D twice weekly and B12 injections. She recently underwent eyelid surgery and is currently happy with the changes. She denies of any diarrhea that is worse than baseline. Her most significant complaint is due to ongoing sinus issues and she is currently on an antibiotic for a sinus infection.       The following portions of the patient's history were reviewed and updated as appropriate: allergies, current medications, past family history, past medical history, past social history, past surgical history and problem list.      No Known Allergies    Past Medical History:   Diagnosis Date   • Anemia    • Cancer (CMS/HCC)     leukemia   • Diabetes mellitus (CMS/HCC)    • Elevated cholesterol    • GERD (gastroesophageal reflux disease)    • Hypertension    • PONV (postoperative nausea and vomiting)        Past Surgical History:   Procedure Laterality Date   • BLEPHAROPLASTY  Bilateral 6/22/2018    Procedure: BLEPHAROPLASTY BOTH EYES UPPER EYELIDS (PT REQUESTED TERESITA KATRIN;  Surgeon: Chris Haile MD;  Location: [facility];  Service: Ophthalmology   • COLONOSCOPY N/A 3/27/2018    Procedure: COLONOSCOPY FOR SCREENING  CPTCODE:56088;  Surgeon: Drew Esparza III, MD;  Location: [facility];  Service: Gastroenterology   • SINUS SURGERY     • SINUS SURGERY     • SLEEVE GASTROPLASTY         Social History     Social History   • Marital status:      Spouse name: N/A   • Number of children: N/A   • Years of education: N/A     Occupational History   • Not on file.     Social History Main Topics   • Smoking status: Former Smoker     Packs/day: 0.25     Years: 4.00   • Smokeless tobacco: Never Used   • Alcohol use No   • Drug use: No   • Sexual activity: Defer     Other Topics Concern   • Not on file     Social History Narrative   • No narrative on file   She lives with her daughter. She denies of any alcohol or illicit drug use. Previous social tobacco use more then 20 years ago.    Family History   Problem Relation Age of Onset   • Anemia Mother    • Hypertension Mother    • Cancer Mother    • COPD Father    • Heart disease Father    • Breast cancer Neg Hx    Maternal Uncle - CLL  Mother - Malignancy of [place]      Current Outpatient Prescriptions:   •  amoxicillin-clavulanate (AUGMENTIN) 875-125 MG per tablet, Take 1 tablet by mouth 2 (Two) Times a Day., Disp: 20 tablet, Rfl: 0  •  amoxicillin-clavulanate (AUGMENTIN) 875-125 MG per tablet, Take 1 tablet by mouth 2 (Two) Times a Day., Disp: 20 tablet, Rfl: 0  •  baclofen (LIORESAL) 20 MG tablet, Take 1 tablet by mouth 3 (Three) Times a Day., Disp: 90 tablet, Rfl: 4  •  celecoxib (CELEBREX) 200 MG capsule, Take 1 capsule by mouth 2 (Two) Times a Day., Disp: 180 capsule, Rfl: 0  •  cyanocobalamin 1000 MCG/ML injection, Inject 1 mL intramuscularly weekly for 8 weeks, then 1 ml monthly thereafter., Disp: 8 mL, Rfl: 1  •   cyanocobalamin 1000 MCG/ML injection, Inject 1 mL intramuscularly monthly., Disp: 1 mL, Rfl: 1  •  DULoxetine (CYMBALTA) 60 MG capsule, Take 1 capsule by mouth Daily., Disp: 90 capsule, Rfl: 1  •  erythromycin (ROMYCIN) 5 MG/GM ophthalmic ointment, Apply 1 application to eyelid wounds four times a day for one week, Disp: 3.5 g, Rfl: 5  •  fenofibrate 160 MG tablet, Take 1 tablet by mouth Daily., Disp: 90 tablet, Rfl: 3  •  ferrous sulfate 325 (65 FE) MG EC tablet, Take 1 tablet by mouth Daily., Disp: 30 tablet, Rfl: 3  •  fluconazole (DIFLUCAN) 200 MG tablet, Take 1 tablet by mouth Daily., Disp: 30 tablet, Rfl: 1  •  furosemide (LASIX) 40 MG tablet, Take 1 tablet by mouth Daily., Disp: 90 tablet, Rfl: 3  •  furosemide (LASIX) 40 MG tablet, Take 1 tablet by mouth Daily., Disp: 90 tablet, Rfl: 3  •  glucose blood test strip, USE 1 STRIP 2 TIMES DAILY AS DIRECTED, Disp: 60 each, Rfl: 6  •  ibuprofen (ADVIL,MOTRIN) 800 MG tablet, Take 1 tablet by mouth 3 (Three) Times a Day As Needed., Disp: 270 tablet, Rfl: 3  •  imatinib (GLEEVEC) 400 MG chemo tablet, Take 1 tablet by mouth Daily., Disp: 30 tablet, Rfl: 5  •  Insulin Glargine (LANTUS SOLOSTAR) 100 UNIT/ML injection pen, Inject 45 units subcutaneously 2 times daily, Disp: 30 mL, Rfl: 6  •  Insulin Glargine (LANTUS SOLOSTAR) 100 UNIT/ML injection pen, Inject 45 units Subcutaneously two times daily, Disp: 30 mL, Rfl: 6  •  ketotifen (ZADITOR) 0.025 % ophthalmic solution, INSTILL 1 DROP INTO BOTH EYES 2 TIMES DAILY., Disp: 5 mL, Rfl: 3  •  loratadine-pseudoephedrine (CLARITIN-D 12 HOUR) 5-120 MG per 12 hr tablet, Take 1 tablet by mouth two times a day, Disp: 30 tablet, Rfl: 0  •  loratadine-pseudoephedrine (CLARITIN-D 12-hour) 5-120 MG per 12 hr tablet, Take 1 tablet by mouth 2 (Two) Times a Day., Disp: 20 tablet, Rfl: 0  •  MethylPREDNISolone (MEDROL, MICHAEL,) 4 MG tablet, Take as directed on package., Disp: 21 tablet, Rfl: 0  •  pantoprazole (PROTONIX) 40 MG EC tablet,  "Take 1 tablet by mouth Daily., Disp: 90 tablet, Rfl: 3  •  prochlorperazine (COMPAZINE) 10 MG tablet, Take 1 tablet by mouth Every 6 (Six) Hours As Needed for Nausea or Vomiting., Disp: 60 tablet, Rfl: 5  •  SUMAtriptan (IMITREX) 100 MG tablet, Take 1 tablet by mouth after onset of migraine. May repeat after 2 hours if headache returns. Do not exceed 200mg in 24 hours., Disp: 10 tablet, Rfl: 2  •  Syringe/Needle, Disp, 25G X 1\" 3 ML misc, USE 1 AS DIRECTED WITH B12, Disp: 1 each, Rfl: 7  •  triamcinolone (KENALOG) 0.1 % cream, Apply topically to affected area(s) 2 Times a Day., Disp: 30 g, Rfl: 0  •  TRUETRACK TEST test strip, use to test blood sugar 8 times daily, Disp: 200 each, Rfl: 5  •  vitamin D (ERGOCALCIFEROL) 00759 units capsule capsule, Take 1 capsule by mouth once weekly., Disp: 12 capsule, Rfl: 3  •  vitamin D (ERGOCALCIFEROL) 55409 units capsule capsule, Take 1 capsule by mouth 2 (Two) Times a Week., Disp: 24 capsule, Rfl: 0        Review of Systems  Constitutional: No fever, chills, no night sweats, ongoing fatigue  HEENT:  No headaches, vision changes or hearing changes, positive sinus drainage, no sore throat.   Cardiovascular:  No chest pain, intermittent lower extremity edema  Pulmonary:  No shortness of breath, no cough.   Gastrointestinal: no nausea, no vomiting. No diarrhea. No abdominal pain. No melena or hematochezia.   Genitourinary:  No hematuria, or changes in urination.   Musculoskeletal:  No pain, or joint problems with exception of lower back pain and occasional muscle cramps  Skin: No rashes or pruritus.   Endocrine:  No hot flashes or chills   Hematologic:  No history of free bleeding, spontaneous bleeding or clotting problems.   Immunologic:  No current allergies and no frequent infections.   Neurologic: No numbness, tingling, or weakness.         Physical Exam:  Vital Signs: These were reviewed and listed as per patient’s electronic medical chart  Vitals:    10/03/18 1529   BP: " 174/81   Pulse: 87   Resp: 18   Temp: 97.6 °F (36.4 °C)   SpO2: 100%     General: Awake, alert and oriented, in no distress, obese  HEENT: Head is atraumatic, normocephalic, extraocular movements full, oropharynx clear, no scleral icterus, pink moist mucous membranes, recent eyelid surgery  Neck: supple, no jvd, lymphadenopathy or masses  Cardiovascular: regular rhythm, tachycardic, without murmurs, rubs or gallops  Pulmonary: non-labored, clear to auscultation bilaterally, no wheezing  Abdomen: soft, non-tender, non-distended, normal active bowel sounds present  Extremities: No clubbing, cyanosis or edema  Lymph: No cervical, supraclavicular adenopathy   Neurologic: Mental status as above, alert, awake and oriented, grossly non-focal exam  Skin: warm, dry, intact  Patient was examined on 10/3/18 and changes are reflected and noted above.       Labs / Studies:    Lab on 10/03/2018   Component Date Value   • Glucose 10/03/2018 142*   • BUN 10/03/2018 13    • Creatinine 10/03/2018 0.68    • Sodium 10/03/2018 139    • Potassium 10/03/2018 4.4    • Chloride 10/03/2018 104    • CO2 10/03/2018 28.6    • Calcium 10/03/2018 9.0    • Total Protein 10/03/2018 7.0    • Albumin 10/03/2018 4.20    • ALT (SGPT) 10/03/2018 24    • AST (SGOT) 10/03/2018 26    • Alkaline Phosphatase 10/03/2018 60    • Total Bilirubin 10/03/2018 0.5    • eGFR Non African Amer 10/03/2018 91    • Globulin 10/03/2018 2.8    • A/G Ratio 10/03/2018 1.5    • BUN/Creatinine Ratio 10/03/2018 19.1    • Anion Gap 10/03/2018 6.4    • Phosphorus 10/03/2018 3.4    • WBC 10/03/2018 7.00    • RBC 10/03/2018 4.19*   • Hemoglobin 10/03/2018 12.4    • Hematocrit 10/03/2018 37.3    • MCV 10/03/2018 89.0    • MCH 10/03/2018 29.6    • MCHC 10/03/2018 33.2    • RDW 10/03/2018 14.1    • RDW-SD 10/03/2018 45.5    • MPV 10/03/2018 10.5*   • Platelets 10/03/2018 258    • Neutrophil % 10/03/2018 50.1    • Lymphocyte % 10/03/2018 32.6    • Monocyte % 10/03/2018 13.9*   •  Eosinophil % 10/03/2018 2.7    • Basophil % 10/03/2018 0.6    • Immature Grans % 10/03/2018 0.1    • Neutrophils, Absolute 10/03/2018 3.51    • Lymphocytes, Absolute 10/03/2018 2.28    • Monocytes, Absolute 10/03/2018 0.97*   • Eosinophils, Absolute 10/03/2018 0.19    • Basophils, Absolute 10/03/2018 0.04    • Immature Grans, Absolute 10/03/2018 0.01    • Osmolality Calc 10/03/2018 280.1    Lab on 09/10/2018   Component Date Value   • Glucose 09/10/2018 225*   • BUN 09/10/2018 14    • Creatinine 09/10/2018 0.84    • Sodium 09/10/2018 138    • Potassium 09/10/2018 4.2    • Chloride 09/10/2018 107    • CO2 09/10/2018 24.2*   • Calcium 09/10/2018 9.1    • Total Protein 09/10/2018 6.9    • Albumin 09/10/2018 4.20    • ALT (SGPT) 09/10/2018 21    • AST (SGOT) 09/10/2018 20    • Alkaline Phosphatase 09/10/2018 77    • Total Bilirubin 09/10/2018 0.3    • eGFR Non  Amer 09/10/2018 71    • Globulin 09/10/2018 2.7    • A/G Ratio 09/10/2018 1.6    • BUN/Creatinine Ratio 09/10/2018 16.7    • Anion Gap 09/10/2018 6.8    • Hemoglobin A1C 09/10/2018 7.60*   • Iron 09/10/2018 36*   • TIBC 09/10/2018 386    • Iron Saturation 09/10/2018 9*   • Vitamin B-12 09/10/2018 299    • Folate 09/10/2018 17.50    • TSH 09/10/2018 1.369    • T3 Uptake 09/10/2018 26.5    • T4, Total 09/10/2018 9.50    • Monospot 09/10/2018 Negative    • 25 Hydroxy, Vitamin D 09/10/2018 16.0    • WBC 09/10/2018 9.64    • RBC 09/10/2018 4.08*   • Hemoglobin 09/10/2018 11.9*   • Hematocrit 09/10/2018 36.1*   • MCV 09/10/2018 88.5    • MCH 09/10/2018 29.2    • MCHC 09/10/2018 33.0    • RDW 09/10/2018 13.9    • RDW-SD 09/10/2018 45.0    • MPV 09/10/2018 10.6*   • Platelets 09/10/2018 265    • Neutrophil % 09/10/2018 65.8    • Lymphocyte % 09/10/2018 23.0    • Monocyte % 09/10/2018 8.6    • Eosinophil % 09/10/2018 2.1    • Basophil % 09/10/2018 0.3    • Immature Grans % 09/10/2018 0.2    • Neutrophils, Absolute 09/10/2018 6.34    • Lymphocytes, Absolute  09/10/2018 2.22    • Monocytes, Absolute 09/10/2018 0.83    • Eosinophils, Absolute 09/10/2018 0.20    • Basophils, Absolute 09/10/2018 0.03    • Immature Grans, Absolute 09/10/2018 0.02    • Osmolality Calc 09/10/2018 283.2    Admission on 06/22/2018, Discharged on 06/22/2018   Component Date Value   • HCG, Urine, QL 06/22/2018 Negative    • Lot Number 06/22/2018 [id number]    • Internal Positive Control 06/22/2018 Positive    • Internal Negative Control 06/22/2018 Negative    • Glucose 06/22/2018 126    Lab on 06/20/2018   Component Date Value   • e13a2 (b2a2) Transcript 06/20/2018 Comment    • e14a2 (b3a2) Transcript 06/20/2018 0.0193    • E1A2 transcript 06/20/2018 Comment    • Interpretation 06/20/2018 Comment    • Director Review 06/20/2018 Comment    • Background: 06/20/2018 Comment    • Methodology: 06/20/2018 Comment    • Glucose 06/20/2018 137*   • BUN 06/20/2018 11    • Creatinine 06/20/2018 0.61    • Sodium 06/20/2018 139    • Potassium 06/20/2018 4.7    • Chloride 06/20/2018 106    • CO2 06/20/2018 26.2    • Calcium 06/20/2018 8.6    • Total Protein 06/20/2018 6.7    • Albumin 06/20/2018 4.00    • ALT (SGPT) 06/20/2018 19    • AST (SGOT) 06/20/2018 22    • Alkaline Phosphatase 06/20/2018 70    • Total Bilirubin 06/20/2018 0.6    • eGFR Non  Amer 06/20/2018 103    • Globulin 06/20/2018 2.7    • A/G Ratio 06/20/2018 1.5    • BUN/Creatinine Ratio 06/20/2018 18.0    • Anion Gap 06/20/2018 6.8    • WBC 06/20/2018 6.17    • RBC 06/20/2018 4.09*   • Hemoglobin 06/20/2018 12.2    • Hematocrit 06/20/2018 36.9*   • MCV 06/20/2018 90.2    • MCH 06/20/2018 29.8    • MCHC 06/20/2018 33.1    • RDW 06/20/2018 14.0    • RDW-SD 06/20/2018 45.2    • MPV 06/20/2018 10.3*   • Platelets 06/20/2018 252    • Neutrophil % 06/20/2018 57.8    • Lymphocyte % 06/20/2018 28.7    • Monocyte % 06/20/2018 9.9    • Eosinophil % 06/20/2018 2.9    • Basophil % 06/20/2018 0.5    • Immature Grans % 06/20/2018 0.2    • Neutrophils,  Absolute 2018 3.57    • Lymphocytes, Absolute 2018 1.77    • Monocytes, Absolute 2018 0.61    • Eosinophils, Absolute 2018 0.18    • Basophils, Absolute 2018 0.03    • Immature Grans, Absolute 2018 0.01    • Osmolality Calc 2018 279.1    Lab on 05/15/2018   Component Date Value   • Glucose 05/15/2018 167*   • BUN 05/15/2018 12    • Creatinine 05/15/2018 0.63    • Sodium 05/15/2018 137    • Potassium 05/15/2018 4.2    • Chloride 05/15/2018 106    • CO2 05/15/2018 23.2*   • Calcium 05/15/2018 8.6    • Total Protein 05/15/2018 6.7    • Albumin 05/15/2018 4.00    • ALT (SGPT) 05/15/2018 20    • AST (SGOT) 05/15/2018 20    • Alkaline Phosphatase 05/15/2018 63    • Total Bilirubin 05/15/2018 0.5    • eGFR Non African Amer 05/15/2018 99    • Globulin 05/15/2018 2.7    • A/G Ratio 05/15/2018 1.5    • BUN/Creatinine Ratio 05/15/2018 19.0    • Anion Gap 05/15/2018 7.8    • WBC 05/15/2018 6.14    • RBC 05/15/2018 4.13*   • Hemoglobin 05/15/2018 11.9*   • Hematocrit 05/15/2018 36.4*   • MCV 05/15/2018 88.1    • MCH 05/15/2018 28.8    • MCHC 05/15/2018 32.7*   • RDW 05/15/2018 14.2    • RDW-SD 05/15/2018 45.6    • MPV 05/15/2018 10.4*   • Platelets 05/15/2018 246    • Neutrophil % 05/15/2018 56.1    • Lymphocyte % 05/15/2018 29.6    • Monocyte % 05/15/2018 9.6    • Eosinophil % 05/15/2018 4.2    • Basophil % 05/15/2018 0.3    • Immature Grans % 05/15/2018 0.2    • Neutrophils, Absolute 05/15/2018 3.44    • Lymphocytes, Absolute 05/15/2018 1.82    • Monocytes, Absolute 05/15/2018 0.59    • Eosinophils, Absolute 05/15/2018 0.26    • Basophils, Absolute 05/15/2018 0.02    • Immature Grans, Absolute 05/15/2018 0.01    • Osmolality Calc 05/15/2018 277.4             [id number]: US ABDOMEN COMPLETE: Visualized pancreas is unremarkable. The imaged portion of the abdominal aorta is nondilated. The liver is homogeneous. There is no intrahepatic ductal dilatation or focal hepatic mass. The  imaged portion of the hepatic vessels and inferior vena cava are patent. The gallbladder has been removed. The common bile duct is normal, measuring 5.7 mm. The kidneys demonstrate no evidence of hydronephrosis or solid renal mass. The spleen is homogeneous and measures 13 cm. IMPRESSION: Spleen measures 13 cm and is homogeneous.           PATHOLOGY:    05/15/17: Bone Marrow Biopsy & Aspirate                     04/25/17: BCR ABL PCR        08/30/17: BCR ABL PCR        12/13/17: BCR ABL PCR        03/20/18: BCR ABL PCR:                                                                                                         06/20/18:                                      Assessment/Plan :  Aric Haro is a very pleasant 52 y.o.  female who presents in follow up appointment for further management and treatment of chronic phase chronic myelogenous leukemia.    1. Chronic Myelogenous Leukemia - CML (chronic phase)    2. Leukocytosis  3. Thrombocytosis  4. Healthcare Maintenance    Given the findings of her peripheral smear I was concerned for an underlying myeloproliferative disorder. Her primary provider obtained a flow cytometry and this did not show any significant abnormalities. I did also obtain a BCR ABL PCR which was positive for the b2a2/b3a2 (p210) and e1a2 (p190) fusion gene transcripts consistent with a diagnosis for CML. Bone marrow biopsy was performed on initial diagnosis prior to starting Dasatinib treatment with results above and consistent with chronic phase CML.     To further evaluate for a myeloproliferative disorder I did also assess for JAK2 (V617F and exon 12)/MPL/CALR mutations which were negative. To assess for underlying inflammation that may be possibly contributing will also obtain an ESR and CRP, EMMANUEL, Rheumatoid factor, as well as an acute hepatitis panel and HIV test which were all negative. Thrombocytosis can also be caused by an underlying iron deficiency however iron panel and  ferritin were normal. I did also order an abdominal ultrasound to assess for splenomegaly which showed spleen size to be 13.9cm.     Given the findings of the BCR ABL PCR I did start the patient on Dasatinib 100mg oral daily. Patient does have a history of pancreatitis and therefore I held on using Nilotinib. Patient however was unable to tolerate the medication secondary to worsening reflux while being off of her PPI, and experienced severe nausea, vomiting, and dehydration. She was then started on Imatinib 400mg daily and has been tolerating this well. Her only complaint today is fatigue which has significantly improved since start of treatment. I have previously advised her of Ibuprofen and Tylenol use for her myalgias howefer she denies these today. I did order baseline Echo which showed an intact EF and she currently has no cardiac symptoms or signs of fluid retention.     She has had a complete hematological response, and BCR ABL PCR has significantly improved since start of Gleevac and at less then <12 months her last BCR ABL continues to improve to 0.193% this will need to continue to be monitored every three months to assess ongoing  molecular response. The blood counts have now stabilized therefore will monitor every 3 months given stability.    She reports that she is due for a mammogram soon, and she recently had a colonoscopy.    5. ACO Quality measures  - Aric Haro does not use tobacco products.  - Discussed the patient's BMI with her. BMI is above normal parameters. Follow-up plan includes:  exercise counseling and nutrition counseling. I again discussed diet and exercise during her appointment as well as weight loss to help improve her ongoing medical issues such as diabetes. We also discussed calorie counting.  - Current outpatient and discharge medications have been reconciled for the patient.      I will have the patient return for follow up visit in three months. She understands that should  she have any questions or concerns prior to her appointment she should give us a call at any time and I would be happy to see her sooner. It was a pleasure to see this patient in clinic today, thank you for allowing me to participate in the care of this patient.      Evita Serrano MD  10/03/18  5:07 PM

## 2019-01-07 NOTE — PROGRESS NOTES
Aric Haro  [id number]  1965  1/9/2019      Referring Provider:   EILEEN Andrade    Reason for Follow Up:   1. Chronic Phase - Chronic Myelogenous Leukemia  2. Leukocytosis  3. Thrombocytosis     Chief Complaint:  Fatigue    History of Present Illness:  Aric Haro is a very pleasant 53 y.o.  female who presents in follow up appointment for further management and treatment of chronic phase chronic myelogenous leukemia (CML).    Ms. Haro began experiencing extreme fatigue where she was sleeping multiple hours during the day when she initially began following with me in April 2017. 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 On laboratory evaluation she was found to have a total white blood cell count of 22.35 and a platelet count 470 thousand. In March 2016 she was also noted to have a leukocytosis (although not as elevated) as well as a thrombocytosis, however reports that these were likely secondary to other medical issues at the time her blood work was drawn. She denied of any significant weight loss however has been gradually losing weight since gastric sleeve procedure. She denied of any fevers or frequent infections but did note fluctuating neck lymphadenopathy as well as early satiety and taste in change. She denied of any abnormal or spontaneous bleeding. I did obtain a BCR ABL PCR to further assess her leukocytosis and thrombocytosis and she was positive for the b2a2/b3a2 (p210) and e1a2 (p190) fusion gene transcripts consistent with a diagnosis for CML. After reviewing the toxicities of each tyrosine kinase inhibitor we decided to start Dasatinib treatment. She had a difficult time tolerating the Dasatinib as she was unable to take her PPI with this medication. Since she had to be taken off of her PPI she had worsening reflux symptoms as well  as severe nausea, vomiting, dehydration, and headaches during this period. Given the toxicities she was experiencing she was taken off Dasatinib and this was changed to Gleevec treatment. Since starting Gleevac 400mg daily she has tolerated this much better without significant side effects. The only side effect that she has been able to see is intermittent pedal edema along with some hand swelling and muscle cramps.     Treatment History:  Started Dasatinib on 05/15/17 - experienced nausea, severe reflux, headaches while on medication and had taken 9 doses. This was discontinued due to intolerability and she was thereafter started on Imatinib.   Started Imatinib on 5/26/17    Interim History:  Since the start of Dasatinib and later Gleevec she has had a good response and normalization in her complete blood counts with improvement in her BCR ABL PCR.     While her complaints of severe fatigue initially improved with treatment she has noted ongoing fatigue which she reports has been worse with the change in weather along her arthralgias and myalgias. She continues to be on oral iron once daily as well as B12 injections and Vitamin D. She also notes mild edema in her extremities which has also been worse with the weather change as well as some periorbital edema. No fevers, infections, night sweats, lymphadenopathy, chest pain, dyspnea, nausea. She has lost one pound however has been watching her diet.      The following portions of the patient's history were reviewed and updated as appropriate: allergies, current medications, past family history, past medical history, past social history, past surgical history and problem list.      No Known Allergies    Past Medical History:   Diagnosis Date   • Anemia    • Cancer (CMS/HCC)     leukemia   • Diabetes mellitus (CMS/HCC)    • Elevated cholesterol    • GERD (gastroesophageal reflux disease)    • Hypertension    • PONV (postoperative nausea and vomiting)        Past  Surgical History:   Procedure Laterality Date   • BLEPHAROPLASTY Bilateral 6/22/2018    Procedure: BLEPHAROPLASTY BOTH EYES UPPER EYELIDS (PT REQUESTED TERESITA WILKES;  Surgeon: Chris Haile MD;  Location: [facility];  Service: Ophthalmology   • COLONOSCOPY N/A 3/27/2018    Procedure: COLONOSCOPY FOR SCREENING  CPTCODE:45058;  Surgeon: Drew Esparza III, MD;  Location: [facility];  Service: Gastroenterology   • SINUS SURGERY     • SINUS SURGERY     • SLEEVE GASTROPLASTY         Social History     Socioeconomic History   • Marital status:      Spouse name: Not on file   • Number of children: Not on file   • Years of education: Not on file   • Highest education level: Not on file   Social Needs   • Financial resource strain: Not on file   • Food insecurity - worry: Not on file   • Food insecurity - inability: Not on file   • Transportation needs - medical: Not on file   • Transportation needs - non-medical: Not on file   Occupational History   • Not on file   Tobacco Use   • Smoking status: Former Smoker     Packs/day: 0.25     Years: 4.00     Pack years: 1.00   • Smokeless tobacco: Never Used   Substance and Sexual Activity   • Alcohol use: No   • Drug use: No   • Sexual activity: Defer   Other Topics Concern   • Not on file   Social History Narrative   • Not on file   She lives with her daughter. She denies of any alcohol or illicit drug use. Previous social tobacco use more then 20 years ago.    Family History   Problem Relation Age of Onset   • Anemia Mother    • Hypertension Mother    • Cancer Mother    • COPD Father    • Heart disease Father    • Breast cancer Neg Hx    Maternal Uncle - CLL  Mother - Malignancy of [place]      Current Outpatient Medications:   •  amoxicillin-clavulanate (AUGMENTIN) 875-125 MG per tablet, Take 1 tablet by mouth 2 (Two) Times a Day., Disp: 20 tablet, Rfl: 0  •  amoxicillin-clavulanate (AUGMENTIN) 875-125 MG per tablet, Take 1 tablet by mouth 2 (Two)  Times a Day., Disp: 20 tablet, Rfl: 0  •  baclofen (LIORESAL) 20 MG tablet, Take 1 tablet by mouth 3 (Three) Times a Day., Disp: 90 tablet, Rfl: 4  •  celecoxib (CELEBREX) 200 MG capsule, Take 1 capsule by mouth 2 (Two) Times a Day., Disp: 180 capsule, Rfl: 0  •  Cholecalciferol (VITAMIN D3) 72777 units capsule, Take 1 capsule by mouth 2 (Two) Times a Week., Disp: 24 capsule, Rfl: 3  •  cyanocobalamin 1000 MCG/ML injection, Inject 1 mL intramuscularly monthly., Disp: 1 mL, Rfl: 1  •  cyanocobalamin 1000 MCG/ML injection, Inject 1 mL into the appropriate muscle as directed by prescriber weekly for 8 weeks then monthly thereafter., Disp: 30 mL, Rfl: 4  •  DULoxetine (CYMBALTA) 60 MG capsule, Take 1 capsule by mouth Daily., Disp: 90 capsule, Rfl: 3  •  erythromycin (ROMYCIN) 5 MG/GM ophthalmic ointment, Apply 1 application to eyelid wounds four times a day for one week, Disp: 3.5 g, Rfl: 5  •  fenofibrate 160 MG tablet, Take 1 tablet by mouth Daily., Disp: 90 tablet, Rfl: 3  •  ferrous sulfate 325 (65 FE) MG EC tablet, Take 1 tablet by mouth Daily., Disp: 30 tablet, Rfl: 3  •  fluconazole (DIFLUCAN) 200 MG tablet, Take 1 tablet by mouth Daily., Disp: 30 tablet, Rfl: 1  •  furosemide (LASIX) 40 MG tablet, Take 1 tablet by mouth Daily., Disp: 90 tablet, Rfl: 3  •  furosemide (LASIX) 40 MG tablet, Take 1 tablet by mouth Daily., Disp: 90 tablet, Rfl: 3  •  furosemide (LASIX) 40 MG tablet, Take 1 tablet by mouth Daily., Disp: 90 tablet, Rfl: 3  •  glucose blood test strip, USE 1 STRIP 2 TIMES DAILY AS DIRECTED, Disp: 60 each, Rfl: 6  •  ibuprofen (ADVIL,MOTRIN) 800 MG tablet, Take 1 tablet by mouth 3 (Three) Times a Day As Needed., Disp: 270 tablet, Rfl: 3  •  imatinib (GLEEVEC) 400 MG chemo tablet, Take 1 tablet by mouth Daily., Disp: 30 tablet, Rfl: 5  •  Insulin Glargine (LANTUS SOLOSTAR) 100 UNIT/ML injection pen, Inject 45 units subcutaneously 2 times daily, Disp: 30 mL, Rfl: 6  •  Insulin Glargine (LANTUS SOLOSTAR)  "100 UNIT/ML injection pen, Inject 45 units Subcutaneously two times daily, Disp: 30 mL, Rfl: 6  •  Insulin Glargine (LANTUS SOLOSTAR) 100 UNIT/ML injection pen, Inject 45 Units subcutaneously 2 times daily., Disp: 30 mL, Rfl: 6  •  ketotifen (ZADITOR) 0.025 % ophthalmic solution, INSTILL 1 DROP INTO BOTH EYES 2 TIMES DAILY., Disp: 5 mL, Rfl: 3  •  LANTUS SOLOSTAR 100 UNIT/ML injection pen, Inject 45 Units under the skin 2 (Two) Times a Day., Disp: 30 mL, Rfl: 6  •  LANTUS SOLOSTAR 100 UNIT/ML injection pen, Inject 45 Units under the skin 2 (Two) Times a Day., Disp: 30 mL, Rfl: 6  •  LANTUS SOLOSTAR 100 UNIT/ML injection pen, Inject 45 Units under the skin into the appropriate area as directed 2 (Two) Times a Day., Disp: 45 mL, Rfl: 6  •  loratadine-pseudoephedrine (CLARITIN-D 12 HOUR) 5-120 MG per 12 hr tablet, Take 1 tablet by mouth two times a day, Disp: 30 tablet, Rfl: 0  •  loratadine-pseudoephedrine (CLARITIN-D 12-hour) 5-120 MG per 12 hr tablet, Take 1 tablet by mouth Every 12 (Twelve) Hours As Needed., Disp: 30 tablet, Rfl: 1  •  MethylPREDNISolone (MEDROL, MICHAEL,) 4 MG tablet, Take as directed on package., Disp: 21 tablet, Rfl: 0  •  pantoprazole (PROTONIX) 40 MG EC tablet, Take 1 tablet by mouth every day, Disp: 90 tablet, Rfl: 3  •  prochlorperazine (COMPAZINE) 10 MG tablet, Take 1 tablet by mouth Every 6 (Six) Hours As Needed for Nausea or Vomiting., Disp: 60 tablet, Rfl: 5  •  SUMAtriptan (IMITREX) 100 MG tablet, Take 1 tablet by mouth after onset of migraine. May repeat after 2 hours if headache returns. Do not exceed 200mg in 24 hours., Disp: 10 tablet, Rfl: 2  •  Syringe/Needle, Disp, 25G X 1\" 3 ML misc, USE 1 AS DIRECTED WITH B12, Disp: 1 each, Rfl: 7  •  triamcinolone (KENALOG) 0.1 % cream, Apply topically to affected area(s) 2 Times a Day., Disp: 30 g, Rfl: 0  •  triamcinolone (KENALOG) 0.1 % cream, Apply to affected area(s) 2 times per day, Disp: 30 g, Rfl: 0  •  TRUETRACK TEST test strip, use to " test blood sugar 8 times daily, Disp: 200 each, Rfl: 5  •  vitamin D (ERGOCALCIFEROL) 22894 units capsule capsule, Take 1 capsule by mouth once weekly., Disp: 12 capsule, Rfl: 3  •  vitamin D (ERGOCALCIFEROL) 35409 units capsule capsule, Take 1 capsule by mouth 2 (Two) Times a Week., Disp: 24 capsule, Rfl: 0  •  vitamin D (ERGOCALCIFEROL) 76599 units capsule capsule, Take 1 capsule by mouth 2 (Two) Times a Week., Disp: 24 capsule, Rfl: 0  •  zolpidem (AMBIEN) 10 MG tablet, Take 1 tablet by mouth Every Night., Disp: 90 tablet, Rfl: 0        Review of Systems  Pertinent positives are listed as per history of present of illness, all other systems reviewed and are negative.        Physical Exam:  Vital Signs: These were reviewed and listed as per patient’s electronic medical chart  Vitals:    01/09/19 1501   BP: 146/85   Pulse: 98   Resp: 18   Temp: 97.1 °F (36.2 °C)   SpO2: 98%     General: Awake, alert and oriented, in no distress, obese  HEENT: Head is atraumatic, normocephalic, extraocular movements full, oropharynx clear, no scleral icterus, pink moist mucous membranes,   Neck: supple, no jvd, lymphadenopathy or masses  Cardiovascular: regular rhythm, tachycardic, without murmurs, rubs or gallops  Pulmonary: non-labored, clear to auscultation bilaterally, no wheezing  Abdomen: soft, non-tender, non-distended, normal active bowel sounds present  Extremities: No clubbing, cyanosis or edema  Lymph: No cervical, supraclavicular adenopathy   Neurologic: Mental status as above, alert, awake and oriented, grossly non-focal exam  Skin: warm, dry, intact  Patient was examined on 01/09/19 and changes are reflected and noted above.        Labs / Studies:    Office Visit on 01/09/2019   Component Date Value   • Glucose 01/09/2019 127*   • BUN 01/09/2019 14    • Creatinine 01/09/2019 0.74    • Sodium 01/09/2019 141    • Potassium 01/09/2019 4.4    • Chloride 01/09/2019 106    • CO2 01/09/2019 26.8    • Calcium 01/09/2019 9.1     • Total Protein 01/09/2019 7.0    • Albumin 01/09/2019 4.20    • ALT (SGPT) 01/09/2019 31    • AST (SGOT) 01/09/2019 27    • Alkaline Phosphatase 01/09/2019 63    • Total Bilirubin 01/09/2019 0.5    • eGFR Non African Amer 01/09/2019 82    • Globulin 01/09/2019 2.8    • A/G Ratio 01/09/2019 1.5    • BUN/Creatinine Ratio 01/09/2019 18.9    • Anion Gap 01/09/2019 8.2    • LDH 01/09/2019 172    • Uric Acid 01/09/2019 5.2    • WBC 01/09/2019 6.78    • RBC 01/09/2019 4.36    • Hemoglobin 01/09/2019 13.3    • Hematocrit 01/09/2019 39.6    • MCV 01/09/2019 90.8    • MCH 01/09/2019 30.5    • MCHC 01/09/2019 33.6    • RDW 01/09/2019 13.7    • RDW-SD 01/09/2019 45.3    • MPV 01/09/2019 10.5*   • Platelets 01/09/2019 272    • Neutrophil % 01/09/2019 55.2    • Lymphocyte % 01/09/2019 31.0    • Monocyte % 01/09/2019 10.8*   • Eosinophil % 01/09/2019 2.5    • Basophil % 01/09/2019 0.4    • Immature Grans % 01/09/2019 0.1    • Neutrophils, Absolute 01/09/2019 3.74    • Lymphocytes, Absolute 01/09/2019 2.10    • Monocytes, Absolute 01/09/2019 0.73    • Eosinophils, Absolute 01/09/2019 0.17    • Basophils, Absolute 01/09/2019 0.03    • Immature Grans, Absolute 01/09/2019 0.01    • Osmolality Calc 01/09/2019 283.3    Lab on 10/03/2018   Component Date Value   • Glucose 10/03/2018 142*   • BUN 10/03/2018 13    • Creatinine 10/03/2018 0.68    • Sodium 10/03/2018 139    • Potassium 10/03/2018 4.4    • Chloride 10/03/2018 104    • CO2 10/03/2018 28.6    • Calcium 10/03/2018 9.0    • Total Protein 10/03/2018 7.0    • Albumin 10/03/2018 4.20    • ALT (SGPT) 10/03/2018 24    • AST (SGOT) 10/03/2018 26    • Alkaline Phosphatase 10/03/2018 60    • Total Bilirubin 10/03/2018 0.5    • eGFR Non African Amer 10/03/2018 91    • Globulin 10/03/2018 2.8    • A/G Ratio 10/03/2018 1.5    • BUN/Creatinine Ratio 10/03/2018 19.1    • Anion Gap 10/03/2018 6.4    • Phosphorus 10/03/2018 3.4    • WBC 10/03/2018 7.00    • RBC 10/03/2018 4.19*   •  Hemoglobin 10/03/2018 12.4    • Hematocrit 10/03/2018 37.3    • MCV 10/03/2018 89.0    • MCH 10/03/2018 29.6    • MCHC 10/03/2018 33.2    • RDW 10/03/2018 14.1    • RDW-SD 10/03/2018 45.5    • MPV 10/03/2018 10.5*   • Platelets 10/03/2018 258    • Neutrophil % 10/03/2018 50.1    • Lymphocyte % 10/03/2018 32.6    • Monocyte % 10/03/2018 13.9*   • Eosinophil % 10/03/2018 2.7    • Basophil % 10/03/2018 0.6    • Immature Grans % 10/03/2018 0.1    • Neutrophils, Absolute 10/03/2018 3.51    • Lymphocytes, Absolute 10/03/2018 2.28    • Monocytes, Absolute 10/03/2018 0.97*   • Eosinophils, Absolute 10/03/2018 0.19    • Basophils, Absolute 10/03/2018 0.04    • Immature Grans, Absolute 10/03/2018 0.01    • Osmolality Calc 10/03/2018 280.1    Lab on 09/10/2018   Component Date Value   • Glucose 09/10/2018 225*   • BUN 09/10/2018 14    • Creatinine 09/10/2018 0.84    • Sodium 09/10/2018 138    • Potassium 09/10/2018 4.2    • Chloride 09/10/2018 107    • CO2 09/10/2018 24.2*   • Calcium 09/10/2018 9.1    • Total Protein 09/10/2018 6.9    • Albumin 09/10/2018 4.20    • ALT (SGPT) 09/10/2018 21    • AST (SGOT) 09/10/2018 20    • Alkaline Phosphatase 09/10/2018 77    • Total Bilirubin 09/10/2018 0.3    • eGFR Non  Amer 09/10/2018 71    • Globulin 09/10/2018 2.7    • A/G Ratio 09/10/2018 1.6    • BUN/Creatinine Ratio 09/10/2018 16.7    • Anion Gap 09/10/2018 6.8    • Hemoglobin A1C 09/10/2018 7.60*   • Iron 09/10/2018 36*   • TIBC 09/10/2018 386    • Iron Saturation 09/10/2018 9*   • Vitamin B-12 09/10/2018 299    • Folate 09/10/2018 17.50    • TSH 09/10/2018 1.369    • T3 Uptake 09/10/2018 26.5    • T4, Total 09/10/2018 9.50    • Monospot 09/10/2018 Negative    • 25 Hydroxy, Vitamin D 09/10/2018 16.0    • WBC 09/10/2018 9.64    • RBC 09/10/2018 4.08*   • Hemoglobin 09/10/2018 11.9*   • Hematocrit 09/10/2018 36.1*   • MCV 09/10/2018 88.5    • MCH 09/10/2018 29.2    • MCHC 09/10/2018 33.0    • RDW 09/10/2018 13.9    • RDW-SD  09/10/2018 45.0    • MPV 09/10/2018 10.6*   • Platelets 09/10/2018 265    • Neutrophil % 09/10/2018 65.8    • Lymphocyte % 09/10/2018 23.0    • Monocyte % 09/10/2018 8.6    • Eosinophil % 09/10/2018 2.1    • Basophil % 09/10/2018 0.3    • Immature Grans % 09/10/2018 0.2    • Neutrophils, Absolute 09/10/2018 6.34    • Lymphocytes, Absolute 09/10/2018 2.22    • Monocytes, Absolute 09/10/2018 0.83    • Eosinophils, Absolute 09/10/2018 0.20    • Basophils, Absolute 09/10/2018 0.03    • Immature Grans, Absolute 09/10/2018 0.02    • Osmolality Calc 09/10/2018 283.2             [id number]: US ABDOMEN COMPLETE: Visualized pancreas is unremarkable. The imaged portion of the abdominal aorta is nondilated. The liver is homogeneous. There is no intrahepatic ductal dilatation or focal hepatic mass. The imaged portion of the hepatic vessels and inferior vena cava are patent. The gallbladder has been removed. The common bile duct is normal, measuring 5.7 mm. The kidneys demonstrate no evidence of hydronephrosis or solid renal mass. The spleen is homogeneous and measures 13 cm. IMPRESSION: Spleen measures 13 cm and is homogeneous.           PATHOLOGY:    05/15/17: Bone Marrow Biopsy & Aspirate                     17: BCR ABL PCR        17: BCR ABL PCR        17: BCR ABL PCR        18: BCR ABL PCR:                                                                                                         18:                                          Assessment/Plan :  Aric Haro is a very pleasant 53 y.o.  female who presents in follow up appointment for further management and treatment of chronic phase chronic myelogenous leukemia.    1. Chronic Myelogenous Leukemia - CML (chronic phase)    2. Leukocytosis  3. Thrombocytosis  4. Healthcare Maintenance    Given the findings of her peripheral smear I was concerned for an underlying myeloproliferative disorder. Her primary provider  obtained a flow cytometry and this did not show any significant abnormalities. I did also obtain a quantitative BCR ABL PCR which was positive for the b2a2/b3a2 (p210) and e1a2 (p190) fusion gene transcripts consistent with a diagnosis for CML. Bone marrow biopsy was performed on initial diagnosis prior to starting Dasatinib treatment with results above and consistent with chronic phase CML.     To further evaluate for a myeloproliferative disorder I did also assess for JAK2 (V617F and exon 12)/MPL/CALR mutations which were negative. To assess for underlying inflammation that may be possibly contributing will also obtain an ESR and CRP, EMMANUEL, Rheumatoid factor, as well as an acute hepatitis panel and HIV test which were all negative. Thrombocytosis can also be caused by an underlying iron deficiency however iron panel and ferritin were normal. I did also order an abdominal ultrasound to assess for splenomegaly which showed spleen size to be 13.9cm.     Given the findings of the BCR ABL PCR I did start the patient on Dasatinib 100mg oral daily. Patient does have a history of pancreatitis and therefore I held on using Nilotinib. Patient however was unable to tolerate the medication secondary to worsening reflux while being off of her PPI, and experienced severe nausea, vomiting, and dehydration. She was then started on Imatinib 400mg daily and has been tolerating this well. Her only complaint today is fatigue which has significantly improved since start of treatment. I have previously advised her of Ibuprofen and Tylenol use for her myalgias howefer she denies these today. I did order baseline Echo which showed an intact EF and she currently has no cardiac symptoms or signs of fluid retention.     She has had a complete hematological response, and BCR ABL PCR has significantly improved since start of Gleevac and her last BCR ABL continues to improve to 0.0193% this will need to continue to be monitored every three  months to assess ongoing molecular response, today's level is pending. The blood counts have now stabilized therefore will monitor every 3 months given stability.    She underwent a colonoscopy February 2018 with Dr. Esparza who recommended repeat colonoscopy in 3-5 years due to polyps that were removed. She has also received her Influenza and Hepatitis A vaccine.    5. ACO Quality measures  - Aric Haro does not use tobacco products.  - Discussed the patient's BMI with her. BMI is above normal parameters. I again discussed diet and exercise during her appointment as well as weight loss to help improve her ongoing medical issues such as diabetes. We also discussed calorie counting. She reports that she has lost one pound.  - Current outpatient and discharge medications have been reconciled for the patient.      I will have the patient return for follow up visit in three months with the nurse practitioner and in six months for follow up with myself. Lab orders have been placed. She understands that should she have any questions or concerns prior to her appointment she should give us a call at any time and I would be happy to see her sooner. It was a pleasure to see this patient in clinic today, thank you for allowing me to participate in the care of this patient.      Evita Serrano MD  01/09/19  3:38 PM

## 2019-01-09 ENCOUNTER — LAB (OUTPATIENT)
Dept: ONCOLOGY | Facility: CLINIC | Age: 54
End: 2019-01-09

## 2019-01-09 ENCOUNTER — SPECIALTY PHARMACY (OUTPATIENT)
Dept: PHARMACY | Facility: HOSPITAL | Age: 54
End: 2019-01-09

## 2019-01-09 ENCOUNTER — OFFICE VISIT (OUTPATIENT)
Dept: ONCOLOGY | Facility: CLINIC | Age: 54
End: 2019-01-09

## 2019-01-09 VITALS
DIASTOLIC BLOOD PRESSURE: 85 MMHG | TEMPERATURE: 97.1 F | WEIGHT: 276.6 LBS | SYSTOLIC BLOOD PRESSURE: 146 MMHG | OXYGEN SATURATION: 98 % | RESPIRATION RATE: 18 BRPM | HEART RATE: 98 BPM | BODY MASS INDEX: 44.64 KG/M2

## 2019-01-09 VITALS
DIASTOLIC BLOOD PRESSURE: 85 MMHG | TEMPERATURE: 97.1 F | HEART RATE: 98 BPM | OXYGEN SATURATION: 98 % | RESPIRATION RATE: 18 BRPM | SYSTOLIC BLOOD PRESSURE: 146 MMHG

## 2019-01-09 DIAGNOSIS — C92.10 CML (CHRONIC MYELOCYTIC LEUKEMIA) (HCC): ICD-10-CM

## 2019-01-09 DIAGNOSIS — C92.10 CML (CHRONIC MYELOCYTIC LEUKEMIA) (HCC): Primary | ICD-10-CM

## 2019-01-09 LAB
ALBUMIN SERPL-MCNC: 4.2 G/DL (ref 3.5–5)
ALBUMIN/GLOB SERPL: 1.5 G/DL (ref 1.5–2.5)
ALP SERPL-CCNC: 63 U/L (ref 35–104)
ALT SERPL W P-5'-P-CCNC: 31 U/L (ref 10–36)
ANION GAP SERPL CALCULATED.3IONS-SCNC: 8.2 MMOL/L (ref 3.6–11.2)
AST SERPL-CCNC: 27 U/L (ref 10–30)
BASOPHILS # BLD AUTO: 0.03 10*3/MM3 (ref 0–0.3)
BASOPHILS NFR BLD AUTO: 0.4 % (ref 0–2)
BILIRUB SERPL-MCNC: 0.5 MG/DL (ref 0.2–1.8)
BUN BLD-MCNC: 14 MG/DL (ref 7–21)
BUN/CREAT SERPL: 18.9 (ref 7–25)
CALCIUM SPEC-SCNC: 9.1 MG/DL (ref 7.7–10)
CHLORIDE SERPL-SCNC: 106 MMOL/L (ref 99–112)
CO2 SERPL-SCNC: 26.8 MMOL/L (ref 24.3–31.9)
CREAT BLD-MCNC: 0.74 MG/DL (ref 0.43–1.29)
DEPRECATED RDW RBC AUTO: 45.3 FL (ref 37–54)
EOSINOPHIL # BLD AUTO: 0.17 10*3/MM3 (ref 0–0.7)
EOSINOPHIL NFR BLD AUTO: 2.5 % (ref 0–5)
ERYTHROCYTE [DISTWIDTH] IN BLOOD BY AUTOMATED COUNT: 13.7 % (ref 11.5–14.5)
GFR SERPL CREATININE-BSD FRML MDRD: 82 ML/MIN/1.73
GLOBULIN UR ELPH-MCNC: 2.8 GM/DL
GLUCOSE BLD-MCNC: 127 MG/DL (ref 70–110)
HCT VFR BLD AUTO: 39.6 % (ref 37–47)
HGB BLD-MCNC: 13.3 G/DL (ref 12–16)
IMM GRANULOCYTES # BLD AUTO: 0.01 10*3/MM3 (ref 0–0.03)
IMM GRANULOCYTES NFR BLD AUTO: 0.1 % (ref 0–0.5)
LDH SERPL-CCNC: 172 U/L (ref 135–225)
LYMPHOCYTES # BLD AUTO: 2.1 10*3/MM3 (ref 1–3)
LYMPHOCYTES NFR BLD AUTO: 31 % (ref 21–51)
MCH RBC QN AUTO: 30.5 PG (ref 27–33)
MCHC RBC AUTO-ENTMCNC: 33.6 G/DL (ref 33–37)
MCV RBC AUTO: 90.8 FL (ref 80–94)
MONOCYTES # BLD AUTO: 0.73 10*3/MM3 (ref 0.1–0.9)
MONOCYTES NFR BLD AUTO: 10.8 % (ref 0–10)
NEUTROPHILS # BLD AUTO: 3.74 10*3/MM3 (ref 1.4–6.5)
NEUTROPHILS NFR BLD AUTO: 55.2 % (ref 30–70)
OSMOLALITY SERPL CALC.SUM OF ELEC: 283.3 MOSM/KG (ref 273–305)
PLATELET # BLD AUTO: 272 10*3/MM3 (ref 130–400)
PMV BLD AUTO: 10.5 FL (ref 6–10)
POTASSIUM BLD-SCNC: 4.4 MMOL/L (ref 3.5–5.3)
PROT SERPL-MCNC: 7 G/DL (ref 6–8)
RBC # BLD AUTO: 4.36 10*6/MM3 (ref 4.2–5.4)
SODIUM BLD-SCNC: 141 MMOL/L (ref 135–153)
URATE SERPL-MCNC: 5.2 MG/DL (ref 2.4–5.7)
WBC NRBC COR # BLD: 6.78 10*3/MM3 (ref 4.5–12.5)

## 2019-01-09 PROCEDURE — 83615 LACTATE (LD) (LDH) ENZYME: CPT | Performed by: INTERNAL MEDICINE

## 2019-01-09 PROCEDURE — 99214 OFFICE O/P EST MOD 30 MIN: CPT | Performed by: INTERNAL MEDICINE

## 2019-01-09 PROCEDURE — 85025 COMPLETE CBC W/AUTO DIFF WBC: CPT | Performed by: INTERNAL MEDICINE

## 2019-01-09 PROCEDURE — 84550 ASSAY OF BLOOD/URIC ACID: CPT | Performed by: INTERNAL MEDICINE

## 2019-01-09 PROCEDURE — 81207 BCR/ABL1 GENE MINOR BP: CPT | Performed by: INTERNAL MEDICINE

## 2019-01-09 PROCEDURE — 80053 COMPREHEN METABOLIC PANEL: CPT | Performed by: INTERNAL MEDICINE

## 2019-01-09 PROCEDURE — 81206 BCR/ABL1 GENE MAJOR BP: CPT | Performed by: INTERNAL MEDICINE

## 2019-01-09 PROCEDURE — 36415 COLL VENOUS BLD VENIPUNCTURE: CPT | Performed by: INTERNAL MEDICINE

## 2019-01-09 RX ORDER — IMATINIB MESYLATE 400 MG/1
400 TABLET, FILM COATED ORAL DAILY
Qty: 30 TABLET | Refills: 5 | Status: SHIPPED | OUTPATIENT
Start: 2019-01-09 | End: 2019-06-24 | Stop reason: SDUPTHER

## 2019-01-09 NOTE — PROGRESS NOTES
Specialty Pharmacy Note      Name:  Aric Haro  :  1965  Date:  2019         Past Medical History:   Diagnosis Date   • Anemia    • Cancer (CMS/HCC)     leukemia   • Diabetes mellitus (CMS/HCC)    • Elevated cholesterol    • GERD (gastroesophageal reflux disease)    • Hypertension    • PONV (postoperative nausea and vomiting)        Past Surgical History:   Procedure Laterality Date   • BLEPHAROPLASTY Bilateral 2018    Procedure: BLEPHAROPLASTY BOTH EYES UPPER EYELIDS (PT REQUESTED TERESITA WILKES;  Surgeon: Chris Haile MD;  Location: [facility];  Service: Ophthalmology   • COLONOSCOPY N/A 3/27/2018    Procedure: COLONOSCOPY FOR SCREENING  CPTCODE:76001;  Surgeon: Drew Esparza III, MD;  Location: [facility];  Service: Gastroenterology   • SINUS SURGERY     • SINUS SURGERY     • SLEEVE GASTROPLASTY         Social History     Socioeconomic History   • Marital status:      Spouse name: Not on file   • Number of children: Not on file   • Years of education: Not on file   • Highest education level: Not on file   Social Needs   • Financial resource strain: Not on file   • Food insecurity - worry: Not on file   • Food insecurity - inability: Not on file   • Transportation needs - medical: Not on file   • Transportation needs - non-medical: Not on file   Occupational History   • Not on file   Tobacco Use   • Smoking status: Former Smoker     Packs/day: 0.25     Years: 4.00     Pack years: 1.00   • Smokeless tobacco: Never Used   Substance and Sexual Activity   • Alcohol use: No   • Drug use: No   • Sexual activity: Defer   Other Topics Concern   • Not on file   Social History Narrative   • Not on file       Family History   Problem Relation Age of Onset   • Anemia Mother    • Hypertension Mother    • Cancer Mother    • COPD Father    • Heart disease Father    • Breast cancer Neg Hx        No Known Allergies    Current Outpatient Medications   Medication Sig Dispense Refill    • amoxicillin-clavulanate (AUGMENTIN) 875-125 MG per tablet Take 1 tablet by mouth 2 (Two) Times a Day. 20 tablet 0   • amoxicillin-clavulanate (AUGMENTIN) 875-125 MG per tablet Take 1 tablet by mouth 2 (Two) Times a Day. 20 tablet 0   • baclofen (LIORESAL) 20 MG tablet Take 1 tablet by mouth 3 (Three) Times a Day. 90 tablet 4   • celecoxib (CELEBREX) 200 MG capsule Take 1 capsule by mouth 2 (Two) Times a Day. 180 capsule 0   • Cholecalciferol (VITAMIN D3) 37800 units capsule Take 1 capsule by mouth 2 (Two) Times a Week. 24 capsule 3   • cyanocobalamin 1000 MCG/ML injection Inject 1 mL intramuscularly monthly. 1 mL 1   • cyanocobalamin 1000 MCG/ML injection Inject 1 mL into the appropriate muscle as directed by prescriber weekly for 8 weeks then monthly thereafter. 30 mL 4   • DULoxetine (CYMBALTA) 60 MG capsule Take 1 capsule by mouth Daily. 90 capsule 3   • erythromycin (ROMYCIN) 5 MG/GM ophthalmic ointment Apply 1 application to eyelid wounds four times a day for one week 3.5 g 5   • fenofibrate 160 MG tablet Take 1 tablet by mouth Daily. 90 tablet 3   • ferrous sulfate 325 (65 FE) MG EC tablet Take 1 tablet by mouth Daily. 30 tablet 3   • fluconazole (DIFLUCAN) 200 MG tablet Take 1 tablet by mouth Daily. 30 tablet 1   • furosemide (LASIX) 40 MG tablet Take 1 tablet by mouth Daily. 90 tablet 3   • furosemide (LASIX) 40 MG tablet Take 1 tablet by mouth Daily. 90 tablet 3   • furosemide (LASIX) 40 MG tablet Take 1 tablet by mouth Daily. 90 tablet 3   • glucose blood test strip USE 1 STRIP 2 TIMES DAILY AS DIRECTED 60 each 6   • ibuprofen (ADVIL,MOTRIN) 800 MG tablet Take 1 tablet by mouth 3 (Three) Times a Day As Needed. 270 tablet 3   • imatinib (GLEEVEC) 400 MG chemo tablet Take 1 tablet by mouth Daily. 30 tablet 5   • Insulin Glargine (LANTUS SOLOSTAR) 100 UNIT/ML injection pen Inject 45 units subcutaneously 2 times daily 30 mL 6   • Insulin Glargine (LANTUS SOLOSTAR) 100 UNIT/ML injection pen Inject 45 units  "Subcutaneously two times daily 30 mL 6   • Insulin Glargine (LANTUS SOLOSTAR) 100 UNIT/ML injection pen Inject 45 Units subcutaneously 2 times daily. 30 mL 6   • ketotifen (ZADITOR) 0.025 % ophthalmic solution INSTILL 1 DROP INTO BOTH EYES 2 TIMES DAILY. 5 mL 3   • LANTUS SOLOSTAR 100 UNIT/ML injection pen Inject 45 Units under the skin 2 (Two) Times a Day. 30 mL 6   • LANTUS SOLOSTAR 100 UNIT/ML injection pen Inject 45 Units under the skin 2 (Two) Times a Day. 30 mL 6   • LANTUS SOLOSTAR 100 UNIT/ML injection pen Inject 45 Units under the skin into the appropriate area as directed 2 (Two) Times a Day. 45 mL 6   • loratadine-pseudoephedrine (CLARITIN-D 12 HOUR) 5-120 MG per 12 hr tablet Take 1 tablet by mouth two times a day 30 tablet 0   • loratadine-pseudoephedrine (CLARITIN-D 12-hour) 5-120 MG per 12 hr tablet Take 1 tablet by mouth Every 12 (Twelve) Hours As Needed. 30 tablet 1   • MethylPREDNISolone (MEDROL, MICHAEL,) 4 MG tablet Take as directed on package. 21 tablet 0   • pantoprazole (PROTONIX) 40 MG EC tablet Take 1 tablet by mouth every day 90 tablet 3   • prochlorperazine (COMPAZINE) 10 MG tablet Take 1 tablet by mouth Every 6 (Six) Hours As Needed for Nausea or Vomiting. 60 tablet 5   • SUMAtriptan (IMITREX) 100 MG tablet Take 1 tablet by mouth after onset of migraine. May repeat after 2 hours if headache returns. Do not exceed 200mg in 24 hours. 10 tablet 2   • Syringe/Needle, Disp, 25G X 1\" 3 ML misc USE 1 AS DIRECTED WITH B12 1 each 7   • triamcinolone (KENALOG) 0.1 % cream Apply topically to affected area(s) 2 Times a Day. 30 g 0   • triamcinolone (KENALOG) 0.1 % cream Apply to affected area(s) 2 times per day 30 g 0   • TRUETRACK TEST test strip use to test blood sugar 8 times daily 200 each 5   • vitamin D (ERGOCALCIFEROL) 76631 units capsule capsule Take 1 capsule by mouth once weekly. 12 capsule 3   • vitamin D (ERGOCALCIFEROL) 46769 units capsule capsule Take 1 capsule by mouth 2 (Two) Times a Week. " 24 capsule 0   • vitamin D (ERGOCALCIFEROL) 72528 units capsule capsule Take 1 capsule by mouth 2 (Two) Times a Week. 24 capsule 0   • zolpidem (AMBIEN) 10 MG tablet Take 1 tablet by mouth Every Night. 90 tablet 0     No current facility-administered medications for this visit.          LABORATORY:    Lab Results   Component Value Date    IRON 36 (L) 09/10/2018    TSH 1.369 09/10/2018    TIBC 386 09/10/2018     No results found for: PROTIME, INR, PTT  No results found for: HAV, HCVQUANT, GPKLZM38, HCVINFO, HCVGENOTYPE  No results found for: AMPHETSCREEN, BARBITSCNUR, LABBENZSCN, COCAINEUR, LABMETHSCN, CLOPE7P, [id number], HEPBSAB, OXYCODONESCN, 6ACETYLMORP, BUPRENORSCNU, LABOPIASCN, PCPUR, THCURSCR  Last Urine Toxicity     There is no flowsheet data to display.          Patient Update Assessment (new medications, allergies, medical history): No changes.      Medication(s): Gleevec 400 mg chemo tablet.      Currently Taking Medication(s): Patient reports taking medication as directed, 1 tablet daily by mouth.     Experiencing Side Effects: None expressed.      Prior Authorization Status: Active.      Financial Assistance Status: Not needed at this time.  Patient co-pay is $5.     Any Issues Identified: None at this time.      Appropriate to Process Prescription(s):  Yes.  After discussion with patient, it is appropriate to fill.  Medication will be dispensed to patient from [facility].     Counseling Offered: Patient declined.      Next Specialty Pharmacy Visit: Scheduled for 2/4/2019

## 2019-01-15 LAB
INTERPRETATION: NORMAL
LAB DIRECTOR NAME PROVIDER: NORMAL
LABORATORY COMMENT REPORT: NORMAL
Lab: NORMAL
T(ABL1,BCR)B2A2/CONTROL (IS) BLD/T: NORMAL %
T(ABL1,BCR)B3A2 MAR QL: NORMAL %
T(ABL1,BCR)E1A2/CONTROL BLD/T: NORMAL %

## 2019-02-28 RX ORDER — ERGOCALCIFEROL 1.25 MG/1
50000 CAPSULE ORAL
Qty: 12 CAPSULE | Refills: 3 | OUTPATIENT
Start: 2019-02-28

## 2019-05-07 ENCOUNTER — LAB (OUTPATIENT)
Dept: ONCOLOGY | Facility: CLINIC | Age: 54
End: 2019-05-07

## 2019-05-07 VITALS
DIASTOLIC BLOOD PRESSURE: 87 MMHG | RESPIRATION RATE: 18 BRPM | HEART RATE: 89 BPM | OXYGEN SATURATION: 98 % | TEMPERATURE: 97.8 F | SYSTOLIC BLOOD PRESSURE: 136 MMHG

## 2019-05-07 DIAGNOSIS — C92.10 CML (CHRONIC MYELOCYTIC LEUKEMIA) (HCC): ICD-10-CM

## 2019-05-07 LAB
ALBUMIN SERPL-MCNC: 3.99 G/DL (ref 3.5–5.2)
ALBUMIN/GLOB SERPL: 1.3 G/DL
ALP SERPL-CCNC: 86 U/L (ref 39–117)
ALT SERPL W P-5'-P-CCNC: 18 U/L (ref 1–33)
ANION GAP SERPL CALCULATED.3IONS-SCNC: 14.4 MMOL/L
AST SERPL-CCNC: 16 U/L (ref 1–32)
BASOPHILS # BLD AUTO: 0.04 10*3/MM3 (ref 0–0.2)
BASOPHILS NFR BLD AUTO: 0.6 % (ref 0–1.5)
BILIRUB SERPL-MCNC: 0.4 MG/DL (ref 0.2–1.2)
BUN BLD-MCNC: 9 MG/DL (ref 6–20)
BUN/CREAT SERPL: 15 (ref 7–25)
CALCIUM SPEC-SCNC: 8.5 MG/DL (ref 8.6–10.5)
CHLORIDE SERPL-SCNC: 100 MMOL/L (ref 98–107)
CO2 SERPL-SCNC: 22.6 MMOL/L (ref 22–29)
CREAT BLD-MCNC: 0.6 MG/DL (ref 0.57–1)
DEPRECATED RDW RBC AUTO: 42.9 FL (ref 37–54)
EOSINOPHIL # BLD AUTO: 0.22 10*3/MM3 (ref 0–0.4)
EOSINOPHIL NFR BLD AUTO: 3.2 % (ref 0.3–6.2)
ERYTHROCYTE [DISTWIDTH] IN BLOOD BY AUTOMATED COUNT: 13.1 % (ref 12.3–15.4)
GFR SERPL CREATININE-BSD FRML MDRD: 105 ML/MIN/1.73
GLOBULIN UR ELPH-MCNC: 3 GM/DL
GLUCOSE BLD-MCNC: 196 MG/DL (ref 65–99)
HCT VFR BLD AUTO: 36.7 % (ref 34–46.6)
HGB BLD-MCNC: 12.3 G/DL (ref 12–15.9)
IMM GRANULOCYTES # BLD AUTO: 0.01 10*3/MM3 (ref 0–0.05)
IMM GRANULOCYTES NFR BLD AUTO: 0.1 % (ref 0–0.5)
LYMPHOCYTES # BLD AUTO: 1.85 10*3/MM3 (ref 0.7–3.1)
LYMPHOCYTES NFR BLD AUTO: 26.7 % (ref 19.6–45.3)
MCH RBC QN AUTO: 30.7 PG (ref 26.6–33)
MCHC RBC AUTO-ENTMCNC: 33.5 G/DL (ref 31.5–35.7)
MCV RBC AUTO: 91.5 FL (ref 79–97)
MONOCYTES # BLD AUTO: 0.65 10*3/MM3 (ref 0.1–0.9)
MONOCYTES NFR BLD AUTO: 9.4 % (ref 5–12)
NEUTROPHILS # BLD AUTO: 4.17 10*3/MM3 (ref 1.7–7)
NEUTROPHILS NFR BLD AUTO: 60 % (ref 42.7–76)
PLATELET # BLD AUTO: 264 10*3/MM3 (ref 140–450)
PMV BLD AUTO: 10.5 FL (ref 6–12)
POTASSIUM BLD-SCNC: 4.1 MMOL/L (ref 3.5–5.2)
PROT SERPL-MCNC: 7 G/DL (ref 6–8.5)
RBC # BLD AUTO: 4.01 10*6/MM3 (ref 3.77–5.28)
SODIUM BLD-SCNC: 137 MMOL/L (ref 136–145)
WBC NRBC COR # BLD: 6.94 10*3/MM3 (ref 3.4–10.8)

## 2019-05-07 PROCEDURE — 81207 BCR/ABL1 GENE MINOR BP: CPT

## 2019-05-07 PROCEDURE — 80053 COMPREHEN METABOLIC PANEL: CPT | Performed by: INTERNAL MEDICINE

## 2019-05-07 PROCEDURE — 85025 COMPLETE CBC W/AUTO DIFF WBC: CPT | Performed by: INTERNAL MEDICINE

## 2019-05-07 PROCEDURE — 81206 BCR/ABL1 GENE MAJOR BP: CPT

## 2019-05-10 ENCOUNTER — OFFICE VISIT (OUTPATIENT)
Dept: ONCOLOGY | Facility: CLINIC | Age: 54
End: 2019-05-10

## 2019-05-10 VITALS
DIASTOLIC BLOOD PRESSURE: 83 MMHG | BODY MASS INDEX: 44.22 KG/M2 | SYSTOLIC BLOOD PRESSURE: 144 MMHG | TEMPERATURE: 97.5 F | OXYGEN SATURATION: 100 % | HEART RATE: 96 BPM | RESPIRATION RATE: 20 BRPM | WEIGHT: 274 LBS

## 2019-05-10 DIAGNOSIS — C92.10 CML (CHRONIC MYELOCYTIC LEUKEMIA) (HCC): Primary | ICD-10-CM

## 2019-05-10 PROCEDURE — 99214 OFFICE O/P EST MOD 30 MIN: CPT | Performed by: NURSE PRACTITIONER

## 2019-05-10 NOTE — PROGRESS NOTES
Aric Haro  [id number]  1965  5/10/2019    Referring Provider:   EILEEN Andrade    Reason for Follow Up:   Chronic Phase - Chronic Myelogenous Leukemia     Chief Complaint:  Chronic fatigue, joint aches/pains    History of Present Illness:  Aric Haro is a very pleasant 53 y.o.  female who presents in follow up appointment for further management and treatment of chronic phase chronic myelogenous leukemia (CML).    Ms. Haro began experiencing extreme fatigue where she was sleeping multiple hours during the day when she initially began following with me in April 2017. 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 On laboratory evaluation she was found to have a total white blood cell count of 22.35 and a platelet count 470 thousand. In March 2016 she was also noted to have a leukocytosis (although not as elevated) as well as a thrombocytosis, however reports that these were likely secondary to other medical issues at the time her blood work was drawn. She denied of any significant weight loss however has been gradually losing weight since gastric sleeve procedure. She denied of any fevers or frequent infections but did note fluctuating neck lymphadenopathy as well as early satiety and taste in change. She denied of any abnormal or spontaneous bleeding. I did obtain a BCR ABL PCR to further assess her leukocytosis and thrombocytosis and she was positive for the b2a2/b3a2 (p210) and e1a2 (p190) fusion gene transcripts consistent with a diagnosis for CML. After reviewing the toxicities of each tyrosine kinase inhibitor we decided to start Dasatinib treatment. She had a difficult time tolerating the Dasatinib as she was unable to take her PPI with this medication. Since she had to be taken off of her PPI she had worsening reflux symptoms as well as severe  "nausea, vomiting, dehydration, and headaches during this period. Given the toxicities she was experiencing she was taken off Dasatinib and this was changed to Gleevec treatment. Since starting Gleevac 400mg daily she has tolerated this much better without significant side effects. The only side effect that she has been able to see is intermittent pedal edema along with some hand swelling and muscle cramps.     Treatment History:  Started Dasatinib on 05/15/17 - experienced nausea, severe reflux, headaches while on medication and had taken 9 doses. This was discontinued due to intolerability and she was thereafter started on Imatinib.   Started Imatinib on 5/26/17    Interim History:  Ms. Haro presents today for follow up. Since her last visit, she reports she has been doing well. She remains on Gleevac 400 mg daily and is tolerating this well with no noticeable SE. She continues to have chronic fatigue and joint aches/pain but says her symptoms are currently much better. She says her symptoms \"go in phases\". She denies and fevers/chills or drenching NS. Denies LAD. Reports good appetite and stable weight. Denies any other complaints today.     No Known Allergies    Past Medical History:   Diagnosis Date   • Anemia    • Cancer (CMS/HCC)     leukemia   • Diabetes mellitus (CMS/HCC)    • Elevated cholesterol    • GERD (gastroesophageal reflux disease)    • Hypertension    • PONV (postoperative nausea and vomiting)        Past Surgical History:   Procedure Laterality Date   • BLEPHAROPLASTY Bilateral 6/22/2018    Procedure: BLEPHAROPLASTY BOTH EYES UPPER EYELIDS (PT REQUESTED TERESITA WILKES;  Surgeon: Chris Haile MD;  Location: [facility];  Service: Ophthalmology   • COLONOSCOPY N/A 3/27/2018    Procedure: COLONOSCOPY FOR SCREENING  CPTCODE:47069;  Surgeon: Drew Esparza III, MD;  Location: [facility] OR;  Service: Gastroenterology   • SINUS SURGERY     • SINUS SURGERY     • SLEEVE GASTROPLASTY   "       Social History     Socioeconomic History   • Marital status:      Spouse name: Not on file   • Number of children: Not on file   • Years of education: Not on file   • Highest education level: Not on file   Tobacco Use   • Smoking status: Former Smoker     Packs/day: 0.25     Years: 4.00     Pack years: 1.00   • Smokeless tobacco: Never Used   Substance and Sexual Activity   • Alcohol use: No   • Drug use: No   • Sexual activity: Defer   She lives with her daughter. She denies of any alcohol or illicit drug use. Previous social tobacco use more then 20 years ago.    Family History   Problem Relation Age of Onset   • Anemia Mother    • Hypertension Mother    • Cancer Mother    • COPD Father    • Heart disease Father    • Breast cancer Neg Hx    Maternal Uncle - CLL  Mother - Malignancy of [place]      Current Outpatient Medications:   •  amoxicillin-clavulanate (AUGMENTIN) 875-125 MG per tablet, Take 1 tablet by mouth 2 (Two) Times a Day., Disp: 20 tablet, Rfl: 0  •  amoxicillin-clavulanate (AUGMENTIN) 875-125 MG per tablet, Take 1 tablet by mouth 2 (Two) Times a Day., Disp: 20 tablet, Rfl: 0  •  baclofen (LIORESAL) 20 MG tablet, Take 1 tablet by mouth 3 (Three) Times a Day., Disp: 90 tablet, Rfl: 4  •  celecoxib (CELEBREX) 200 MG capsule, Take 1 capsule by mouth 2 (Two) Times a Day., Disp: 180 capsule, Rfl: 0  •  Cholecalciferol (VITAMIN D3) 38014 units capsule, Take 1 capsule by mouth 2 (Two) Times a Week., Disp: 24 capsule, Rfl: 3  •  cyanocobalamin 1000 MCG/ML injection, Inject 1 mL intramuscularly monthly., Disp: 1 mL, Rfl: 1  •  cyanocobalamin 1000 MCG/ML injection, Inject 1 mL into the appropriate muscle as directed by prescriber weekly for 8 weeks then monthly thereafter., Disp: 30 mL, Rfl: 4  •  doxycycline (MONODOX) 100 MG capsule, Take 1 capsule by mouth 2 (Two) Times a Day., Disp: 20 capsule, Rfl: 0  •  DULoxetine (CYMBALTA) 60 MG capsule, Take 1 capsule by mouth Daily., Disp: 90 capsule,  Rfl: 3  •  erythromycin (ROMYCIN) 5 MG/GM ophthalmic ointment, Apply 1 application to eyelid wounds four times a day for one week, Disp: 3.5 g, Rfl: 5  •  fenofibrate (TRICOR) 145 MG tablet, Take 1 tablet by mouth Daily., Disp: 90 tablet, Rfl: 3  •  fenofibrate 160 MG tablet, Take 1 tablet by mouth Daily., Disp: 90 tablet, Rfl: 3  •  ferrous sulfate 325 (65 FE) MG EC tablet, Take 1 tablet by mouth Daily., Disp: 30 tablet, Rfl: 3  •  fluconazole (DIFLUCAN) 200 MG tablet, Take 1 tablet by mouth Daily., Disp: 30 tablet, Rfl: 1  •  furosemide (LASIX) 40 MG tablet, Take 1 tablet by mouth Daily., Disp: 90 tablet, Rfl: 3  •  furosemide (LASIX) 40 MG tablet, Take 1 tablet by mouth Daily., Disp: 90 tablet, Rfl: 3  •  furosemide (LASIX) 40 MG tablet, Take 1 tablet by mouth Daily., Disp: 90 tablet, Rfl: 3  •  glucose blood test strip, USE 1 STRIP 2 TIMES DAILY AS DIRECTED, Disp: 60 enema, Rfl: 6  •  ibuprofen (ADVIL,MOTRIN) 800 MG tablet, Take 1 tablet by mouth 3 (Three) Times a Day As Needed., Disp: 270 tablet, Rfl: 3  •  ibuprofen (ADVIL,MOTRIN) 800 MG tablet, Take 1 tablet by mouth 3 (Three) Times a Day As Needed with food., Disp: 270 tablet, Rfl: 3  •  imatinib (GLEEVEC) 400 MG chemo tablet, Take 1 tablet by mouth Daily., Disp: 30 tablet, Rfl: 5  •  Insulin Glargine (LANTUS SOLOSTAR) 100 UNIT/ML injection pen, Inject 45 units subcutaneously 2 times daily, Disp: 30 mL, Rfl: 6  •  Insulin Glargine (LANTUS SOLOSTAR) 100 UNIT/ML injection pen, Inject 45 units Subcutaneously two times daily, Disp: 30 mL, Rfl: 6  •  Insulin Glargine (LANTUS SOLOSTAR) 100 UNIT/ML injection pen, Inject 45 Units subcutaneously 2 times daily., Disp: 30 mL, Rfl: 6  •  ketotifen (ZADITOR) 0.025 % ophthalmic solution, INSTILL 1 DROP INTO BOTH EYES 2 TIMES DAILY., Disp: 5 mL, Rfl: 3  •  LANTUS SOLOSTAR 100 UNIT/ML injection pen, Inject 45 Units under the skin 2 (Two) Times a Day., Disp: 30 mL, Rfl: 6  •  LANTUS SOLOSTAR 100 UNIT/ML injection pen, Inject  "45 Units under the skin 2 (Two) Times a Day., Disp: 30 mL, Rfl: 6  •  LANTUS SOLOSTAR 100 UNIT/ML injection pen, Inject 45 Units under the skin into the appropriate area as directed 2 (Two) Times a Day., Disp: 45 mL, Rfl: 6  •  loratadine-pseudoephedrine (CLARITIN-D 12 HOUR) 5-120 MG per 12 hr tablet, Take 1 tablet by mouth two times a day, Disp: 30 tablet, Rfl: 0  •  loratadine-pseudoephedrine (CLARITIN-D 12-hour) 5-120 MG per 12 hr tablet, Take 1 tablet by mouth Every 12 (Twelve) Hours As Needed., Disp: 30 tablet, Rfl: 1  •  MethylPREDNISolone (MEDROL, MICHAEL,) 4 MG tablet, Take as directed on package., Disp: 21 tablet, Rfl: 0  •  pantoprazole (PROTONIX) 40 MG EC tablet, Take 1 tablet by mouth every day, Disp: 90 tablet, Rfl: 3  •  prochlorperazine (COMPAZINE) 10 MG tablet, Take 1 tablet by mouth Every 6 (Six) Hours As Needed for Nausea or Vomiting., Disp: 60 tablet, Rfl: 5  •  SUMAtriptan (IMITREX) 100 MG tablet, Take 1 tablet by mouth after onset of migraine. May repeat after 2 hours if headache returns. Do not exceed 200mg in 24 hours., Disp: 10 tablet, Rfl: 2  •  Syringe/Needle, Disp, 25G X 1\" 3 ML misc, USE 1 AS DIRECTED WITH B12, Disp: 1 each, Rfl: 7  •  triamcinolone (KENALOG) 0.1 % cream, Apply topically to affected area(s) 2 Times a Day., Disp: 30 g, Rfl: 0  •  triamcinolone (KENALOG) 0.1 % cream, Apply to affected area(s) 2 times per day, Disp: 30 g, Rfl: 0  •  triamcinolone (KENALOG) 0.1 % cream, Apply a Thin Layer to Affected Area Topically Twice Daily, Disp: 80 g, Rfl: 5  •  TRUETRACK TEST test strip, use to test blood sugar 8 times daily, Disp: 200 each, Rfl: 5  •  vitamin D (ERGOCALCIFEROL) 09460 units capsule capsule, Take 1 capsule by mouth once weekly., Disp: 12 capsule, Rfl: 3  •  vitamin D (ERGOCALCIFEROL) 33239 units capsule capsule, Take 1 capsule by mouth 2 (Two) Times a Week., Disp: 24 capsule, Rfl: 0  •  cholecalciferol (VITAMIN D3) 59272 units capsule, Take 1 capsule by mouth 2 (Two) Times a " Week., Disp: 24 capsule, Rfl: 0  •  zolpidem (AMBIEN) 10 MG tablet, Take 1 tablet by mouth Every Night., Disp: 90 tablet, Rfl: 0    Review of Systems  Pertinent positives are listed as above, all other systems reviewed and are negative.    Physical Exam:  Vital Signs: These were reviewed and listed as per patient’s electronic medical chart  Vitals:    05/10/19 1057   BP: 144/83   Pulse: 96   Resp: 20   Temp: 97.5 °F (36.4 °C)   SpO2: 100%   General: Awake, alert and oriented, in no distress, obese  HEENT: Head is atraumatic, normocephalic, extraocular movements full, oropharynx clear, no scleral icterus, pink moist mucous membranes,   Neck: supple, no jvd, lymphadenopathy or masses  Cardiovascular: RRR, without murmurs, rubs or gallops  Pulmonary: non-labored, clear to auscultation bilaterally, no wheezing  Abdomen: soft, non-tender, non-distended, normal active bowel sounds present  Extremities: No clubbing, cyanosis or edema  Lymph: No cervical, supraclavicular adenopathy   Neurologic: Mental status as above, alert, awake and oriented, grossly non-focal exam  Skin: warm, dry, intact    Labs / Studies:    Lab on 05/07/2019   Component Date Value   • Glucose 05/07/2019 196*   • BUN 05/07/2019 9    • Creatinine 05/07/2019 0.60    • Sodium 05/07/2019 137    • Potassium 05/07/2019 4.1    • Chloride 05/07/2019 100    • CO2 05/07/2019 22.6    • Calcium 05/07/2019 8.5*   • Total Protein 05/07/2019 7.0    • Albumin 05/07/2019 3.99    • ALT (SGPT) 05/07/2019 18    • AST (SGOT) 05/07/2019 16    • Alkaline Phosphatase 05/07/2019 86    • Total Bilirubin 05/07/2019 0.4    • eGFR Non African Amer 05/07/2019 105    • Globulin 05/07/2019 3.0    • A/G Ratio 05/07/2019 1.3    • BUN/Creatinine Ratio 05/07/2019 15.0    • Anion Gap 05/07/2019 14.4    • WBC 05/07/2019 6.94    • RBC 05/07/2019 4.01    • Hemoglobin 05/07/2019 12.3    • Hematocrit 05/07/2019 36.7    • MCV 05/07/2019 91.5    • MCH 05/07/2019 30.7    • MCHC 05/07/2019 33.5     • RDW 05/07/2019 13.1    • RDW-SD 05/07/2019 42.9    • MPV 05/07/2019 10.5    • Platelets 05/07/2019 264    • Neutrophil % 05/07/2019 60.0    • Lymphocyte % 05/07/2019 26.7    • Monocyte % 05/07/2019 9.4    • Eosinophil % 05/07/2019 3.2    • Basophil % 05/07/2019 0.6    • Immature Grans % 05/07/2019 0.1    • Neutrophils, Absolute 05/07/2019 4.17    • Lymphocytes, Absolute 05/07/2019 1.85    • Monocytes, Absolute 05/07/2019 0.65    • Eosinophils, Absolute 05/07/2019 0.22    • Basophils, Absolute 05/07/2019 0.04    • Immature Grans, Absolute 05/07/2019 0.01    Office Visit on 01/09/2019   Component Date Value   • Glucose 01/09/2019 127*   • BUN 01/09/2019 14    • Creatinine 01/09/2019 0.74    • Sodium 01/09/2019 141    • Potassium 01/09/2019 4.4    • Chloride 01/09/2019 106    • CO2 01/09/2019 26.8    • Calcium 01/09/2019 9.1    • Total Protein 01/09/2019 7.0    • Albumin 01/09/2019 4.20    • ALT (SGPT) 01/09/2019 31    • AST (SGOT) 01/09/2019 27    • Alkaline Phosphatase 01/09/2019 63    • Total Bilirubin 01/09/2019 0.5    • eGFR Non African Amer 01/09/2019 82    • Globulin 01/09/2019 2.8    • A/G Ratio 01/09/2019 1.5    • BUN/Creatinine Ratio 01/09/2019 18.9    • Anion Gap 01/09/2019 8.2    • LDH 01/09/2019 172    • Uric Acid 01/09/2019 5.2    • e13a2 (b2a2) Transcript 01/09/2019 Comment    • e14a2 (b3a2) Transcript 01/09/2019 Comment    • E1A2 transcript 01/09/2019 Comment    • Interpretation 01/09/2019 Comment    • Director Review 01/09/2019 Comment    • Background: 01/09/2019 Comment    • Methodology: 01/09/2019 Comment    • WBC 01/09/2019 6.78    • RBC 01/09/2019 4.36    • Hemoglobin 01/09/2019 13.3    • Hematocrit 01/09/2019 39.6    • MCV 01/09/2019 90.8    • MCH 01/09/2019 30.5    • MCHC 01/09/2019 33.6    • RDW 01/09/2019 13.7    • RDW-SD 01/09/2019 45.3    • MPV 01/09/2019 10.5*   • Platelets 01/09/2019 272    • Neutrophil % 01/09/2019 55.2    • Lymphocyte % 01/09/2019 31.0    • Monocyte % 01/09/2019 10.8*    • Eosinophil % 2019 2.5    • Basophil % 2019 0.4    • Immature Grans % 2019 0.1    • Neutrophils, Absolute 2019 3.74    • Lymphocytes, Absolute 2019 2.10    • Monocytes, Absolute 2019 0.73    • Eosinophils, Absolute 2019 0.17    • Basophils, Absolute 2019 0.03    • Immature Grans, Absolute 2019 0.01    • Osmolality Calc 2019 283.3       [id number]: US ABDOMEN COMPLETE: Visualized pancreas is unremarkable. The imaged portion of the abdominal aorta is nondilated. The liver is homogeneous. There is no intrahepatic ductal dilatation or focal hepatic mass. The imaged portion of the hepatic vessels and inferior vena cava are patent. The gallbladder has been removed. The common bile duct is normal, measuring 5.7 mm. The kidneys demonstrate no evidence of hydronephrosis or solid renal mass. The spleen is homogeneous and measures 13 cm. IMPRESSION: Spleen measures 13 cm and is homogeneous.     PATHOLOGY:    05/15/17: Bone Marrow Biopsy & Aspirate                     17: BCR ABL PCR        17: BCR ABL PCR        17: BCR ABL PCR        18: BCR ABL PCR:                                                                                                         18:                              19      Assessment/Plan :  Aric Haro is a very pleasant 53 y.o.  female who presents in follow up appointment for further management and treatment of chronic phase chronic myelogenous leukemia.    1. Chronic Myelogenous Leukemia - CML (chronic phase)      Given the findings of her peripheral smear, we were concerned for an underlying myeloproliferative disorder. Her primary provider obtained a flow cytometry and this did not show any significant abnormalities. Also obtain a BCR ABL PCR which was positive for the b2a2/b3a2 (p210) and e1a2 (p190) fusion gene transcripts consistent with a diagnosis for CML. We performed bone marrow  biopsy on initial diagnosis prior to starting Dasatinib treatment with results above and consistent with chronic phase CMP.      To further evaluate for a myeloproliferative disorder, also assessed for JAK2 (V617F and exon 12)/MPL/CALR mutations which were negative. To assess for underlying inflammation that may be possibly contributing also obtained an ESR and CRP, EMMANUEL, Rheumatoid factor, as well as an acute hepatitis panel and HIV test which were all negative. Thrombocytosis can also be caused by an underlying iron deficiency thus obtained iron panel and ferritin which were normal. Also order an abdominal ultrasound to assess for splenomegaly which showed spleen size to be 13.9cm.     Given the findings of the BCR ABL PCR started the patient on Dasatinib 100mg oral daily. Patient does have a history of pancreatitis and therefore  held on using Nilotinib. Patient however was unable to tolerate the medication secondary to worsening reflux while being off of her PPI, and experienced severe nausea, vomiting, and dehydration. She was then started on Imatinib 400mg daily and has been tolerating this well. Baseline Echo showed an intact EF and she currently has no cardiac symptoms or signs of fluid retention.     She has had a complete hematological response since start of Gleevac and her most recent BCR ABL from January is showing molecular remission. This will need to continue to be monitored every three months to assess ongoing molecular response, repeat level is pending. The blood counts remain normalized.     Prophylaxis:  She underwent a colonoscopy February 2018 with Dr. Esparza who recommended repeat colonoscopy in 3-5 years due to polyps that were removed. She has also received her Influenza and Hepatitis A vaccine.    5. ACO Quality measures  - Aric Haro does not use tobacco products.  -She received flu vaccine.   - Current outpatient and discharge medications have been reconciled for the patient.    She  will follow up with Dr. Serrano in 3 months with labs. She understands that should she have any questions or concerns prior to her appointment she should give us a call at any time and I would be happy to see her sooner. It was a pleasure to see this patient in clinic today, thank you for allowing me to participate in the care of this patient.    I spent 25 minutes with Ms. Haro today. More than 50% of the time was spent in counseling / coordination of care for the above problems.    Mariajose Humphries, APRN  5/10/19  10:56 AM

## 2019-06-24 DIAGNOSIS — C92.10 CML (CHRONIC MYELOCYTIC LEUKEMIA) (HCC): ICD-10-CM

## 2019-06-24 RX ORDER — IMATINIB MESYLATE 400 MG/1
400 TABLET, FILM COATED ORAL DAILY
Qty: 30 TABLET | Refills: 5 | Status: CANCELLED | OUTPATIENT
Start: 2019-06-24

## 2019-06-26 ENCOUNTER — LAB (OUTPATIENT)
Dept: LAB | Facility: HOSPITAL | Age: 54
End: 2019-06-26

## 2019-06-26 ENCOUNTER — TRANSCRIBE ORDERS (OUTPATIENT)
Dept: ADMINISTRATIVE | Facility: HOSPITAL | Age: 54
End: 2019-06-26

## 2019-06-26 DIAGNOSIS — E55.9 VITAMIN D DEFICIENCY: ICD-10-CM

## 2019-06-26 DIAGNOSIS — E11.9 DIABETES MELLITUS WITHOUT COMPLICATION (HCC): Primary | ICD-10-CM

## 2019-06-26 DIAGNOSIS — J34.89 DRY NOSE: ICD-10-CM

## 2019-06-26 DIAGNOSIS — E53.8 VITAMIN B 12 DEFICIENCY: ICD-10-CM

## 2019-06-26 DIAGNOSIS — I10 HYPERTENSION, ESSENTIAL: ICD-10-CM

## 2019-06-26 DIAGNOSIS — E78.2 MIXED HYPERLIPIDEMIA: ICD-10-CM

## 2019-06-26 DIAGNOSIS — E11.9 DIABETES MELLITUS WITHOUT COMPLICATION (HCC): ICD-10-CM

## 2019-06-26 DIAGNOSIS — Z79.4 ENCOUNTER FOR LONG-TERM (CURRENT) USE OF INSULIN (HCC): ICD-10-CM

## 2019-06-26 LAB
25(OH)D3 SERPL-MCNC: 48.8 NG/ML (ref 30–100)
ALBUMIN SERPL-MCNC: 4 G/DL (ref 3.5–5.2)
ALBUMIN UR-MCNC: 9.6 MG/L
ALBUMIN/GLOB SERPL: 1.4 G/DL
ALP SERPL-CCNC: 73 U/L (ref 39–117)
ALT SERPL W P-5'-P-CCNC: 19 U/L (ref 1–33)
ANION GAP SERPL CALCULATED.3IONS-SCNC: 13.1 MMOL/L (ref 5–15)
AST SERPL-CCNC: 16 U/L (ref 1–32)
BASOPHILS # BLD AUTO: 0.03 10*3/MM3 (ref 0–0.2)
BASOPHILS NFR BLD AUTO: 0.4 % (ref 0–1.5)
BILIRUB SERPL-MCNC: 0.5 MG/DL (ref 0.2–1.2)
BUN BLD-MCNC: 17 MG/DL (ref 6–20)
BUN/CREAT SERPL: 30.4 (ref 7–25)
CALCIUM SPEC-SCNC: 9.9 MG/DL (ref 8.6–10.5)
CHLORIDE SERPL-SCNC: 104 MMOL/L (ref 98–107)
CHOLEST SERPL-MCNC: 184 MG/DL (ref 0–200)
CO2 SERPL-SCNC: 23.9 MMOL/L (ref 22–29)
CREAT BLD-MCNC: 0.56 MG/DL (ref 0.57–1)
DEPRECATED RDW RBC AUTO: 50.1 FL (ref 37–54)
EOSINOPHIL # BLD AUTO: 0.23 10*3/MM3 (ref 0–0.4)
EOSINOPHIL NFR BLD AUTO: 3.2 % (ref 0.3–6.2)
ERYTHROCYTE [DISTWIDTH] IN BLOOD BY AUTOMATED COUNT: 14.1 % (ref 12.3–15.4)
FOLATE SERPL-MCNC: 8.44 NG/ML (ref 4.78–24.2)
GFR SERPL CREATININE-BSD FRML MDRD: 113 ML/MIN/1.73
GLOBULIN UR ELPH-MCNC: 2.9 GM/DL
GLUCOSE BLD-MCNC: 181 MG/DL (ref 65–99)
HBA1C MFR BLD: 7.6 % (ref 4.8–5.6)
HCT VFR BLD AUTO: 39.7 % (ref 34–46.6)
HDLC SERPL-MCNC: 72 MG/DL (ref 40–60)
HGB BLD-MCNC: 12.7 G/DL (ref 12–15.9)
IMM GRANULOCYTES # BLD AUTO: 0.01 10*3/MM3 (ref 0–0.05)
IMM GRANULOCYTES NFR BLD AUTO: 0.1 % (ref 0–0.5)
LDLC SERPL CALC-MCNC: 90 MG/DL (ref 0–100)
LDLC/HDLC SERPL: 1.25 {RATIO}
LYMPHOCYTES # BLD AUTO: 2.15 10*3/MM3 (ref 0.7–3.1)
LYMPHOCYTES NFR BLD AUTO: 30 % (ref 19.6–45.3)
MCH RBC QN AUTO: 30.8 PG (ref 26.6–33)
MCHC RBC AUTO-ENTMCNC: 32 G/DL (ref 31.5–35.7)
MCV RBC AUTO: 96.1 FL (ref 79–97)
MONOCYTES # BLD AUTO: 0.76 10*3/MM3 (ref 0.1–0.9)
MONOCYTES NFR BLD AUTO: 10.6 % (ref 5–12)
NEUTROPHILS # BLD AUTO: 4 10*3/MM3 (ref 1.7–7)
NEUTROPHILS NFR BLD AUTO: 55.8 % (ref 42.7–76)
PLATELET # BLD AUTO: 277 10*3/MM3 (ref 140–450)
PMV BLD AUTO: 11.1 FL (ref 6–12)
POTASSIUM BLD-SCNC: 4.6 MMOL/L (ref 3.5–5.2)
PROT SERPL-MCNC: 6.9 G/DL (ref 6–8.5)
RBC # BLD AUTO: 4.13 10*6/MM3 (ref 3.77–5.28)
SODIUM BLD-SCNC: 141 MMOL/L (ref 136–145)
T3 SERPL-MCNC: 121 NG/DL (ref 80–200)
T4 SERPL-MCNC: 7.9 MCG/DL (ref 4.5–11.7)
TRIGL SERPL-MCNC: 110 MG/DL (ref 0–150)
TSH SERPL DL<=0.05 MIU/L-ACNC: 2.01 MIU/ML (ref 0.27–4.2)
VIT B12 BLD-MCNC: 441 PG/ML (ref 211–946)
VLDLC SERPL-MCNC: 22 MG/DL (ref 5–40)
WBC NRBC COR # BLD: 7.17 10*3/MM3 (ref 3.4–10.8)

## 2019-06-26 PROCEDURE — 83036 HEMOGLOBIN GLYCOSYLATED A1C: CPT

## 2019-06-26 PROCEDURE — 80053 COMPREHEN METABOLIC PANEL: CPT

## 2019-06-26 PROCEDURE — 82607 VITAMIN B-12: CPT

## 2019-06-26 PROCEDURE — 82043 UR ALBUMIN QUANTITATIVE: CPT

## 2019-06-26 PROCEDURE — 82746 ASSAY OF FOLIC ACID SERUM: CPT

## 2019-06-26 PROCEDURE — 82306 VITAMIN D 25 HYDROXY: CPT

## 2019-06-26 PROCEDURE — 80061 LIPID PANEL: CPT

## 2019-06-26 PROCEDURE — 36415 COLL VENOUS BLD VENIPUNCTURE: CPT

## 2019-06-26 PROCEDURE — 84436 ASSAY OF TOTAL THYROXINE: CPT

## 2019-06-26 PROCEDURE — 84443 ASSAY THYROID STIM HORMONE: CPT

## 2019-06-26 PROCEDURE — 84480 ASSAY TRIIODOTHYRONINE (T3): CPT

## 2019-06-26 PROCEDURE — 85025 COMPLETE CBC W/AUTO DIFF WBC: CPT

## 2019-08-15 ENCOUNTER — OFFICE VISIT (OUTPATIENT)
Dept: ONCOLOGY | Facility: CLINIC | Age: 54
End: 2019-08-15

## 2019-08-15 ENCOUNTER — LAB (OUTPATIENT)
Dept: ONCOLOGY | Facility: CLINIC | Age: 54
End: 2019-08-15

## 2019-08-15 VITALS
TEMPERATURE: 98.4 F | DIASTOLIC BLOOD PRESSURE: 73 MMHG | OXYGEN SATURATION: 97 % | HEART RATE: 86 BPM | BODY MASS INDEX: 44.55 KG/M2 | WEIGHT: 276 LBS | SYSTOLIC BLOOD PRESSURE: 143 MMHG | RESPIRATION RATE: 20 BRPM

## 2019-08-15 DIAGNOSIS — C92.10 CML (CHRONIC MYELOCYTIC LEUKEMIA) (HCC): ICD-10-CM

## 2019-08-15 DIAGNOSIS — D72.829 LEUKOCYTOSIS, UNSPECIFIED TYPE: ICD-10-CM

## 2019-08-15 DIAGNOSIS — E61.1 IRON DEFICIENCY: Primary | ICD-10-CM

## 2019-08-15 DIAGNOSIS — D69.6 THROMBOCYTOPENIA (HCC): ICD-10-CM

## 2019-08-15 LAB
ALBUMIN SERPL-MCNC: 4.05 G/DL (ref 3.5–5.2)
ALBUMIN/GLOB SERPL: 1.3 G/DL
ALP SERPL-CCNC: 84 U/L (ref 39–117)
ALT SERPL W P-5'-P-CCNC: 17 U/L (ref 1–33)
ANION GAP SERPL CALCULATED.3IONS-SCNC: 13.1 MMOL/L (ref 5–15)
AST SERPL-CCNC: 15 U/L (ref 1–32)
BASOPHILS # BLD AUTO: 0.03 10*3/MM3 (ref 0–0.2)
BASOPHILS NFR BLD AUTO: 0.3 % (ref 0–1.5)
BILIRUB SERPL-MCNC: 0.3 MG/DL (ref 0.2–1.2)
BUN BLD-MCNC: 14 MG/DL (ref 6–20)
BUN/CREAT SERPL: 17.1 (ref 7–25)
CALCIUM SPEC-SCNC: 9.6 MG/DL (ref 8.6–10.5)
CHLORIDE SERPL-SCNC: 102 MMOL/L (ref 98–107)
CO2 SERPL-SCNC: 24.9 MMOL/L (ref 22–29)
CREAT BLD-MCNC: 0.82 MG/DL (ref 0.57–1)
DEPRECATED RDW RBC AUTO: 44.7 FL (ref 37–54)
EOSINOPHIL # BLD AUTO: 0.31 10*3/MM3 (ref 0–0.4)
EOSINOPHIL NFR BLD AUTO: 3.4 % (ref 0.3–6.2)
ERYTHROCYTE [DISTWIDTH] IN BLOOD BY AUTOMATED COUNT: 13.7 % (ref 12.3–15.4)
FERRITIN SERPL-MCNC: 145.2 NG/ML (ref 13–150)
GFR SERPL CREATININE-BSD FRML MDRD: 73 ML/MIN/1.73
GLOBULIN UR ELPH-MCNC: 3.2 GM/DL
GLUCOSE BLD-MCNC: 194 MG/DL (ref 65–99)
HCT VFR BLD AUTO: 38.6 % (ref 34–46.6)
HGB BLD-MCNC: 12.6 G/DL (ref 12–15.9)
IMM GRANULOCYTES # BLD AUTO: 0.02 10*3/MM3 (ref 0–0.05)
IMM GRANULOCYTES NFR BLD AUTO: 0.2 % (ref 0–0.5)
IRON 24H UR-MRATE: 58 MCG/DL (ref 37–145)
IRON SATN MFR SERPL: 14 % (ref 20–50)
LDH SERPL-CCNC: 207 U/L (ref 135–214)
LYMPHOCYTES # BLD AUTO: 2.01 10*3/MM3 (ref 0.7–3.1)
LYMPHOCYTES NFR BLD AUTO: 21.9 % (ref 19.6–45.3)
MCH RBC QN AUTO: 30.4 PG (ref 26.6–33)
MCHC RBC AUTO-ENTMCNC: 32.6 G/DL (ref 31.5–35.7)
MCV RBC AUTO: 93.2 FL (ref 79–97)
MONOCYTES # BLD AUTO: 0.86 10*3/MM3 (ref 0.1–0.9)
MONOCYTES NFR BLD AUTO: 9.4 % (ref 5–12)
NEUTROPHILS # BLD AUTO: 5.95 10*3/MM3 (ref 1.7–7)
NEUTROPHILS NFR BLD AUTO: 64.8 % (ref 42.7–76)
PLATELET # BLD AUTO: 274 10*3/MM3 (ref 140–450)
PMV BLD AUTO: 10.5 FL (ref 6–12)
POTASSIUM BLD-SCNC: 4.6 MMOL/L (ref 3.5–5.2)
PROT SERPL-MCNC: 7.2 G/DL (ref 6–8.5)
RBC # BLD AUTO: 4.14 10*6/MM3 (ref 3.77–5.28)
SODIUM BLD-SCNC: 140 MMOL/L (ref 136–145)
TIBC SERPL-MCNC: 417 MCG/DL (ref 298–536)
TRANSFERRIN SERPL-MCNC: 280 MG/DL (ref 200–360)
URATE SERPL-MCNC: 4.3 MG/DL (ref 2.4–5.7)
WBC NRBC COR # BLD: 9.18 10*3/MM3 (ref 3.4–10.8)

## 2019-08-15 PROCEDURE — 84550 ASSAY OF BLOOD/URIC ACID: CPT | Performed by: INTERNAL MEDICINE

## 2019-08-15 PROCEDURE — 84466 ASSAY OF TRANSFERRIN: CPT | Performed by: INTERNAL MEDICINE

## 2019-08-15 PROCEDURE — 80053 COMPREHEN METABOLIC PANEL: CPT | Performed by: INTERNAL MEDICINE

## 2019-08-15 PROCEDURE — 81206 BCR/ABL1 GENE MAJOR BP: CPT

## 2019-08-15 PROCEDURE — 99214 OFFICE O/P EST MOD 30 MIN: CPT | Performed by: INTERNAL MEDICINE

## 2019-08-15 PROCEDURE — 83615 LACTATE (LD) (LDH) ENZYME: CPT | Performed by: INTERNAL MEDICINE

## 2019-08-15 PROCEDURE — 82728 ASSAY OF FERRITIN: CPT | Performed by: INTERNAL MEDICINE

## 2019-08-15 PROCEDURE — 83540 ASSAY OF IRON: CPT | Performed by: INTERNAL MEDICINE

## 2019-08-15 PROCEDURE — 81207 BCR/ABL1 GENE MINOR BP: CPT

## 2019-08-15 PROCEDURE — 85025 COMPLETE CBC W/AUTO DIFF WBC: CPT | Performed by: INTERNAL MEDICINE

## 2019-08-15 NOTE — PROGRESS NOTES
Aric Haro  [id number]  1965  8/15/2019      Referring Provider:   EILEEN Andrade    Reason for Follow Up:   1. Chronic Phase - Chronic Myelogenous Leukemia  2. Leukocytosis  3. Thrombocytosis     Chief Complaint:  Fatigue    History of Present Illness:  Aric Haro is a very pleasant 53 y.o.  female who presents in follow up appointment for further management and treatment of chronic phase chronic myelogenous leukemia (CML).    Ms. Haro began experiencing extreme fatigue where she was sleeping multiple hours during the day when she initially began following with me in April 2017. 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 On laboratory evaluation she was found to have a total white blood cell count of 22.35 and a platelet count 470 thousand. In March 2016 she was also noted to have a leukocytosis (although not as elevated) as well as a thrombocytosis, however reports that these were likely secondary to other medical issues at the time her blood work was drawn. She denied of any significant weight loss however has been gradually losing weight since gastric sleeve procedure. She denied of any fevers or frequent infections but did note fluctuating neck lymphadenopathy as well as early satiety and taste in change. She denied of any abnormal or spontaneous bleeding. I did obtain a BCR ABL PCR to further assess her leukocytosis and thrombocytosis and she was positive for the b2a2/b3a2 (p210) and e1a2 (p190) fusion gene transcripts consistent with a diagnosis for CML. After reviewing the toxicities of each tyrosine kinase inhibitor we decided to start Dasatinib treatment. She had a difficult time tolerating the Dasatinib as she was unable to take her PPI with this medication. Since she had to be taken off of her PPI she had worsening reflux symptoms as well  as severe nausea, vomiting, dehydration, and headaches during this period. Given the toxicities she was experiencing she was taken off Dasatinib and this was changed to Gleevec treatment. Since starting Gleevac 400mg daily she has tolerated this much better without significant side effects. The only side effect that she has been able to see is intermittent pedal edema along with some hand swelling and muscle cramps.     Treatment History:  Started Dasatinib on 05/15/17 - experienced nausea, severe reflux, headaches while on medication and had taken 9 doses. This was discontinued due to intolerability and she was thereafter started on Imatinib.   Started Imatinib on 5/26/17    Interim History:  Since the start of Dasatinib and later Gleevec she has had a good response and normalization in her complete blood counts with improvement in her BCR ABL PCR. She has been having hot flashes and night sweats the last six months and menstrual cycles have been decreasing. She continues to be on oral iron once daily as prescribed by her primary provider. She does note edema in her extremities which become evident after standing for long periods of time otherwise no fevers, infections, lymphadenopathy, chest pain, dyspnea, nausea. She notes and oral laceration which is improving.      The following portions of the patient's history were reviewed and updated as appropriate: allergies, current medications, past family history, past medical history, past social history, past surgical history and problem list.      No Known Allergies    Past Medical History:   Diagnosis Date   • Anemia    • Cancer (CMS/HCC)     leukemia   • Diabetes mellitus (CMS/HCC)    • Elevated cholesterol    • GERD (gastroesophageal reflux disease)    • Hypertension    • PONV (postoperative nausea and vomiting)        Past Surgical History:   Procedure Laterality Date   • BLEPHAROPLASTY Bilateral 6/22/2018    Procedure: BLEPHAROPLASTY BOTH EYES UPPER EYELIDS (PT  REQUESTED TERESITA WILKES;  Surgeon: Chris Haile MD;  Location: [facility];  Service: Ophthalmology   • COLONOSCOPY N/A 3/27/2018    Procedure: COLONOSCOPY FOR SCREENING  CPTCODE:25047;  Surgeon: Drew Esparza III, MD;  Location: [facility];  Service: Gastroenterology   • SINUS SURGERY     • SINUS SURGERY     • SLEEVE GASTROPLASTY         Social History     Socioeconomic History   • Marital status:      Spouse name: Not on file   • Number of children: Not on file   • Years of education: Not on file   • Highest education level: Not on file   Tobacco Use   • Smoking status: Former Smoker     Packs/day: 0.25     Years: 4.00     Pack years: 1.00   • Smokeless tobacco: Never Used   Substance and Sexual Activity   • Alcohol use: No   • Drug use: No   • Sexual activity: Defer   She lives with her daughter. She denies of any alcohol or illicit drug use. Previous social tobacco use more then 20 years ago.    Family History   Problem Relation Age of Onset   • Anemia Mother    • Hypertension Mother    • Cancer Mother    • COPD Father    • Heart disease Father    • Breast cancer Neg Hx    Maternal Uncle - CLL  Mother - Malignancy of [place]      Current Outpatient Medications:   •  baclofen (LIORESAL) 20 MG tablet, Take 1 tablet by mouth 3 (Three) Times a Day., Disp: 90 tablet, Rfl: 4  •  celecoxib (CELEBREX) 200 MG capsule, Take 1 capsule by mouth 2 (Two) Times a Day., Disp: 180 capsule, Rfl: 0  •  cholecalciferol (VITAMIN D3) 18761 units capsule, Take 1 capsule by mouth 2 (Two) Times a Week., Disp: 24 capsule, Rfl: 0  •  Cholecalciferol (VITAMIN D3) 89357 units capsule, Take 1 capsule by mouth 2 (Two) Times a Week., Disp: 24 capsule, Rfl: 3  •  cyanocobalamin 1000 MCG/ML injection, Inject 1 mL intramuscularly monthly., Disp: 1 mL, Rfl: 1  •  cyanocobalamin 1000 MCG/ML injection, Inject 1 mL into the appropriate muscle as directed by prescriber weekly for 8 weeks then monthly thereafter., Disp: 30  mL, Rfl: 4  •  DULoxetine (CYMBALTA) 60 MG capsule, Take 1 capsule by mouth Daily., Disp: 90 capsule, Rfl: 3  •  fenofibrate (TRICOR) 145 MG tablet, Take 1 tablet by mouth Daily., Disp: 90 tablet, Rfl: 3  •  fenofibrate 160 MG tablet, Take 1 tablet by mouth Daily., Disp: 90 tablet, Rfl: 3  •  ferrous sulfate 325 (65 FE) MG EC tablet, Take 1 tablet by mouth Daily., Disp: 30 tablet, Rfl: 3  •  fluconazole (DIFLUCAN) 200 MG tablet, Take 1 tablet by mouth Daily., Disp: 30 tablet, Rfl: 1  •  furosemide (LASIX) 40 MG tablet, Take 1 tablet by mouth Daily., Disp: 90 tablet, Rfl: 3  •  furosemide (LASIX) 40 MG tablet, Take 1 tablet by mouth Daily., Disp: 90 tablet, Rfl: 3  •  furosemide (LASIX) 40 MG tablet, Take 1 tablet by mouth Daily., Disp: 90 tablet, Rfl: 3  •  ibuprofen (ADVIL,MOTRIN) 800 MG tablet, Take 1 tablet by mouth 3 (Three) Times a Day As Needed., Disp: 270 tablet, Rfl: 3  •  ibuprofen (ADVIL,MOTRIN) 800 MG tablet, Take 1 tablet by mouth 3 (Three) Times a Day As Needed with food., Disp: 270 tablet, Rfl: 3  •  imatinib (GLEEVEC) 400 MG chemo tablet, Take 1 tablet by mouth Daily., Disp: 30 tablet, Rfl: 5  •  Insulin Glargine (LANTUS SOLOSTAR) 100 UNIT/ML injection pen, Inject 45 units subcutaneously 2 times daily, Disp: 30 mL, Rfl: 6  •  Insulin Glargine (LANTUS SOLOSTAR) 100 UNIT/ML injection pen, Inject 45 units Subcutaneously two times daily, Disp: 30 mL, Rfl: 6  •  Insulin Glargine (LANTUS SOLOSTAR) 100 UNIT/ML injection pen, Inject 45 Units subcutaneously 2 times daily., Disp: 30 mL, Rfl: 6  •  LANTUS SOLOSTAR 100 UNIT/ML injection pen, Inject 45 Units under the skin 2 (Two) Times a Day., Disp: 30 mL, Rfl: 6  •  LANTUS SOLOSTAR 100 UNIT/ML injection pen, Inject 45 Units under the skin 2 (Two) Times a Day., Disp: 30 mL, Rfl: 6  •  LANTUS SOLOSTAR 100 UNIT/ML injection pen, Inject 45 Units under the skin into the appropriate area as directed 2 (Two) Times a Day., Disp: 45 mL, Rfl: 6  •   "loratadine-pseudoephedrine (CLARITIN-D 12 HOUR) 5-120 MG per 12 hr tablet, Take 1 tablet by mouth two times a day, Disp: 30 tablet, Rfl: 0  •  loratadine-pseudoephedrine (CLARITIN-D 12-hour) 5-120 MG per 12 hr tablet, Take 1 tablet by mouth Every 12 (Twelve) Hours As Needed., Disp: 30 tablet, Rfl: 1  •  pantoprazole (PROTONIX) 40 MG EC tablet, Take 1 tablet by mouth every day, Disp: 90 tablet, Rfl: 3  •  SUMAtriptan (IMITREX) 100 MG tablet, Take 1 tablet by mouth after onset of migraine. May repeat after 2 hours if headache returns. Do not exceed 200mg in 24 hours., Disp: 10 tablet, Rfl: 2  •  Syringe/Needle, Disp, 25G X 1\" 3 ML misc, USE 1 AS DIRECTED WITH B12, Disp: 1 each, Rfl: 7  •  triamcinolone (KENALOG) 0.1 % cream, Apply topically to affected area(s) 2 Times a Day., Disp: 30 g, Rfl: 0  •  triamcinolone (KENALOG) 0.1 % cream, Apply to affected area(s) 2 times per day, Disp: 30 g, Rfl: 0  •  triamcinolone (KENALOG) 0.1 % cream, Apply a Thin Layer to Affected Area Topically Twice Daily, Disp: 80 g, Rfl: 5  •  TRUETRACK TEST test strip, use to test blood sugar 8 times daily, Disp: 200 each, Rfl: 5  •  vitamin D (ERGOCALCIFEROL) 20823 units capsule capsule, Take 1 capsule by mouth once weekly., Disp: 12 capsule, Rfl: 3  •  vitamin D (ERGOCALCIFEROL) 57431 units capsule capsule, Take 1 capsule by mouth 2 (Two) Times a Week., Disp: 24 capsule, Rfl: 0  •  zolpidem (AMBIEN) 10 MG tablet, Take 1 tablet by mouth Every Night., Disp: 90 tablet, Rfl: 0  •  zolpidem (AMBIEN) 10 MG tablet, Take 1 tablet by mouth Every Night at bedtime for insomnia, Disp: 30 tablet, Rfl: 1  •  glucose blood test strip, USE 1 STRIP 2 TIMES DAILY AS DIRECTED, Disp: 60 enema, Rfl: 6  •  mupirocin (BACTROBAN) 2 % ointment, 1 application into each nostril as directed by provider 2 (Two) Times a Day., Disp: 22 g, Rfl: 2        Review of Systems  Pertinent positives are listed as per history of present of illness, all other systems reviewed and are " negative.        Physical Exam:  Vital Signs: These were reviewed and listed as per patient’s electronic medical chart  Vitals:    08/15/19 1433   BP: 143/73   Pulse: 86   Resp: 20   Temp: 98.4 °F (36.9 °C)   SpO2: 97%     General: Awake, alert and oriented, in no distress, obese  HEENT: Head is atraumatic, normocephalic, extraocular movements full, oropharynx clear, no scleral icterus, pink moist mucous membranes,   Neck: supple, no jvd, lymphadenopathy or masses  Cardiovascular: regular rhythm, tachycardic, without murmurs, rubs or gallops  Pulmonary: non-labored, clear to auscultation bilaterally, no wheezing  Abdomen: soft, non-tender, non-distended, normal active bowel sounds present  Extremities: No clubbing, cyanosis or edema  Lymph: No cervical, supraclavicular, axillary adenopathy   Neurologic: Mental status as above, alert, awake and oriented, grossly non-focal exam  Skin: warm, dry, intact  Patient was examined on 08/15/19 and changes are reflected and noted above.        Labs / Studies:    Office Visit on 08/15/2019   Component Date Value   • Iron 08/15/2019 58    • Iron Saturation 08/15/2019 14*   • Transferrin 08/15/2019 280    • TIBC 08/15/2019 417    • Ferritin 08/15/2019 145.20    Lab on 08/15/2019   Component Date Value   • Glucose 08/15/2019 194*   • BUN 08/15/2019 14    • Creatinine 08/15/2019 0.82    • Sodium 08/15/2019 140    • Potassium 08/15/2019 4.6    • Chloride 08/15/2019 102    • CO2 08/15/2019 24.9    • Calcium 08/15/2019 9.6    • Total Protein 08/15/2019 7.2    • Albumin 08/15/2019 4.05    • ALT (SGPT) 08/15/2019 17    • AST (SGOT) 08/15/2019 15    • Alkaline Phosphatase 08/15/2019 84    • Total Bilirubin 08/15/2019 0.3    • eGFR Non  Amer 08/15/2019 73    • Globulin 08/15/2019 3.2    • A/G Ratio 08/15/2019 1.3    • BUN/Creatinine Ratio 08/15/2019 17.1    • Anion Gap 08/15/2019 13.1    • Uric Acid 08/15/2019 4.3    • LDH 08/15/2019 207    • WBC 08/15/2019 9.18    • RBC 08/15/2019  4.14    • Hemoglobin 08/15/2019 12.6    • Hematocrit 08/15/2019 38.6    • MCV 08/15/2019 93.2    • MCH 08/15/2019 30.4    • MCHC 08/15/2019 32.6    • RDW 08/15/2019 13.7    • RDW-SD 08/15/2019 44.7    • MPV 08/15/2019 10.5    • Platelets 08/15/2019 274    • Neutrophil % 08/15/2019 64.8    • Lymphocyte % 08/15/2019 21.9    • Monocyte % 08/15/2019 9.4    • Eosinophil % 08/15/2019 3.4    • Basophil % 08/15/2019 0.3    • Immature Grans % 08/15/2019 0.2    • Neutrophils, Absolute 08/15/2019 5.95    • Lymphocytes, Absolute 08/15/2019 2.01    • Monocytes, Absolute 08/15/2019 0.86    • Eosinophils, Absolute 08/15/2019 0.31    • Basophils, Absolute 08/15/2019 0.03    • Immature Grans, Absolute 08/15/2019 0.02    Lab on 06/26/2019   Component Date Value   • Glucose 06/26/2019 181*   • BUN 06/26/2019 17    • Creatinine 06/26/2019 0.56*   • Sodium 06/26/2019 141    • Potassium 06/26/2019 4.6    • Chloride 06/26/2019 104    • CO2 06/26/2019 23.9    • Calcium 06/26/2019 9.9    • Total Protein 06/26/2019 6.9    • Albumin 06/26/2019 4.00    • ALT (SGPT) 06/26/2019 19    • AST (SGOT) 06/26/2019 16    • Alkaline Phosphatase 06/26/2019 73    • Total Bilirubin 06/26/2019 0.5    • eGFR Non African Amer 06/26/2019 113    • Globulin 06/26/2019 2.9    • A/G Ratio 06/26/2019 1.4    • BUN/Creatinine Ratio 06/26/2019 30.4*   • Anion Gap 06/26/2019 13.1    • Total Cholesterol 06/26/2019 184    • Triglycerides 06/26/2019 110    • HDL Cholesterol 06/26/2019 72*   • LDL Cholesterol  06/26/2019 90    • VLDL Cholesterol 06/26/2019 22    • LDL/HDL Ratio 06/26/2019 1.25    • Hemoglobin A1C 06/26/2019 7.60*   • Microalbumin, Urine 06/26/2019 9.6    • TSH 06/26/2019 2.010    • T3, Total 06/26/2019 121.0    • T4, Total 06/26/2019 7.90    • Vitamin B-12 06/26/2019 441    • 25 Hydroxy, Vitamin D 06/26/2019 48.8    • Folate 06/26/2019 8.44    • WBC 06/26/2019 7.17    • RBC 06/26/2019 4.13    • Hemoglobin 06/26/2019 12.7    • Hematocrit 06/26/2019 39.7    •  MCV 06/26/2019 96.1    • MCH 06/26/2019 30.8    • MCHC 06/26/2019 32.0    • RDW 06/26/2019 14.1    • RDW-SD 06/26/2019 50.1    • MPV 06/26/2019 11.1    • Platelets 06/26/2019 277    • Neutrophil % 06/26/2019 55.8    • Lymphocyte % 06/26/2019 30.0    • Monocyte % 06/26/2019 10.6    • Eosinophil % 06/26/2019 3.2    • Basophil % 06/26/2019 0.4    • Immature Grans % 06/26/2019 0.1    • Neutrophils, Absolute 06/26/2019 4.00    • Lymphocytes, Absolute 06/26/2019 2.15    • Monocytes, Absolute 06/26/2019 0.76    • Eosinophils, Absolute 06/26/2019 0.23    • Basophils, Absolute 06/26/2019 0.03    • Immature Grans, Absolute 06/26/2019 0.01    Lab on 05/07/2019   Component Date Value   • Glucose 05/07/2019 196*   • BUN 05/07/2019 9    • Creatinine 05/07/2019 0.60    • Sodium 05/07/2019 137    • Potassium 05/07/2019 4.1    • Chloride 05/07/2019 100    • CO2 05/07/2019 22.6    • Calcium 05/07/2019 8.5*   • Total Protein 05/07/2019 7.0    • Albumin 05/07/2019 3.99    • ALT (SGPT) 05/07/2019 18    • AST (SGOT) 05/07/2019 16    • Alkaline Phosphatase 05/07/2019 86    • Total Bilirubin 05/07/2019 0.4    • eGFR Non African Amer 05/07/2019 105    • Globulin 05/07/2019 3.0    • A/G Ratio 05/07/2019 1.3    • BUN/Creatinine Ratio 05/07/2019 15.0    • Anion Gap 05/07/2019 14.4    • e13a2 (b2a2) Transcript 05/07/2019 Comment    • e14a2 (b3a2) Transcript 05/07/2019 Comment    • E1A2 transcript 05/07/2019 Comment    • Interpretation 05/07/2019 Comment    • Director Review 05/07/2019 Comment    • Background: 05/07/2019 Comment    • Methodology: 05/07/2019 Comment    • WBC 05/07/2019 6.94    • RBC 05/07/2019 4.01    • Hemoglobin 05/07/2019 12.3    • Hematocrit 05/07/2019 36.7    • MCV 05/07/2019 91.5    • MCH 05/07/2019 30.7    • MCHC 05/07/2019 33.5    • RDW 05/07/2019 13.1    • RDW-SD 05/07/2019 42.9    • MPV 05/07/2019 10.5    • Platelets 05/07/2019 264    • Neutrophil % 05/07/2019 60.0    • Lymphocyte % 05/07/2019 26.7    • Monocyte %  2019 9.4    • Eosinophil % 2019 3.2    • Basophil % 2019 0.6    • Immature Grans % 2019 0.1    • Neutrophils, Absolute 2019 4.17    • Lymphocytes, Absolute 2019 1.85    • Monocytes, Absolute 2019 0.65    • Eosinophils, Absolute 2019 0.22    • Basophils, Absolute 2019 0.04    • Immature Grans, Absolute 2019 0.01             IMAGIN17: US ABDOMEN COMPLETE: Visualized pancreas is unremarkable. The imaged portion of the abdominal aorta is nondilated. The liver is homogeneous. There is no intrahepatic ductal dilatation or focal hepatic mass. The imaged portion of the hepatic vessels and inferior vena cava are patent. The gallbladder has been removed. The common bile duct is normal, measuring 5.7 mm. The kidneys demonstrate no evidence of hydronephrosis or solid renal mass. The spleen is homogeneous and measures 13 cm. IMPRESSION: Spleen measures 13 cm and is homogeneous.           PATHOLOGY:    05/15/17: Bone Marrow Biopsy & Aspirate                     17: BCR ABL PCR        17: BCR ABL PCR        17: BCR ABL PCR        18: BCR ABL PCR:                                                                                                         18:                                2019:            Assessment/Plan :  Aric Haro is a very pleasant 53 y.o.  female who presents in follow up appointment for further management and treatment of chronic phase chronic myelogenous leukemia.    1. Chronic Myelogenous Leukemia - CML (chronic phase)    2. Leukocytosis  3. Thrombocytosis  4. Iron deficiency  5. Healthcare Maintenance    Given the findings of her peripheral smear I was concerned for an underlying myeloproliferative disorder. Her primary provider obtained a flow cytometry and this did not show any significant abnormalities. I did also obtain a quantitative BCR ABL PCR which was positive for the b2a2/b3a2 (p210)  and e1a2 (p190) fusion gene transcripts consistent with a diagnosis for CML. Bone marrow biopsy was performed on initial diagnosis prior to starting Dasatinib treatment with results above and consistent with chronic phase CML.     To further evaluate for a myeloproliferative disorder I did also assess for JAK2 (V617F and exon 12)/MPL/CALR mutations which were negative. To assess for underlying inflammation that may be possibly contributing will also obtain an ESR and CRP, EMMANUEL, Rheumatoid factor, as well as an acute hepatitis panel and HIV test which were all negative. Thrombocytosis can also be caused by an underlying iron deficiency however iron panel and ferritin were normal. I did also order an abdominal ultrasound to assess for splenomegaly which showed spleen size to be 13.9cm.     Given the findings of the BCR ABL PCR I did start the patient on Dasatinib 100mg oral daily. Patient does have a history of pancreatitis and therefore I held on using Nilotinib. Patient however was unable to tolerate the medication secondary to worsening reflux while being off of her PPI, and experienced severe nausea, vomiting, and dehydration. She was then started on Imatinib 400mg daily and has been tolerating this well. Her only complaint today is fatigue which has significantly improved since start of treatment. I have previously advised her of Ibuprofen and Tylenol use for her myalgias howefer she denies these today. I did order baseline Echo which showed an intact EF and she currently has no cardiac symptoms or signs of fluid retention.     She has had a complete hematological response, and BCR ABL PCR has normalized since start of Gleevac and her last BCR ABL was <0.0032% this will need to continue to be monitored every three months to assess ongoing molecular response, today's level is pending. The blood counts have now stabilized therefore will monitor every 3 months given stability.    She underwent a colonoscopy February  2018 with Dr. Esparza who recommended repeat colonoscopy in 3-5 years due to polyps that were removed. She has also received her Influenza and Hepatitis A vaccine. She is currently on oral iron once daily and would like to come off of iron. She was advised to have this routinely checked.    5. ACO Quality measures  - Aric Haro does not use tobacco products.  - Discussed the patient's BMI with her. BMI is above normal parameters. I again discussed diet and exercise during her appointment as well as weight loss to help improve her ongoing medical issues such as diabetes. We also discussed calorie counting. She reports that she has lost one pound.  - Current outpatient and discharge medications have been reconciled for the patient.      I will have the patient return for follow up visit in three months with the nurse practitioner and in six months for follow up with myself. Lab orders have been placed. She understands that should she have any questions or concerns prior to her appointment she should give us a call at any time and I would be happy to see her sooner. It was a pleasure to see this patient in clinic today, thank you for allowing me to participate in the care of this patient.      Evita Serrano MD  08/15/19  3:32 PM

## 2019-08-19 LAB
INTERPRETATION: NORMAL
LAB DIRECTOR NAME PROVIDER: NORMAL
LABORATORY COMMENT REPORT: NORMAL
Lab: NORMAL
T(ABL1,BCR)B2A2/CONTROL (IS) BLD/T: 0.25 %
T(ABL1,BCR)B3A2 MAR QL: 0.24 %
T(ABL1,BCR)E1A2/CONTROL BLD/T: NORMAL %

## 2019-09-12 ENCOUNTER — TRANSCRIBE ORDERS (OUTPATIENT)
Dept: ADMINISTRATIVE | Facility: HOSPITAL | Age: 54
End: 2019-09-12

## 2019-09-12 ENCOUNTER — LAB (OUTPATIENT)
Dept: LAB | Facility: HOSPITAL | Age: 54
End: 2019-09-12

## 2019-09-12 DIAGNOSIS — C92.12 CHRONIC MYELOID LEUKEMIA IN RELAPSE (HCC): ICD-10-CM

## 2019-09-12 DIAGNOSIS — E55.9 VITAMIN D DEFICIENCY: ICD-10-CM

## 2019-09-12 DIAGNOSIS — E11.9 DIABETES MELLITUS WITHOUT COMPLICATION (HCC): ICD-10-CM

## 2019-09-12 DIAGNOSIS — D53.9 SIMPLE CHRONIC ANEMIA: ICD-10-CM

## 2019-09-12 DIAGNOSIS — E53.8 VITAMIN B 12 DEFICIENCY: ICD-10-CM

## 2019-09-12 DIAGNOSIS — C92.12 CHRONIC MYELOID LEUKEMIA IN RELAPSE (HCC): Primary | ICD-10-CM

## 2019-09-12 LAB
25(OH)D3 SERPL-MCNC: 74.7 NG/ML (ref 30–100)
ALBUMIN SERPL-MCNC: 4.4 G/DL (ref 3.5–5.2)
ALBUMIN UR-MCNC: 12.9 MG/DL
ALBUMIN/GLOB SERPL: 1.6 G/DL
ALP SERPL-CCNC: 73 U/L (ref 39–117)
ALT SERPL W P-5'-P-CCNC: 18 U/L (ref 1–33)
ANION GAP SERPL CALCULATED.3IONS-SCNC: 14.9 MMOL/L (ref 5–15)
AST SERPL-CCNC: 15 U/L (ref 1–32)
BACTERIA UR QL AUTO: ABNORMAL /HPF
BASOPHILS # BLD AUTO: 0.05 10*3/MM3 (ref 0–0.2)
BASOPHILS NFR BLD AUTO: 0.8 % (ref 0–1.5)
BILIRUB SERPL-MCNC: 0.4 MG/DL (ref 0.2–1.2)
BILIRUB UR QL STRIP: NEGATIVE
BUN BLD-MCNC: 11 MG/DL (ref 6–20)
BUN/CREAT SERPL: 16.9 (ref 7–25)
CALCIUM SPEC-SCNC: 9.5 MG/DL (ref 8.6–10.5)
CHLORIDE SERPL-SCNC: 102 MMOL/L (ref 98–107)
CHOLEST SERPL-MCNC: 162 MG/DL (ref 0–200)
CLARITY UR: ABNORMAL
CO2 SERPL-SCNC: 24.1 MMOL/L (ref 22–29)
COLOR UR: ABNORMAL
CREAT BLD-MCNC: 0.65 MG/DL (ref 0.57–1)
DEPRECATED RDW RBC AUTO: 48.9 FL (ref 37–54)
EOSINOPHIL # BLD AUTO: 0.21 10*3/MM3 (ref 0–0.4)
EOSINOPHIL NFR BLD AUTO: 3.4 % (ref 0.3–6.2)
ERYTHROCYTE [DISTWIDTH] IN BLOOD BY AUTOMATED COUNT: 13.5 % (ref 12.3–15.4)
FOLATE SERPL-MCNC: 9.98 NG/ML (ref 4.78–24.2)
GFR SERPL CREATININE-BSD FRML MDRD: 95 ML/MIN/1.73
GLOBULIN UR ELPH-MCNC: 2.7 GM/DL
GLUCOSE BLD-MCNC: 186 MG/DL (ref 65–99)
GLUCOSE UR STRIP-MCNC: NEGATIVE MG/DL
HBA1C MFR BLD: 7.65 % (ref 4.8–5.6)
HCT VFR BLD AUTO: 43.2 % (ref 34–46.6)
HDLC SERPL-MCNC: 59 MG/DL (ref 40–60)
HGB BLD-MCNC: 13.6 G/DL (ref 12–15.9)
HGB UR QL STRIP.AUTO: NEGATIVE
HYALINE CASTS UR QL AUTO: ABNORMAL /LPF
IMM GRANULOCYTES # BLD AUTO: 0.01 10*3/MM3 (ref 0–0.05)
IMM GRANULOCYTES NFR BLD AUTO: 0.2 % (ref 0–0.5)
IRON 24H UR-MRATE: 69 MCG/DL (ref 37–145)
IRON SATN MFR SERPL: 16 % (ref 20–50)
KETONES UR QL STRIP: NEGATIVE
LDLC SERPL CALC-MCNC: 77 MG/DL (ref 0–100)
LDLC/HDLC SERPL: 1.3 {RATIO}
LEUKOCYTE ESTERASE UR QL STRIP.AUTO: NEGATIVE
LYMPHOCYTES # BLD AUTO: 1.68 10*3/MM3 (ref 0.7–3.1)
LYMPHOCYTES NFR BLD AUTO: 27.4 % (ref 19.6–45.3)
MCH RBC QN AUTO: 30.6 PG (ref 26.6–33)
MCHC RBC AUTO-ENTMCNC: 31.5 G/DL (ref 31.5–35.7)
MCV RBC AUTO: 97.3 FL (ref 79–97)
MONOCYTES # BLD AUTO: 0.71 10*3/MM3 (ref 0.1–0.9)
MONOCYTES NFR BLD AUTO: 11.6 % (ref 5–12)
NEUTROPHILS # BLD AUTO: 3.48 10*3/MM3 (ref 1.7–7)
NEUTROPHILS NFR BLD AUTO: 56.6 % (ref 42.7–76)
NITRITE UR QL STRIP: NEGATIVE
NRBC BLD AUTO-RTO: 0 /100 WBC (ref 0–0.2)
PH UR STRIP.AUTO: 6 [PH] (ref 5–8)
PLATELET # BLD AUTO: 258 10*3/MM3 (ref 140–450)
PMV BLD AUTO: 11.4 FL (ref 6–12)
POTASSIUM BLD-SCNC: 4.6 MMOL/L (ref 3.5–5.2)
PROT SERPL-MCNC: 7.1 G/DL (ref 6–8.5)
PROT UR QL STRIP: ABNORMAL
RBC # BLD AUTO: 4.44 10*6/MM3 (ref 3.77–5.28)
RBC # UR: ABNORMAL /HPF
REF LAB TEST METHOD: ABNORMAL
SODIUM BLD-SCNC: 141 MMOL/L (ref 136–145)
SP GR UR STRIP: 1.03 (ref 1–1.03)
SQUAMOUS #/AREA URNS HPF: ABNORMAL /HPF
T3 SERPL-MCNC: 113 NG/DL (ref 80–200)
T4 SERPL-MCNC: 8.56 MCG/DL (ref 4.5–11.7)
TIBC SERPL-MCNC: 425 MCG/DL (ref 298–536)
TRANSFERRIN SERPL-MCNC: 285 MG/DL (ref 200–360)
TRIGL SERPL-MCNC: 132 MG/DL (ref 0–150)
TSH SERPL DL<=0.05 MIU/L-ACNC: 1.55 UIU/ML (ref 0.27–4.2)
UROBILINOGEN UR QL STRIP: ABNORMAL
VIT B12 BLD-MCNC: 625 PG/ML (ref 211–946)
VLDLC SERPL-MCNC: 26.4 MG/DL (ref 5–40)
WBC NRBC COR # BLD: 6.14 10*3/MM3 (ref 3.4–10.8)
WBC UR QL AUTO: ABNORMAL /HPF

## 2019-09-12 PROCEDURE — 81001 URINALYSIS AUTO W/SCOPE: CPT

## 2019-09-12 PROCEDURE — 80053 COMPREHEN METABOLIC PANEL: CPT

## 2019-09-12 PROCEDURE — 80061 LIPID PANEL: CPT

## 2019-09-12 PROCEDURE — 36415 COLL VENOUS BLD VENIPUNCTURE: CPT

## 2019-09-12 PROCEDURE — 84436 ASSAY OF TOTAL THYROXINE: CPT

## 2019-09-12 PROCEDURE — 84480 ASSAY TRIIODOTHYRONINE (T3): CPT

## 2019-09-12 PROCEDURE — 82607 VITAMIN B-12: CPT

## 2019-09-12 PROCEDURE — 83540 ASSAY OF IRON: CPT

## 2019-09-12 PROCEDURE — 83036 HEMOGLOBIN GLYCOSYLATED A1C: CPT

## 2019-09-12 PROCEDURE — 85025 COMPLETE CBC W/AUTO DIFF WBC: CPT

## 2019-09-12 PROCEDURE — 82746 ASSAY OF FOLIC ACID SERUM: CPT

## 2019-09-12 PROCEDURE — 84443 ASSAY THYROID STIM HORMONE: CPT

## 2019-09-12 PROCEDURE — 82043 UR ALBUMIN QUANTITATIVE: CPT

## 2019-09-12 PROCEDURE — 82306 VITAMIN D 25 HYDROXY: CPT

## 2019-09-12 PROCEDURE — 84466 ASSAY OF TRANSFERRIN: CPT

## 2019-11-07 ENCOUNTER — LAB (OUTPATIENT)
Dept: ONCOLOGY | Facility: CLINIC | Age: 54
End: 2019-11-07

## 2019-11-07 VITALS
SYSTOLIC BLOOD PRESSURE: 141 MMHG | TEMPERATURE: 98.6 F | HEART RATE: 90 BPM | RESPIRATION RATE: 20 BRPM | DIASTOLIC BLOOD PRESSURE: 82 MMHG | OXYGEN SATURATION: 98 %

## 2019-11-07 DIAGNOSIS — E61.1 IRON DEFICIENCY: ICD-10-CM

## 2019-11-07 DIAGNOSIS — C92.10 CML (CHRONIC MYELOCYTIC LEUKEMIA) (HCC): ICD-10-CM

## 2019-11-07 LAB
ALBUMIN SERPL-MCNC: 3.93 G/DL (ref 3.5–5.2)
ALBUMIN/GLOB SERPL: 1.2 G/DL
ALP SERPL-CCNC: 66 U/L (ref 39–117)
ALT SERPL W P-5'-P-CCNC: 19 U/L (ref 1–33)
ANION GAP SERPL CALCULATED.3IONS-SCNC: 11.4 MMOL/L (ref 5–15)
AST SERPL-CCNC: 20 U/L (ref 1–32)
BASOPHILS # BLD AUTO: 0.04 10*3/MM3 (ref 0–0.2)
BASOPHILS NFR BLD AUTO: 0.6 % (ref 0–1.5)
BILIRUB SERPL-MCNC: 0.4 MG/DL (ref 0.2–1.2)
BUN BLD-MCNC: 12 MG/DL (ref 6–20)
BUN/CREAT SERPL: 18.8 (ref 7–25)
CALCIUM SPEC-SCNC: 9 MG/DL (ref 8.6–10.5)
CHLORIDE SERPL-SCNC: 104 MMOL/L (ref 98–107)
CO2 SERPL-SCNC: 23.6 MMOL/L (ref 22–29)
CREAT BLD-MCNC: 0.64 MG/DL (ref 0.57–1)
DEPRECATED RDW RBC AUTO: 46.1 FL (ref 37–54)
EOSINOPHIL # BLD AUTO: 0.14 10*3/MM3 (ref 0–0.4)
EOSINOPHIL NFR BLD AUTO: 2.2 % (ref 0.3–6.2)
ERYTHROCYTE [DISTWIDTH] IN BLOOD BY AUTOMATED COUNT: 13.4 % (ref 12.3–15.4)
FERRITIN SERPL-MCNC: 142.2 NG/ML (ref 13–150)
GFR SERPL CREATININE-BSD FRML MDRD: 97 ML/MIN/1.73
GLOBULIN UR ELPH-MCNC: 3.3 GM/DL
GLUCOSE BLD-MCNC: 136 MG/DL (ref 65–99)
HCT VFR BLD AUTO: 37.8 % (ref 34–46.6)
HGB BLD-MCNC: 12.4 G/DL (ref 12–15.9)
IMM GRANULOCYTES # BLD AUTO: 0.02 10*3/MM3 (ref 0–0.05)
IMM GRANULOCYTES NFR BLD AUTO: 0.3 % (ref 0–0.5)
IRON 24H UR-MRATE: 90 MCG/DL (ref 37–145)
IRON SATN MFR SERPL: 23 % (ref 20–50)
LDH SERPL-CCNC: 200 U/L (ref 135–214)
LYMPHOCYTES # BLD AUTO: 1.56 10*3/MM3 (ref 0.7–3.1)
LYMPHOCYTES NFR BLD AUTO: 24 % (ref 19.6–45.3)
MCH RBC QN AUTO: 30.5 PG (ref 26.6–33)
MCHC RBC AUTO-ENTMCNC: 32.8 G/DL (ref 31.5–35.7)
MCV RBC AUTO: 92.9 FL (ref 79–97)
MONOCYTES # BLD AUTO: 0.66 10*3/MM3 (ref 0.1–0.9)
MONOCYTES NFR BLD AUTO: 10.2 % (ref 5–12)
NEUTROPHILS # BLD AUTO: 4.07 10*3/MM3 (ref 1.7–7)
NEUTROPHILS NFR BLD AUTO: 62.7 % (ref 42.7–76)
NRBC BLD AUTO-RTO: 0 /100 WBC (ref 0–0.2)
PLATELET # BLD AUTO: 241 10*3/MM3 (ref 140–450)
PMV BLD AUTO: 10.7 FL (ref 6–12)
POTASSIUM BLD-SCNC: 4.4 MMOL/L (ref 3.5–5.2)
PROT SERPL-MCNC: 7.2 G/DL (ref 6–8.5)
RBC # BLD AUTO: 4.07 10*6/MM3 (ref 3.77–5.28)
SODIUM BLD-SCNC: 139 MMOL/L (ref 136–145)
TIBC SERPL-MCNC: 384 MCG/DL (ref 298–536)
TRANSFERRIN SERPL-MCNC: 258 MG/DL (ref 200–360)
URATE SERPL-MCNC: 5.4 MG/DL (ref 2.4–5.7)
WBC NRBC COR # BLD: 6.49 10*3/MM3 (ref 3.4–10.8)

## 2019-11-07 PROCEDURE — 84466 ASSAY OF TRANSFERRIN: CPT | Performed by: INTERNAL MEDICINE

## 2019-11-07 PROCEDURE — 80053 COMPREHEN METABOLIC PANEL: CPT | Performed by: INTERNAL MEDICINE

## 2019-11-07 PROCEDURE — 84550 ASSAY OF BLOOD/URIC ACID: CPT | Performed by: INTERNAL MEDICINE

## 2019-11-07 PROCEDURE — 36415 COLL VENOUS BLD VENIPUNCTURE: CPT

## 2019-11-07 PROCEDURE — 85025 COMPLETE CBC W/AUTO DIFF WBC: CPT | Performed by: INTERNAL MEDICINE

## 2019-11-07 PROCEDURE — 81206 BCR/ABL1 GENE MAJOR BP: CPT

## 2019-11-07 PROCEDURE — 81207 BCR/ABL1 GENE MINOR BP: CPT

## 2019-11-07 PROCEDURE — 82728 ASSAY OF FERRITIN: CPT | Performed by: INTERNAL MEDICINE

## 2019-11-07 PROCEDURE — 83615 LACTATE (LD) (LDH) ENZYME: CPT | Performed by: INTERNAL MEDICINE

## 2019-11-07 PROCEDURE — 83540 ASSAY OF IRON: CPT | Performed by: INTERNAL MEDICINE

## 2019-11-26 ENCOUNTER — OFFICE VISIT (OUTPATIENT)
Dept: ONCOLOGY | Facility: CLINIC | Age: 54
End: 2019-11-26

## 2019-11-26 VITALS
DIASTOLIC BLOOD PRESSURE: 75 MMHG | SYSTOLIC BLOOD PRESSURE: 156 MMHG | RESPIRATION RATE: 20 BRPM | WEIGHT: 272 LBS | TEMPERATURE: 97.4 F | OXYGEN SATURATION: 99 % | BODY MASS INDEX: 43.9 KG/M2 | HEART RATE: 97 BPM

## 2019-11-26 DIAGNOSIS — D69.6 THROMBOCYTOPENIA (HCC): ICD-10-CM

## 2019-11-26 DIAGNOSIS — E61.1 IRON DEFICIENCY: ICD-10-CM

## 2019-11-26 DIAGNOSIS — C92.10 CML (CHRONIC MYELOCYTIC LEUKEMIA) (HCC): Primary | ICD-10-CM

## 2019-11-26 PROCEDURE — 99214 OFFICE O/P EST MOD 30 MIN: CPT | Performed by: INTERNAL MEDICINE

## 2019-11-26 NOTE — PROGRESS NOTES
Aric Haro  [id number]  1965  11/26/2019      Referring Provider:   EILEEN Andrade    Reason for Follow Up:   1. Chronic Phase - Chronic Myelogenous Leukemia  2. Leukocytosis  3. Thrombocytosis     Chief Complaint:  Fatigue    History of Present Illness:  Aric Haro is a very pleasant 54 y.o.  female who presents in follow up appointment for further management and treatment of chronic phase chronic myelogenous leukemia (CML).    Ms. Haro began experiencing extreme fatigue where she was sleeping multiple hours during the day when she initially began following with me in April 2017. 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 On laboratory evaluation she was found to have a total white blood cell count of 22.35 and a platelet count 470 thousand. In March 2016 she was also noted to have a leukocytosis (although not as elevated) as well as a thrombocytosis, however reports that these were likely secondary to other medical issues at the time her blood work was drawn. She denied of any significant weight loss however has been gradually losing weight since gastric sleeve procedure. She denied of any fevers or frequent infections but did note fluctuating neck lymphadenopathy as well as early satiety and taste in change. She denied of any abnormal or spontaneous bleeding. I did obtain a BCR ABL PCR to further assess her leukocytosis and thrombocytosis and she was positive for the b2a2/b3a2 (p210) and e1a2 (p190) fusion gene transcripts consistent with a diagnosis for CML. After reviewing the toxicities of each tyrosine kinase inhibitor we decided to start Dasatinib treatment. She had a difficult time tolerating the Dasatinib as she was unable to take her PPI with this medication. Since she had to be taken off of her PPI she had worsening reflux symptoms as well  as severe nausea, vomiting, dehydration, and headaches during this period. Given the toxicities she was experiencing she was taken off Dasatinib and this was changed to Gleevec treatment. Since starting Gleevac 400mg daily she has tolerated this much better without significant side effects. The only side effect that she has been able to see is intermittent pedal edema along with some hand swelling and muscle cramps.     Treatment History:  Started Dasatinib on 05/15/17 - experienced nausea, severe reflux, headaches while on medication and had taken 9 doses. This was discontinued due to intolerability and she was thereafter started on Imatinib.   Started Imatinib on 5/26/17    Interim History:  Since the start of Dasatinib and later Gleevec she has had a good response and normalization in her complete blood counts with improvement in her BCR ABL PCR. She denies of any fevers, infections, lymphadenopathy, chest pain, dyspnea, nausea, edema. She has been trying to lose weight since her last visit.        The following portions of the patient's history were reviewed and updated as appropriate: allergies, current medications, past family history, past medical history, past social history, past surgical history and problem list.      No Known Allergies    Past Medical History:   Diagnosis Date   • Anemia    • Cancer (CMS/HCC)     leukemia   • Diabetes mellitus (CMS/HCC)    • Elevated cholesterol    • GERD (gastroesophageal reflux disease)    • Hypertension    • PONV (postoperative nausea and vomiting)        Past Surgical History:   Procedure Laterality Date   • BLEPHAROPLASTY Bilateral 6/22/2018    Procedure: BLEPHAROPLASTY BOTH EYES UPPER EYELIDS (PT REQUESTED TERESITA WILKES;  Surgeon: Chris Haile MD;  Location: [facility];  Service: Ophthalmology   • COLONOSCOPY N/A 3/27/2018    Procedure: COLONOSCOPY FOR SCREENING  CPTCODE:[id number];  Surgeon: Drew Esparza III, MD;  Location: [facility] OR;  Service:  Gastroenterology   • SINUS SURGERY     • SINUS SURGERY     • SLEEVE GASTROPLASTY         Social History     Socioeconomic History   • Marital status:      Spouse name: Not on file   • Number of children: Not on file   • Years of education: Not on file   • Highest education level: Not on file   Tobacco Use   • Smoking status: Former Smoker     Packs/day: 0.25     Years: 4.00     Pack years: 1.00   • Smokeless tobacco: Never Used   Substance and Sexual Activity   • Alcohol use: No   • Drug use: No   • Sexual activity: Defer   She lives with her daughter. She denies of any alcohol or illicit drug use. Previous social tobacco use more then 20 years ago.    Family History   Problem Relation Age of Onset   • Anemia Mother    • Hypertension Mother    • Cancer Mother    • COPD Father    • Heart disease Father    • Breast cancer Neg Hx    Maternal Uncle - CLL  Mother - Malignancy of [place]      Current Outpatient Medications:   •  baclofen (LIORESAL) 20 MG tablet, Take 1 tablet by mouth 3 (Three) Times a Day., Disp: 90 tablet, Rfl: 4  •  celecoxib (CELEBREX) 200 MG capsule, Take 1 capsule by mouth 2 (Two) Times a Day. (Patient taking differently: Take 200 mg by mouth 2 (Two) Times a Day. Takes 1/2 tablet once daily), Disp: 180 capsule, Rfl: 3  •  celecoxib (CeleBREX) 200 MG capsule, Take 1 capsule by mouth 2 (Two) Times a Day., Disp: 180 capsule, Rfl: 3  •  cyanocobalamin 1000 MCG/ML injection, Inject 1 mL intramuscularly monthly., Disp: 1 mL, Rfl: 1  •  cyanocobalamin 1000 MCG/ML injection, Inject 1 mL into the appropriate muscle as directed by prescriber weekly for 8 weeks then monthly thereafter., Disp: 30 mL, Rfl: 4  •  DULoxetine (CYMBALTA) 60 MG capsule, Take 1 capsule by mouth Daily., Disp: 90 capsule, Rfl: 3  •  fenofibrate (TRICOR) 145 MG tablet, Take 1 tablet by mouth Daily., Disp: 90 tablet, Rfl: 3  •  fenofibrate (TRICOR) 145 MG tablet, Take 1 tablet by mouth Daily., Disp: 90 tablet, Rfl: 3  •   ferrous sulfate 325 (65 FE) MG EC tablet, Take 1 tablet by mouth Daily., Disp: 30 tablet, Rfl: 3  •  ferrous sulfate 325 (65 FE) MG EC tablet, Take 1 tablet by mouth Daily., Disp: 90 tablet, Rfl: 3  •  furosemide (LASIX) 40 MG tablet, Take 1 tablet by mouth Daily., Disp: 90 tablet, Rfl: 3  •  furosemide (LASIX) 40 MG tablet, Take 1 tablet by mouth Daily., Disp: 90 tablet, Rfl: 3  •  furosemide (LASIX) 40 MG tablet, Take 1 tablet by mouth Daily., Disp: 90 tablet, Rfl: 3  •  glucose blood test strip, USE 1 STRIP 2 TIMES DAILY AS DIRECTED, Disp: 60 enema, Rfl: 6  •  glucose blood test strip, USE 1 STRIP 2 TIMES DAILY AS DIRECTED, Disp: 180 each, Rfl: 6  •  HYDROcodone-acetaminophen (NORCO)  MG per tablet, Take 1 tablet by mouth every 4 - 6 hours as needed for severe pain., Disp: 20 tablet, Rfl: 0  •  ibuprofen (ADVIL,MOTRIN) 800 MG tablet, Take 1 tablet by mouth 3 (Three) Times a Day As Needed., Disp: 270 tablet, Rfl: 3  •  ibuprofen (ADVIL,MOTRIN) 800 MG tablet, Take 1 tablet by mouth 3 (Three) Times a Day As Needed with food., Disp: 270 tablet, Rfl: 3  •  imatinib (GLEEVEC) 400 MG chemo tablet, Take 1 tablet by mouth Daily., Disp: 30 tablet, Rfl: 5  •  Insulin Glargine (LANTUS SOLOSTAR) 100 UNIT/ML injection pen, Inject 45 units subcutaneously 2 times daily, Disp: 30 mL, Rfl: 6  •  Insulin Glargine (LANTUS SOLOSTAR) 100 UNIT/ML injection pen, Inject 45 units Subcutaneously two times daily, Disp: 30 mL, Rfl: 6  •  Insulin Glargine (LANTUS SOLOSTAR) 100 UNIT/ML injection pen, Inject 45 Units subcutaneously 2 times daily., Disp: 30 mL, Rfl: 6  •  LANTUS SOLOSTAR 100 UNIT/ML injection pen, Inject 45 Units under the skin 2 (Two) Times a Day., Disp: 30 mL, Rfl: 6  •  LANTUS SOLOSTAR 100 UNIT/ML injection pen, Inject 45 Units under the skin 2 (Two) Times a Day., Disp: 30 mL, Rfl: 6  •  LANTUS SOLOSTAR 100 UNIT/ML injection pen, Inject 45 Units under the skin into the appropriate area as directed 2 (Two) Times a Day.,  "Disp: 36 mL, Rfl: 6  •  loratadine-pseudoephedrine (CLARITIN-D 12 HOUR) 5-120 MG per 12 hr tablet, Take 1 tablet by mouth two times a day, Disp: 30 tablet, Rfl: 0  •  loratadine-pseudoephedrine (CLARITIN-D 12-hour) 5-120 MG per 12 hr tablet, Take 1 tablet by mouth Every 12 (Twelve) Hours As Needed., Disp: 30 tablet, Rfl: 1  •  mupirocin (BACTROBAN) 2 % ointment, 1 application into each nostril as directed by provider 2 (Two) Times a Day., Disp: 22 g, Rfl: 2  •  pantoprazole (PROTONIX) 40 MG EC tablet, Take 1 tablet by mouth Daily., Disp: 90 tablet, Rfl: 3  •  promethazine (PHENERGAN) 25 MG tablet, Take 1 tablet by mouth every 4-6 hours as needed., Disp: 20 tablet, Rfl: 0  •  SUMAtriptan (IMITREX) 100 MG tablet, Take 1 tablet by mouth after onset of migraine. May repeat after 2 hours if headache returns. Do not exceed 200mg in 24 hours., Disp: 10 tablet, Rfl: 2  •  SUMAtriptan (IMITREX) 100 MG tablet, Take 1 tablet by mouth after onset of migraine; may repeat once, Disp: 27 tablet, Rfl: 2  •  Syringe/Needle, Disp, 25G X 1\" 3 ML misc, USE 1 AS DIRECTED WITH B12, Disp: 1 each, Rfl: 7  •  triamcinolone (KENALOG) 0.1 % cream, Apply topically to affected area(s) 2 Times a Day., Disp: 30 g, Rfl: 0  •  triamcinolone (KENALOG) 0.1 % cream, Apply to affected area(s) 2 times per day, Disp: 30 g, Rfl: 0  •  triamcinolone (KENALOG) 0.1 % cream, Apply a Thin Layer to Affected Area Topically Twice Daily, Disp: 80 g, Rfl: 5  •  triamcinolone (KENALOG) 0.1 % cream, Apply a Thin Layer to Affected Area Topically Twice Daily, Disp: 240 g, Rfl: 5  •  TRUETRACK TEST test strip, use to test blood sugar 8 times daily, Disp: 200 each, Rfl: 5  •  vitamin D (ERGOCALCIFEROL) 55319 units capsule capsule, Take 1 capsule by mouth once weekly., Disp: 12 capsule, Rfl: 3  •  vitamin D (ERGOCALCIFEROL) 90468 units capsule capsule, Take 1 capsule by mouth 2 (Two) Times a Week., Disp: 24 capsule, Rfl: 0  •  zolpidem (AMBIEN) 10 MG tablet, Take 1 tablet " by mouth Every Night., Disp: 90 tablet, Rfl: 0  •  zolpidem (AMBIEN) 10 MG tablet, Take 1 tablet by mouth Every Night at bedtime for insomnia, Disp: 30 tablet, Rfl: 1  •  cholecalciferol (VITAMIN D3) 40878 units capsule, Take 1 capsule by mouth 2 (Two) Times a Week., Disp: 24 capsule, Rfl: 0  •  Cholecalciferol (VITAMIN D3) 32870 units capsule, Take 1 capsule by mouth 2 (Two) Times a Week., Disp: 24 capsule, Rfl: 3  •  fenofibrate 160 MG tablet, Take 1 tablet by mouth Daily., Disp: 90 tablet, Rfl: 3  •  fluconazole (DIFLUCAN) 200 MG tablet, Take 1 tablet by mouth Daily., Disp: 30 tablet, Rfl: 1        Review of Systems  Pertinent positives are listed as per history of present of illness, all other systems reviewed and are negative.        Physical Exam:  Vital Signs: These were reviewed and listed as per patient’s electronic medical chart  Vitals:    11/26/19 0910   BP: 156/75   Pulse: 97   Resp: 20   Temp: 97.4 °F (36.3 °C)   SpO2: 99%     General: Awake, alert and oriented, in no distress, obese  HEENT: Head is atraumatic, normocephalic, extraocular movements full, oropharynx clear, no scleral icterus, pink moist mucous membranes,   Neck: supple, no jvd, lymphadenopathy or masses  Cardiovascular: regular rhythm, tachycardic, without murmurs, rubs or gallops  Pulmonary: non-labored, clear to auscultation bilaterally, no wheezing  Abdomen: soft, non-tender, non-distended, normal active bowel sounds present  Extremities: No clubbing, cyanosis or edema  Lymph: Positive right cervical which patient reports is improving, no supraclavicular adenopathy   Neurologic: Mental status as above, alert, awake and oriented, grossly non-focal exam  Skin: warm, dry, intact  Patient was examined on 11/26/19 and changes are reflected and noted above.        Labs / Studies:    Lab on 11/07/2019   Component Date Value   • Glucose 11/07/2019 136*   • BUN 11/07/2019 12    • Creatinine 11/07/2019 0.64    • Sodium 11/07/2019 139    •  Potassium 11/07/2019 4.4    • Chloride 11/07/2019 104    • CO2 11/07/2019 23.6    • Calcium 11/07/2019 9.0    • Total Protein 11/07/2019 7.2    • Albumin 11/07/2019 3.93    • ALT (SGPT) 11/07/2019 19    • AST (SGOT) 11/07/2019 20    • Alkaline Phosphatase 11/07/2019 66    • Total Bilirubin 11/07/2019 0.4    • eGFR Non African Amer 11/07/2019 97    • Globulin 11/07/2019 3.3    • A/G Ratio 11/07/2019 1.2    • BUN/Creatinine Ratio 11/07/2019 18.8    • Anion Gap 11/07/2019 11.4    • LDH 11/07/2019 200    • Uric Acid 11/07/2019 5.4    • e13a2 (b2a2) Transcript 11/07/2019 Comment    • e14a2 (b3a2) Transcript 11/07/2019 Comment    • E1A2 transcript 11/07/2019 Comment    • Interpretation 11/07/2019 Comment    • Director Review 11/07/2019 Comment    • Background: 11/07/2019 Comment    • Methodology: 11/07/2019 Comment    • Iron 11/07/2019 90    • Iron Saturation 11/07/2019 23    • Transferrin 11/07/2019 258    • TIBC 11/07/2019 384    • Ferritin 11/07/2019 142.20    • WBC 11/07/2019 6.49    • RBC 11/07/2019 4.07    • Hemoglobin 11/07/2019 12.4    • Hematocrit 11/07/2019 37.8    • MCV 11/07/2019 92.9    • MCH 11/07/2019 30.5    • MCHC 11/07/2019 32.8    • RDW 11/07/2019 13.4    • RDW-SD 11/07/2019 46.1    • MPV 11/07/2019 10.7    • Platelets 11/07/2019 241    • Neutrophil % 11/07/2019 62.7    • Lymphocyte % 11/07/2019 24.0    • Monocyte % 11/07/2019 10.2    • Eosinophil % 11/07/2019 2.2    • Basophil % 11/07/2019 0.6    • Immature Grans % 11/07/2019 0.3    • Neutrophils, Absolute 11/07/2019 4.07    • Lymphocytes, Absolute 11/07/2019 1.56    • Monocytes, Absolute 11/07/2019 0.66    • Eosinophils, Absolute 11/07/2019 0.14    • Basophils, Absolute 11/07/2019 0.04    • Immature Grans, Absolute 11/07/2019 0.02    • nRBC 11/07/2019 0.0    Lab on 09/12/2019   Component Date Value   • T3, Total 09/12/2019 113.0    • T4, Total 09/12/2019 8.56    • TSH 09/12/2019 1.550    • Vitamin B-12 09/12/2019 625    • Folate 09/12/2019 9.98    •  Iron 09/12/2019 69    • Iron Saturation 09/12/2019 16*   • Transferrin 09/12/2019 285    • TIBC 09/12/2019 425    • Hemoglobin A1C 09/12/2019 7.65*   • 25 Hydroxy, Vitamin D 09/12/2019 74.7    • Microalbumin, Urine 09/12/2019 12.9    • Total Cholesterol 09/12/2019 162    • Triglycerides 09/12/2019 132    • HDL Cholesterol 09/12/2019 59    • LDL Cholesterol  09/12/2019 77    • VLDL Cholesterol 09/12/2019 26.4    • LDL/HDL Ratio 09/12/2019 1.30    • Glucose 09/12/2019 186*   • BUN 09/12/2019 11    • Creatinine 09/12/2019 0.65    • Sodium 09/12/2019 141    • Potassium 09/12/2019 4.6    • Chloride 09/12/2019 102    • CO2 09/12/2019 24.1    • Calcium 09/12/2019 9.5    • Total Protein 09/12/2019 7.1    • Albumin 09/12/2019 4.40    • ALT (SGPT) 09/12/2019 18    • AST (SGOT) 09/12/2019 15    • Alkaline Phosphatase 09/12/2019 73    • Total Bilirubin 09/12/2019 0.4    • eGFR Non African Amer 09/12/2019 95    • Globulin 09/12/2019 2.7    • A/G Ratio 09/12/2019 1.6    • BUN/Creatinine Ratio 09/12/2019 16.9    • Anion Gap 09/12/2019 14.9    • Color, UA 09/12/2019 Dark Yellow*   • Appearance, UA 09/12/2019 Cloudy*   • pH, UA 09/12/2019 6.0    • Specific Gravity, UA 09/12/2019 1.028    • Glucose, UA 09/12/2019 Negative    • Ketones, UA 09/12/2019 Negative    • Bilirubin, UA 09/12/2019 Negative    • Blood, UA 09/12/2019 Negative    • Protein, UA 09/12/2019 30 mg/dL (1+)*   • Leuk Esterase, UA 09/12/2019 Negative    • Nitrite, UA 09/12/2019 Negative    • Urobilinogen, UA 09/12/2019 1.0 E.U./dL    • RBC, UA 09/12/2019 None Seen    • WBC, UA 09/12/2019 0-2    • Bacteria, UA 09/12/2019 2+*   • Squamous Epithelial Cell* 09/12/2019 7-12*   • Hyaline Casts, UA 09/12/2019 None Seen    • Methodology 09/12/2019 Manual Light Microscopy    • WBC 09/12/2019 6.14    • RBC 09/12/2019 4.44    • Hemoglobin 09/12/2019 13.6    • Hematocrit 09/12/2019 43.2    • MCV 09/12/2019 97.3*   • MCH 09/12/2019 30.6    • MCHC 09/12/2019 31.5    • RDW 09/12/2019  13.5    • RDW-SD 09/12/2019 48.9    • MPV 09/12/2019 11.4    • Platelets 09/12/2019 258    • Neutrophil % 09/12/2019 56.6    • Lymphocyte % 09/12/2019 27.4    • Monocyte % 09/12/2019 11.6    • Eosinophil % 09/12/2019 3.4    • Basophil % 09/12/2019 0.8    • Immature Grans % 09/12/2019 0.2    • Neutrophils, Absolute 09/12/2019 3.48    • Lymphocytes, Absolute 09/12/2019 1.68    • Monocytes, Absolute 09/12/2019 0.71    • Eosinophils, Absolute 09/12/2019 0.21    • Basophils, Absolute 09/12/2019 0.05    • Immature Grans, Absolute 09/12/2019 0.01    • nRBC 09/12/2019 0.0    Office Visit on 08/15/2019   Component Date Value   • Iron 08/15/2019 58    • Iron Saturation 08/15/2019 14*   • Transferrin 08/15/2019 280    • TIBC 08/15/2019 417    • Ferritin 08/15/2019 145.20    Lab on 08/15/2019   Component Date Value   • Glucose 08/15/2019 194*   • BUN 08/15/2019 14    • Creatinine 08/15/2019 0.82    • Sodium 08/15/2019 140    • Potassium 08/15/2019 4.6    • Chloride 08/15/2019 102    • CO2 08/15/2019 24.9    • Calcium 08/15/2019 9.6    • Total Protein 08/15/2019 7.2    • Albumin 08/15/2019 4.05    • ALT (SGPT) 08/15/2019 17    • AST (SGOT) 08/15/2019 15    • Alkaline Phosphatase 08/15/2019 84    • Total Bilirubin 08/15/2019 0.3    • eGFR Non  Amer 08/15/2019 73    • Globulin 08/15/2019 3.2    • A/G Ratio 08/15/2019 1.3    • BUN/Creatinine Ratio 08/15/2019 17.1    • Anion Gap 08/15/2019 13.1    • e13a2 (b2a2) Transcript 08/15/2019 0.2522    • e14a2 (b3a2) Transcript 08/15/2019 0.2438    • E1A2 transcript 08/15/2019 Comment    • Interpretation 08/15/2019 Comment    • Director Review 08/15/2019 Comment    • Background: 08/15/2019 Comment    • Methodology: 08/15/2019 Comment    • Uric Acid 08/15/2019 4.3    • LDH 08/15/2019 207    • WBC 08/15/2019 9.18    • RBC 08/15/2019 4.14    • Hemoglobin 08/15/2019 12.6    • Hematocrit 08/15/2019 38.6    • MCV 08/15/2019 93.2    • MCH 08/15/2019 30.4    • MCHC 08/15/2019 32.6    • RDW  08/15/2019 13.7    • RDW-SD 08/15/2019 44.7    • MPV 08/15/2019 10.5    • Platelets 08/15/2019 274    • Neutrophil % 08/15/2019 64.8    • Lymphocyte % 08/15/2019 21.9    • Monocyte % 08/15/2019 9.4    • Eosinophil % 08/15/2019 3.4    • Basophil % 08/15/2019 0.3    • Immature Grans % 08/15/2019 0.2    • Neutrophils, Absolute 08/15/2019 5.95    • Lymphocytes, Absolute 08/15/2019 2.01    • Monocytes, Absolute 08/15/2019 0.86    • Eosinophils, Absolute 08/15/2019 0.31    • Basophils, Absolute 08/15/2019 0.03    • Immature Grans, Absolute 08/15/2019 0.02    Lab on 06/26/2019   Component Date Value   • Glucose 06/26/2019 181*   • BUN 06/26/2019 17    • Creatinine 06/26/2019 0.56*   • Sodium 06/26/2019 141    • Potassium 06/26/2019 4.6    • Chloride 06/26/2019 104    • CO2 06/26/2019 23.9    • Calcium 06/26/2019 9.9    • Total Protein 06/26/2019 6.9    • Albumin 06/26/2019 4.00    • ALT (SGPT) 06/26/2019 19    • AST (SGOT) 06/26/2019 16    • Alkaline Phosphatase 06/26/2019 73    • Total Bilirubin 06/26/2019 0.5    • eGFR Non African Amer 06/26/2019 113    • Globulin 06/26/2019 2.9    • A/G Ratio 06/26/2019 1.4    • BUN/Creatinine Ratio 06/26/2019 30.4*   • Anion Gap 06/26/2019 13.1    • Total Cholesterol 06/26/2019 184    • Triglycerides 06/26/2019 110    • HDL Cholesterol 06/26/2019 72*   • LDL Cholesterol  06/26/2019 90    • VLDL Cholesterol 06/26/2019 22    • LDL/HDL Ratio 06/26/2019 1.25    • Hemoglobin A1C 06/26/2019 7.60*   • Microalbumin, Urine 06/26/2019 9.6    • TSH 06/26/2019 2.010    • T3, Total 06/26/2019 121.0    • T4, Total 06/26/2019 7.90    • Vitamin B-12 06/26/2019 441    • 25 Hydroxy, Vitamin D 06/26/2019 48.8    • Folate 06/26/2019 8.44    • WBC 06/26/2019 7.17    • RBC 06/26/2019 4.13    • Hemoglobin 06/26/2019 12.7    • Hematocrit 06/26/2019 39.7    • MCV 06/26/2019 96.1    • MCH 06/26/2019 30.8    • MCHC 06/26/2019 32.0    • RDW 06/26/2019 14.1    • RDW-SD 06/26/2019 50.1    • MPV 06/26/2019 11.1    •  Platelets 2019 277    • Neutrophil % 2019 55.8    • Lymphocyte % 2019 30.0    • Monocyte % 2019 10.6    • Eosinophil % 2019 3.2    • Basophil % 2019 0.4    • Immature Grans % 2019 0.1    • Neutrophils, Absolute 2019 4.00    • Lymphocytes, Absolute 2019 2.15    • Monocytes, Absolute 2019 0.76    • Eosinophils, Absolute 2019 0.23    • Basophils, Absolute 2019 0.03    • Immature Grans, Absolute 2019 0.01             [id number]: US ABDOMEN COMPLETE: Visualized pancreas is unremarkable. The imaged portion of the abdominal aorta is nondilated. The liver is homogeneous. There is no intrahepatic ductal dilatation or focal hepatic mass. The imaged portion of the hepatic vessels and inferior vena cava are patent. The gallbladder has been removed. The common bile duct is normal, measuring 5.7 mm. The kidneys demonstrate no evidence of hydronephrosis or solid renal mass. The spleen is homogeneous and measures 13 cm. IMPRESSION: Spleen measures 13 cm and is homogeneous.           PATHOLOGY:    05/15/17: Bone Marrow Biopsy & Aspirate                     17: BCR ABL PCR        17: BCR ABL PCR        17: BCR ABL PCR        18: BCR ABL PCR:                                                                                                         18:                                2019:     08/15/19:       19:              Assessment/Plan :  Aric Haro is a very pleasant 54 y.o.  female who presents in follow up appointment for further management and treatment of chronic phase chronic myelogenous leukemia.    1. Chronic Myelogenous Leukemia - CML (chronic phase)    2. Leukocytosis  3. Thrombocytosis  4. Iron deficiency  5. Healthcare Maintenance    Given the findings of her peripheral smear I was concerned for an underlying myeloproliferative disorder. Her primary provider obtained a flow cytometry  and this did not show any significant abnormalities. I did also obtain a quantitative BCR ABL PCR which was positive for the b2a2/b3a2 (p210) and e1a2 (p190) fusion gene transcripts consistent with a diagnosis for CML. Bone marrow biopsy was performed on initial diagnosis prior to starting Dasatinib treatment with results above and consistent with chronic phase CML.     To further evaluate for a myeloproliferative disorder I did also assess for JAK2 (V617F and exon 12)/MPL/CALR mutations which were negative. To assess for underlying inflammation that may be possibly contributing will also obtain an ESR and CRP, EMMANUEL, Rheumatoid factor, as well as an acute hepatitis panel and HIV test which were all negative. Thrombocytosis can also be caused by an underlying iron deficiency however iron panel and ferritin were normal. I did also order an abdominal ultrasound to assess for splenomegaly which showed spleen size to be 13.9cm.     Given the findings of the BCR ABL PCR I did start the patient on Dasatinib 100mg oral daily. Patient does have a history of pancreatitis and therefore I held on using Nilotinib. Patient however was unable to tolerate the medication secondary to worsening reflux while being off of her PPI, and experienced severe nausea, vomiting, and dehydration. She was then started on Imatinib 400mg daily and has been tolerating this well. I have previously advised her of Ibuprofen and Tylenol use for her myalgias however she denies these today. I did order baseline Echo which showed an intact EF and she currently has no cardiac symptoms or signs of fluid retention.     She has had a complete hematological response, and BCR ABL PCR has normalized since start of Gleevac and her last BCR ABL was <0.0032% this will need to continue to be monitored every three months to assess ongoing molecular response, today's level is has normalized since her last lab draw which did show some mild elevation. The blood counts  have now stabilized therefore will monitor every 3 months given stability.    She underwent a colonoscopy February 2018 with Dr. Esparza who recommended repeat colonoscopy in 3-5 years due to polyps that were removed. She has also received her Influenza and Hepatitis A vaccine. She is currently on oral iron once daily and would like to come off of iron. She was advised to have this routinely checked.    5. ACO Quality measures  - Aric Haro does not use tobacco products.  - Discussed the patient's BMI with her. BMI is above normal parameters. She has lost some weight since her last visit and she has been changing her dietary habits and eating healthier.  -  Aric Haro received 2019 flu vaccine.  -  Aric Haro reports a pain score of 0. Given her pain assessment as noted, no further treatment or management is required and she will continue to follow with her primary provider and will again reassess at next visit.  -  Current outpatient and discharge medications have been reconciled for the patient.  Reviewed by: Evita Serrano MD      I will have the patient return for follow up visit in three months with labs one week prior. Lab orders have been placed. She understands that should she have any questions or concerns prior to her appointment she should give us a call at any time and I would be happy to see her sooner. It was a pleasure to see this patient in clinic today, thank you for allowing me to participate in the care of this patient.      Evita Serrano MD  11/26/19  12:55 PM

## 2019-12-11 ENCOUNTER — TRANSCRIBE ORDERS (OUTPATIENT)
Dept: ADMINISTRATIVE | Facility: HOSPITAL | Age: 54
End: 2019-12-11

## 2019-12-11 ENCOUNTER — HOSPITAL ENCOUNTER (OUTPATIENT)
Dept: GENERAL RADIOLOGY | Facility: HOSPITAL | Age: 54
Discharge: HOME | End: 2019-12-11
Admitting: NURSE PRACTITIONER

## 2019-12-11 DIAGNOSIS — M50.90 CERVICAL BACK PAIN WITH EVIDENCE OF DISC DISEASE: Primary | ICD-10-CM

## 2019-12-11 PROCEDURE — 72050 X-RAY EXAM NECK SPINE 4/5VWS: CPT | Performed by: RADIOLOGY

## 2019-12-11 PROCEDURE — 72050 X-RAY EXAM NECK SPINE 4/5VWS: CPT

## 2020-01-23 ENCOUNTER — SPECIALTY PHARMACY (OUTPATIENT)
Dept: PHARMACY | Facility: HOSPITAL | Age: 55
End: 2020-01-23

## 2020-01-23 DIAGNOSIS — C92.10 CML (CHRONIC MYELOCYTIC LEUKEMIA) (HCC): ICD-10-CM

## 2020-01-23 RX ORDER — IMATINIB MESYLATE 400 MG/1
400 TABLET, FILM COATED ORAL DAILY
Qty: 30 TABLET | Refills: 5 | Status: SHIPPED | OUTPATIENT
Start: 2020-01-23 | End: 2020-08-26 | Stop reason: SDUPTHER

## 2020-02-26 ENCOUNTER — LAB (OUTPATIENT)
Dept: ONCOLOGY | Facility: CLINIC | Age: 55
End: 2020-02-26

## 2020-02-26 VITALS
TEMPERATURE: 97.9 F | HEART RATE: 108 BPM | SYSTOLIC BLOOD PRESSURE: 131 MMHG | DIASTOLIC BLOOD PRESSURE: 83 MMHG | RESPIRATION RATE: 18 BRPM | OXYGEN SATURATION: 98 %

## 2020-02-26 DIAGNOSIS — D69.6 THROMBOCYTOPENIA (HCC): ICD-10-CM

## 2020-02-26 DIAGNOSIS — C92.10 CML (CHRONIC MYELOCYTIC LEUKEMIA) (HCC): ICD-10-CM

## 2020-02-26 DIAGNOSIS — E61.1 IRON DEFICIENCY: ICD-10-CM

## 2020-02-26 LAB
ALBUMIN SERPL-MCNC: 3.91 G/DL (ref 3.5–5.2)
ALBUMIN/GLOB SERPL: 1.2 G/DL
ALP SERPL-CCNC: 69 U/L (ref 39–117)
ALT SERPL W P-5'-P-CCNC: 20 U/L (ref 1–33)
ANION GAP SERPL CALCULATED.3IONS-SCNC: 14.1 MMOL/L (ref 5–15)
AST SERPL-CCNC: 19 U/L (ref 1–32)
BASOPHILS # BLD AUTO: 0.07 10*3/MM3 (ref 0–0.2)
BASOPHILS NFR BLD AUTO: 1.1 % (ref 0–1.5)
BILIRUB SERPL-MCNC: 0.4 MG/DL (ref 0.2–1.2)
BUN BLD-MCNC: 11 MG/DL (ref 6–20)
BUN/CREAT SERPL: 14.7 (ref 7–25)
CALCIUM SPEC-SCNC: 9.2 MG/DL (ref 8.6–10.5)
CHLORIDE SERPL-SCNC: 103 MMOL/L (ref 98–107)
CO2 SERPL-SCNC: 24.9 MMOL/L (ref 22–29)
CREAT BLD-MCNC: 0.75 MG/DL (ref 0.57–1)
DEPRECATED RDW RBC AUTO: 45.5 FL (ref 37–54)
EOSINOPHIL # BLD AUTO: 0.12 10*3/MM3 (ref 0–0.4)
EOSINOPHIL NFR BLD AUTO: 1.8 % (ref 0.3–6.2)
ERYTHROCYTE [DISTWIDTH] IN BLOOD BY AUTOMATED COUNT: 13.2 % (ref 12.3–15.4)
FERRITIN SERPL-MCNC: 182.7 NG/ML (ref 13–150)
GFR SERPL CREATININE-BSD FRML MDRD: 81 ML/MIN/1.73
GLOBULIN UR ELPH-MCNC: 3.2 GM/DL
GLUCOSE BLD-MCNC: 148 MG/DL (ref 65–99)
HCT VFR BLD AUTO: 40 % (ref 34–46.6)
HGB BLD-MCNC: 12.9 G/DL (ref 12–15.9)
IMM GRANULOCYTES # BLD AUTO: 0.02 10*3/MM3 (ref 0–0.05)
IMM GRANULOCYTES NFR BLD AUTO: 0.3 % (ref 0–0.5)
IRON 24H UR-MRATE: 71 MCG/DL (ref 37–145)
IRON SATN MFR SERPL: 18 % (ref 20–50)
LDH SERPL-CCNC: 210 U/L (ref 135–214)
LYMPHOCYTES # BLD AUTO: 1.73 10*3/MM3 (ref 0.7–3.1)
LYMPHOCYTES NFR BLD AUTO: 26.2 % (ref 19.6–45.3)
MCH RBC QN AUTO: 30.3 PG (ref 26.6–33)
MCHC RBC AUTO-ENTMCNC: 32.3 G/DL (ref 31.5–35.7)
MCV RBC AUTO: 93.9 FL (ref 79–97)
MONOCYTES # BLD AUTO: 0.66 10*3/MM3 (ref 0.1–0.9)
MONOCYTES NFR BLD AUTO: 10 % (ref 5–12)
NEUTROPHILS # BLD AUTO: 4 10*3/MM3 (ref 1.7–7)
NEUTROPHILS NFR BLD AUTO: 60.6 % (ref 42.7–76)
NRBC BLD AUTO-RTO: 0 /100 WBC (ref 0–0.2)
PLATELET # BLD AUTO: 276 10*3/MM3 (ref 140–450)
PMV BLD AUTO: 10.7 FL (ref 6–12)
POTASSIUM BLD-SCNC: 4.2 MMOL/L (ref 3.5–5.2)
PROT SERPL-MCNC: 7.1 G/DL (ref 6–8.5)
RBC # BLD AUTO: 4.26 10*6/MM3 (ref 3.77–5.28)
SODIUM BLD-SCNC: 142 MMOL/L (ref 136–145)
TIBC SERPL-MCNC: 396 MCG/DL (ref 298–536)
TRANSFERRIN SERPL-MCNC: 266 MG/DL (ref 200–360)
URATE SERPL-MCNC: 4.5 MG/DL (ref 2.4–5.7)
WBC NRBC COR # BLD: 6.6 10*3/MM3 (ref 3.4–10.8)

## 2020-02-26 PROCEDURE — 81207 BCR/ABL1 GENE MINOR BP: CPT

## 2020-02-26 PROCEDURE — 80053 COMPREHEN METABOLIC PANEL: CPT | Performed by: INTERNAL MEDICINE

## 2020-02-26 PROCEDURE — 84466 ASSAY OF TRANSFERRIN: CPT | Performed by: INTERNAL MEDICINE

## 2020-02-26 PROCEDURE — 85025 COMPLETE CBC W/AUTO DIFF WBC: CPT | Performed by: INTERNAL MEDICINE

## 2020-02-26 PROCEDURE — 83615 LACTATE (LD) (LDH) ENZYME: CPT | Performed by: INTERNAL MEDICINE

## 2020-02-26 PROCEDURE — 83540 ASSAY OF IRON: CPT | Performed by: INTERNAL MEDICINE

## 2020-02-26 PROCEDURE — 36415 COLL VENOUS BLD VENIPUNCTURE: CPT | Performed by: INTERNAL MEDICINE

## 2020-02-26 PROCEDURE — 82728 ASSAY OF FERRITIN: CPT | Performed by: INTERNAL MEDICINE

## 2020-02-26 PROCEDURE — 84550 ASSAY OF BLOOD/URIC ACID: CPT | Performed by: INTERNAL MEDICINE

## 2020-02-26 PROCEDURE — 81206 BCR/ABL1 GENE MAJOR BP: CPT

## 2020-03-03 ENCOUNTER — OFFICE VISIT (OUTPATIENT)
Dept: ONCOLOGY | Facility: CLINIC | Age: 55
End: 2020-03-03

## 2020-03-03 VITALS
WEIGHT: 274.8 LBS | TEMPERATURE: 98.5 F | DIASTOLIC BLOOD PRESSURE: 84 MMHG | HEART RATE: 93 BPM | SYSTOLIC BLOOD PRESSURE: 152 MMHG | RESPIRATION RATE: 18 BRPM | OXYGEN SATURATION: 98 % | BODY MASS INDEX: 44.35 KG/M2

## 2020-03-03 DIAGNOSIS — C92.10 CML (CHRONIC MYELOCYTIC LEUKEMIA) (HCC): Primary | ICD-10-CM

## 2020-03-03 DIAGNOSIS — E61.1 IRON DEFICIENCY: ICD-10-CM

## 2020-03-03 PROCEDURE — 99214 OFFICE O/P EST MOD 30 MIN: CPT | Performed by: INTERNAL MEDICINE

## 2020-03-09 ENCOUNTER — HOSPITAL ENCOUNTER (OUTPATIENT)
Dept: CARDIOLOGY | Facility: HOSPITAL | Age: 55
Discharge: HOME | End: 2020-03-09
Admitting: INTERNAL MEDICINE

## 2020-03-09 DIAGNOSIS — C92.10 CML (CHRONIC MYELOCYTIC LEUKEMIA) (HCC): ICD-10-CM

## 2020-03-09 LAB
BH CV ECHO MEAS - % IVS THICK: 38.5 %
BH CV ECHO MEAS - % LVPW THICK: 65.7 %
BH CV ECHO MEAS - ACS: 1.9 CM
BH CV ECHO MEAS - AO MAX PG: 8.4 MMHG
BH CV ECHO MEAS - AO MEAN PG: 5 MMHG
BH CV ECHO MEAS - AO ROOT AREA (BSA CORRECTED): 1.1
BH CV ECHO MEAS - AO ROOT AREA: 5.3 CM^2
BH CV ECHO MEAS - AO ROOT DIAM: 2.6 CM
BH CV ECHO MEAS - AO V2 MAX: 145 CM/SEC
BH CV ECHO MEAS - AO V2 MEAN: 102 CM/SEC
BH CV ECHO MEAS - AO V2 VTI: 31.6 CM
BH CV ECHO MEAS - BSA(HAYCOCK): 2.5 M^2
BH CV ECHO MEAS - BSA: 2.3 M^2
BH CV ECHO MEAS - BZI_BMI: 44.2 KILOGRAMS/M^2
BH CV ECHO MEAS - BZI_METRIC_HEIGHT: 167.6 CM
BH CV ECHO MEAS - BZI_METRIC_WEIGHT: 124.3 KG
BH CV ECHO MEAS - EDV(CUBED): 80.9 ML
BH CV ECHO MEAS - EDV(MOD-SP4): 53.4 ML
BH CV ECHO MEAS - EDV(TEICH): 84.2 ML
BH CV ECHO MEAS - EF(CUBED): 71.7 %
BH CV ECHO MEAS - EF(MOD-SP4): 46.8 %
BH CV ECHO MEAS - EF(TEICH): 63.7 %
BH CV ECHO MEAS - ESV(CUBED): 22.9 ML
BH CV ECHO MEAS - ESV(MOD-SP4): 28.4 ML
BH CV ECHO MEAS - ESV(TEICH): 30.6 ML
BH CV ECHO MEAS - FS: 34.3 %
BH CV ECHO MEAS - IVS/LVPW: 0.87
BH CV ECHO MEAS - IVSD: 1.1 CM
BH CV ECHO MEAS - IVSS: 1.5 CM
BH CV ECHO MEAS - LA DIMENSION: 3.4 CM
BH CV ECHO MEAS - LA/AO: 1.3
BH CV ECHO MEAS - LV DIASTOLIC VOL/BSA (35-75): 23.4 ML/M^2
BH CV ECHO MEAS - LV MASS(C)D: 173.7 GRAMS
BH CV ECHO MEAS - LV MASS(C)DI: 76 GRAMS/M^2
BH CV ECHO MEAS - LV MASS(C)S: 193.6 GRAMS
BH CV ECHO MEAS - LV MASS(C)SI: 84.7 GRAMS/M^2
BH CV ECHO MEAS - LV SYSTOLIC VOL/BSA (12-30): 12.4 ML/M^2
BH CV ECHO MEAS - LVIDD: 4.3 CM
BH CV ECHO MEAS - LVIDS: 2.8 CM
BH CV ECHO MEAS - LVLD AP4: 7.7 CM
BH CV ECHO MEAS - LVLS AP4: 6.5 CM
BH CV ECHO MEAS - LVOT AREA (M): 2.5 CM^2
BH CV ECHO MEAS - LVOT AREA: 2.5 CM^2
BH CV ECHO MEAS - LVOT DIAM: 1.8 CM
BH CV ECHO MEAS - LVPWD: 1.2 CM
BH CV ECHO MEAS - LVPWS: 2 CM
BH CV ECHO MEAS - MV A MAX VEL: 111.5 CM/SEC
BH CV ECHO MEAS - MV E MAX VEL: 96.6 CM/SEC
BH CV ECHO MEAS - MV E/A: 0.87
BH CV ECHO MEAS - PA ACC TIME: 0.11 SEC
BH CV ECHO MEAS - PA PR(ACCEL): 28.2 MMHG
BH CV ECHO MEAS - SI(AO): 73.4 ML/M^2
BH CV ECHO MEAS - SI(CUBED): 25.4 ML/M^2
BH CV ECHO MEAS - SI(MOD-SP4): 10.9 ML/M^2
BH CV ECHO MEAS - SI(TEICH): 23.4 ML/M^2
BH CV ECHO MEAS - SV(AO): 167.8 ML
BH CV ECHO MEAS - SV(CUBED): 58 ML
BH CV ECHO MEAS - SV(MOD-SP4): 25 ML
BH CV ECHO MEAS - SV(TEICH): 53.6 ML
BH CV ECHO MEAS - TR MAX VEL: 213.3 CM/SEC
MAXIMAL PREDICTED HEART RATE: 166 BPM
STRESS TARGET HR: 141 BPM

## 2020-03-09 PROCEDURE — 93306 TTE W/DOPPLER COMPLETE: CPT | Performed by: INTERNAL MEDICINE

## 2020-03-09 PROCEDURE — 93306 TTE W/DOPPLER COMPLETE: CPT

## 2020-03-24 ENCOUNTER — DOCUMENTATION (OUTPATIENT)
Dept: ONCOLOGY | Facility: CLINIC | Age: 55
End: 2020-03-24

## 2020-04-20 NOTE — PROGRESS NOTES
Specialty Pharmacy Note      Name:  Aric Haro  :  1965  Date:  2020       Past Medical History:   Diagnosis Date   • Anemia    • Cancer (CMS/HCC)     leukemia   • Diabetes mellitus (CMS/HCC)    • Elevated cholesterol    • GERD (gastroesophageal reflux disease)    • Hypertension    • PONV (postoperative nausea and vomiting)        Past Surgical History:   Procedure Laterality Date   • BLEPHAROPLASTY Bilateral 2018    Procedure: BLEPHAROPLASTY BOTH EYES UPPER EYELIDS (PT REQUESTED TERESITA WILKES;  Surgeon: Chris Haile MD;  Location: [facility];  Service: Ophthalmology   • COLONOSCOPY N/A 3/27/2018    Procedure: COLONOSCOPY FOR SCREENING  CPTCODE:25279;  Surgeon: Drew Esparza III, MD;  Location: [facility];  Service: Gastroenterology   • SINUS SURGERY     • SINUS SURGERY     • SLEEVE GASTROPLASTY         Social History     Socioeconomic History   • Marital status:      Spouse name: Not on file   • Number of children: Not on file   • Years of education: Not on file   • Highest education level: Not on file   Tobacco Use   • Smoking status: Former Smoker     Packs/day: 0.25     Years: 4.00     Pack years: 1.00   • Smokeless tobacco: Never Used   Substance and Sexual Activity   • Alcohol use: No   • Drug use: No   • Sexual activity: Defer       Family History   Problem Relation Age of Onset   • Anemia Mother    • Hypertension Mother    • Cancer Mother    • COPD Father    • Heart disease Father    • Breast cancer Neg Hx        No Known Allergies    Current Outpatient Medications   Medication Sig Dispense Refill   • amoxicillin-clavulanate (AUGMENTIN) 875-125 MG per tablet Take 1 tablet by mouth Every 12 (Twelve) Hours. 20 tablet 0   • baclofen (LIORESAL) 20 MG tablet Take 1 tablet by mouth 3 (Three) Times a Day. 90 tablet 4   • celecoxib (CELEBREX) 200 MG capsule Take 1 capsule by mouth 2 (Two) Times a Day. (Patient taking differently: Take 200 mg by mouth 2 (Two) Times  a Day. Takes 1/2 tablet once daily) 180 capsule 3   • celecoxib (CeleBREX) 200 MG capsule Take 1 capsule by mouth 2 (Two) Times a Day. 180 capsule 3   • cholecalciferol (VITAMIN D3) 60798 units capsule Take 1 capsule by mouth 2 (Two) Times a Week. 24 capsule 0   • Cholecalciferol (VITAMIN D3) 45953 units capsule Take 1 capsule by mouth 2 (Two) Times a Week. 24 capsule 3   • cyanocobalamin 1000 MCG/ML injection Inject 1 mL intramuscularly monthly. 1 mL 1   • cyanocobalamin 1000 MCG/ML injection Inject 1 mL into the appropriate muscle as directed by prescriber weekly for 8 weeks then monthly thereafter. 30 mL 4   • desloratadine-pseudoephedrine (CLARINEX-D 12-hour) 2.5-120 MG per tablet take 1 tablet by mouth 2 times every day 30 tablet 0   • DULoxetine (CYMBALTA) 60 MG capsule Take 1 capsule by mouth Daily. 90 capsule 3   • fenofibrate (TRICOR) 145 MG tablet Take 1 tablet by mouth Daily. 90 tablet 3   • fenofibrate (TRICOR) 145 MG tablet Take 1 tablet by mouth Daily. 90 tablet 3   • fenofibrate 160 MG tablet Take 1 tablet by mouth Daily. 90 tablet 3   • ferrous sulfate 325 (65 FE) MG EC tablet Take 1 tablet by mouth Daily. 30 tablet 3   • ferrous sulfate 325 (65 FE) MG EC tablet Take 1 tablet by mouth Daily. 90 tablet 3   • fluconazole (DIFLUCAN) 200 MG tablet Take 1 tablet by mouth Daily. 30 tablet 1   • furosemide (LASIX) 40 MG tablet Take 1 tablet by mouth Daily. 90 tablet 3   • furosemide (LASIX) 40 MG tablet Take 1 tablet by mouth Daily. 90 tablet 3   • furosemide (LASIX) 40 MG tablet Take 1 tablet by mouth Daily. 90 tablet 3   • glucose blood test strip USE 1 STRIP 2 TIMES DAILY AS DIRECTED 60 enema 6   • glucose blood test strip USE 1 STRIP 2 TIMES DAILY AS DIRECTED 180 each 6   • HYDROcodone-acetaminophen (NORCO)  MG per tablet Take 1 tablet by mouth every 4 - 6 hours as needed for severe pain. 20 tablet 0   • ibuprofen (ADVIL,MOTRIN) 800 MG tablet Take 1 tablet by mouth 3 (Three) Times a Day As Needed.  270 tablet 3   • ibuprofen (ADVIL,MOTRIN) 800 MG tablet take 1 tablet by mouth 3 times every day with food as needed 270 tablet 3   • imatinib (GLEEVEC) 400 MG chemo tablet Take 1 tablet by mouth Daily. 30 tablet 5   • Insulin Glargine (LANTUS SOLOSTAR) 100 UNIT/ML injection pen Inject 45 units subcutaneously 2 times daily 30 mL 6   • Insulin Glargine (LANTUS SOLOSTAR) 100 UNIT/ML injection pen Inject 45 units Subcutaneously two times daily 30 mL 6   • Insulin Glargine (LANTUS SOLOSTAR) 100 UNIT/ML injection pen Inject 45 Units subcutaneously 2 times daily. 30 mL 6   • Insulin Glargine (LANTUS SOLOSTAR) 100 UNIT/ML injection pen Inject 44 Units under the skin into the appropriate area as directed 2 (Two) Times a Day. 30 mL 3   • LANTUS SOLOSTAR 100 UNIT/ML injection pen Inject 45 Units under the skin 2 (Two) Times a Day. 30 mL 6   • LANTUS SOLOSTAR 100 UNIT/ML injection pen Inject 45 Units under the skin 2 (Two) Times a Day. 30 mL 6   • LANTUS SOLOSTAR 100 UNIT/ML injection pen Inject 45 Units under the skin into the appropriate area as directed 2 (Two) Times a Day. 36 mL 6   • loratadine-pseudoephedrine (CLARITIN-D 12 HOUR) 5-120 MG per 12 hr tablet Take 1 tablet by mouth two times a day 30 tablet 0   • loratadine-pseudoephedrine (CLARITIN-D 12-hour) 5-120 MG per 12 hr tablet Take 1 tablet by mouth Every 12 (Twelve) Hours As Needed. 30 tablet 0   • mupirocin (BACTROBAN) 2 % ointment 1 application into each nostril as directed by provider 2 (Two) Times a Day. 22 g 2   • oseltamivir (TAMIFLU) 75 MG capsule Take 1 capsule by mouth 2 (Two) Times a Day. 10 capsule 0   • pantoprazole (PROTONIX) 40 MG EC tablet Take 1 tablet by mouth Daily. 90 tablet 3   • promethazine (PHENERGAN) 25 MG tablet Take 1 tablet by mouth every 4-6 hours as needed. 20 tablet 0   • SUMAtriptan (IMITREX) 100 MG tablet Take 1 tablet by mouth after onset of migraine. May repeat after 2 hours if headache returns. Do not exceed 200mg in 24 hours. 10  "tablet 2   • SUMAtriptan (IMITREX) 100 MG tablet Take 1 tablet by mouth after onset of migraine; may repeat once 27 tablet 2   • Syringe/Needle, Disp, 25G X 1\" 3 ML misc USE 1 AS DIRECTED WITH B12 1 each 7   • triamcinolone (KENALOG) 0.1 % cream Apply topically to affected area(s) 2 Times a Day. 30 g 0   • triamcinolone (KENALOG) 0.1 % cream Apply to affected area(s) 2 times per day 30 g 0   • triamcinolone (KENALOG) 0.1 % cream Apply a Thin Layer to Affected Area Topically Twice Daily 80 g 5   • triamcinolone (KENALOG) 0.1 % cream Apply a Thin Layer to Affected Area Topically Twice Daily 240 g 5   • TRUETRACK TEST test strip use to test blood sugar 8 times daily 200 each 5   • vitamin D (ERGOCALCIFEROL) 35229 units capsule capsule Take 1 capsule by mouth once weekly. 12 capsule 3   • vitamin D (ERGOCALCIFEROL) 49139 units capsule capsule Take 1 capsule by mouth 2 (Two) Times a Week. 24 capsule 0   • zolpidem (AMBIEN) 10 MG tablet Take 1 tablet by mouth Every Night. 90 tablet 0   • zolpidem (AMBIEN) 10 MG tablet Take 1 tablet by mouth Every Night at bedtime for insomnia 30 tablet 1     No current facility-administered medications for this visit.          ASSESSMENT/PLAN:       Patient Update Assessment (new medications, allergies, medical history): No changes.      Medication(s): Gleevec 400 mg chemo tablet.      Currently Taking Medication(s): Patient reports taking medication as directed, 1 tablet daily by mouth.     Experiencing Side Effects: None expressed.      Prior Authorization Status: Active.      Financial Assistance Status: Not needed at this time.  Patient co-pay is $5.     Any Issues Identified: None at this time.      Appropriate to Process Prescription(s):  Yes.  After discussion with patient, it is appropriate to fill.  Medication will be dispensed to patient from [facility].     Counseling Offered: Patient declined.      Next Specialty Pharmacy Visit: In 4 weeks                "

## 2020-05-19 ENCOUNTER — LAB (OUTPATIENT)
Dept: ONCOLOGY | Facility: CLINIC | Age: 55
End: 2020-05-19

## 2020-05-19 VITALS
HEART RATE: 94 BPM | OXYGEN SATURATION: 98 % | TEMPERATURE: 97.1 F | DIASTOLIC BLOOD PRESSURE: 78 MMHG | SYSTOLIC BLOOD PRESSURE: 126 MMHG | RESPIRATION RATE: 20 BRPM

## 2020-05-19 DIAGNOSIS — E53.8 VITAMIN B12 DEFICIENCY: ICD-10-CM

## 2020-05-19 DIAGNOSIS — C92.10 CML (CHRONIC MYELOCYTIC LEUKEMIA) (HCC): ICD-10-CM

## 2020-05-19 DIAGNOSIS — Z00.00 ROUTINE GENERAL MEDICAL EXAMINATION AT A HEALTH CARE FACILITY: Primary | ICD-10-CM

## 2020-05-19 DIAGNOSIS — E61.1 IRON DEFICIENCY: ICD-10-CM

## 2020-05-19 DIAGNOSIS — E55.9 VITAMIN D DEFICIENCY: ICD-10-CM

## 2020-05-19 LAB
25(OH)D3 SERPL-MCNC: 26.9 NG/ML (ref 30–100)
ALBUMIN SERPL-MCNC: 4.06 G/DL (ref 3.5–5.2)
ALBUMIN/GLOB SERPL: 1.4 G/DL
ALP SERPL-CCNC: 70 U/L (ref 39–117)
ALT SERPL W P-5'-P-CCNC: 27 U/L (ref 1–33)
ANION GAP SERPL CALCULATED.3IONS-SCNC: 13.3 MMOL/L (ref 5–15)
AST SERPL-CCNC: 22 U/L (ref 1–32)
BASOPHILS # BLD AUTO: 0.06 10*3/MM3 (ref 0–0.2)
BASOPHILS NFR BLD AUTO: 0.8 % (ref 0–1.5)
BILIRUB SERPL-MCNC: 0.5 MG/DL (ref 0.2–1.2)
BUN BLD-MCNC: 12 MG/DL (ref 6–20)
BUN/CREAT SERPL: 16.4 (ref 7–25)
CALCIUM SPEC-SCNC: 9.2 MG/DL (ref 8.6–10.5)
CHLORIDE SERPL-SCNC: 100 MMOL/L (ref 98–107)
CHOLEST SERPL-MCNC: 161 MG/DL (ref 0–200)
CO2 SERPL-SCNC: 25.7 MMOL/L (ref 22–29)
CREAT BLD-MCNC: 0.73 MG/DL (ref 0.57–1)
DEPRECATED RDW RBC AUTO: 43.5 FL (ref 37–54)
EOSINOPHIL # BLD AUTO: 0.24 10*3/MM3 (ref 0–0.4)
EOSINOPHIL NFR BLD AUTO: 3.4 % (ref 0.3–6.2)
ERYTHROCYTE [DISTWIDTH] IN BLOOD BY AUTOMATED COUNT: 12.8 % (ref 12.3–15.4)
FERRITIN SERPL-MCNC: 204.2 NG/ML (ref 13–150)
FOLATE SERPL-MCNC: 13.6 NG/ML (ref 4.78–24.2)
GFR SERPL CREATININE-BSD FRML MDRD: 83 ML/MIN/1.73
GLOBULIN UR ELPH-MCNC: 2.9 GM/DL
GLUCOSE BLD-MCNC: 227 MG/DL (ref 65–99)
HCT VFR BLD AUTO: 40.1 % (ref 34–46.6)
HDLC SERPL-MCNC: 49 MG/DL (ref 40–60)
HGB BLD-MCNC: 13.2 G/DL (ref 12–15.9)
IMM GRANULOCYTES # BLD AUTO: 0.02 10*3/MM3 (ref 0–0.05)
IMM GRANULOCYTES NFR BLD AUTO: 0.3 % (ref 0–0.5)
IRON 24H UR-MRATE: 74 MCG/DL (ref 37–145)
IRON SATN MFR SERPL: 19 % (ref 20–50)
LDLC SERPL CALC-MCNC: 67 MG/DL (ref 0–100)
LDLC/HDLC SERPL: 1.38 {RATIO}
LYMPHOCYTES # BLD AUTO: 2.05 10*3/MM3 (ref 0.7–3.1)
LYMPHOCYTES NFR BLD AUTO: 28.6 % (ref 19.6–45.3)
MCH RBC QN AUTO: 30.2 PG (ref 26.6–33)
MCHC RBC AUTO-ENTMCNC: 32.9 G/DL (ref 31.5–35.7)
MCV RBC AUTO: 91.8 FL (ref 79–97)
MONOCYTES # BLD AUTO: 0.63 10*3/MM3 (ref 0.1–0.9)
MONOCYTES NFR BLD AUTO: 8.8 % (ref 5–12)
NEUTROPHILS # BLD AUTO: 4.16 10*3/MM3 (ref 1.7–7)
NEUTROPHILS NFR BLD AUTO: 58.1 % (ref 42.7–76)
NRBC BLD AUTO-RTO: 0 /100 WBC (ref 0–0.2)
PLATELET # BLD AUTO: 262 10*3/MM3 (ref 140–450)
PMV BLD AUTO: 10.5 FL (ref 6–12)
POTASSIUM BLD-SCNC: 4.5 MMOL/L (ref 3.5–5.2)
PROT SERPL-MCNC: 7 G/DL (ref 6–8.5)
RBC # BLD AUTO: 4.37 10*6/MM3 (ref 3.77–5.28)
SODIUM BLD-SCNC: 139 MMOL/L (ref 136–145)
T3FREE SERPL-MCNC: 2.68 PG/ML (ref 2–4.4)
T4 FREE SERPL-MCNC: 1.34 NG/DL (ref 0.93–1.7)
TIBC SERPL-MCNC: 387 MCG/DL (ref 298–536)
TRANSFERRIN SERPL-MCNC: 260 MG/DL (ref 200–360)
TRIGL SERPL-MCNC: 223 MG/DL (ref 0–150)
TSH SERPL DL<=0.05 MIU/L-ACNC: 1.39 UIU/ML (ref 0.27–4.2)
VIT B12 BLD-MCNC: 533 PG/ML (ref 211–946)
VLDLC SERPL-MCNC: 44.6 MG/DL
WBC NRBC COR # BLD: 7.16 10*3/MM3 (ref 3.4–10.8)

## 2020-05-19 PROCEDURE — 36415 COLL VENOUS BLD VENIPUNCTURE: CPT

## 2020-05-19 PROCEDURE — 85025 COMPLETE CBC W/AUTO DIFF WBC: CPT | Performed by: INTERNAL MEDICINE

## 2020-05-19 PROCEDURE — 82746 ASSAY OF FOLIC ACID SERUM: CPT | Performed by: NURSE PRACTITIONER

## 2020-05-19 PROCEDURE — 80061 LIPID PANEL: CPT | Performed by: NURSE PRACTITIONER

## 2020-05-19 PROCEDURE — 80053 COMPREHEN METABOLIC PANEL: CPT | Performed by: INTERNAL MEDICINE

## 2020-05-19 PROCEDURE — 83540 ASSAY OF IRON: CPT | Performed by: INTERNAL MEDICINE

## 2020-05-19 PROCEDURE — 82728 ASSAY OF FERRITIN: CPT | Performed by: INTERNAL MEDICINE

## 2020-05-19 PROCEDURE — 84481 FREE ASSAY (FT-3): CPT | Performed by: NURSE PRACTITIONER

## 2020-05-19 PROCEDURE — 81206 BCR/ABL1 GENE MAJOR BP: CPT

## 2020-05-19 PROCEDURE — 82306 VITAMIN D 25 HYDROXY: CPT | Performed by: NURSE PRACTITIONER

## 2020-05-19 PROCEDURE — 82607 VITAMIN B-12: CPT | Performed by: NURSE PRACTITIONER

## 2020-05-19 PROCEDURE — 81207 BCR/ABL1 GENE MINOR BP: CPT

## 2020-05-19 PROCEDURE — 84439 ASSAY OF FREE THYROXINE: CPT | Performed by: NURSE PRACTITIONER

## 2020-05-19 PROCEDURE — 84443 ASSAY THYROID STIM HORMONE: CPT | Performed by: NURSE PRACTITIONER

## 2020-05-19 PROCEDURE — 84466 ASSAY OF TRANSFERRIN: CPT | Performed by: INTERNAL MEDICINE

## 2020-05-21 ENCOUNTER — SPECIALTY PHARMACY (OUTPATIENT)
Dept: PHARMACY | Facility: HOSPITAL | Age: 55
End: 2020-05-21

## 2020-06-01 NOTE — PROGRESS NOTES
Aric Haro  [id number]  1965  6/2/2020      Referring Provider:   EILEEN Andrade    Reason for Follow Up:   1. Chronic Phase - Chronic Myelogenous Leukemia  2. Leukocytosis  3. Thrombocytosis     Chief Complaint:  Fatigue      History of Present Illness:  Aric Haro is a very pleasant 54 y.o.  female who presents in follow up appointment for further management and treatment of chronic phase chronic myelogenous leukemia (CML).    Ms. Haro began experiencing extreme fatigue where she was sleeping multiple hours during the day when she initially began following with me in April 2017. 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 On laboratory evaluation she was found to have a total white blood cell count of 22.35 and a platelet count 470 thousand. In March 2016 she was also noted to have a leukocytosis (although not as elevated) as well as a thrombocytosis, however reports that these were likely secondary to other medical issues at the time her blood work was drawn. She denied of any significant weight loss however has been gradually losing weight since gastric sleeve procedure. She denied of any fevers or frequent infections but did note fluctuating neck lymphadenopathy as well as early satiety and taste in change. She denied of any abnormal or spontaneous bleeding. I did obtain a BCR ABL PCR to further assess her leukocytosis and thrombocytosis and she was positive for the b2a2/b3a2 (p210) and e1a2 (p190) fusion gene transcripts consistent with a diagnosis for CML. After reviewing the toxicities of each tyrosine kinase inhibitor we decided to start Dasatinib treatment. She had a difficult time tolerating the Dasatinib as she was unable to take her PPI with this medication. Since she had to be taken off of her PPI she had worsening reflux symptoms as well  as severe nausea, vomiting, dehydration, and headaches during this period. Given the toxicities she was experiencing she was taken off Dasatinib and this was changed to Gleevec treatment. Since starting Gleevac 400mg daily she has tolerated this much better without significant side effects. The only side effect that she has been able to see is intermittent pedal edema along with some hand swelling and muscle cramps.     Treatment History:  Started Dasatinib on 05/15/17 - experienced nausea, severe reflux, headaches while on medication and had taken 9 doses. This was discontinued due to intolerability and she was thereafter started on Imatinib.   Started Imatinib on 5/26/17    Interim History:  Since the start of Dasatinib and later Gleevec she has had a good response and normalization in her complete blood counts with improvement in her BCR ABL PCR. She denies of any fevers, infections, lymphadenopathy, chest pain, dyspnea, nausea. She continues to experience intermittent night sweats. She denies of any significant edema and is no longer on oral iron.         The following portions of the patient's history were reviewed and updated as appropriate: allergies, current medications, past family history, past medical history, past social history, past surgical history and problem list.      No Known Allergies    Past Medical History:   Diagnosis Date   • Anemia    • Cancer (CMS/HCC)     leukemia   • Diabetes mellitus (CMS/HCC)    • Elevated cholesterol    • GERD (gastroesophageal reflux disease)    • Hypertension    • PONV (postoperative nausea and vomiting)        Past Surgical History:   Procedure Laterality Date   • BLEPHAROPLASTY Bilateral 6/22/2018    Procedure: BLEPHAROPLASTY BOTH EYES UPPER EYELIDS (PT REQUESTED TERESITA WILKES;  Surgeon: Chris Haile MD;  Location: [facility];  Service: Ophthalmology   • COLONOSCOPY N/A 3/27/2018    Procedure: COLONOSCOPY FOR SCREENING  CPTCODE:49219;  Surgeon: Drew  Edson Esparza III, MD;  Location: [facility];  Service: Gastroenterology   • SINUS SURGERY     • SINUS SURGERY     • SLEEVE GASTROPLASTY         Social History     Socioeconomic History   • Marital status:      Spouse name: Not on file   • Number of children: Not on file   • Years of education: Not on file   • Highest education level: Not on file   Tobacco Use   • Smoking status: Former Smoker     Packs/day: 0.25     Years: 4.00     Pack years: 1.00   • Smokeless tobacco: Never Used   Substance and Sexual Activity   • Alcohol use: No   • Drug use: No   • Sexual activity: Defer   She lives with her daughter. She denies of any alcohol or illicit drug use. Previous social tobacco use more then 20 years ago.    Family History   Problem Relation Age of Onset   • Anemia Mother    • Hypertension Mother    • Cancer Mother    • COPD Father    • Heart disease Father    • Breast cancer Neg Hx    Maternal Uncle - CLL  Mother - Malignancy of GE Junction      Current Outpatient Medications:   •  amoxicillin-clavulanate (AUGMENTIN) 875-125 MG per tablet, Take 1 tablet by mouth Every 12 (Twelve) Hours., Disp: 20 tablet, Rfl: 0  •  baclofen (LIORESAL) 20 MG tablet, Take 1 tablet by mouth 3 (Three) Times a Day., Disp: 90 tablet, Rfl: 4  •  celecoxib (CELEBREX) 200 MG capsule, Take 1 capsule by mouth 2 (Two) Times a Day. (Patient taking differently: Take 200 mg by mouth 2 (Two) Times a Day. Takes 1/2 tablet once daily), Disp: 180 capsule, Rfl: 3  •  celecoxib (CeleBREX) 200 MG capsule, Take 1 capsule by mouth 2 (Two) Times a Day., Disp: 180 capsule, Rfl: 3  •  cholecalciferol (VITAMIN D3) 22357 units capsule, Take 1 capsule by mouth 2 (Two) Times a Week., Disp: 24 capsule, Rfl: 0  •  Cholecalciferol (VITAMIN D3) 61860 units capsule, Take 1 capsule by mouth 2 (Two) Times a Week., Disp: 24 capsule, Rfl: 3  •  cyanocobalamin 1000 MCG/ML injection, Inject 1 mL intramuscularly monthly., Disp: 1 mL, Rfl: 1  •  cyanocobalamin 1000  MCG/ML injection, Inject 1 mL into the appropriate muscle as directed by prescriber weekly for 8 weeks then monthly thereafter., Disp: 30 mL, Rfl: 4  •  desloratadine-pseudoephedrine (CLARINEX-D 12-hour) 2.5-120 MG per tablet, take 1 tablet by mouth 2 times every day, Disp: 30 tablet, Rfl: 0  •  DULoxetine (CYMBALTA) 60 MG capsule, Take 1 capsule by mouth Daily., Disp: 90 capsule, Rfl: 3  •  fenofibrate (TRICOR) 145 MG tablet, Take 1 tablet by mouth Daily., Disp: 90 tablet, Rfl: 3  •  fenofibrate (TRICOR) 145 MG tablet, Take 1 tablet by mouth Daily., Disp: 90 tablet, Rfl: 3  •  fenofibrate 160 MG tablet, Take 1 tablet by mouth Daily., Disp: 90 tablet, Rfl: 3  •  ferrous sulfate 325 (65 FE) MG EC tablet, Take 1 tablet by mouth Daily., Disp: 30 tablet, Rfl: 3  •  ferrous sulfate 325 (65 FE) MG EC tablet, Take 1 tablet by mouth Daily., Disp: 90 tablet, Rfl: 3  •  fluconazole (DIFLUCAN) 200 MG tablet, Take 1 tablet by mouth Daily., Disp: 30 tablet, Rfl: 1  •  furosemide (LASIX) 40 MG tablet, Take 1 tablet by mouth Daily., Disp: 90 tablet, Rfl: 3  •  furosemide (LASIX) 40 MG tablet, Take 1 tablet by mouth Daily., Disp: 90 tablet, Rfl: 3  •  furosemide (LASIX) 40 MG tablet, Take 1 tablet by mouth Daily., Disp: 90 tablet, Rfl: 3  •  glucose blood test strip, USE 1 STRIP 2 TIMES DAILY AS DIRECTED, Disp: 60 enema, Rfl: 6  •  glucose blood test strip, USE 1 STRIP 2 TIMES DAILY AS DIRECTED, Disp: 180 each, Rfl: 6  •  HYDROcodone-acetaminophen (NORCO)  MG per tablet, Take 1 tablet by mouth every 4 - 6 hours as needed for severe pain., Disp: 20 tablet, Rfl: 0  •  ibuprofen (ADVIL,MOTRIN) 800 MG tablet, Take 1 tablet by mouth 3 (Three) Times a Day As Needed., Disp: 270 tablet, Rfl: 3  •  ibuprofen (ADVIL,MOTRIN) 800 MG tablet, take 1 tablet by mouth 3 times every day with food as needed, Disp: 270 tablet, Rfl: 3  •  imatinib (GLEEVEC) 400 MG chemo tablet, Take 1 tablet by mouth Daily., Disp: 30 tablet, Rfl: 5  •  Insulin  Glargine (LANTUS SOLOSTAR) 100 UNIT/ML injection pen, Inject 45 units subcutaneously 2 times daily, Disp: 30 mL, Rfl: 6  •  Insulin Glargine (LANTUS SOLOSTAR) 100 UNIT/ML injection pen, Inject 45 units Subcutaneously two times daily, Disp: 30 mL, Rfl: 6  •  Insulin Glargine (LANTUS SOLOSTAR) 100 UNIT/ML injection pen, Inject 45 Units subcutaneously 2 times daily., Disp: 30 mL, Rfl: 6  •  Insulin Glargine (LANTUS SOLOSTAR) 100 UNIT/ML injection pen, Inject 44 Units under the skin into the appropriate area as directed 2 (Two) Times a Day., Disp: 30 mL, Rfl: 3  •  LANTUS SOLOSTAR 100 UNIT/ML injection pen, Inject 45 Units under the skin 2 (Two) Times a Day., Disp: 30 mL, Rfl: 6  •  LANTUS SOLOSTAR 100 UNIT/ML injection pen, Inject 45 Units under the skin 2 (Two) Times a Day., Disp: 30 mL, Rfl: 6  •  LANTUS SOLOSTAR 100 UNIT/ML injection pen, Inject 45 Units under the skin into the appropriate area as directed 2 (Two) Times a Day., Disp: 36 mL, Rfl: 6  •  loratadine-pseudoephedrine (CLARITIN-D 12 HOUR) 5-120 MG per 12 hr tablet, Take 1 tablet by mouth two times a day, Disp: 30 tablet, Rfl: 0  •  loratadine-pseudoephedrine (CLARITIN-D 12-hour) 5-120 MG per 12 hr tablet, Take 1 tablet by mouth Every 12 (Twelve) Hours As Needed., Disp: 30 tablet, Rfl: 0  •  mupirocin (BACTROBAN) 2 % ointment, 1 application into each nostril as directed by provider 2 (Two) Times a Day., Disp: 22 g, Rfl: 2  •  oseltamivir (TAMIFLU) 75 MG capsule, Take 1 capsule by mouth 2 (Two) Times a Day., Disp: 10 capsule, Rfl: 0  •  pantoprazole (PROTONIX) 40 MG EC tablet, Take 1 tablet by mouth Daily., Disp: 90 tablet, Rfl: 3  •  promethazine (PHENERGAN) 25 MG tablet, Take 1 tablet by mouth every 4-6 hours as needed., Disp: 20 tablet, Rfl: 0  •  SUMAtriptan (IMITREX) 100 MG tablet, Take 1 tablet by mouth after onset of migraine. May repeat after 2 hours if headache returns. Do not exceed 200mg in 24 hours., Disp: 10 tablet, Rfl: 2  •  SUMAtriptan  "(IMITREX) 100 MG tablet, Take 1 tablet by mouth after onset of migraine; may repeat once, Disp: 27 tablet, Rfl: 2  •  Syringe/Needle, Disp, 25G X 1\" 3 ML misc, USE 1 AS DIRECTED WITH B12, Disp: 1 each, Rfl: 7  •  triamcinolone (KENALOG) 0.1 % cream, Apply topically to affected area(s) 2 Times a Day., Disp: 30 g, Rfl: 0  •  triamcinolone (KENALOG) 0.1 % cream, Apply to affected area(s) 2 times per day, Disp: 30 g, Rfl: 0  •  triamcinolone (KENALOG) 0.1 % cream, Apply a Thin Layer to Affected Area Topically Twice Daily, Disp: 80 g, Rfl: 5  •  triamcinolone (KENALOG) 0.1 % cream, Apply a Thin Layer to Affected Area Topically Twice Daily, Disp: 240 g, Rfl: 5  •  TRUETRACK TEST test strip, use to test blood sugar 8 times daily, Disp: 200 each, Rfl: 5  •  vitamin D (ERGOCALCIFEROL) 67803 units capsule capsule, Take 1 capsule by mouth once weekly., Disp: 12 capsule, Rfl: 3  •  vitamin D (ERGOCALCIFEROL) 58787 units capsule capsule, Take 1 capsule by mouth 2 (Two) Times a Week., Disp: 24 capsule, Rfl: 0  •  zolpidem (AMBIEN) 10 MG tablet, Take 1 tablet by mouth Every Night., Disp: 90 tablet, Rfl: 0  •  zolpidem (AMBIEN) 10 MG tablet, Take 1 tablet by mouth Every Night at bedtime for insomnia, Disp: 30 tablet, Rfl: 1        Review of Systems  A comprehensive 14-point review of systems performed.  Significant findings as mentioned above.  All other systems reviewed and are negative.      Physical Exam:  Vital Signs: These were reviewed and listed as per patient’s electronic medical chart  Vitals:    06/02/20 1211   BP: 154/77   Pulse: 66   Resp: 18   Temp: 95.1 °F (35.1 °C)   SpO2: 99%     General: Awake, alert and oriented, in no distress, obese  HEENT: Head is atraumatic, normocephalic, extraocular movements full, no scleral icterus  Neck: supple, no jvd, lymphadenopathy or masses  Cardiovascular: regular rhythm, tachycardic, without murmurs, rubs or gallops  Pulmonary: non-labored, clear to auscultation bilaterally, no " wheezing  Abdomen: soft, non-tender, non-distended, normal active bowel sounds present  Extremities: No clubbing, cyanosis or edema  Lymph: Positive right cervical which patient reports is improving, no supraclavicular adenopathy   Neurologic: Mental status as above, alert, awake and oriented, grossly non-focal exam  Skin: warm, dry, intact  Patient was examined on 06/02/20 and changes are reflected and noted above.        Labs / Studies:    Lab on 05/19/2020   Component Date Value   • e13a2 (b2a2) Transcript 05/19/2020 Comment    • e14a2 (b3a2) Transcript 05/19/2020 Comment    • E1A2 transcript 05/19/2020 Comment    • Interpretation 05/19/2020 Comment    • Director Review 05/19/2020 Comment    • Background: 05/19/2020 Comment    • Methodology: 05/19/2020 Comment    • Glucose 05/19/2020 227*   • BUN 05/19/2020 12    • Creatinine 05/19/2020 0.73    • Sodium 05/19/2020 139    • Potassium 05/19/2020 4.5    • Chloride 05/19/2020 100    • CO2 05/19/2020 25.7    • Calcium 05/19/2020 9.2    • Total Protein 05/19/2020 7.0    • Albumin 05/19/2020 4.06    • ALT (SGPT) 05/19/2020 27    • AST (SGOT) 05/19/2020 22    • Alkaline Phosphatase 05/19/2020 70    • Total Bilirubin 05/19/2020 0.5    • eGFR Non African Amer 05/19/2020 83    • Globulin 05/19/2020 2.9    • A/G Ratio 05/19/2020 1.4    • BUN/Creatinine Ratio 05/19/2020 16.4    • Anion Gap 05/19/2020 13.3    • Iron 05/19/2020 74    • Iron Saturation 05/19/2020 19*   • Transferrin 05/19/2020 260    • TIBC 05/19/2020 387    • Ferritin 05/19/2020 204.20*   • WBC 05/19/2020 7.16    • RBC 05/19/2020 4.37    • Hemoglobin 05/19/2020 13.2    • Hematocrit 05/19/2020 40.1    • MCV 05/19/2020 91.8    • MCH 05/19/2020 30.2    • MCHC 05/19/2020 32.9    • RDW 05/19/2020 12.8    • RDW-SD 05/19/2020 43.5    • MPV 05/19/2020 10.5    • Platelets 05/19/2020 262    • Neutrophil % 05/19/2020 58.1    • Lymphocyte % 05/19/2020 28.6    • Monocyte % 05/19/2020 8.8    • Eosinophil % 05/19/2020 3.4       • Basophil % 05/19/2020 0.8    • Immature Grans % 05/19/2020 0.3    • Neutrophils, Absolute 05/19/2020 4.16    • Lymphocytes, Absolute 05/19/2020 2.05    • Monocytes, Absolute 05/19/2020 0.63    • Eosinophils, Absolute 05/19/2020 0.24    • Basophils, Absolute 05/19/2020 0.06    • Immature Grans, Absolute 05/19/2020 0.02    • nRBC 05/19/2020 0.0    • Total Cholesterol 05/19/2020 161    • Triglycerides 05/19/2020 223*   • HDL Cholesterol 05/19/2020 49    • LDL Cholesterol  05/19/2020 67    • VLDL Cholesterol 05/19/2020 44.6    • LDL/HDL Ratio 05/19/2020 1.38    • Vitamin B-12 05/19/2020 533    • Folate 05/19/2020 13.60    • T3, Free 05/19/2020 2.68    • Free T4 05/19/2020 1.34    • TSH 05/19/2020 1.390    • 25 Hydroxy, Vitamin D 05/19/2020 26.9*   Hospital Outpatient Visit on 03/09/2020   Component Date Value   • BSA 03/09/2020 2.3    • IVSd 03/09/2020 1.1    • IVSs 03/09/2020 1.5    • LVIDd 03/09/2020 4.3    • LVIDs 03/09/2020 2.8    • LVPWd 03/09/2020 1.2    • BH CV ECHO SHARON - LVPWS 03/09/2020 2.0    • IVS/LVPW 03/09/2020 0.87    • FS 03/09/2020 34.3    • EDV(Teich) 03/09/2020 84.2    • ESV(Teich) 03/09/2020 30.6    • EF(Teich) 03/09/2020 63.7    • EDV(cubed) 03/09/2020 80.9    • ESV(cubed) 03/09/2020 22.9    • EF(cubed) 03/09/2020 71.7    • % IVS thick 03/09/2020 38.5    • % LVPW thick 03/09/2020 65.7    • LV mass(C)d 03/09/2020 173.7    • LV mass(C)dI 03/09/2020 76.0    • LV mass(C)s 03/09/2020 193.6    • LV mass(C)sI 03/09/2020 84.7    • SV(Teich) 03/09/2020 53.6    • SI(Teich) 03/09/2020 23.4    • SV(cubed) 03/09/2020 58.0    • SI(cubed) 03/09/2020 25.4    • Ao root diam 03/09/2020 2.6    • Ao root area 03/09/2020 5.3    • ACS 03/09/2020 1.9    • LA dimension 03/09/2020 3.4    • LA/Ao 03/09/2020 1.3    • LVOT diam 03/09/2020 1.8    • LVOT area 03/09/2020 2.5    • LVOT area(traced) 03/09/2020 2.5    • LVLd ap4 03/09/2020 7.7    • EDV(MOD-sp4) 03/09/2020 53.4    • LVLs ap4 03/09/2020 6.5    • ESV(MOD-sp4)  03/09/2020 28.4    • EF(MOD-sp4) 03/09/2020 46.8    • SV(MOD-sp4) 03/09/2020 25.0    • SI(MOD-sp4) 03/09/2020 10.9    • Ao root area (BSA correc* 03/09/2020 1.1    • LV Ferrara Vol (BSA correct* 03/09/2020 23.4    • LV Sys Vol (BSA correcte* 03/09/2020 12.4    • MV E max bernarda 03/09/2020 96.6    • MV A max bernarda 03/09/2020 111.5    • MV E/A 03/09/2020 0.87    • Ao pk bernarda 03/09/2020 145.0    • Ao max PG 03/09/2020 8.4    • Ao V2 mean 03/09/2020 102.0    • Ao mean PG 03/09/2020 5.0    • Ao V2 VTI 03/09/2020 31.6    • SV(Ao) 03/09/2020 167.8    • SI(Ao) 03/09/2020 73.4    • PA acc time 03/09/2020 0.11    • TR max bernarda 03/09/2020 213.3    • PA pr(Accel) 03/09/2020 28.2    • BH CV ECHO SHARON - BZI_BMI 03/09/2020 44.2    • BH CV ECHO SHARON - BSA(HA* 03/09/2020 2.5    • BH CV ECHO SHARON - BZI_ME* 03/09/2020 124.3    • BH CV ECHO SHARON - BZI_ME* 03/09/2020 167.6    • Target HR (85%) 03/09/2020 141    • Max. Pred. HR (100%) 03/09/2020 166    Orders Only on 02/26/2020   Component Date Value   • Glucose 02/26/2020 148*   • BUN 02/26/2020 11    • Creatinine 02/26/2020 0.75    • Sodium 02/26/2020 142    • Potassium 02/26/2020 4.2    • Chloride 02/26/2020 103    • CO2 02/26/2020 24.9    • Calcium 02/26/2020 9.2    • Total Protein 02/26/2020 7.1    • Albumin 02/26/2020 3.91    • ALT (SGPT) 02/26/2020 20    • AST (SGOT) 02/26/2020 19    • Alkaline Phosphatase 02/26/2020 69    • Total Bilirubin 02/26/2020 0.4    • eGFR Non African Amer 02/26/2020 81    • Globulin 02/26/2020 3.2    • A/G Ratio 02/26/2020 1.2    • BUN/Creatinine Ratio 02/26/2020 14.7    • Anion Gap 02/26/2020 14.1    • LDH 02/26/2020 210    • Uric Acid 02/26/2020 4.5    • e13a2 (b2a2) Transcript 02/26/2020 Comment    • e14a2 (b3a2) Transcript 02/26/2020 Comment    • E1A2 transcript 02/26/2020 Comment    • Interpretation 02/26/2020 Comment    • Director Review 02/26/2020 Comment    • Background: 02/26/2020 Comment    • Methodology: 02/26/2020 Comment    • Iron 02/26/2020 71    •  Iron Saturation 2020 18*   • Transferrin 2020 266    • TIBC 2020 396    • Ferritin 2020 182.70*   • WBC 2020 6.60    • RBC 2020 4.26    • Hemoglobin 2020 12.9    • Hematocrit 2020 40.0    • MCV 2020 93.9    • MCH 2020 30.3    • MCHC 2020 32.3    • RDW 2020 13.2    • RDW-SD 2020 45.5    • MPV 2020 10.7    • Platelets 2020 276    • Neutrophil % 2020 60.6    • Lymphocyte % 2020 26.2    • Monocyte % 2020 10.0    • Eosinophil % 2020 1.8    • Basophil % 2020 1.1    • Immature Grans % 2020 0.3    • Neutrophils, Absolute 2020 4.00    • Lymphocytes, Absolute 2020 1.73    • Monocytes, Absolute 2020 0.66    • Eosinophils, Absolute 2020 0.12    • Basophils, Absolute 2020 0.07    • Immature Grans, Absolute 2020 0.02    • nRBC 2020 0.0             [id number]: US ABDOMEN COMPLETE: Visualized pancreas is unremarkable. The imaged portion of the abdominal aorta is nondilated. The liver is homogeneous. There is no intrahepatic ductal dilatation or focal hepatic mass. The imaged portion of the hepatic vessels and inferior vena cava are patent. The gallbladder has been removed. The common bile duct is normal, measuring 5.7 mm. The kidneys demonstrate no evidence of hydronephrosis or solid renal mass. The spleen is homogeneous and measures 13 cm. IMPRESSION: Spleen measures 13 cm and is homogeneous.           PATHOLOGY:    05/15/17: Bone Marrow Biopsy & Aspirate                     17: BCR ABL PCR        17: BCR ABL PCR        17: BCR ABL PCR        18: BCR ABL PCR:                                                                                                         18:                                2019:                                                                                                                              08/15/19:                    :       11/07/19:                        06/01/20:                                       Assessment/Plan :  Aric Haro is a very pleasant 54 y.o.  female who presents in follow up appointment for further management and treatment of chronic phase chronic myelogenous leukemia.    1. Chronic Myelogenous Leukemia - CML (chronic phase)    2. Leukocytosis  3. Thrombocytosis  4. Iron deficiency  5. Healthcare Maintenance    Peripheral smear concerning for an underlying myeloproliferative disorder. Her primary provider obtained a flow cytometry which did not reveal any significant abnormalities. I obtained a quantitative BCR ABL PCR positive for the b2a2/b3a2 (p210) and e1a2 (p190) fusion gene transcripts consistent with a diagnosis for CML. Bone marrow biopsy performed on initial diagnosis 5/17/17 and prior to starting Dasatinib treatment with results above and consistent with chronic phase CML.     To further evaluate for a myeloproliferative disorder I did also assess for JAK2 (V617F and exon 12)/MPL/CALR mutations which were negative. ESR, CRP, EMMANUEL, Rheumatoid factor, as well as an acute hepatitis panel and HIV test which were all negative. No iron deficiency seen. Abdominal ultrasound significant for a spleen size 13.9cm.     For treatment of her CML she was started on Dasatinib 100mg oral daily. She does have a history significant for pancreatitis, therefore, I held on using Nilotinib. Patient however was unable to tolerate Dasatinib secondary to worsening reflux while being off of her PPI, and experiencing severe nausea, vomiting, and dehydration. She was then started on Imatinib 400mg daily and has been tolerating this well. I have previously advised her of Ibuprofen and Tylenol use for her myalgias. I did order baseline Echo which showed an intact EF and she currently has no cardiac symptoms or signs of fluid retention. Repeat echo shows intact EF 61-65%.    She has had a  complete hematological response, and BCR ABL PCR has normalized since start of Gleevac and her last BCR ABL was <0.0032% this will need to continue to be monitored every three months to assess ongoing molecular response, today's level is <0.0032%. The blood counts have now stabilized therefore will continue to monitor every 3 months given stability.    While her weight has overall be stable in the last one year she does note increasing swelling in her extremities. Therefore will repeat echocardiogram to further evaluate.    She underwent a colonoscopy February 2018 with Dr. Esparza who recommended repeat colonoscopy in 3-5 years due to polyps that were removed. She has also received her Influenza and Hepatitis A vaccine. Iron has been discontinued and iron studies are more consistent with anemia of chronic inflammation.     5. ACO Quality measures  -  rAic Haro does not use tobacco products.  -  Patient's BMI has been discussed with her. BMI is above normal parameters. She has been changing her dietary habits and eating healthier.  -  Aric Haro received 2019 flu vaccine.  -  Aric Haro reports a pain score of 0. Given her pain assessment as noted, no further treatment or management is required and she will continue to follow with her primary provider and will again reassess at next visit.  -  Current outpatient and discharge medications have been reconciled for the patient.  Reviewed by: Evita Serrano MD      I will have the patient return for follow up visit in three months with labs one to two weeks prior. Lab orders have been placed. She understands that should she have any questions or concerns prior to her appointment she should give us a call at any time and I would be happy to see her sooner. It was a pleasure to see this patient in clinic today, thank you for allowing me to participate in the care of this patient.      Evita Serrano MD  06/02/20  13:35

## 2020-06-02 ENCOUNTER — OFFICE VISIT (OUTPATIENT)
Dept: ONCOLOGY | Facility: CLINIC | Age: 55
End: 2020-06-02

## 2020-06-02 VITALS
BODY MASS INDEX: 44.48 KG/M2 | TEMPERATURE: 95.1 F | HEART RATE: 66 BPM | WEIGHT: 275.6 LBS | SYSTOLIC BLOOD PRESSURE: 154 MMHG | DIASTOLIC BLOOD PRESSURE: 77 MMHG | OXYGEN SATURATION: 99 % | RESPIRATION RATE: 18 BRPM

## 2020-06-02 DIAGNOSIS — C92.10 CML (CHRONIC MYELOCYTIC LEUKEMIA) (HCC): Primary | ICD-10-CM

## 2020-06-02 DIAGNOSIS — E53.8 VITAMIN B12 DEFICIENCY: ICD-10-CM

## 2020-06-02 DIAGNOSIS — E61.1 IRON DEFICIENCY: ICD-10-CM

## 2020-06-02 PROCEDURE — 99214 OFFICE O/P EST MOD 30 MIN: CPT | Performed by: INTERNAL MEDICINE

## 2020-06-05 NOTE — PROGRESS NOTES
Specialty Pharmacy Note      Name:  Aric Haro  :  1965  Date:  2020         Past Medical History:   Diagnosis Date   • Anemia    • Cancer (CMS/HCC)     leukemia   • Diabetes mellitus (CMS/HCC)    • Elevated cholesterol    • GERD (gastroesophageal reflux disease)    • Hypertension    • PONV (postoperative nausea and vomiting)        Past Surgical History:   Procedure Laterality Date   • BLEPHAROPLASTY Bilateral 2018    Procedure: BLEPHAROPLASTY BOTH EYES UPPER EYELIDS (PT REQUESTED TERESITA WILKES;  Surgeon: Chris Haile MD;  Location: [facility];  Service: Ophthalmology   • COLONOSCOPY N/A 3/27/2018    Procedure: COLONOSCOPY FOR SCREENING  CPTCODE:70406;  Surgeon: Drew Esparza III, MD;  Location: [facility];  Service: Gastroenterology   • SINUS SURGERY     • SINUS SURGERY     • SLEEVE GASTROPLASTY         Social History     Socioeconomic History   • Marital status:      Spouse name: Not on file   • Number of children: Not on file   • Years of education: Not on file   • Highest education level: Not on file   Tobacco Use   • Smoking status: Former Smoker     Packs/day: 0.25     Years: 4.00     Pack years: 1.00   • Smokeless tobacco: Never Used   Substance and Sexual Activity   • Alcohol use: No   • Drug use: No   • Sexual activity: Defer       Family History   Problem Relation Age of Onset   • Anemia Mother    • Hypertension Mother    • Cancer Mother    • COPD Father    • Heart disease Father    • Breast cancer Neg Hx        No Known Allergies    Current Outpatient Medications   Medication Sig Dispense Refill   • amoxicillin-clavulanate (AUGMENTIN) 875-125 MG per tablet Take 1 tablet by mouth Every 12 (Twelve) Hours. 20 tablet 0   • baclofen (LIORESAL) 20 MG tablet Take 1 tablet by mouth 3 (Three) Times a Day. 90 tablet 4   • celecoxib (CeleBREX) 200 MG capsule Take 1 capsule by mouth 2 (Two) Times a Day. (Patient taking differently: Take 200 mg by mouth 2 (Two)  Times a Day. Takes 1/2 tablet once daily) 180 capsule 3   • celecoxib (CeleBREX) 200 MG capsule Take 1 capsule by mouth 2 (Two) Times a Day. 180 capsule 3   • cholecalciferol (VITAMIN D3) 68651 units capsule Take 1 capsule by mouth 2 (Two) Times a Week. 24 capsule 0   • Cholecalciferol (VITAMIN D3) 58228 units capsule Take 1 capsule by mouth 2 (Two) Times a Week. 24 capsule 3   • cyanocobalamin 1000 MCG/ML injection Inject 1 mL intramuscularly monthly. 1 mL 1   • cyanocobalamin 1000 MCG/ML injection Inject 1 mL into the appropriate muscle as directed by prescriber weekly for 8 weeks then monthly thereafter. 30 mL 4   • desloratadine-pseudoephedrine (CLARINEX-D 12-hour) 2.5-120 MG per tablet take 1 tablet by mouth 2 times every day 30 tablet 0   • DULoxetine (CYMBALTA) 60 MG capsule Take 1 capsule by mouth Daily. 90 capsule 3   • fenofibrate (TRICOR) 145 MG tablet Take 1 tablet by mouth Daily. 90 tablet 3   • fenofibrate (TRICOR) 145 MG tablet Take 1 tablet by mouth Daily. 90 tablet 3   • fenofibrate 160 MG tablet Take 1 tablet by mouth Daily. 90 tablet 3   • ferrous sulfate 325 (65 FE) MG EC tablet Take 1 tablet by mouth Daily. 30 tablet 3   • ferrous sulfate 325 (65 FE) MG EC tablet Take 1 tablet by mouth Daily. 90 tablet 3   • fluconazole (DIFLUCAN) 200 MG tablet Take 1 tablet by mouth Daily. 30 tablet 1   • furosemide (LASIX) 40 MG tablet Take 1 tablet by mouth Daily. 90 tablet 3   • furosemide (LASIX) 40 MG tablet Take 1 tablet by mouth Daily. 90 tablet 3   • furosemide (LASIX) 40 MG tablet Take 1 tablet by mouth Daily. 90 tablet 3   • glucose blood test strip USE 1 STRIP 2 TIMES DAILY AS DIRECTED 60 enema 6   • glucose blood test strip USE 1 STRIP 2 TIMES DAILY AS DIRECTED 180 each 6   • HYDROcodone-acetaminophen (NORCO)  MG per tablet Take 1 tablet by mouth every 4 - 6 hours as needed for severe pain. 20 tablet 0   • ibuprofen (ADVIL,MOTRIN) 800 MG tablet Take 1 tablet by mouth 3 (Three) Times a Day As  Needed. 270 tablet 3   • ibuprofen (ADVIL,MOTRIN) 800 MG tablet take 1 tablet by mouth 3 times every day with food as needed 270 tablet 3   • imatinib (GLEEVEC) 400 MG chemo tablet Take 1 tablet by mouth Daily. 30 tablet 5   • Insulin Glargine (LANTUS SOLOSTAR) 100 UNIT/ML injection pen Inject 45 units subcutaneously 2 times daily 30 mL 6   • Insulin Glargine (LANTUS SOLOSTAR) 100 UNIT/ML injection pen Inject 45 units Subcutaneously two times daily 30 mL 6   • Insulin Glargine (LANTUS SOLOSTAR) 100 UNIT/ML injection pen Inject 45 Units subcutaneously 2 times daily. 30 mL 6   • Insulin Glargine (LANTUS SOLOSTAR) 100 UNIT/ML injection pen Inject 44 Units under the skin into the appropriate area as directed 2 (Two) Times a Day. 30 mL 3   • LANTUS SOLOSTAR 100 UNIT/ML injection pen Inject 45 Units under the skin 2 (Two) Times a Day. 30 mL 6   • LANTUS SOLOSTAR 100 UNIT/ML injection pen Inject 45 Units under the skin 2 (Two) Times a Day. 30 mL 6   • LANTUS SOLOSTAR 100 UNIT/ML injection pen Inject 45 Units under the skin into the appropriate area as directed 2 (Two) Times a Day. 36 mL 6   • loratadine-pseudoephedrine (CLARITIN-D 12 HOUR) 5-120 MG per 12 hr tablet Take 1 tablet by mouth two times a day 30 tablet 0   • loratadine-pseudoephedrine (CLARITIN-D 12-hour) 5-120 MG per 12 hr tablet Take 1 tablet by mouth Every 12 (Twelve) Hours As Needed. 30 tablet 0   • mupirocin (BACTROBAN) 2 % ointment 1 application into each nostril as directed by provider 2 (Two) Times a Day. 22 g 2   • oseltamivir (TAMIFLU) 75 MG capsule Take 1 capsule by mouth 2 (Two) Times a Day. 10 capsule 0   • pantoprazole (PROTONIX) 40 MG EC tablet Take 1 tablet by mouth Daily. 90 tablet 3   • promethazine (PHENERGAN) 25 MG tablet Take 1 tablet by mouth every 4-6 hours as needed. 20 tablet 0   • SUMAtriptan (IMITREX) 100 MG tablet Take 1 tablet by mouth after onset of migraine. May repeat after 2 hours if headache returns. Do not exceed 200mg in 24  "hours. 10 tablet 2   • SUMAtriptan (IMITREX) 100 MG tablet Take 1 tablet by mouth after onset of migraine; may repeat once 27 tablet 2   • Syringe/Needle, Disp, 25G X 1\" 3 ML misc USE 1 AS DIRECTED WITH B12 1 each 7   • triamcinolone (KENALOG) 0.1 % cream Apply topically to affected area(s) 2 Times a Day. 30 g 0   • triamcinolone (KENALOG) 0.1 % cream Apply to affected area(s) 2 times per day 30 g 0   • triamcinolone (KENALOG) 0.1 % cream Apply a Thin Layer to Affected Area Topically Twice Daily 80 g 5   • triamcinolone (KENALOG) 0.1 % cream Apply a Thin Layer to Affected Area Topically Twice Daily 240 g 5   • TRUETRACK TEST test strip use to test blood sugar 8 times daily 200 each 5   • vitamin D (ERGOCALCIFEROL) 63932 units capsule capsule Take 1 capsule by mouth once weekly. 12 capsule 3   • vitamin D (ERGOCALCIFEROL) 06437 units capsule capsule Take 1 capsule by mouth 2 (Two) Times a Week. 24 capsule 0   • zolpidem (AMBIEN) 10 MG tablet Take 1 tablet by mouth Every Night. 90 tablet 0   • zolpidem (AMBIEN) 10 MG tablet Take 1 tablet by mouth Every Night at bedtime for insomnia 30 tablet 1     No current facility-administered medications for this visit.          LABORATORY:    Lab Results   Component Value Date    WBC 7.16 05/19/2020    HGB 13.2 05/19/2020    HCT 40.1 05/19/2020    MCV 91.8 05/19/2020    RDW 12.8 05/19/2020     05/19/2020    NEUTRORELPCT 58.1 05/19/2020    LYMPHORELPCT 28.6 05/19/2020    MONORELPCT 8.8 05/19/2020    EOSRELPCT 3.4 05/19/2020    BASORELPCT 0.8 05/19/2020    NEUTROABS 4.16 05/19/2020    LYMPHSABS 2.05 05/19/2020       Lab Results   Component Value Date     05/19/2020    K 4.5 05/19/2020    CO2 25.7 05/19/2020     05/19/2020    BUN 12 05/19/2020    CREATININE 0.73 05/19/2020    GLUCOSE 227 (H) 05/19/2020    CALCIUM 9.2 05/19/2020    ALKPHOS 70 05/19/2020    AST 22 05/19/2020    ALT 27 05/19/2020    BILITOT 0.5 05/19/2020    ALBUMIN 4.06 05/19/2020    PROTEINTOT 7.0 " 05/19/2020    PHOS 3.4 10/03/2018       Lab Results   Component Value Date     02/26/2020    URICACID 4.5 02/26/2020        Imaging Results (Last 24 Hours)     ** No results found for the last 24 hours. **          ASSESSMENT/PLAN:    Patient Update Assessment (new medications, allergies, medical history): No changes.      Medication(s): Gleevec 400 mg chemo tablet.      Currently Taking Medication(s): Patient reports taking medication as directed, 1 tablet daily by mouth.     Experiencing Side Effects: None expressed.      Prior Authorization Status: Approved.       Financial Assistance Status: Not needed at this time.  Patient co-pay is $5.     Any Issues Identified: No issues expressed form patient, no issues processing refill.     Appropriate to Process Prescription(s):  Yes. Medication will be dispensed to patient from [facility].     Counseling Offered: Patient previously counseled upon initiation of therapy, aware to contact pharmacy with any new questions or concerns.     Next Specialty Pharmacy Visit: 06/29/2020

## 2020-06-29 ENCOUNTER — SPECIALTY PHARMACY (OUTPATIENT)
Dept: PHARMACY | Facility: HOSPITAL | Age: 55
End: 2020-06-29

## 2020-07-17 NOTE — PROGRESS NOTES
Specialty Pharmacy Note      Name:  Aric Haro  :  1965  Date:  2020         Past Medical History:   Diagnosis Date   • Anemia    • Cancer (CMS/HCC)     leukemia   • Diabetes mellitus (CMS/HCC)    • Elevated cholesterol    • GERD (gastroesophageal reflux disease)    • Hypertension    • PONV (postoperative nausea and vomiting)        Past Surgical History:   Procedure Laterality Date   • BLEPHAROPLASTY Bilateral 2018    Procedure: BLEPHAROPLASTY BOTH EYES UPPER EYELIDS (PT REQUESTED TERESITA WILKES;  Surgeon: Chris Haile MD;  Location: [facility];  Service: Ophthalmology   • COLONOSCOPY N/A 3/27/2018    Procedure: COLONOSCOPY FOR SCREENING  CPTCODE:68880;  Surgeon: Drew Esparza III, MD;  Location: [facility];  Service: Gastroenterology   • SINUS SURGERY     • SINUS SURGERY     • SLEEVE GASTROPLASTY         Social History     Socioeconomic History   • Marital status:      Spouse name: Not on file   • Number of children: Not on file   • Years of education: Not on file   • Highest education level: Not on file   Tobacco Use   • Smoking status: Former Smoker     Packs/day: 0.25     Years: 4.00     Pack years: 1.00   • Smokeless tobacco: Never Used   Substance and Sexual Activity   • Alcohol use: No   • Drug use: No   • Sexual activity: Defer       Family History   Problem Relation Age of Onset   • Anemia Mother    • Hypertension Mother    • Cancer Mother    • COPD Father    • Heart disease Father    • Breast cancer Neg Hx        No Known Allergies    Current Outpatient Medications   Medication Sig Dispense Refill   • amoxicillin-clavulanate (AUGMENTIN) 875-125 MG per tablet Take 1 tablet by mouth Every 12 (Twelve) Hours. 20 tablet 0   • baclofen (LIORESAL) 20 MG tablet Take 1 tablet by mouth 3 (Three) Times a Day. 90 tablet 4   • celecoxib (CeleBREX) 200 MG capsule Take 1 capsule by mouth 2 (Two) Times a Day. (Patient taking differently: Take 200 mg by mouth 2 (Two)  Times a Day. Takes 1/2 tablet once daily) 180 capsule 3   • celecoxib (CeleBREX) 200 MG capsule Take 1 capsule by mouth 2 (Two) Times a Day. 180 capsule 3   • cholecalciferol (VITAMIN D3) 14101 units capsule Take 1 capsule by mouth 2 (Two) Times a Week. 24 capsule 0   • Cholecalciferol (VITAMIN D3) 35925 units capsule Take 1 capsule by mouth 2 (Two) Times a Week. 24 capsule 3   • cyanocobalamin 1000 MCG/ML injection Inject 1 mL intramuscularly monthly. 1 mL 1   • cyanocobalamin 1000 MCG/ML injection Inject 1 mL into the appropriate muscle as directed by prescriber weekly for 8 weeks then monthly thereafter. 30 mL 4   • desloratadine-pseudoephedrine (CLARINEX-D 12-hour) 2.5-120 MG per tablet take 1 tablet by mouth 2 times every day 30 tablet 0   • DULoxetine (CYMBALTA) 60 MG capsule Take 1 capsule by mouth Daily. 90 capsule 3   • fenofibrate (TRICOR) 145 MG tablet Take 1 tablet by mouth Daily. 90 tablet 3   • fenofibrate (TRICOR) 145 MG tablet Take 1 tablet by mouth Daily. 90 tablet 3   • fenofibrate 160 MG tablet Take 1 tablet by mouth Daily. 90 tablet 3   • ferrous sulfate 325 (65 FE) MG EC tablet Take 1 tablet by mouth Daily. 30 tablet 3   • ferrous sulfate 325 (65 FE) MG EC tablet Take 1 tablet by mouth Daily. 90 tablet 3   • fluconazole (DIFLUCAN) 200 MG tablet Take 1 tablet by mouth Daily. 30 tablet 1   • furosemide (LASIX) 40 MG tablet Take 1 tablet by mouth Daily. 90 tablet 3   • furosemide (LASIX) 40 MG tablet Take 1 tablet by mouth Daily. 90 tablet 3   • furosemide (LASIX) 40 MG tablet Take 1 tablet by mouth Daily. 90 tablet 3   • glucose blood test strip USE 1 STRIP 2 TIMES DAILY AS DIRECTED 60 enema 6   • glucose blood test strip USE 1 STRIP 2 TIMES DAILY AS DIRECTED 180 each 6   • HYDROcodone-acetaminophen (NORCO)  MG per tablet Take 1 tablet by mouth every 4 - 6 hours as needed for severe pain. 20 tablet 0   • HYDROcodone-acetaminophen (NORCO)  MG per tablet Take 1 tablet by mouth Every 4-6  Hours As Needed. 30 tablet 0   • ibuprofen (ADVIL,MOTRIN) 800 MG tablet Take 1 tablet by mouth 3 (Three) Times a Day As Needed. 270 tablet 3   • ibuprofen (ADVIL,MOTRIN) 800 MG tablet take 1 tablet by mouth 3 times every day with food as needed 270 tablet 3   • imatinib (GLEEVEC) 400 MG chemo tablet Take 1 tablet by mouth Daily. 30 tablet 5   • Insulin Glargine (LANTUS SOLOSTAR) 100 UNIT/ML injection pen Inject 45 units subcutaneously 2 times daily 30 mL 6   • Insulin Glargine (LANTUS SOLOSTAR) 100 UNIT/ML injection pen Inject 45 units Subcutaneously two times daily 30 mL 6   • Insulin Glargine (LANTUS SOLOSTAR) 100 UNIT/ML injection pen Inject 45 Units subcutaneously 2 times daily. 30 mL 6   • Insulin Glargine (LANTUS SOLOSTAR) 100 UNIT/ML injection pen Inject 44 Units under the skin into the appropriate area as directed 2 (Two) Times a Day. 30 mL 3   • LANTUS SOLOSTAR 100 UNIT/ML injection pen Inject 45 Units under the skin 2 (Two) Times a Day. 30 mL 6   • LANTUS SOLOSTAR 100 UNIT/ML injection pen Inject 45 Units under the skin 2 (Two) Times a Day. 30 mL 6   • LANTUS SOLOSTAR 100 UNIT/ML injection pen Inject 45 Units under the skin into the appropriate area as directed 2 (Two) Times a Day. 36 mL 6   • loratadine-pseudoephedrine (CLARITIN-D 12 HOUR) 5-120 MG per 12 hr tablet Take 1 tablet by mouth two times a day 30 tablet 0   • loratadine-pseudoephedrine (CLARITIN-D 12-hour) 5-120 MG per 12 hr tablet Take 1 tablet by mouth Every 12 (Twelve) Hours As Needed. 30 tablet 0   • mupirocin (BACTROBAN) 2 % ointment 1 application into each nostril as directed by provider 2 (Two) Times a Day. 22 g 2   • oseltamivir (TAMIFLU) 75 MG capsule Take 1 capsule by mouth 2 (Two) Times a Day. 10 capsule 0   • pantoprazole (PROTONIX) 40 MG EC tablet Take 1 tablet by mouth Daily. 90 tablet 3   • promethazine (PHENERGAN) 25 MG tablet Take 1 tablet by mouth every 4-6 hours as needed. 20 tablet 0   • SUMAtriptan (IMITREX) 100 MG tablet  "Take 1 tablet by mouth after onset of migraine. May repeat after 2 hours if headache returns. Do not exceed 200mg in 24 hours. 10 tablet 2   • SUMAtriptan (IMITREX) 100 MG tablet Take 1 tablet by mouth after onset of migraine; may repeat once 27 tablet 2   • Syringe/Needle, Disp, 25G X 1\" 3 ML misc USE 1 AS DIRECTED WITH B12 1 each 7   • triamcinolone (KENALOG) 0.1 % cream Apply topically to affected area(s) 2 Times a Day. 30 g 0   • triamcinolone (KENALOG) 0.1 % cream Apply to affected area(s) 2 times per day 30 g 0   • triamcinolone (KENALOG) 0.1 % cream Apply a Thin Layer to Affected Area Topically Twice Daily 80 g 5   • triamcinolone (KENALOG) 0.1 % cream Apply a Thin Layer to Affected Area Topically Twice Daily 240 g 5   • TRUETRACK TEST test strip use to test blood sugar 8 times daily 200 each 5   • vitamin D (ERGOCALCIFEROL) 62164 units capsule capsule Take 1 capsule by mouth once weekly. 12 capsule 3   • vitamin D (ERGOCALCIFEROL) 37671 units capsule capsule Take 1 capsule by mouth 2 (Two) Times a Week. 24 capsule 0   • zolpidem (AMBIEN) 10 MG tablet Take 1 tablet by mouth Every Night. 90 tablet 0   • zolpidem (AMBIEN) 10 MG tablet Take 1 tablet by mouth Every Night at bedtime for insomnia 30 tablet 1     No current facility-administered medications for this visit.          LABORATORY:    Lab Results   Component Value Date    WBC 7.16 05/19/2020    HGB 13.2 05/19/2020    HCT 40.1 05/19/2020    MCV 91.8 05/19/2020    RDW 12.8 05/19/2020     05/19/2020    NEUTRORELPCT 58.1 05/19/2020    LYMPHORELPCT 28.6 05/19/2020    MONORELPCT 8.8 05/19/2020    EOSRELPCT 3.4 05/19/2020    BASORELPCT 0.8 05/19/2020    NEUTROABS 4.16 05/19/2020    LYMPHSABS 2.05 05/19/2020       Lab Results   Component Value Date     05/19/2020    K 4.5 05/19/2020    CO2 25.7 05/19/2020     05/19/2020    BUN 12 05/19/2020    CREATININE 0.73 05/19/2020    GLUCOSE 227 (H) 05/19/2020    CALCIUM 9.2 05/19/2020    ALKPHOS 70 " 05/19/2020    AST 22 05/19/2020    ALT 27 05/19/2020    BILITOT 0.5 05/19/2020    ALBUMIN 4.06 05/19/2020    PROTEINTOT 7.0 05/19/2020    PHOS 3.4 10/03/2018       Lab Results   Component Value Date     02/26/2020    URICACID 4.5 02/26/2020        Imaging Results (Last 24 Hours)     ** No results found for the last 24 hours. **          ASSESSMENT/PLAN:    Patient Update Assessment (new medications, allergies, medical history): No changes.      Medication(s): Gleevec 400 mg chemo tablet.      Currently Taking Medication(s): Patient reports taking medication as directed, 1 tablet daily by mouth.     Experiencing Side Effects: None expressed.      Prior Authorization Status: Approved.       Financial Assistance Status: Not needed at this time.  Patient co-pay is $5.     Any Issues Identified: No issues expressed form patient, no issues processing refill.     Appropriate to Process Prescription(s):  Yes. Medication will be dispensed to patient from [facility].     Counseling Offered: Patient previously counseled upon initiation of therapy, aware to contact pharmacy with any new questions or concerns.     Next Specialty Pharmacy Visit: 7/27/2020

## 2020-07-27 ENCOUNTER — SPECIALTY PHARMACY (OUTPATIENT)
Dept: PHARMACY | Facility: HOSPITAL | Age: 55
End: 2020-07-27

## 2020-08-12 NOTE — PROGRESS NOTES
Specialty Pharmacy Note      Name:  Aric Haro  :  1965  Date:  2020    Past Medical History:   Diagnosis Date   • Anemia    • Cancer (CMS/HCC)     leukemia   • Diabetes mellitus (CMS/HCC)    • Elevated cholesterol    • GERD (gastroesophageal reflux disease)    • Hypertension    • PONV (postoperative nausea and vomiting)        Past Surgical History:   Procedure Laterality Date   • BLEPHAROPLASTY Bilateral 2018    Procedure: BLEPHAROPLASTY BOTH EYES UPPER EYELIDS (PT REQUESTED TERESITA WILKES;  Surgeon: Chris Haile MD;  Location: [facility];  Service: Ophthalmology   • COLONOSCOPY N/A 3/27/2018    Procedure: COLONOSCOPY FOR SCREENING  CPTCODE:63537;  Surgeon: Drew Esparza III, MD;  Location: [facility];  Service: Gastroenterology   • SINUS SURGERY     • SINUS SURGERY     • SLEEVE GASTROPLASTY         Social History     Socioeconomic History   • Marital status:      Spouse name: Not on file   • Number of children: Not on file   • Years of education: Not on file   • Highest education level: Not on file   Tobacco Use   • Smoking status: Former Smoker     Packs/day: 0.25     Years: 4.00     Pack years: 1.00   • Smokeless tobacco: Never Used   Substance and Sexual Activity   • Alcohol use: No   • Drug use: No   • Sexual activity: Defer       Family History   Problem Relation Age of Onset   • Anemia Mother    • Hypertension Mother    • Cancer Mother    • COPD Father    • Heart disease Father    • Breast cancer Neg Hx        No Known Allergies    Current Outpatient Medications   Medication Sig Dispense Refill   • amoxicillin-clavulanate (AUGMENTIN) 875-125 MG per tablet Take 1 tablet by mouth Every 12 (Twelve) Hours. 20 tablet 0   • baclofen (LIORESAL) 20 MG tablet Take 1 tablet by mouth 3 (Three) Times a Day. 90 tablet 4   • celecoxib (CeleBREX) 200 MG capsule Take 1 capsule by mouth 2 (Two) Times a Day. (Patient taking differently: Take 200 mg by mouth 2 (Two) Times a  Day. Takes 1/2 tablet once daily) 180 capsule 3   • celecoxib (CeleBREX) 200 MG capsule Take 1 capsule by mouth 2 (Two) Times a Day. 180 capsule 3   • cholecalciferol (VITAMIN D3) 11291 units capsule Take 1 capsule by mouth 2 (Two) Times a Week. 24 capsule 0   • Cholecalciferol (VITAMIN D3) 05870 units capsule Take 1 capsule by mouth 2 (Two) Times a Week. 24 capsule 3   • cyanocobalamin 1000 MCG/ML injection Inject 1 mL intramuscularly monthly. 1 mL 1   • cyanocobalamin 1000 MCG/ML injection Inject 1 mL into the appropriate muscle as directed by prescriber weekly for 8 weeks then monthly thereafter. 30 mL 4   • desloratadine-pseudoephedrine (CLARINEX-D 12-hour) 2.5-120 MG per tablet take 1 tablet by mouth 2 times every day 30 tablet 0   • DULoxetine (CYMBALTA) 60 MG capsule Take 1 capsule by mouth Daily. 90 capsule 3   • fenofibrate (TRICOR) 145 MG tablet Take 1 tablet by mouth Daily. 90 tablet 3   • fenofibrate (TRICOR) 145 MG tablet Take 1 tablet by mouth Daily. 90 tablet 3   • fenofibrate 160 MG tablet Take 1 tablet by mouth Daily. 90 tablet 3   • ferrous sulfate 325 (65 FE) MG EC tablet Take 1 tablet by mouth Daily. 30 tablet 3   • ferrous sulfate 325 (65 FE) MG EC tablet Take 1 tablet by mouth Daily. 90 tablet 3   • fluconazole (DIFLUCAN) 200 MG tablet Take 1 tablet by mouth Daily. 30 tablet 1   • furosemide (LASIX) 40 MG tablet Take 1 tablet by mouth Daily. 90 tablet 3   • furosemide (LASIX) 40 MG tablet Take 1 tablet by mouth Daily. 90 tablet 3   • furosemide (LASIX) 40 MG tablet Take 1 tablet by mouth Daily. 90 tablet 3   • glucose blood test strip USE 1 STRIP 2 TIMES DAILY AS DIRECTED 60 enema 6   • glucose blood test strip USE 1 STRIP 2 TIMES DAILY AS DIRECTED 180 each 6   • HYDROcodone-acetaminophen (NORCO)  MG per tablet Take 1 tablet by mouth every 4 - 6 hours as needed for severe pain. 20 tablet 0   • HYDROcodone-acetaminophen (NORCO)  MG per tablet Take 1 tablet by mouth Every 4-6 Hours As  Needed. 30 tablet 0   • ibuprofen (ADVIL,MOTRIN) 800 MG tablet Take 1 tablet by mouth 3 (Three) Times a Day As Needed. 270 tablet 3   • ibuprofen (ADVIL,MOTRIN) 800 MG tablet take 1 tablet by mouth 3 times every day with food as needed 270 tablet 3   • imatinib (GLEEVEC) 400 MG chemo tablet Take 1 tablet by mouth Daily. 30 tablet 5   • Insulin Glargine (LANTUS SOLOSTAR) 100 UNIT/ML injection pen Inject 45 units subcutaneously 2 times daily 30 mL 6   • Insulin Glargine (LANTUS SOLOSTAR) 100 UNIT/ML injection pen Inject 45 units Subcutaneously two times daily 30 mL 6   • Insulin Glargine (LANTUS SOLOSTAR) 100 UNIT/ML injection pen Inject 45 Units subcutaneously 2 times daily. 30 mL 6   • Insulin Glargine (LANTUS SOLOSTAR) 100 UNIT/ML injection pen Inject 44 Units under the skin into the appropriate area as directed 2 (Two) Times a Day. 30 mL 3   • Insulin Glargine (LANTUS SOLOSTAR) 100 UNIT/ML injection pen Inject 45 Units under the skin into the appropriate area as directed 2 (two) times a day. 30 mL 0   • LANTUS SOLOSTAR 100 UNIT/ML injection pen Inject 45 Units under the skin 2 (Two) Times a Day. 30 mL 6   • LANTUS SOLOSTAR 100 UNIT/ML injection pen Inject 45 Units under the skin 2 (Two) Times a Day. 30 mL 6   • LANTUS SOLOSTAR 100 UNIT/ML injection pen Inject 45 Units under the skin into the appropriate area as directed 2 (Two) Times a Day. 36 mL 6   • loratadine-pseudoephedrine (CLARITIN-D 12 HOUR) 5-120 MG per 12 hr tablet Take 1 tablet by mouth two times a day 30 tablet 0   • loratadine-pseudoephedrine (CLARITIN-D 12-hour) 5-120 MG per 12 hr tablet Take 1 tablet by mouth Every 12 (Twelve) Hours As Needed. 30 tablet 0   • mupirocin (BACTROBAN) 2 % ointment 1 application into each nostril as directed by provider 2 (Two) Times a Day. 22 g 2   • oseltamivir (TAMIFLU) 75 MG capsule Take 1 capsule by mouth 2 (Two) Times a Day. 10 capsule 0   • pantoprazole (PROTONIX) 40 MG EC tablet Take 1 tablet by mouth Daily. 90  "tablet 3   • promethazine (PHENERGAN) 25 MG tablet Take 1 tablet by mouth every 4-6 hours as needed. 20 tablet 0   • SUMAtriptan (IMITREX) 100 MG tablet Take 1 tablet by mouth after onset of migraine. May repeat after 2 hours if headache returns. Do not exceed 200mg in 24 hours. 10 tablet 2   • SUMAtriptan (IMITREX) 100 MG tablet Take 1 tablet by mouth after onset of migraine; may repeat once 27 tablet 2   • Syringe/Needle, Disp, 25G X 1\" 3 ML misc USE 1 AS DIRECTED WITH B12 1 each 7   • triamcinolone (KENALOG) 0.1 % cream Apply topically to affected area(s) 2 Times a Day. 30 g 0   • triamcinolone (KENALOG) 0.1 % cream Apply to affected area(s) 2 times per day 30 g 0   • triamcinolone (KENALOG) 0.1 % cream Apply a Thin Layer to Affected Area Topically Twice Daily 80 g 5   • triamcinolone (KENALOG) 0.1 % cream Apply a Thin Layer to Affected Area Topically Twice Daily 240 g 5   • TRUETRACK TEST test strip use to test blood sugar 8 times daily 200 each 5   • vitamin D (ERGOCALCIFEROL) 48152 units capsule capsule Take 1 capsule by mouth once weekly. 12 capsule 3   • vitamin D (ERGOCALCIFEROL) 21914 units capsule capsule Take 1 capsule by mouth 2 (Two) Times a Week. 24 capsule 0   • zolpidem (AMBIEN) 10 MG tablet Take 1 tablet by mouth Every Night. 90 tablet 0   • zolpidem (AMBIEN) 10 MG tablet Take 1 tablet by mouth Every Night at bedtime for insomnia 30 tablet 1     No current facility-administered medications for this visit.          LABORATORY:    Lab Results   Component Value Date    WBC 7.16 05/19/2020    HGB 13.2 05/19/2020    HCT 40.1 05/19/2020    MCV 91.8 05/19/2020    RDW 12.8 05/19/2020     05/19/2020    NEUTRORELPCT 58.1 05/19/2020    LYMPHORELPCT 28.6 05/19/2020    MONORELPCT 8.8 05/19/2020    EOSRELPCT 3.4 05/19/2020    BASORELPCT 0.8 05/19/2020    NEUTROABS 4.16 05/19/2020    LYMPHSABS 2.05 05/19/2020       Lab Results   Component Value Date     05/19/2020    K 4.5 05/19/2020    CO2 25.7 " 05/19/2020     05/19/2020    BUN 12 05/19/2020    CREATININE 0.73 05/19/2020    GLUCOSE 227 (H) 05/19/2020    CALCIUM 9.2 05/19/2020    ALKPHOS 70 05/19/2020    AST 22 05/19/2020    ALT 27 05/19/2020    BILITOT 0.5 05/19/2020    ALBUMIN 4.06 05/19/2020    PROTEINTOT 7.0 05/19/2020    PHOS 3.4 10/03/2018       Lab Results   Component Value Date     02/26/2020    URICACID 4.5 02/26/2020        Imaging Results (Last 24 Hours)     ** No results found for the last 24 hours. **          ASSESSMENT/PLAN:    Patient Update Assessment (new medications, allergies, medical history): No changes.      Medication(s): Gleevec 400 mg chemo tablet.      Currently Taking Medication(s): Patient reports taking medication as directed, 1 tablet daily by mouth.     Experiencing Side Effects: None expressed.      Prior Authorization Status: Approved.       Financial Assistance Status: Not needed at this time.  Patient co-pay is $5.     Any Issues Identified: No issues expressed form patient, no issues processing refill.     Appropriate to Process Prescription(s):  Yes. Medication will be dispensed to patient from [facility].     Counseling Offered: Patient previously counseled upon initiation of therapy, aware to contact pharmacy with any new questions or concerns.     Next Specialty Pharmacy Visit: 8/26/2020

## 2020-08-17 ENCOUNTER — LAB (OUTPATIENT)
Dept: ONCOLOGY | Facility: CLINIC | Age: 55
End: 2020-08-17

## 2020-08-17 VITALS
DIASTOLIC BLOOD PRESSURE: 76 MMHG | RESPIRATION RATE: 18 BRPM | OXYGEN SATURATION: 98 % | TEMPERATURE: 97.5 F | SYSTOLIC BLOOD PRESSURE: 126 MMHG | HEART RATE: 87 BPM

## 2020-08-17 DIAGNOSIS — E53.8 VITAMIN B12 DEFICIENCY: ICD-10-CM

## 2020-08-17 DIAGNOSIS — E61.1 IRON DEFICIENCY: ICD-10-CM

## 2020-08-17 DIAGNOSIS — C92.10 CML (CHRONIC MYELOCYTIC LEUKEMIA) (HCC): ICD-10-CM

## 2020-08-17 LAB
ALBUMIN SERPL-MCNC: 3.98 G/DL (ref 3.5–5.2)
ALBUMIN/GLOB SERPL: 1.4 G/DL
ALP SERPL-CCNC: 70 U/L (ref 39–117)
ALT SERPL W P-5'-P-CCNC: 23 U/L (ref 1–33)
ANION GAP SERPL CALCULATED.3IONS-SCNC: 9.5 MMOL/L (ref 5–15)
AST SERPL-CCNC: 22 U/L (ref 1–32)
BASOPHILS # BLD AUTO: 0.04 10*3/MM3 (ref 0–0.2)
BASOPHILS NFR BLD AUTO: 0.5 % (ref 0–1.5)
BILIRUB SERPL-MCNC: 0.4 MG/DL (ref 0–1.2)
BUN SERPL-MCNC: 14 MG/DL (ref 6–20)
BUN/CREAT SERPL: 18.4 (ref 7–25)
CALCIUM SPEC-SCNC: 8.8 MG/DL (ref 8.6–10.5)
CHLORIDE SERPL-SCNC: 105 MMOL/L (ref 98–107)
CO2 SERPL-SCNC: 24.5 MMOL/L (ref 22–29)
CREAT SERPL-MCNC: 0.76 MG/DL (ref 0.57–1)
DEPRECATED RDW RBC AUTO: 46.4 FL (ref 37–54)
EOSINOPHIL # BLD AUTO: 0.2 10*3/MM3 (ref 0–0.4)
EOSINOPHIL NFR BLD AUTO: 2.6 % (ref 0.3–6.2)
ERYTHROCYTE [DISTWIDTH] IN BLOOD BY AUTOMATED COUNT: 13.4 % (ref 12.3–15.4)
GFR SERPL CREATININE-BSD FRML MDRD: 79 ML/MIN/1.73
GLOBULIN UR ELPH-MCNC: 2.9 GM/DL
GLUCOSE SERPL-MCNC: 173 MG/DL (ref 65–99)
HCT VFR BLD AUTO: 40.6 % (ref 34–46.6)
HGB BLD-MCNC: 12.9 G/DL (ref 12–15.9)
IMM GRANULOCYTES # BLD AUTO: 0.02 10*3/MM3 (ref 0–0.05)
IMM GRANULOCYTES NFR BLD AUTO: 0.3 % (ref 0–0.5)
LDH SERPL-CCNC: 194 U/L (ref 135–214)
LYMPHOCYTES # BLD AUTO: 2.12 10*3/MM3 (ref 0.7–3.1)
LYMPHOCYTES NFR BLD AUTO: 27.9 % (ref 19.6–45.3)
MCH RBC QN AUTO: 29.9 PG (ref 26.6–33)
MCHC RBC AUTO-ENTMCNC: 31.8 G/DL (ref 31.5–35.7)
MCV RBC AUTO: 94 FL (ref 79–97)
MONOCYTES # BLD AUTO: 0.7 10*3/MM3 (ref 0.1–0.9)
MONOCYTES NFR BLD AUTO: 9.2 % (ref 5–12)
NEUTROPHILS NFR BLD AUTO: 4.51 10*3/MM3 (ref 1.7–7)
NEUTROPHILS NFR BLD AUTO: 59.5 % (ref 42.7–76)
NRBC BLD AUTO-RTO: 0 /100 WBC (ref 0–0.2)
PLATELET # BLD AUTO: 256 10*3/MM3 (ref 140–450)
PMV BLD AUTO: 10.2 FL (ref 6–12)
POTASSIUM SERPL-SCNC: 4.7 MMOL/L (ref 3.5–5.2)
PROT SERPL-MCNC: 6.9 G/DL (ref 6–8.5)
RBC # BLD AUTO: 4.32 10*6/MM3 (ref 3.77–5.28)
SODIUM SERPL-SCNC: 139 MMOL/L (ref 136–145)
URATE SERPL-MCNC: 5.3 MG/DL (ref 2.4–5.7)
WBC # BLD AUTO: 7.59 10*3/MM3 (ref 3.4–10.8)

## 2020-08-17 PROCEDURE — 83615 LACTATE (LD) (LDH) ENZYME: CPT | Performed by: INTERNAL MEDICINE

## 2020-08-17 PROCEDURE — 84550 ASSAY OF BLOOD/URIC ACID: CPT | Performed by: INTERNAL MEDICINE

## 2020-08-17 PROCEDURE — 81207 BCR/ABL1 GENE MINOR BP: CPT

## 2020-08-17 PROCEDURE — 85025 COMPLETE CBC W/AUTO DIFF WBC: CPT | Performed by: INTERNAL MEDICINE

## 2020-08-17 PROCEDURE — 81206 BCR/ABL1 GENE MAJOR BP: CPT

## 2020-08-17 PROCEDURE — 80053 COMPREHEN METABOLIC PANEL: CPT | Performed by: INTERNAL MEDICINE

## 2020-08-26 ENCOUNTER — SPECIALTY PHARMACY (OUTPATIENT)
Dept: PHARMACY | Facility: HOSPITAL | Age: 55
End: 2020-08-26

## 2020-08-26 DIAGNOSIS — C92.10 CML (CHRONIC MYELOCYTIC LEUKEMIA) (HCC): ICD-10-CM

## 2020-08-26 RX ORDER — IMATINIB MESYLATE 400 MG/1
400 TABLET, FILM COATED ORAL DAILY
Qty: 30 TABLET | Refills: 3 | Status: SHIPPED | OUTPATIENT
Start: 2020-08-26 | End: 2020-12-17 | Stop reason: SDUPTHER

## 2020-09-01 ENCOUNTER — OFFICE VISIT (OUTPATIENT)
Dept: ONCOLOGY | Facility: CLINIC | Age: 55
End: 2020-09-01

## 2020-09-01 VITALS
WEIGHT: 276 LBS | DIASTOLIC BLOOD PRESSURE: 88 MMHG | HEART RATE: 102 BPM | RESPIRATION RATE: 18 BRPM | TEMPERATURE: 97.1 F | OXYGEN SATURATION: 99 % | BODY MASS INDEX: 44.55 KG/M2 | SYSTOLIC BLOOD PRESSURE: 147 MMHG

## 2020-09-01 DIAGNOSIS — E53.8 VITAMIN B12 DEFICIENCY: ICD-10-CM

## 2020-09-01 DIAGNOSIS — C92.10 CML (CHRONIC MYELOCYTIC LEUKEMIA) (HCC): Primary | ICD-10-CM

## 2020-09-01 DIAGNOSIS — E61.1 IRON DEFICIENCY: ICD-10-CM

## 2020-09-01 PROCEDURE — 99214 OFFICE O/P EST MOD 30 MIN: CPT | Performed by: INTERNAL MEDICINE

## 2020-09-01 NOTE — PROGRESS NOTES
Aric Haro  [id number]  1965  9/1/2020      Referring Provider:   EILEEN Andrade    Reason for Follow Up:   1. Chronic Phase - Chronic Myelogenous Leukemia  2. Leukocytosis  3. Thrombocytosis     Chief Complaint:  Edema      History of Present Illness:  Aric Haro is a very pleasant 54 y.o.  female who presents in follow up appointment for further management and treatment of chronic phase chronic myelogenous leukemia (CML).    Ms. Haro began experiencing extreme fatigue where she was sleeping multiple hours during the day when she initially began following with me in April 2017. 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 On laboratory evaluation she was found to have a total white blood cell count of 22.35 and a platelet count 470 thousand. In March 2016 she was also noted to have a leukocytosis (although not as elevated) as well as a thrombocytosis, however reports that these were likely secondary to other medical issues at the time her blood work was drawn. She denied of any significant weight loss however has been gradually losing weight since gastric sleeve procedure. She denied of any fevers or frequent infections but did note fluctuating neck lymphadenopathy as well as early satiety and taste in change. She denied of any abnormal or spontaneous bleeding. I did obtain a BCR ABL PCR to further assess her leukocytosis and thrombocytosis and she was positive for the b2a2/b3a2 (p210) and e1a2 (p190) fusion gene transcripts consistent with a diagnosis for CML. After reviewing the toxicities of each tyrosine kinase inhibitor we decided to start Dasatinib treatment. She had a difficult time tolerating the Dasatinib as she was unable to take her PPI with this medication. Since she had to be taken off of her PPI she had worsening reflux symptoms as well  as severe nausea, vomiting, dehydration, and headaches during this period. Given the toxicities she was experiencing she was taken off Dasatinib and this was changed to Gleevec treatment. Since starting Gleevac 400mg daily she has tolerated this much better without significant side effects. The only side effect that she has been able to see is intermittent pedal edema along with some hand swelling and muscle cramps.     Treatment History:  Started Dasatinib on 05/15/17 - experienced nausea, severe reflux, headaches while on medication and had taken 9 doses. This was discontinued due to intolerability and she was thereafter started on Imatinib.   Started Imatinib on 5/26/17    Interim History:  Since the start of Dasatinib and later Gleevec she has had a good response and normalization in her complete blood counts with improvement in her BCR ABL PCR. She denies of any fevers, infections, lymphadenopathy, chest pain, dyspnea, nausea. She has noticed worsening edema in her extremities and has intermittently taken Lasix with improvement in her symptoms. She was growing tomatoes in her garden and admitted eating this with salt, however reports that her edema was also occurring after she had stopped. She notes that her swelling has improved today.         The following portions of the patient's history were reviewed and updated as appropriate: allergies, current medications, past family history, past medical history, past social history, past surgical history and problem list.      No Known Allergies    Past Medical History:   Diagnosis Date   • Anemia    • Cancer (CMS/HCC)     leukemia   • Diabetes mellitus (CMS/HCC)    • Elevated cholesterol    • GERD (gastroesophageal reflux disease)    • Hypertension    • PONV (postoperative nausea and vomiting)        Past Surgical History:   Procedure Laterality Date   • BLEPHAROPLASTY Bilateral 6/22/2018    Procedure: BLEPHAROPLASTY BOTH EYES UPPER EYELIDS (PT REQUESTED TERESITA  KATRIN;  Surgeon: Chris Haile MD;  Location: [facility] OR;  Service: Ophthalmology   • COLONOSCOPY N/A 3/27/2018    Procedure: COLONOSCOPY FOR SCREENING  CPTCODE:01403;  Surgeon: Drew Esparza III, MD;  Location: [facility] OR;  Service: Gastroenterology   • SINUS SURGERY     • SINUS SURGERY     • SLEEVE GASTROPLASTY         Social History     Socioeconomic History   • Marital status:      Spouse name: Not on file   • Number of children: Not on file   • Years of education: Not on file   • Highest education level: Not on file   Tobacco Use   • Smoking status: Former Smoker     Packs/day: 0.25     Years: 4.00     Pack years: 1.00   • Smokeless tobacco: Never Used   Substance and Sexual Activity   • Alcohol use: No   • Drug use: No   • Sexual activity: Defer   She lives with her daughter. She denies of any alcohol or illicit drug use. Previous social tobacco use more then 20 years ago.        Family History   Problem Relation Age of Onset   • Anemia Mother    • Hypertension Mother    • Cancer Mother    • COPD Father    • Heart disease Father    • Breast cancer Neg Hx    Maternal Uncle - CLL  Mother - Malignancy of [place]      Current Outpatient Medications:   •  amoxicillin-clavulanate (AUGMENTIN) 875-125 MG per tablet, Take 1 tablet by mouth Every 12 (Twelve) Hours., Disp: 20 tablet, Rfl: 0  •  baclofen (LIORESAL) 20 MG tablet, Take 1 tablet by mouth 3 (Three) Times a Day., Disp: 90 tablet, Rfl: 4  •  celecoxib (CeleBREX) 200 MG capsule, Take 1 capsule by mouth 2 (Two) Times a Day. (Patient taking differently: Take 200 mg by mouth 2 (Two) Times a Day. Takes 1/2 tablet once daily), Disp: 180 capsule, Rfl: 3  •  celecoxib (CeleBREX) 200 MG capsule, Take 1 capsule by mouth 2 (Two) Times a Day., Disp: 180 capsule, Rfl: 3  •  cholecalciferol (VITAMIN D3) 62052 units capsule, Take 1 capsule by mouth 2 (Two) Times a Week., Disp: 24 capsule, Rfl: 0  •  Cholecalciferol (VITAMIN D3) 67229 units capsule,  Take 1 capsule by mouth 2 (Two) Times a Week., Disp: 24 capsule, Rfl: 3  •  cyanocobalamin 1000 MCG/ML injection, Inject 1 mL intramuscularly monthly., Disp: 1 mL, Rfl: 1  •  cyanocobalamin 1000 MCG/ML injection, Inject 1 mL into the appropriate muscle as directed by prescriber weekly for 8 weeks then monthly thereafter., Disp: 30 mL, Rfl: 4  •  cyanocobalamin 1000 MCG/ML injection, Administer 1 ml (1,000 mcg) intramuscularly once weekly, Disp: 12 mL, Rfl: 2  •  desloratadine-pseudoephedrine (CLARINEX-D 12-hour) 2.5-120 MG per tablet, take 1 tablet by mouth 2 times every day, Disp: 30 tablet, Rfl: 0  •  DULoxetine (CYMBALTA) 60 MG capsule, Take 1 capsule by mouth Daily., Disp: 90 capsule, Rfl: 3  •  DULoxetine (CYMBALTA) 60 MG capsule, Take 1 capsule by mouth daily, Disp: 90 capsule, Rfl: 3  •  fenofibrate (TRICOR) 145 MG tablet, Take 1 tablet by mouth Daily., Disp: 90 tablet, Rfl: 3  •  fenofibrate (TRICOR) 145 MG tablet, Take 1 tablet by mouth Daily., Disp: 90 tablet, Rfl: 3  •  fenofibrate (TRICOR) 145 MG tablet, Take 1 tablet by mouth daily, Disp: 90 tablet, Rfl: 2  •  fenofibrate 160 MG tablet, Take 1 tablet by mouth Daily., Disp: 90 tablet, Rfl: 3  •  ferrous sulfate 325 (65 FE) MG EC tablet, Take 1 tablet by mouth Daily., Disp: 30 tablet, Rfl: 3  •  ferrous sulfate 325 (65 FE) MG EC tablet, Take 1 tablet by mouth Daily., Disp: 90 tablet, Rfl: 3  •  fluconazole (DIFLUCAN) 200 MG tablet, Take 1 tablet by mouth Daily., Disp: 30 tablet, Rfl: 1  •  furosemide (LASIX) 40 MG tablet, Take 1 tablet by mouth Daily., Disp: 90 tablet, Rfl: 3  •  furosemide (LASIX) 40 MG tablet, Take 1 tablet by mouth Daily., Disp: 90 tablet, Rfl: 3  •  furosemide (LASIX) 40 MG tablet, Take 1 tablet by mouth Daily., Disp: 90 tablet, Rfl: 3  •  glucose blood test strip, USE 1 STRIP 2 TIMES DAILY AS DIRECTED, Disp: 60 enema, Rfl: 6  •  glucose blood test strip, USE 1 STRIP 2 TIMES DAILY AS DIRECTED, Disp: 180 each, Rfl: 6  •   HYDROcodone-acetaminophen (NORCO)  MG per tablet, Take 1 tablet by mouth every 4 - 6 hours as needed for severe pain., Disp: 20 tablet, Rfl: 0  •  HYDROcodone-acetaminophen (NORCO)  MG per tablet, Take 1 tablet by mouth Every 4-6 Hours As Needed., Disp: 30 tablet, Rfl: 0  •  ibuprofen (ADVIL,MOTRIN) 800 MG tablet, Take 1 tablet by mouth 3 (Three) Times a Day As Needed., Disp: 270 tablet, Rfl: 3  •  ibuprofen (ADVIL,MOTRIN) 800 MG tablet, take 1 tablet by mouth 3 times every day with food as needed, Disp: 270 tablet, Rfl: 3  •  ibuprofen (ADVIL,MOTRIN) 800 MG tablet, Take 1 tablet by mouth 3 times per day with food or milk as needed, Disp: 270 tablet, Rfl: 1  •  imatinib (GLEEVEC) 400 MG chemo tablet, Take 1 tablet by mouth Daily., Disp: 30 tablet, Rfl: 3  •  Insulin Glargine (LANTUS SOLOSTAR) 100 UNIT/ML injection pen, Inject 45 units subcutaneously 2 times daily, Disp: 30 mL, Rfl: 6  •  Insulin Glargine (LANTUS SOLOSTAR) 100 UNIT/ML injection pen, Inject 45 units Subcutaneously two times daily, Disp: 30 mL, Rfl: 6  •  Insulin Glargine (LANTUS SOLOSTAR) 100 UNIT/ML injection pen, Inject 45 Units subcutaneously 2 times daily., Disp: 30 mL, Rfl: 6  •  Insulin Glargine (LANTUS SOLOSTAR) 100 UNIT/ML injection pen, Inject 44 Units under the skin into the appropriate area as directed 2 (Two) Times a Day., Disp: 30 mL, Rfl: 3  •  Insulin Glargine (LANTUS SOLOSTAR) 100 UNIT/ML injection pen, Inject 100 Units under the skin into the appropriate area as directed Daily., Disp: 30 mL, Rfl: 2  •  LANTUS SOLOSTAR 100 UNIT/ML injection pen, Inject 45 Units under the skin 2 (Two) Times a Day., Disp: 30 mL, Rfl: 6  •  LANTUS SOLOSTAR 100 UNIT/ML injection pen, Inject 45 Units under the skin 2 (Two) Times a Day., Disp: 30 mL, Rfl: 6  •  LANTUS SOLOSTAR 100 UNIT/ML injection pen, Inject 45 Units under the skin into the appropriate area as directed 2 (Two) Times a Day., Disp: 36 mL, Rfl: 6  •  loratadine-pseudoephedrine  "(CLARITIN-D 12 HOUR) 5-120 MG per 12 hr tablet, Take 1 tablet by mouth two times a day, Disp: 30 tablet, Rfl: 0  •  loratadine-pseudoephedrine (CLARITIN-D 12-hour) 5-120 MG per 12 hr tablet, Take 1 tablet by mouth Every 12 (Twelve) Hours As Needed., Disp: 30 tablet, Rfl: 0  •  loratadine-pseudoephedrine (CLARITIN-D 12-hour) 5-120 MG per 12 hr tablet, Take 1 tablet by mouth every 12 hours as needed, Disp: 30 tablet, Rfl: 0  •  mupirocin (BACTROBAN) 2 % ointment, 1 application into each nostril as directed by provider 2 (Two) Times a Day., Disp: 22 g, Rfl: 2  •  oseltamivir (TAMIFLU) 75 MG capsule, Take 1 capsule by mouth 2 (Two) Times a Day., Disp: 10 capsule, Rfl: 0  •  pantoprazole (PROTONIX) 40 MG EC tablet, Take 1 tablet by mouth daily, Disp: 90 tablet, Rfl: 2  •  promethazine (PHENERGAN) 25 MG tablet, Take 1 tablet by mouth every 4-6 hours as needed., Disp: 20 tablet, Rfl: 0  •  SUMAtriptan (IMITREX) 100 MG tablet, Take 1 tablet by mouth after onset of migraine. May repeat after 2 hours if headache returns. Do not exceed 200mg in 24 hours., Disp: 10 tablet, Rfl: 2  •  SUMAtriptan (IMITREX) 100 MG tablet, Take 1 tablet by mouth after onset of migraine; may repeat once, Disp: 27 tablet, Rfl: 2  •  SUMAtriptan (IMITREX) 100 MG tablet, Take 1 tablet by mouth 2 times per day at least 2 hours between doses as needed, Disp: 27 tablet, Rfl: 2  •  Syringe/Needle, Disp, 25G X 1\" 3 ML misc, USE 1 AS DIRECTED WITH B12, Disp: 1 each, Rfl: 7  •  triamcinolone (KENALOG) 0.1 % cream, Apply topically to affected area(s) 2 Times a Day., Disp: 30 g, Rfl: 0  •  triamcinolone (KENALOG) 0.1 % cream, Apply to affected area(s) 2 times per day, Disp: 30 g, Rfl: 0  •  triamcinolone (KENALOG) 0.1 % cream, Apply a Thin Layer to Affected Area Topically Twice Daily, Disp: 80 g, Rfl: 5  •  triamcinolone (KENALOG) 0.1 % cream, Apply a Thin Layer to Affected Area Topically Twice Daily, Disp: 240 g, Rfl: 5  •  triamcinolone (KENALOG) 0.1 % " cream, Apply 1 application topically to affected area daily as needed, Disp: 80 g, Rfl: 3  •  TRUETRACK TEST test strip, use to test blood sugar 8 times daily, Disp: 200 each, Rfl: 5  •  vitamin D (ERGOCALCIFEROL) 31526 units capsule capsule, Take 1 capsule by mouth once weekly., Disp: 12 capsule, Rfl: 3  •  vitamin D (ERGOCALCIFEROL) 27627 units capsule capsule, Take 1 capsule by mouth 2 (Two) Times a Week., Disp: 24 capsule, Rfl: 0  •  zolpidem (AMBIEN) 10 MG tablet, Take 1 tablet by mouth Every Night., Disp: 90 tablet, Rfl: 0  •  zolpidem (AMBIEN) 10 MG tablet, Take 1 tablet by mouth Every Night at bedtime for insomnia, Disp: 30 tablet, Rfl: 1        Review of Systems  A comprehensive 14-point review of systems performed.  Significant findings as mentioned above.  All other systems reviewed and are negative. Positive edema.      Physical Exam:  Vital Signs: These were reviewed and listed as per patient’s electronic medical chart  Vitals:    09/01/20 0917   BP: 147/88   Pulse: 102   Resp: 18   Temp: 97.1 °F (36.2 °C)   SpO2: 99%     General: Awake, alert and oriented, in no distress, obese  HEENT: Head is atraumatic, normocephalic, extraocular movements full, no scleral icterus, mask in place  Neck: supple, no jvd, lymphadenopathy or masses  Cardiovascular: regular rhythm, tachycardic, without murmurs, rubs or gallops  Pulmonary: non-labored, clear to auscultation bilaterally, no wheezing  Abdomen: soft, non-tender, non-distended, normal active bowel sounds present  Extremities: No clubbing, cyanosis or edema  Lymph: Positive right cervical which patient reports is improving, no supraclavicular adenopathy   Neurologic: Mental status as above, alert, awake and oriented, grossly non-focal exam  Skin: warm, dry, intact  Patient was examined on 09/01/20 and changes are reflected and noted above.        Labs / Studies:    Lab on 08/17/2020   Component Date Value   • Glucose 08/17/2020 173*   • BUN 08/17/2020 14    •  Creatinine 08/17/2020 0.76    • Sodium 08/17/2020 139    • Potassium 08/17/2020 4.7    • Chloride 08/17/2020 105    • CO2 08/17/2020 24.5    • Calcium 08/17/2020 8.8    • Total Protein 08/17/2020 6.9    • Albumin 08/17/2020 3.98    • ALT (SGPT) 08/17/2020 23    • AST (SGOT) 08/17/2020 22    • Alkaline Phosphatase 08/17/2020 70    • Total Bilirubin 08/17/2020 0.4    • eGFR Non African Amer 08/17/2020 79    • Globulin 08/17/2020 2.9    • A/G Ratio 08/17/2020 1.4    • BUN/Creatinine Ratio 08/17/2020 18.4    • Anion Gap 08/17/2020 9.5    • LDH 08/17/2020 194    • Uric Acid 08/17/2020 5.3    • e13a2 (b2a2) Transcript 08/17/2020 Comment    • e14a2 (b3a2) Transcript 08/17/2020 Comment    • E1A2 transcript 08/17/2020 Comment    • Interpretation 08/17/2020 Comment    • Director Review 08/17/2020 Comment    • Background: 08/17/2020 Comment    • Methodology: 08/17/2020 Comment    • WBC 08/17/2020 7.59    • RBC 08/17/2020 4.32    • Hemoglobin 08/17/2020 12.9    • Hematocrit 08/17/2020 40.6    • MCV 08/17/2020 94.0    • MCH 08/17/2020 29.9    • MCHC 08/17/2020 31.8    • RDW 08/17/2020 13.4    • RDW-SD 08/17/2020 46.4    • MPV 08/17/2020 10.2    • Platelets 08/17/2020 256    • Neutrophil % 08/17/2020 59.5    • Lymphocyte % 08/17/2020 27.9    • Monocyte % 08/17/2020 9.2    • Eosinophil % 08/17/2020 2.6    • Basophil % 08/17/2020 0.5    • Immature Grans % 08/17/2020 0.3    • Neutrophils, Absolute 08/17/2020 4.51    • Lymphocytes, Absolute 08/17/2020 2.12    • Monocytes, Absolute 08/17/2020 0.70    • Eosinophils, Absolute 08/17/2020 0.20    • Basophils, Absolute 08/17/2020 0.04    • Immature Grans, Absolute 08/17/2020 0.02    • nRBC 08/17/2020 0.0    Lab on 05/19/2020   Component Date Value   • e13a2 (b2a2) Transcript 05/19/2020 Comment    • e14a2 (b3a2) Transcript 05/19/2020 Comment    • E1A2 transcript 05/19/2020 Comment    • Interpretation 05/19/2020 Comment    • Director Review 05/19/2020 Comment    • Background: 05/19/2020  Comment    • Methodology: 05/19/2020 Comment    • Glucose 05/19/2020 227*   • BUN 05/19/2020 12    • Creatinine 05/19/2020 0.73    • Sodium 05/19/2020 139    • Potassium 05/19/2020 4.5    • Chloride 05/19/2020 100    • CO2 05/19/2020 25.7    • Calcium 05/19/2020 9.2    • Total Protein 05/19/2020 7.0    • Albumin 05/19/2020 4.06    • ALT (SGPT) 05/19/2020 27    • AST (SGOT) 05/19/2020 22    • Alkaline Phosphatase 05/19/2020 70    • Total Bilirubin 05/19/2020 0.5    • eGFR Non African Amer 05/19/2020 83    • Globulin 05/19/2020 2.9    • A/G Ratio 05/19/2020 1.4    • BUN/Creatinine Ratio 05/19/2020 16.4    • Anion Gap 05/19/2020 13.3    • Iron 05/19/2020 74    • Iron Saturation 05/19/2020 19*   • Transferrin 05/19/2020 260    • TIBC 05/19/2020 387    • Ferritin 05/19/2020 204.20*   • WBC 05/19/2020 7.16    • RBC 05/19/2020 4.37    • Hemoglobin 05/19/2020 13.2    • Hematocrit 05/19/2020 40.1    • MCV 05/19/2020 91.8    • MCH 05/19/2020 30.2    • MCHC 05/19/2020 32.9    • RDW 05/19/2020 12.8    • RDW-SD 05/19/2020 43.5    • MPV 05/19/2020 10.5    • Platelets 05/19/2020 262    • Neutrophil % 05/19/2020 58.1    • Lymphocyte % 05/19/2020 28.6    • Monocyte % 05/19/2020 8.8    • Eosinophil % 05/19/2020 3.4    • Basophil % 05/19/2020 0.8    • Immature Grans % 05/19/2020 0.3    • Neutrophils, Absolute 05/19/2020 4.16    • Lymphocytes, Absolute 05/19/2020 2.05    • Monocytes, Absolute 05/19/2020 0.63    • Eosinophils, Absolute 05/19/2020 0.24    • Basophils, Absolute 05/19/2020 0.06    • Immature Grans, Absolute 05/19/2020 0.02    • nRBC 05/19/2020 0.0    • Total Cholesterol 05/19/2020 161    • Triglycerides 05/19/2020 223*   • HDL Cholesterol 05/19/2020 49    • LDL Cholesterol  05/19/2020 67    • VLDL Cholesterol 05/19/2020 44.6    • LDL/HDL Ratio 05/19/2020 1.38    • Vitamin B-12 05/19/2020 533    • Folate 05/19/2020 13.60    • T3, Free 05/19/2020 2.68    • Free T4 05/19/2020 1.34    • TSH 05/19/2020 1.390    • 25 Hydroxy,  Vitamin D 05/19/2020 26.9*   Hospital Outpatient Visit on 03/09/2020   Component Date Value   • BSA 03/09/2020 2.3    • IVSd 03/09/2020 1.1    • IVSs 03/09/2020 1.5    • LVIDd 03/09/2020 4.3    • LVIDs 03/09/2020 2.8    • LVPWd 03/09/2020 1.2    • BH CV ECHO SHARON - LVPWS 03/09/2020 2.0    • IVS/LVPW 03/09/2020 0.87    • FS 03/09/2020 34.3    • EDV(Teich) 03/09/2020 84.2    • ESV(Teich) 03/09/2020 30.6    • EF(Teich) 03/09/2020 63.7    • EDV(cubed) 03/09/2020 80.9    • ESV(cubed) 03/09/2020 22.9    • EF(cubed) 03/09/2020 71.7    • % IVS thick 03/09/2020 38.5    • % LVPW thick 03/09/2020 65.7    • LV mass(C)d 03/09/2020 173.7    • LV mass(C)dI 03/09/2020 76.0    • LV mass(C)s 03/09/2020 193.6    • LV mass(C)sI 03/09/2020 84.7    • SV(Teich) 03/09/2020 53.6    • SI(Teich) 03/09/2020 23.4    • SV(cubed) 03/09/2020 58.0    • SI(cubed) 03/09/2020 25.4    • Ao root diam 03/09/2020 2.6    • Ao root area 03/09/2020 5.3    • ACS 03/09/2020 1.9    • LA dimension 03/09/2020 3.4    • LA/Ao 03/09/2020 1.3    • LVOT diam 03/09/2020 1.8    • LVOT area 03/09/2020 2.5    • LVOT area(traced) 03/09/2020 2.5    • LVLd ap4 03/09/2020 7.7    • EDV(MOD-sp4) 03/09/2020 53.4    • LVLs ap4 03/09/2020 6.5    • ESV(MOD-sp4) 03/09/2020 28.4    • EF(MOD-sp4) 03/09/2020 46.8    • SV(MOD-sp4) 03/09/2020 25.0    • SI(MOD-sp4) 03/09/2020 10.9    • Ao root area (BSA correc* 03/09/2020 1.1    • LV Ferrara Vol (BSA correct* 03/09/2020 23.4    • LV Sys Vol (BSA correcte* 03/09/2020 12.4    • MV E max bernarda 03/09/2020 96.6    • MV A max bernarda 03/09/2020 111.5    • MV E/A 03/09/2020 0.87    • Ao pk bernarda 03/09/2020 145.0    • Ao max PG 03/09/2020 8.4    • Ao V2 mean 03/09/2020 102.0    • Ao mean PG 03/09/2020 5.0    • Ao V2 VTI 03/09/2020 31.6    • SV(Ao) 03/09/2020 167.8    • SI(Ao) 03/09/2020 73.4    • PA acc time 03/09/2020 0.11    • TR max bernarda 03/09/2020 213.3    • PA pr(Accel) 03/09/2020 28.2    • BH CV ECHO SHARON - BZI_BMI 03/09/2020 44.2    • BH CV ECHO SHARON  - BSA(HA* 2020 2.5    • BH CV ECHO SHARON - BZI_ME* 2020 124.3    • BH CV ECHO SHARON - BZI_ME* 2020 167.6    • Target HR (85%) 2020 141    • Max. Pred. HR (100%) 2020 166             [id number]: US ABDOMEN COMPLETE: Visualized pancreas is unremarkable. The imaged portion of the abdominal aorta is nondilated. The liver is homogeneous. There is no intrahepatic ductal dilatation or focal hepatic mass. The imaged portion of the hepatic vessels and inferior vena cava are patent. The gallbladder has been removed. The common bile duct is normal, measuring 5.7 mm. The kidneys demonstrate no evidence of hydronephrosis or solid renal mass. The spleen is homogeneous and measures 13 cm. IMPRESSION: Spleen measures 13 cm and is homogeneous.           PATHOLOGY:    05/15/17: Bone Marrow Biopsy & Aspirate                     17: BCR ABL PCR        17: BCR ABL PCR        17: BCR ABL PCR        18: BCR ABL PCR:                                                                                                         18:                                2019:                                                                                                                             08/15/19:                    :       19:                        20:                             2020:            Assessment/Plan :  Aric Haro is a very pleasant 54 y.o.  female who presents in follow up appointment for further management and treatment of chronic phase chronic myelogenous leukemia.    1. Chronic Myelogenous Leukemia - CML (chronic phase)    2. Leukocytosis  3. Thrombocytosis  4. Iron deficiency  5. Healthcare Maintenance    Peripheral smear concerning for an underlying myeloproliferative disorder. Her primary provider obtained a flow cytometry which did not reveal any significant abnormalities. I obtained a quantitative BCR ABL PCR positive for the  b2a2/b3a2 (p210) and e1a2 (p190) fusion gene transcripts consistent with a diagnosis for CML. Bone marrow biopsy performed on initial diagnosis 5/17/17 and prior to starting Dasatinib treatment with results above and consistent with chronic phase CML.     To further evaluate for a myeloproliferative disorder I did also assess for JAK2 (V617F and exon 12)/MPL/CALR mutations which were negative. ESR, CRP, EMMANUEL, Rheumatoid factor, as well as an acute hepatitis panel and HIV test which were all negative. No iron deficiency seen. Abdominal ultrasound significant for a spleen size 13.9cm.     For treatment of her CML she was started on Dasatinib 100mg oral daily. She does have a history significant for pancreatitis, therefore, I held on using Nilotinib. Patient however was unable to tolerate Dasatinib secondary to worsening reflux while being off of her PPI, and experiencing severe nausea, vomiting, and dehydration. She was then started on Imatinib 400mg daily and has been tolerating this well. I have previously advised her of Ibuprofen and Tylenol use for her myalgias. I did order baseline Echo which showed an intact EF. While her weight has overall be stable in the last one year she does note increasing swelling in her extremities. Therefore I did repeat echocardiogram to further evaluate and repeat echo showed an intact EF 61-65% which is stable.    She has had a complete hematological response, and BCR ABL PCR has normalized since start of Gleevac. This will need to continue to be monitored every three months to assess ongoing molecular response, today's level is <0.0032%. The blood counts have now stabilized therefore will continue to monitor every 3 months given stability.    She underwent a colonoscopy February 2018 with Dr. Esparza who recommended repeat colonoscopy in 3-5 years due to polyps that were removed. She has also received her Influenza and Hepatitis A vaccine. Iron has been discontinued and iron studies  are more consistent with anemia of chronic inflammation.     5. ACO Quality measures  -  Aric Haro does not use tobacco products.  -  Patient's BMI has been discussed with her. BMI is above normal parameters. She has been changing her dietary habits and eating healthier.  -  Airc Haro received 2019 flu vaccine.  -  Aric Haro reports a pain score of 0. Given her pain assessment as noted, no further treatment or management is required and she will continue to follow with her primary provider and will again reassess at next visit.  -  Current outpatient and discharge medications have been reconciled for the patient.  Reviewed by: Evita Serrano MD      I will have the patient return for follow up visit in three months with labs one to two weeks prior. Lab orders have been placed. She understands that should she have any questions or concerns prior to her appointment she should give us a call at any time and I would be happy to see her sooner. It was a pleasure to see this patient in clinic today, thank you for allowing me to participate in the care of this patient.      Evita Serrano MD  09/01/20  12:08

## 2020-09-15 NOTE — PROGRESS NOTES
Specialty Pharmacy Note      Name:  Aric Haro  :  1965  Date:  2020    Past Medical History:   Diagnosis Date   • Anemia    • Cancer (CMS/HCC)     leukemia   • Diabetes mellitus (CMS/HCC)    • Elevated cholesterol    • GERD (gastroesophageal reflux disease)    • Hypertension    • PONV (postoperative nausea and vomiting)        Past Surgical History:   Procedure Laterality Date   • BLEPHAROPLASTY Bilateral 2018    Procedure: BLEPHAROPLASTY BOTH EYES UPPER EYELIDS (PT REQUESTED TERESITA WILKES;  Surgeon: Chris Haile MD;  Location: [facility];  Service: Ophthalmology   • COLONOSCOPY N/A 3/27/2018    Procedure: COLONOSCOPY FOR SCREENING  CPTCODE:07275;  Surgeon: Drew Esparza III, MD;  Location: [facility];  Service: Gastroenterology   • SINUS SURGERY     • SINUS SURGERY     • SLEEVE GASTROPLASTY         Social History     Socioeconomic History   • Marital status:      Spouse name: Not on file   • Number of children: Not on file   • Years of education: Not on file   • Highest education level: Not on file   Tobacco Use   • Smoking status: Former Smoker     Packs/day: 0.25     Years: 4.00     Pack years: 1.00   • Smokeless tobacco: Never Used   Substance and Sexual Activity   • Alcohol use: No   • Drug use: No   • Sexual activity: Defer       Family History   Problem Relation Age of Onset   • Anemia Mother    • Hypertension Mother    • Cancer Mother    • COPD Father    • Heart disease Father    • Breast cancer Neg Hx        No Known Allergies    Current Outpatient Medications   Medication Sig Dispense Refill   • amoxicillin-clavulanate (AUGMENTIN) 875-125 MG per tablet Take 1 tablet by mouth Every 12 (Twelve) Hours. 20 tablet 0   • baclofen (LIORESAL) 20 MG tablet Take 1 tablet by mouth 3 (Three) Times a Day. 90 tablet 4   • celecoxib (CeleBREX) 200 MG capsule Take 1 capsule by mouth 2 (Two) Times a Day. (Patient taking differently: Take 200 mg by mouth 2 (Two) Times a  Day. Takes 1/2 tablet once daily) 180 capsule 3   • celecoxib (CeleBREX) 200 MG capsule Take 1 capsule by mouth 2 (Two) Times a Day. 180 capsule 3   • cholecalciferol (VITAMIN D3) 03634 units capsule Take 1 capsule by mouth 2 (Two) Times a Week. 24 capsule 0   • Cholecalciferol (VITAMIN D3) 35614 units capsule Take 1 capsule by mouth 2 (Two) Times a Week. 24 capsule 3   • cyanocobalamin 1000 MCG/ML injection Inject 1 mL intramuscularly monthly. 1 mL 1   • cyanocobalamin 1000 MCG/ML injection Inject 1 mL into the appropriate muscle as directed by prescriber weekly for 8 weeks then monthly thereafter. 30 mL 4   • cyanocobalamin 1000 MCG/ML injection Administer 1 ml (1,000 mcg) intramuscularly once weekly 12 mL 2   • desloratadine-pseudoephedrine (CLARINEX-D 12-hour) 2.5-120 MG per tablet take 1 tablet by mouth 2 times every day 30 tablet 0   • DULoxetine (CYMBALTA) 60 MG capsule Take 1 capsule by mouth Daily. 90 capsule 3   • DULoxetine (CYMBALTA) 60 MG capsule Take 1 capsule by mouth daily 90 capsule 3   • fenofibrate (TRICOR) 145 MG tablet Take 1 tablet by mouth Daily. 90 tablet 3   • fenofibrate (TRICOR) 145 MG tablet Take 1 tablet by mouth Daily. 90 tablet 3   • fenofibrate (TRICOR) 145 MG tablet Take 1 tablet by mouth daily 90 tablet 2   • fenofibrate 160 MG tablet Take 1 tablet by mouth Daily. 90 tablet 3   • ferrous sulfate 325 (65 FE) MG EC tablet Take 1 tablet by mouth Daily. 30 tablet 3   • ferrous sulfate 325 (65 FE) MG EC tablet Take 1 tablet by mouth Daily. 90 tablet 3   • fluconazole (DIFLUCAN) 200 MG tablet Take 1 tablet by mouth Daily. 30 tablet 1   • furosemide (LASIX) 40 MG tablet Take 1 tablet by mouth Daily. 90 tablet 3   • furosemide (LASIX) 40 MG tablet Take 1 tablet by mouth Daily. 90 tablet 3   • furosemide (LASIX) 40 MG tablet Take 1 tablet by mouth Daily. 90 tablet 3   • glucose blood test strip USE 1 STRIP 2 TIMES DAILY AS DIRECTED 60 enema 6   • glucose blood test strip USE 1 STRIP 2 TIMES  DAILY AS DIRECTED 180 each 6   • HYDROcodone-acetaminophen (NORCO)  MG per tablet Take 1 tablet by mouth every 4 - 6 hours as needed for severe pain. 20 tablet 0   • HYDROcodone-acetaminophen (NORCO)  MG per tablet Take 1 tablet by mouth Every 4-6 Hours As Needed. 30 tablet 0   • ibuprofen (ADVIL,MOTRIN) 800 MG tablet Take 1 tablet by mouth 3 (Three) Times a Day As Needed. 270 tablet 3   • ibuprofen (ADVIL,MOTRIN) 800 MG tablet take 1 tablet by mouth 3 times every day with food as needed 270 tablet 3   • ibuprofen (ADVIL,MOTRIN) 800 MG tablet Take 1 tablet by mouth 3 times per day with food or milk as needed 270 tablet 1   • imatinib (GLEEVEC) 400 MG chemo tablet Take 1 tablet by mouth Daily. 30 tablet 3   • Insulin Glargine (LANTUS SOLOSTAR) 100 UNIT/ML injection pen Inject 45 units subcutaneously 2 times daily 30 mL 6   • Insulin Glargine (LANTUS SOLOSTAR) 100 UNIT/ML injection pen Inject 45 units Subcutaneously two times daily 30 mL 6   • Insulin Glargine (LANTUS SOLOSTAR) 100 UNIT/ML injection pen Inject 45 Units subcutaneously 2 times daily. 30 mL 6   • Insulin Glargine (LANTUS SOLOSTAR) 100 UNIT/ML injection pen Inject 44 Units under the skin into the appropriate area as directed 2 (Two) Times a Day. 30 mL 3   • Insulin Glargine (LANTUS SOLOSTAR) 100 UNIT/ML injection pen Inject 100 Units under the skin into the appropriate area as directed Daily. 30 mL 2   • LANTUS SOLOSTAR 100 UNIT/ML injection pen Inject 45 Units under the skin 2 (Two) Times a Day. 30 mL 6   • LANTUS SOLOSTAR 100 UNIT/ML injection pen Inject 45 Units under the skin 2 (Two) Times a Day. 30 mL 6   • LANTUS SOLOSTAR 100 UNIT/ML injection pen Inject 45 Units under the skin into the appropriate area as directed 2 (Two) Times a Day. 36 mL 6   • loratadine-pseudoephedrine (CLARITIN-D 12 HOUR) 5-120 MG per 12 hr tablet Take 1 tablet by mouth two times a day 30 tablet 0   • loratadine-pseudoephedrine (CLARITIN-D 12-hour) 5-120 MG per 12  "hr tablet Take 1 tablet by mouth Every 12 (Twelve) Hours As Needed. 30 tablet 0   • loratadine-pseudoephedrine (CLARITIN-D 12-hour) 5-120 MG per 12 hr tablet Take 1 tablet by mouth every 12 hours as needed 30 tablet 0   • mupirocin (BACTROBAN) 2 % ointment 1 application into each nostril as directed by provider 2 (Two) Times a Day. 22 g 2   • oseltamivir (TAMIFLU) 75 MG capsule Take 1 capsule by mouth 2 (Two) Times a Day. 10 capsule 0   • pantoprazole (PROTONIX) 40 MG EC tablet Take 1 tablet by mouth daily 90 tablet 2   • promethazine (PHENERGAN) 25 MG tablet Take 1 tablet by mouth every 4-6 hours as needed. 20 tablet 0   • SUMAtriptan (IMITREX) 100 MG tablet Take 1 tablet by mouth after onset of migraine. May repeat after 2 hours if headache returns. Do not exceed 200mg in 24 hours. 10 tablet 2   • SUMAtriptan (IMITREX) 100 MG tablet Take 1 tablet by mouth after onset of migraine; may repeat once 27 tablet 2   • SUMAtriptan (IMITREX) 100 MG tablet Take 1 tablet by mouth 2 times per day at least 2 hours between doses as needed 27 tablet 2   • Syringe/Needle, Disp, 25G X 1\" 3 ML misc USE 1 AS DIRECTED WITH B12 1 each 7   • triamcinolone (KENALOG) 0.1 % cream Apply topically to affected area(s) 2 Times a Day. 30 g 0   • triamcinolone (KENALOG) 0.1 % cream Apply to affected area(s) 2 times per day 30 g 0   • triamcinolone (KENALOG) 0.1 % cream Apply a Thin Layer to Affected Area Topically Twice Daily 80 g 5   • triamcinolone (KENALOG) 0.1 % cream Apply a Thin Layer to Affected Area Topically Twice Daily 240 g 5   • triamcinolone (KENALOG) 0.1 % cream Apply 1 application topically to affected area daily as needed 80 g 3   • TRUETRACK TEST test strip use to test blood sugar 8 times daily 200 each 5   • vitamin D (ERGOCALCIFEROL) 62999 units capsule capsule Take 1 capsule by mouth once weekly. 12 capsule 3   • vitamin D (ERGOCALCIFEROL) 29280 units capsule capsule Take 1 capsule by mouth 2 (Two) Times a Week. 24 capsule 0 "   • zolpidem (AMBIEN) 10 MG tablet Take 1 tablet by mouth Every Night. 90 tablet 0   • zolpidem (AMBIEN) 10 MG tablet Take 1 tablet by mouth Every Night at bedtime for insomnia 30 tablet 1     No current facility-administered medications for this visit.          LABORATORY:    Lab Results   Component Value Date    WBC 7.59 08/17/2020    HGB 12.9 08/17/2020    HCT 40.6 08/17/2020    MCV 94.0 08/17/2020    RDW 13.4 08/17/2020     08/17/2020    NEUTRORELPCT 59.5 08/17/2020    LYMPHORELPCT 27.9 08/17/2020    MONORELPCT 9.2 08/17/2020    EOSRELPCT 2.6 08/17/2020    BASORELPCT 0.5 08/17/2020    NEUTROABS 4.51 08/17/2020    LYMPHSABS 2.12 08/17/2020       Lab Results   Component Value Date     08/17/2020    K 4.7 08/17/2020    CO2 24.5 08/17/2020     08/17/2020    BUN 14 08/17/2020    CREATININE 0.76 08/17/2020    GLUCOSE 173 (H) 08/17/2020    CALCIUM 8.8 08/17/2020    ALKPHOS 70 08/17/2020    AST 22 08/17/2020    ALT 23 08/17/2020    BILITOT 0.4 08/17/2020    ALBUMIN 3.98 08/17/2020    PROTEINTOT 6.9 08/17/2020    PHOS 3.4 10/03/2018       Lab Results   Component Value Date     08/17/2020    URICACID 5.3 08/17/2020        Imaging Results (Last 24 Hours)     ** No results found for the last 24 hours. **          ASSESSMENT/PLAN:    Patient Update Assessment (new medications, allergies, medical history): No changes.      Medication(s): Gleevec 400 mg chemo tablet.      Currently Taking Medication(s): Patient reports taking medication as directed, 1 tablet daily by mouth.     Experiencing Side Effects: None expressed.      Prior Authorization Status: Approved.       Financial Assistance Status: Not needed at this time.  Patient co-pay is $5.     Any Issues Identified: No issues expressed form patient, no issues processing refill.     Appropriate to Process Prescription(s):  Yes. Medication will be dispensed to patient from [facility].     Counseling Offered: Patient previously  counseled upon initiation of therapy, aware to contact pharmacy with any new questions or concerns.     Next Specialty Pharmacy Visit: 9/28/2020

## 2020-09-28 ENCOUNTER — SPECIALTY PHARMACY (OUTPATIENT)
Dept: PHARMACY | Facility: HOSPITAL | Age: 55
End: 2020-09-28

## 2020-10-19 NOTE — PROGRESS NOTES
Specialty Pharmacy Note      Name:  Aric Haro  :  1965  Date:  2020         Past Medical History:   Diagnosis Date   • Anemia    • Cancer (CMS/HCC)     leukemia   • Diabetes mellitus (CMS/HCC)    • Elevated cholesterol    • GERD (gastroesophageal reflux disease)    • Hypertension    • PONV (postoperative nausea and vomiting)        Past Surgical History:   Procedure Laterality Date   • BLEPHAROPLASTY Bilateral 2018    Procedure: BLEPHAROPLASTY BOTH EYES UPPER EYELIDS (PT REQUESTED TERESITA WILKES;  Surgeon: Chris Haile MD;  Location: [facility];  Service: Ophthalmology   • COLONOSCOPY N/A 3/27/2018    Procedure: COLONOSCOPY FOR SCREENING  CPTCODE:42414;  Surgeon: Drew Esparza III, MD;  Location: [facility];  Service: Gastroenterology   • SINUS SURGERY     • SINUS SURGERY     • SLEEVE GASTROPLASTY         Social History     Socioeconomic History   • Marital status:      Spouse name: Not on file   • Number of children: Not on file   • Years of education: Not on file   • Highest education level: Not on file   Tobacco Use   • Smoking status: Former Smoker     Packs/day: 0.25     Years: 4.00     Pack years: 1.00   • Smokeless tobacco: Never Used   Substance and Sexual Activity   • Alcohol use: No   • Drug use: No   • Sexual activity: Defer       Family History   Problem Relation Age of Onset   • Anemia Mother    • Hypertension Mother    • Cancer Mother    • COPD Father    • Heart disease Father    • Breast cancer Neg Hx        No Known Allergies    Current Outpatient Medications   Medication Sig Dispense Refill   • ALPRAZolam (XANAX) 1 MG tablet Take 1 tablet by mouth 2 times per day 60 tablet 0   • amoxicillin-clavulanate (AUGMENTIN) 875-125 MG per tablet Take 1 tablet by mouth Every 12 (Twelve) Hours. 20 tablet 0   • baclofen (LIORESAL) 20 MG tablet Take 1 tablet by mouth 3 (Three) Times a Day. 90 tablet 4   • celecoxib (CeleBREX) 200 MG capsule Take 1 capsule by  mouth 2 (Two) Times a Day. (Patient taking differently: Take 200 mg by mouth 2 (Two) Times a Day. Takes 1/2 tablet once daily) 180 capsule 3   • celecoxib (CeleBREX) 200 MG capsule Take 1 capsule by mouth 2 (Two) Times a Day. 180 capsule 3   • cholecalciferol (VITAMIN D3) 66681 units capsule Take 1 capsule by mouth 2 (Two) Times a Week. 24 capsule 0   • Cholecalciferol (VITAMIN D3) 85841 units capsule Take 1 capsule by mouth 2 (Two) Times a Week. 24 capsule 3   • cyanocobalamin 1000 MCG/ML injection Inject 1 mL intramuscularly monthly. 1 mL 1   • cyanocobalamin 1000 MCG/ML injection Inject 1 mL into the appropriate muscle as directed by prescriber weekly for 8 weeks then monthly thereafter. 30 mL 4   • cyanocobalamin 1000 MCG/ML injection Administer 1 ml (1,000 mcg) intramuscularly once weekly 12 mL 2   • desloratadine-pseudoephedrine (CLARINEX-D 12-hour) 2.5-120 MG per tablet take 1 tablet by mouth 2 times every day 30 tablet 0   • DULoxetine (CYMBALTA) 60 MG capsule Take 1 capsule by mouth Daily. 90 capsule 3   • DULoxetine (CYMBALTA) 60 MG capsule Take 1 capsule by mouth daily 90 capsule 3   • fenofibrate (TRICOR) 145 MG tablet Take 1 tablet by mouth Daily. 90 tablet 3   • fenofibrate (TRICOR) 145 MG tablet Take 1 tablet by mouth Daily. 90 tablet 3   • fenofibrate (TRICOR) 145 MG tablet Take 1 tablet by mouth daily 90 tablet 2   • fenofibrate 160 MG tablet Take 1 tablet by mouth Daily. 90 tablet 3   • ferrous sulfate 325 (65 FE) MG EC tablet Take 1 tablet by mouth Daily. 30 tablet 3   • ferrous sulfate 325 (65 FE) MG EC tablet Take 1 tablet by mouth Daily. 90 tablet 3   • fluconazole (DIFLUCAN) 200 MG tablet Take 1 tablet by mouth Daily. 30 tablet 1   • furosemide (LASIX) 40 MG tablet Take 1 tablet by mouth Daily. 90 tablet 3   • furosemide (LASIX) 40 MG tablet Take 1 tablet by mouth Daily. 90 tablet 3   • furosemide (LASIX) 40 MG tablet Take 1 tablet by mouth Daily. 90 tablet 3   • glucose blood test strip USE 1  STRIP 2 TIMES DAILY AS DIRECTED 60 enema 6   • glucose blood test strip USE 1 STRIP 2 TIMES DAILY AS DIRECTED 180 each 6   • HYDROcodone-acetaminophen (NORCO)  MG per tablet Take 1 tablet by mouth every 4 - 6 hours as needed for severe pain. 20 tablet 0   • HYDROcodone-acetaminophen (NORCO)  MG per tablet Take 1 tablet by mouth Every 4-6 Hours As Needed. 30 tablet 0   • ibuprofen (ADVIL,MOTRIN) 800 MG tablet Take 1 tablet by mouth 3 (Three) Times a Day As Needed. 270 tablet 3   • ibuprofen (ADVIL,MOTRIN) 800 MG tablet take 1 tablet by mouth 3 times every day with food as needed 270 tablet 3   • ibuprofen (ADVIL,MOTRIN) 800 MG tablet Take 1 tablet by mouth 3 times per day with food or milk as needed 270 tablet 1   • imatinib (GLEEVEC) 400 MG chemo tablet Take 1 tablet by mouth Daily. 30 tablet 3   • Insulin Glargine (LANTUS SOLOSTAR) 100 UNIT/ML injection pen Inject 45 units subcutaneously 2 times daily 30 mL 6   • Insulin Glargine (LANTUS SOLOSTAR) 100 UNIT/ML injection pen Inject 45 units Subcutaneously two times daily 30 mL 6   • Insulin Glargine (LANTUS SOLOSTAR) 100 UNIT/ML injection pen Inject 45 Units subcutaneously 2 times daily. 30 mL 6   • Insulin Glargine (LANTUS SOLOSTAR) 100 UNIT/ML injection pen Inject 44 Units under the skin into the appropriate area as directed 2 (Two) Times a Day. 30 mL 3   • Insulin Glargine (LANTUS SOLOSTAR) 100 UNIT/ML injection pen Inject 100 Units under the skin into the appropriate area as directed Daily. 30 mL 2   • LANTUS SOLOSTAR 100 UNIT/ML injection pen Inject 45 Units under the skin 2 (Two) Times a Day. 30 mL 6   • LANTUS SOLOSTAR 100 UNIT/ML injection pen Inject 45 Units under the skin 2 (Two) Times a Day. 30 mL 6   • LANTUS SOLOSTAR 100 UNIT/ML injection pen Inject 45 Units under the skin into the appropriate area as directed 2 (Two) Times a Day. 36 mL 6   • loratadine-pseudoephedrine (CLARITIN-D 12 HOUR) 5-120 MG per 12 hr tablet Take 1 tablet by mouth two  "times a day 30 tablet 0   • loratadine-pseudoephedrine (CLARITIN-D 12-hour) 5-120 MG per 12 hr tablet Take 1 tablet by mouth Every 12 (Twelve) Hours As Needed. 30 tablet 0   • loratadine-pseudoephedrine (CLARITIN-D 12-hour) 5-120 MG per 12 hr tablet Take 1 tablet by mouth every 12 hours as needed 30 tablet 0   • mupirocin (BACTROBAN) 2 % ointment 1 application into each nostril as directed by provider 2 (Two) Times a Day. 22 g 2   • oseltamivir (TAMIFLU) 75 MG capsule Take 1 capsule by mouth 2 (Two) Times a Day. 10 capsule 0   • pantoprazole (PROTONIX) 40 MG EC tablet Take 1 tablet by mouth daily 90 tablet 2   • promethazine (PHENERGAN) 25 MG tablet Take 1 tablet by mouth every 4-6 hours as needed. 20 tablet 0   • SUMAtriptan (IMITREX) 100 MG tablet Take 1 tablet by mouth after onset of migraine. May repeat after 2 hours if headache returns. Do not exceed 200mg in 24 hours. 10 tablet 2   • SUMAtriptan (IMITREX) 100 MG tablet Take 1 tablet by mouth after onset of migraine; may repeat once 27 tablet 2   • SUMAtriptan (IMITREX) 100 MG tablet Take 1 tablet by mouth 2 times per day at least 2 hours between doses as needed 27 tablet 2   • Syringe/Needle, Disp, 25G X 1\" 3 ML misc USE 1 AS DIRECTED WITH B12 1 each 7   • triamcinolone (KENALOG) 0.1 % cream Apply topically to affected area(s) 2 Times a Day. 30 g 0   • triamcinolone (KENALOG) 0.1 % cream Apply to affected area(s) 2 times per day 30 g 0   • triamcinolone (KENALOG) 0.1 % cream Apply a Thin Layer to Affected Area Topically Twice Daily 80 g 5   • triamcinolone (KENALOG) 0.1 % cream Apply a Thin Layer to Affected Area Topically Twice Daily 240 g 5   • triamcinolone (KENALOG) 0.1 % cream Apply 1 application topically to affected area daily as needed 80 g 3   • TRUETRACK TEST test strip use to test blood sugar 8 times daily 200 each 5   • vitamin D (ERGOCALCIFEROL) 63343 units capsule capsule Take 1 capsule by mouth once weekly. 12 capsule 3   • vitamin D " (ERGOCALCIFEROL) 95007 units capsule capsule Take 1 capsule by mouth 2 (Two) Times a Week. 24 capsule 0   • zolpidem (AMBIEN) 10 MG tablet Take 1 tablet by mouth Every Night. 90 tablet 0   • zolpidem (AMBIEN) 10 MG tablet Take 1 tablet by mouth Every Night at bedtime for insomnia 30 tablet 1     No current facility-administered medications for this visit.          LABORATORY:    Lab Results   Component Value Date    WBC 7.59 08/17/2020    HGB 12.9 08/17/2020    HCT 40.6 08/17/2020    MCV 94.0 08/17/2020    RDW 13.4 08/17/2020     08/17/2020    NEUTRORELPCT 59.5 08/17/2020    LYMPHORELPCT 27.9 08/17/2020    MONORELPCT 9.2 08/17/2020    EOSRELPCT 2.6 08/17/2020    BASORELPCT 0.5 08/17/2020    NEUTROABS 4.51 08/17/2020    LYMPHSABS 2.12 08/17/2020       Lab Results   Component Value Date     08/17/2020    K 4.7 08/17/2020    CO2 24.5 08/17/2020     08/17/2020    BUN 14 08/17/2020    CREATININE 0.76 08/17/2020    GLUCOSE 173 (H) 08/17/2020    CALCIUM 8.8 08/17/2020    ALKPHOS 70 08/17/2020    AST 22 08/17/2020    ALT 23 08/17/2020    BILITOT 0.4 08/17/2020    ALBUMIN 3.98 08/17/2020    PROTEINTOT 6.9 08/17/2020    PHOS 3.4 10/03/2018       Lab Results   Component Value Date     08/17/2020    URICACID 5.3 08/17/2020        Imaging Results (Last 24 Hours)     ** No results found for the last 24 hours. **          ASSESSMENT/PLAN:    Patient Update Assessment (new medications, allergies, medical history): No changes.      Medication(s): Gleevec 400 mg chemo tablet.      Currently Taking Medication(s): Patient reports taking medication as directed, 1 tablet daily by mouth.     Experiencing Side Effects: None expressed.      Prior Authorization Status: Approved.       Financial Assistance Status: Not needed at this time.  Patient co-pay is $5.     Any Issues Identified: No issues expressed form patient, no issues processing refill.     Appropriate to Process Prescription(s):  Yes. Medication will be  dispensed to patient from [facility].     Counseling Offered: Patient previously counseled upon initiation of therapy, aware to contact pharmacy with any new questions or concerns.     Next Specialty Pharmacy Visit:  10/27/2020

## 2020-10-27 ENCOUNTER — SPECIALTY PHARMACY (OUTPATIENT)
Dept: PHARMACY | Facility: HOSPITAL | Age: 55
End: 2020-10-27

## 2020-10-30 NOTE — PROGRESS NOTES
Specialty Pharmacy Note      Name:  Aric Haro  :  1965  Date:  10/27/2020         Past Medical History:   Diagnosis Date   • Anemia    • Cancer (CMS/HCC)     leukemia   • Diabetes mellitus (CMS/HCC)    • Elevated cholesterol    • GERD (gastroesophageal reflux disease)    • Hypertension    • PONV (postoperative nausea and vomiting)        Past Surgical History:   Procedure Laterality Date   • BLEPHAROPLASTY Bilateral 2018    Procedure: BLEPHAROPLASTY BOTH EYES UPPER EYELIDS (PT REQUESTED TERESITA WILKES;  Surgeon: Chris Haile MD;  Location: [facility];  Service: Ophthalmology   • COLONOSCOPY N/A 3/27/2018    Procedure: COLONOSCOPY FOR SCREENING  CPTCODE:03653;  Surgeon: Drew Esparza III, MD;  Location: [facility];  Service: Gastroenterology   • SINUS SURGERY     • SINUS SURGERY     • SLEEVE GASTROPLASTY         Social History     Socioeconomic History   • Marital status:      Spouse name: Not on file   • Number of children: Not on file   • Years of education: Not on file   • Highest education level: Not on file   Tobacco Use   • Smoking status: Former Smoker     Packs/day: 0.25     Years: 4.00     Pack years: 1.00   • Smokeless tobacco: Never Used   Substance and Sexual Activity   • Alcohol use: No   • Drug use: No   • Sexual activity: Defer       Family History   Problem Relation Age of Onset   • Anemia Mother    • Hypertension Mother    • Cancer Mother    • COPD Father    • Heart disease Father    • Breast cancer Neg Hx        No Known Allergies    Current Outpatient Medications   Medication Sig Dispense Refill   • ALPRAZolam (XANAX) 1 MG tablet Take 1 tablet by mouth 2 times per day 60 tablet 0   • amoxicillin-clavulanate (AUGMENTIN) 875-125 MG per tablet Take 1 tablet by mouth Every 12 (Twelve) Hours. 20 tablet 0   • baclofen (LIORESAL) 20 MG tablet Take 1 tablet by mouth 3 (Three) Times a Day. 90 tablet 4   • celecoxib (CeleBREX) 200 MG capsule Take 1 capsule by  mouth 2 (Two) Times a Day. (Patient taking differently: Take 200 mg by mouth 2 (Two) Times a Day. Takes 1/2 tablet once daily) 180 capsule 3   • celecoxib (CeleBREX) 200 MG capsule Take 1 capsule by mouth 2 (Two) Times a Day. 180 capsule 3   • cholecalciferol (VITAMIN D3) 67147 units capsule Take 1 capsule by mouth 2 (Two) Times a Week. 24 capsule 0   • Cholecalciferol (VITAMIN D3) 62086 units capsule Take 1 capsule by mouth 2 (Two) Times a Week. 24 capsule 3   • cyanocobalamin 1000 MCG/ML injection Inject 1 mL intramuscularly monthly. 1 mL 1   • cyanocobalamin 1000 MCG/ML injection Inject 1 mL into the appropriate muscle as directed by prescriber weekly for 8 weeks then monthly thereafter. 30 mL 4   • cyanocobalamin 1000 MCG/ML injection Administer 1 ml (1,000 mcg) intramuscularly once weekly 12 mL 2   • desloratadine-pseudoephedrine (CLARINEX-D 12-hour) 2.5-120 MG per tablet take 1 tablet by mouth 2 times every day 30 tablet 0   • DULoxetine (CYMBALTA) 60 MG capsule Take 1 capsule by mouth Daily. 90 capsule 3   • DULoxetine (CYMBALTA) 60 MG capsule Take 1 capsule by mouth daily 90 capsule 3   • fenofibrate (TRICOR) 145 MG tablet Take 1 tablet by mouth Daily. 90 tablet 3   • fenofibrate (TRICOR) 145 MG tablet Take 1 tablet by mouth Daily. 90 tablet 3   • fenofibrate (TRICOR) 145 MG tablet Take 1 tablet by mouth daily 90 tablet 2   • fenofibrate 160 MG tablet Take 1 tablet by mouth Daily. 90 tablet 3   • ferrous sulfate 325 (65 FE) MG EC tablet Take 1 tablet by mouth Daily. 30 tablet 3   • ferrous sulfate 325 (65 FE) MG EC tablet Take 1 tablet by mouth Daily. 90 tablet 3   • fluconazole (DIFLUCAN) 200 MG tablet Take 1 tablet by mouth Daily. 30 tablet 1   • furosemide (LASIX) 40 MG tablet Take 1 tablet by mouth Daily. 90 tablet 3   • furosemide (LASIX) 40 MG tablet Take 1 tablet by mouth Daily. 90 tablet 3   • furosemide (LASIX) 40 MG tablet Take 1 tablet by mouth Daily. 90 tablet 3   • glucose blood test strip USE 1  STRIP 2 TIMES DAILY AS DIRECTED 60 enema 6   • glucose blood test strip USE 1 STRIP 2 TIMES DAILY AS DIRECTED 180 each 6   • HYDROcodone-acetaminophen (NORCO)  MG per tablet Take 1 tablet by mouth every 4 - 6 hours as needed for severe pain. 20 tablet 0   • HYDROcodone-acetaminophen (NORCO)  MG per tablet Take 1 tablet by mouth Every 4-6 Hours As Needed. 30 tablet 0   • ibuprofen (ADVIL,MOTRIN) 800 MG tablet Take 1 tablet by mouth 3 (Three) Times a Day As Needed. 270 tablet 3   • ibuprofen (ADVIL,MOTRIN) 800 MG tablet take 1 tablet by mouth 3 times every day with food as needed 270 tablet 3   • ibuprofen (ADVIL,MOTRIN) 800 MG tablet Take 1 tablet by mouth 3 times per day with food or milk as needed 270 tablet 1   • imatinib (GLEEVEC) 400 MG chemo tablet Take 1 tablet by mouth Daily. 30 tablet 3   • Insulin Glargine (LANTUS SOLOSTAR) 100 UNIT/ML injection pen Inject 45 units subcutaneously 2 times daily 30 mL 6   • Insulin Glargine (LANTUS SOLOSTAR) 100 UNIT/ML injection pen Inject 45 units Subcutaneously two times daily 30 mL 6   • Insulin Glargine (LANTUS SOLOSTAR) 100 UNIT/ML injection pen Inject 45 Units subcutaneously 2 times daily. 30 mL 6   • Insulin Glargine (LANTUS SOLOSTAR) 100 UNIT/ML injection pen Inject 44 Units under the skin into the appropriate area as directed 2 (Two) Times a Day. 30 mL 3   • Insulin Glargine (LANTUS SOLOSTAR) 100 UNIT/ML injection pen Inject 100 Units under the skin into the appropriate area as directed Daily. 30 mL 2   • LANTUS SOLOSTAR 100 UNIT/ML injection pen Inject 45 Units under the skin 2 (Two) Times a Day. 30 mL 6   • LANTUS SOLOSTAR 100 UNIT/ML injection pen Inject 45 Units under the skin 2 (Two) Times a Day. 30 mL 6   • LANTUS SOLOSTAR 100 UNIT/ML injection pen Inject 45 Units under the skin into the appropriate area as directed 2 (Two) Times a Day. 36 mL 6   • loratadine-pseudoephedrine (CLARITIN-D 12 HOUR) 5-120 MG per 12 hr tablet Take 1 tablet by mouth two  "times a day 30 tablet 0   • loratadine-pseudoephedrine (CLARITIN-D 12-hour) 5-120 MG per 12 hr tablet Take 1 tablet by mouth Every 12 (Twelve) Hours As Needed. 30 tablet 0   • loratadine-pseudoephedrine (CLARITIN-D 12-hour) 5-120 MG per 12 hr tablet Take 1 tablet by mouth every 12 hours as needed 30 tablet 0   • mupirocin (BACTROBAN) 2 % ointment 1 application into each nostril as directed by provider 2 (Two) Times a Day. 22 g 2   • oseltamivir (TAMIFLU) 75 MG capsule Take 1 capsule by mouth 2 (Two) Times a Day. 10 capsule 0   • pantoprazole (PROTONIX) 40 MG EC tablet Take 1 tablet by mouth daily 90 tablet 2   • promethazine (PHENERGAN) 25 MG tablet Take 1 tablet by mouth every 4-6 hours as needed. 20 tablet 0   • SUMAtriptan (IMITREX) 100 MG tablet Take 1 tablet by mouth after onset of migraine. May repeat after 2 hours if headache returns. Do not exceed 200mg in 24 hours. 10 tablet 2   • SUMAtriptan (IMITREX) 100 MG tablet Take 1 tablet by mouth after onset of migraine; may repeat once 27 tablet 2   • SUMAtriptan (IMITREX) 100 MG tablet Take 1 tablet by mouth 2 times per day at least 2 hours between doses as needed 27 tablet 2   • Syringe/Needle, Disp, 25G X 1\" 3 ML misc USE 1 AS DIRECTED WITH B12 1 each 7   • triamcinolone (KENALOG) 0.1 % cream Apply topically to affected area(s) 2 Times a Day. 30 g 0   • triamcinolone (KENALOG) 0.1 % cream Apply to affected area(s) 2 times per day 30 g 0   • triamcinolone (KENALOG) 0.1 % cream Apply a Thin Layer to Affected Area Topically Twice Daily 80 g 5   • triamcinolone (KENALOG) 0.1 % cream Apply a Thin Layer to Affected Area Topically Twice Daily 240 g 5   • triamcinolone (KENALOG) 0.1 % cream Apply 1 application topically to affected area daily as needed 80 g 3   • TRUETRACK TEST test strip use to test blood sugar 8 times daily 200 each 5   • vitamin D (ERGOCALCIFEROL) 80633 units capsule capsule Take 1 capsule by mouth once weekly. 12 capsule 3   • vitamin D " (ERGOCALCIFEROL) 67049 units capsule capsule Take 1 capsule by mouth 2 (Two) Times a Week. 24 capsule 0   • zolpidem (AMBIEN) 10 MG tablet Take 1 tablet by mouth Every Night. 90 tablet 0   • zolpidem (AMBIEN) 10 MG tablet Take 1 tablet by mouth Every Night at bedtime for insomnia 30 tablet 1     No current facility-administered medications for this visit.          LABORATORY:    Lab Results   Component Value Date    WBC 7.59 08/17/2020    HGB 12.9 08/17/2020    HCT 40.6 08/17/2020    MCV 94.0 08/17/2020    RDW 13.4 08/17/2020     08/17/2020    NEUTRORELPCT 59.5 08/17/2020    LYMPHORELPCT 27.9 08/17/2020    MONORELPCT 9.2 08/17/2020    EOSRELPCT 2.6 08/17/2020    BASORELPCT 0.5 08/17/2020    NEUTROABS 4.51 08/17/2020    LYMPHSABS 2.12 08/17/2020       Lab Results   Component Value Date     08/17/2020    K 4.7 08/17/2020    CO2 24.5 08/17/2020     08/17/2020    BUN 14 08/17/2020    CREATININE 0.76 08/17/2020    GLUCOSE 173 (H) 08/17/2020    CALCIUM 8.8 08/17/2020    ALKPHOS 70 08/17/2020    AST 22 08/17/2020    ALT 23 08/17/2020    BILITOT 0.4 08/17/2020    ALBUMIN 3.98 08/17/2020    PROTEINTOT 6.9 08/17/2020    PHOS 3.4 10/03/2018       Lab Results   Component Value Date     08/17/2020    URICACID 5.3 08/17/2020        Imaging Results (Last 24 Hours)     ** No results found for the last 24 hours. **          ASSESSMENT/PLAN:    Patient Update Assessment (new medications, allergies, medical history): No changes.      Medication(s): Gleevec 400 mg chemo tablet.      Currently Taking Medication(s): Patient reports taking medication as directed, 1 tablet daily by mouth.     Experiencing Side Effects: None expressed.      Prior Authorization Status: Approved.       Financial Assistance Status: Not needed at this time.  Patient co-pay is $5.     Any Issues Identified: No issues expressed form patient, no issues processing refill.     Appropriate to Process Prescription(s):  Yes. Medication will be  dispensed to patient from [facility].     Counseling Offered: Patient previously counseled upon initiation of therapy, aware to contact pharmacy with any new questions or concerns.     Next Specialty Pharmacy Visit:  11/24/2020

## 2020-11-24 ENCOUNTER — SPECIALTY PHARMACY (OUTPATIENT)
Dept: PHARMACY | Facility: HOSPITAL | Age: 55
End: 2020-11-24

## 2020-11-25 NOTE — PROGRESS NOTES
Specialty Pharmacy Note      Name:  Aric Haro  :  1965  Date:  2020         Past Medical History:   Diagnosis Date   • Anemia    • Cancer (CMS/HCC)     leukemia   • Diabetes mellitus (CMS/HCC)    • Elevated cholesterol    • GERD (gastroesophageal reflux disease)    • Hypertension    • PONV (postoperative nausea and vomiting)        Past Surgical History:   Procedure Laterality Date   • BLEPHAROPLASTY Bilateral 2018    Procedure: BLEPHAROPLASTY BOTH EYES UPPER EYELIDS (PT REQUESTED TERESITA WILKES;  Surgeon: Chris Haile MD;  Location: [facility];  Service: Ophthalmology   • COLONOSCOPY N/A 3/27/2018    Procedure: COLONOSCOPY FOR SCREENING  CPTCODE:01824;  Surgeon: Drew Esparza III, MD;  Location: [facility];  Service: Gastroenterology   • SINUS SURGERY     • SINUS SURGERY     • SLEEVE GASTROPLASTY         Social History     Socioeconomic History   • Marital status:      Spouse name: Not on file   • Number of children: Not on file   • Years of education: Not on file   • Highest education level: Not on file   Tobacco Use   • Smoking status: Former Smoker     Packs/day: 0.25     Years: 4.00     Pack years: 1.00   • Smokeless tobacco: Never Used   Substance and Sexual Activity   • Alcohol use: No   • Drug use: No   • Sexual activity: Defer       Family History   Problem Relation Age of Onset   • Anemia Mother    • Hypertension Mother    • Cancer Mother    • COPD Father    • Heart disease Father    • Breast cancer Neg Hx        No Known Allergies    Current Outpatient Medications   Medication Sig Dispense Refill   • ALPRAZolam (XANAX) 1 MG tablet Take 1 tablet by mouth 2 times per day 60 tablet 0   • amoxicillin-clavulanate (AUGMENTIN) 875-125 MG per tablet Take 1 tablet by mouth Every 12 (Twelve) Hours. 20 tablet 0   • baclofen (LIORESAL) 20 MG tablet Take 1 tablet by mouth 3 (Three) Times a Day. 90 tablet 4   • celecoxib (CeleBREX) 200 MG capsule Take 1 capsule by  mouth 2 (Two) Times a Day. (Patient taking differently: Take 200 mg by mouth 2 (Two) Times a Day. Takes 1/2 tablet once daily) 180 capsule 3   • celecoxib (CeleBREX) 200 MG capsule Take 1 capsule by mouth 2 (Two) Times a Day. 180 capsule 3   • cholecalciferol (VITAMIN D3) 98031 units capsule Take 1 capsule by mouth 2 (Two) Times a Week. 24 capsule 0   • Cholecalciferol (VITAMIN D3) 52280 units capsule Take 1 capsule by mouth 2 (Two) Times a Week. 24 capsule 3   • cyanocobalamin 1000 MCG/ML injection Inject 1 mL intramuscularly monthly. 1 mL 1   • cyanocobalamin 1000 MCG/ML injection Inject 1 mL into the appropriate muscle as directed by prescriber weekly for 8 weeks then monthly thereafter. 30 mL 4   • cyanocobalamin 1000 MCG/ML injection Administer 1 ml (1,000 mcg) intramuscularly once weekly 12 mL 2   • cyanocobalamin 1000 MCG/ML injection Inject 1 mL into the appropriate muscle as directed by prescriber 1 (One) Time Per Week. 12 mL 2   • desloratadine-pseudoephedrine (CLARINEX-D 12-hour) 2.5-120 MG per tablet take 1 tablet by mouth 2 times every day 30 tablet 0   • DULoxetine (CYMBALTA) 60 MG capsule Take 1 capsule by mouth Daily. 90 capsule 3   • DULoxetine (CYMBALTA) 60 MG capsule Take 1 capsule by mouth daily 90 capsule 3   • DULoxetine (CYMBALTA) 60 MG capsule Take 1 capsule by mouth Daily. 90 capsule 3   • fenofibrate (TRICOR) 145 MG tablet Take 1 tablet by mouth Daily. 90 tablet 3   • fenofibrate (TRICOR) 145 MG tablet Take 1 tablet by mouth Daily. 90 tablet 3   • fenofibrate (TRICOR) 145 MG tablet Take 1 tablet by mouth daily 90 tablet 2   • fenofibrate (TRICOR) 145 MG tablet Take 1 tablet by mouth Daily. 90 tablet 2   • fenofibrate 160 MG tablet Take 1 tablet by mouth Daily. 90 tablet 3   • ferrous sulfate 325 (65 FE) MG EC tablet Take 1 tablet by mouth Daily. 30 tablet 3   • ferrous sulfate 325 (65 FE) MG EC tablet Take 1 tablet by mouth Daily. 90 tablet 3   • ferrous sulfate 325 (65 FE) MG tablet Take 1  tablet by mouth Daily. 90 tablet 2   • fluconazole (DIFLUCAN) 200 MG tablet Take 1 tablet by mouth Daily. 30 tablet 1   • furosemide (LASIX) 40 MG tablet Take 1 tablet by mouth Daily. 90 tablet 3   • furosemide (LASIX) 40 MG tablet Take 1 tablet by mouth Daily. 90 tablet 3   • furosemide (LASIX) 40 MG tablet Take 1 tablet by mouth Daily. 90 tablet 3   • glucose blood (FREESTYLE TEST STRIPS) test strip USE 2 TIMES DAILY AS DIRECTED 200 each 2   • glucose blood test strip USE 1 STRIP 2 TIMES DAILY AS DIRECTED 60 enema 6   • glucose blood test strip USE 1 STRIP 2 TIMES DAILY AS DIRECTED 180 each 6   • HYDROcodone-acetaminophen (NORCO)  MG per tablet Take 1 tablet by mouth every 4 - 6 hours as needed for severe pain. 20 tablet 0   • HYDROcodone-acetaminophen (NORCO)  MG per tablet Take 1 tablet by mouth Every 4-6 Hours As Needed. 30 tablet 0   • ibuprofen (ADVIL,MOTRIN) 800 MG tablet Take 1 tablet by mouth 3 (Three) Times a Day As Needed. 270 tablet 3   • ibuprofen (ADVIL,MOTRIN) 800 MG tablet take 1 tablet by mouth 3 times every day with food as needed 270 tablet 3   • ibuprofen (ADVIL,MOTRIN) 800 MG tablet Take 1 tablet by mouth 3 times per day with food or milk as needed 270 tablet 1   • ibuprofen (ADVIL,MOTRIN) 800 MG tablet Take 1 tablet  by mouth 3 times per day with food or milk as needed 270 tablet 2   • imatinib (GLEEVEC) 400 MG chemo tablet Take 1 tablet by mouth Daily. 30 tablet 3   • Insulin Glargine (LANTUS SOLOSTAR) 100 UNIT/ML injection pen Inject 45 units subcutaneously 2 times daily 30 mL 6   • Insulin Glargine (LANTUS SOLOSTAR) 100 UNIT/ML injection pen Inject 45 units Subcutaneously two times daily 30 mL 6   • Insulin Glargine (LANTUS SOLOSTAR) 100 UNIT/ML injection pen Inject 45 Units subcutaneously 2 times daily. 30 mL 6   • Insulin Glargine (LANTUS SOLOSTAR) 100 UNIT/ML injection pen Inject 44 Units under the skin into the appropriate area as directed 2 (Two) Times a Day. 30 mL 3   •  Insulin Glargine (LANTUS SOLOSTAR) 100 UNIT/ML injection pen Inject 100 Units under the skin into the appropriate area as directed Daily. 30 mL 2   • Insulin Glargine (Lantus SoloStar) 100 UNIT/ML injection pen Inject 100 Units under the skin into the appropriate area as directed Daily. 30 mL 2   • LANTUS SOLOSTAR 100 UNIT/ML injection pen Inject 45 Units under the skin 2 (Two) Times a Day. 30 mL 6   • LANTUS SOLOSTAR 100 UNIT/ML injection pen Inject 45 Units under the skin 2 (Two) Times a Day. 30 mL 6   • LANTUS SOLOSTAR 100 UNIT/ML injection pen Inject 45 Units under the skin into the appropriate area as directed 2 (Two) Times a Day. 36 mL 6   • loratadine-pseudoephedrine (CLARITIN-D 12 HOUR) 5-120 MG per 12 hr tablet Take 1 tablet by mouth two times a day 30 tablet 0   • loratadine-pseudoephedrine (CLARITIN-D 12-hour) 5-120 MG per 12 hr tablet Take 1 tablet by mouth Every 12 (Twelve) Hours As Needed. 30 tablet 0   • loratadine-pseudoephedrine (Allergy Relief D-12) 5-120 MG per 12 hr tablet Take 1 tablet by mouth Every 12 (Twelve) Hours As Needed. 30 tablet 0   • mupirocin (BACTROBAN) 2 % ointment 1 application into each nostril as directed by provider 2 (Two) Times a Day. 22 g 2   • oseltamivir (TAMIFLU) 75 MG capsule Take 1 capsule by mouth 2 (Two) Times a Day. 10 capsule 0   • pantoprazole (PROTONIX) 40 MG EC tablet Take 1 tablet by mouth daily 90 tablet 2   • pantoprazole (PROTONIX) 40 MG EC tablet Take 1 tablet by mouth Daily. 90 tablet 2   • promethazine (PHENERGAN) 25 MG tablet Take 1 tablet by mouth every 4-6 hours as needed. 20 tablet 0   • SUMAtriptan (IMITREX) 100 MG tablet Take 1 tablet by mouth after onset of migraine. May repeat after 2 hours if headache returns. Do not exceed 200mg in 24 hours. 10 tablet 2   • SUMAtriptan (IMITREX) 100 MG tablet Take 1 tablet by mouth after onset of migraine; may repeat once 27 tablet 2   • SUMAtriptan (IMITREX) 100 MG tablet Take 1 tablet by mouth 2 times per day  "at least 2 hours between doses as needed 27 tablet 2   • SUMAtriptan (IMITREX) 100 MG tablet Take 1 tablet by mouth 2 times per day at least 2 hours between doses as needed 27 tablet 2   • Syringe/Needle, Disp, 25G X 1\" 3 ML misc USE 1 AS DIRECTED WITH B12 1 each 7   • triamcinolone (KENALOG) 0.1 % cream Apply topically to affected area(s) 2 Times a Day. 30 g 0   • triamcinolone (KENALOG) 0.1 % cream Apply to affected area(s) 2 times per day 30 g 0   • triamcinolone (KENALOG) 0.1 % cream Apply a Thin Layer to Affected Area Topically Twice Daily 80 g 5   • triamcinolone (KENALOG) 0.1 % cream Apply a Thin Layer to Affected Area Topically Twice Daily 240 g 5   • triamcinolone (KENALOG) 0.1 % cream Apply 1 application topically to affected area daily as needed 80 g 3   • TRUETRACK TEST test strip use to test blood sugar 8 times daily 200 each 5   • vitamin D (ERGOCALCIFEROL) 42754 units capsule capsule Take 1 capsule by mouth once weekly. 12 capsule 3   • vitamin D (ERGOCALCIFEROL) 53598 units capsule capsule Take 1 capsule by mouth 2 (Two) Times a Week. 24 capsule 0   • zolpidem (AMBIEN) 10 MG tablet Take 1 tablet by mouth Every Night. 90 tablet 0   • zolpidem (AMBIEN) 10 MG tablet Take 1 tablet by mouth Every Night at bedtime for insomnia 30 tablet 1     No current facility-administered medications for this visit.          LABORATORY:    Lab Results   Component Value Date    WBC 7.59 08/17/2020    HGB 12.9 08/17/2020    HCT 40.6 08/17/2020    MCV 94.0 08/17/2020    RDW 13.4 08/17/2020     08/17/2020    NEUTRORELPCT 59.5 08/17/2020    LYMPHORELPCT 27.9 08/17/2020    MONORELPCT 9.2 08/17/2020    EOSRELPCT 2.6 08/17/2020    BASORELPCT 0.5 08/17/2020    NEUTROABS 4.51 08/17/2020    LYMPHSABS 2.12 08/17/2020       Lab Results   Component Value Date     08/17/2020    K 4.7 08/17/2020    CO2 24.5 08/17/2020     08/17/2020    BUN 14 08/17/2020    CREATININE 0.76 08/17/2020    GLUCOSE 173 (H) 08/17/2020    " CALCIUM 8.8 08/17/2020    ALKPHOS 70 08/17/2020    AST 22 08/17/2020    ALT 23 08/17/2020    BILITOT 0.4 08/17/2020    ALBUMIN 3.98 08/17/2020    PROTEINTOT 6.9 08/17/2020    PHOS 3.4 10/03/2018       Lab Results   Component Value Date     08/17/2020    URICACID 5.3 08/17/2020        Imaging Results (Last 24 Hours)     ** No results found for the last 24 hours. **          ASSESSMENT/PLAN:    Patient Update Assessment (new medications, allergies, medical history): No changes.      Medication(s): Gleevec 400 mg chemo tablet.      Currently Taking Medication(s): Patient reports taking medication as directed, 1 tablet daily by mouth.     Experiencing Side Effects: None expressed.      Prior Authorization Status: Approved.       Financial Assistance Status: Not needed at this time.  Patient co-pay is $5.     Any Issues Identified: No issues expressed form patient, no issues processing refill.     Appropriate to Process Prescription(s):  Yes. Medication will be dispensed to patient from [facility].     Counseling Offered: Patient previously counseled upon initiation of therapy, aware to contact pharmacy with any new questions or concerns.     Next Specialty Pharmacy Visit:  Scheduled for ~4 weeks.

## 2020-12-03 ENCOUNTER — LAB (OUTPATIENT)
Dept: ONCOLOGY | Facility: CLINIC | Age: 55
End: 2020-12-03

## 2020-12-03 VITALS
OXYGEN SATURATION: 98 % | WEIGHT: 270.6 LBS | SYSTOLIC BLOOD PRESSURE: 147 MMHG | HEART RATE: 88 BPM | BODY MASS INDEX: 43.68 KG/M2 | DIASTOLIC BLOOD PRESSURE: 72 MMHG | RESPIRATION RATE: 18 BRPM | TEMPERATURE: 97.3 F

## 2020-12-03 DIAGNOSIS — E53.8 VITAMIN B12 DEFICIENCY: ICD-10-CM

## 2020-12-03 DIAGNOSIS — E61.1 IRON DEFICIENCY: ICD-10-CM

## 2020-12-03 DIAGNOSIS — C92.10 CML (CHRONIC MYELOCYTIC LEUKEMIA) (HCC): ICD-10-CM

## 2020-12-03 LAB
ALBUMIN SERPL-MCNC: 3.82 G/DL (ref 3.5–5.2)
ALBUMIN/GLOB SERPL: 1.3 G/DL
ALP SERPL-CCNC: 64 U/L (ref 39–117)
ALT SERPL W P-5'-P-CCNC: 22 U/L (ref 1–33)
ANION GAP SERPL CALCULATED.3IONS-SCNC: 10.3 MMOL/L (ref 5–15)
AST SERPL-CCNC: 20 U/L (ref 1–32)
BASOPHILS # BLD AUTO: 0.04 10*3/MM3 (ref 0–0.2)
BASOPHILS NFR BLD AUTO: 0.7 % (ref 0–1.5)
BILIRUB SERPL-MCNC: 0.4 MG/DL (ref 0–1.2)
BUN SERPL-MCNC: 12 MG/DL (ref 6–20)
BUN/CREAT SERPL: 17.4 (ref 7–25)
CALCIUM SPEC-SCNC: 9.3 MG/DL (ref 8.6–10.5)
CHLORIDE SERPL-SCNC: 100 MMOL/L (ref 98–107)
CO2 SERPL-SCNC: 26.7 MMOL/L (ref 22–29)
CREAT SERPL-MCNC: 0.69 MG/DL (ref 0.57–1)
DEPRECATED RDW RBC AUTO: 46.1 FL (ref 37–54)
EOSINOPHIL # BLD AUTO: 0.14 10*3/MM3 (ref 0–0.4)
EOSINOPHIL NFR BLD AUTO: 2.5 % (ref 0.3–6.2)
ERYTHROCYTE [DISTWIDTH] IN BLOOD BY AUTOMATED COUNT: 13.4 % (ref 12.3–15.4)
FERRITIN SERPL-MCNC: 136.3 NG/ML (ref 13–150)
GFR SERPL CREATININE-BSD FRML MDRD: 88 ML/MIN/1.73
GLOBULIN UR ELPH-MCNC: 2.9 GM/DL
GLUCOSE SERPL-MCNC: 119 MG/DL (ref 65–99)
HCT VFR BLD AUTO: 38.2 % (ref 34–46.6)
HGB BLD-MCNC: 12.5 G/DL (ref 12–15.9)
IMM GRANULOCYTES # BLD AUTO: 0.01 10*3/MM3 (ref 0–0.05)
IMM GRANULOCYTES NFR BLD AUTO: 0.2 % (ref 0–0.5)
IRON 24H UR-MRATE: 76 MCG/DL (ref 37–145)
IRON SATN MFR SERPL: 18 % (ref 20–50)
LDH SERPL-CCNC: 222 U/L (ref 135–214)
LYMPHOCYTES # BLD AUTO: 1.61 10*3/MM3 (ref 0.7–3.1)
LYMPHOCYTES NFR BLD AUTO: 28.4 % (ref 19.6–45.3)
MCH RBC QN AUTO: 30.3 PG (ref 26.6–33)
MCHC RBC AUTO-ENTMCNC: 32.7 G/DL (ref 31.5–35.7)
MCV RBC AUTO: 92.5 FL (ref 79–97)
MONOCYTES # BLD AUTO: 0.56 10*3/MM3 (ref 0.1–0.9)
MONOCYTES NFR BLD AUTO: 9.9 % (ref 5–12)
NEUTROPHILS NFR BLD AUTO: 3.3 10*3/MM3 (ref 1.7–7)
NEUTROPHILS NFR BLD AUTO: 58.3 % (ref 42.7–76)
NRBC BLD AUTO-RTO: 0 /100 WBC (ref 0–0.2)
PLATELET # BLD AUTO: 258 10*3/MM3 (ref 140–450)
PMV BLD AUTO: 10.3 FL (ref 6–12)
POTASSIUM SERPL-SCNC: 4.3 MMOL/L (ref 3.5–5.2)
PROT SERPL-MCNC: 6.7 G/DL (ref 6–8.5)
RBC # BLD AUTO: 4.13 10*6/MM3 (ref 3.77–5.28)
SODIUM SERPL-SCNC: 137 MMOL/L (ref 136–145)
TIBC SERPL-MCNC: 434 MCG/DL (ref 298–536)
TRANSFERRIN SERPL-MCNC: 291 MG/DL (ref 200–360)
URATE SERPL-MCNC: 4.4 MG/DL (ref 2.4–5.7)
VIT B12 BLD-MCNC: 343 PG/ML (ref 211–946)
WBC # BLD AUTO: 5.66 10*3/MM3 (ref 3.4–10.8)

## 2020-12-03 PROCEDURE — 81206 BCR/ABL1 GENE MAJOR BP: CPT

## 2020-12-03 PROCEDURE — 82607 VITAMIN B-12: CPT | Performed by: INTERNAL MEDICINE

## 2020-12-03 PROCEDURE — 80053 COMPREHEN METABOLIC PANEL: CPT | Performed by: INTERNAL MEDICINE

## 2020-12-03 PROCEDURE — 81207 BCR/ABL1 GENE MINOR BP: CPT

## 2020-12-03 PROCEDURE — 84550 ASSAY OF BLOOD/URIC ACID: CPT | Performed by: INTERNAL MEDICINE

## 2020-12-03 PROCEDURE — 84466 ASSAY OF TRANSFERRIN: CPT | Performed by: INTERNAL MEDICINE

## 2020-12-03 PROCEDURE — 82728 ASSAY OF FERRITIN: CPT | Performed by: INTERNAL MEDICINE

## 2020-12-03 PROCEDURE — 83540 ASSAY OF IRON: CPT | Performed by: INTERNAL MEDICINE

## 2020-12-03 PROCEDURE — 36415 COLL VENOUS BLD VENIPUNCTURE: CPT

## 2020-12-03 PROCEDURE — 85025 COMPLETE CBC W/AUTO DIFF WBC: CPT | Performed by: INTERNAL MEDICINE

## 2020-12-03 PROCEDURE — 83615 LACTATE (LD) (LDH) ENZYME: CPT | Performed by: INTERNAL MEDICINE

## 2020-12-07 ENCOUNTER — OFFICE VISIT (OUTPATIENT)
Dept: ONCOLOGY | Facility: CLINIC | Age: 55
End: 2020-12-07

## 2020-12-07 VITALS
WEIGHT: 271 LBS | BODY MASS INDEX: 43.74 KG/M2 | RESPIRATION RATE: 18 BRPM | DIASTOLIC BLOOD PRESSURE: 81 MMHG | OXYGEN SATURATION: 97 % | TEMPERATURE: 97.3 F | HEART RATE: 95 BPM | SYSTOLIC BLOOD PRESSURE: 154 MMHG

## 2020-12-07 DIAGNOSIS — E53.8 VITAMIN B12 DEFICIENCY: ICD-10-CM

## 2020-12-07 DIAGNOSIS — C92.10 CML (CHRONIC MYELOCYTIC LEUKEMIA) (HCC): Primary | ICD-10-CM

## 2020-12-07 DIAGNOSIS — E61.1 IRON DEFICIENCY: ICD-10-CM

## 2020-12-07 PROCEDURE — 99214 OFFICE O/P EST MOD 30 MIN: CPT | Performed by: INTERNAL MEDICINE

## 2020-12-12 LAB
INTERPRETATION: NEGATIVE
LAB DIRECTOR NAME PROVIDER: NORMAL
LABORATORY COMMENT REPORT: NORMAL
REF LAB TEST METHOD: NORMAL
T(ABL1,BCR)B2A2/CONTROL BLD/T: NORMAL %
T(ABL1,BCR)B3A2/CONTROL BLD/T: NORMAL %
T(ABL1,BCR)E1A2/CONTROL BLD/T: NORMAL %

## 2020-12-17 ENCOUNTER — SPECIALTY PHARMACY (OUTPATIENT)
Dept: PHARMACY | Facility: HOSPITAL | Age: 55
End: 2020-12-17

## 2020-12-17 DIAGNOSIS — C92.10 CML (CHRONIC MYELOCYTIC LEUKEMIA) (HCC): ICD-10-CM

## 2020-12-17 RX ORDER — IMATINIB MESYLATE 400 MG/1
400 TABLET, FILM COATED ORAL DAILY
Qty: 30 TABLET | Refills: 3 | Status: SHIPPED | OUTPATIENT
Start: 2020-12-17 | End: 2021-04-23 | Stop reason: SDUPTHER

## 2021-01-13 ENCOUNTER — LAB (OUTPATIENT)
Dept: LAB | Facility: HOSPITAL | Age: 56
End: 2021-01-13

## 2021-01-13 ENCOUNTER — TRANSCRIBE ORDERS (OUTPATIENT)
Dept: ADMINISTRATIVE | Facility: HOSPITAL | Age: 56
End: 2021-01-13

## 2021-01-13 DIAGNOSIS — E78.2 MIXED HYPERLIPIDEMIA: ICD-10-CM

## 2021-01-13 DIAGNOSIS — D64.9 ANEMIA, UNSPECIFIED TYPE: ICD-10-CM

## 2021-01-13 DIAGNOSIS — E11.9 DIABETES MELLITUS WITHOUT COMPLICATION (HCC): ICD-10-CM

## 2021-01-13 DIAGNOSIS — E55.9 VITAMIN D DEFICIENCY, UNSPECIFIED: ICD-10-CM

## 2021-01-13 DIAGNOSIS — I10 ESSENTIAL HYPERTENSION, MALIGNANT: ICD-10-CM

## 2021-01-13 DIAGNOSIS — D51.9 ANEMIA DUE TO VITAMIN B12 DEFICIENCY, UNSPECIFIED B12 DEFICIENCY TYPE: ICD-10-CM

## 2021-01-13 DIAGNOSIS — G93.32 CHRONIC FATIGUE SYNDROME: ICD-10-CM

## 2021-01-13 DIAGNOSIS — I10 ESSENTIAL HYPERTENSION, MALIGNANT: Primary | ICD-10-CM

## 2021-01-13 LAB
25(OH)D3 SERPL-MCNC: 27.2 NG/ML (ref 30–100)
ALBUMIN SERPL-MCNC: 3.9 G/DL (ref 3.5–5.2)
ALBUMIN/GLOB SERPL: 1.8 G/DL
ALP SERPL-CCNC: 61 U/L (ref 39–117)
ALT SERPL W P-5'-P-CCNC: 14 U/L (ref 1–33)
ANION GAP SERPL CALCULATED.3IONS-SCNC: 12 MMOL/L (ref 5–15)
AST SERPL-CCNC: 21 U/L (ref 1–32)
BASOPHILS # BLD AUTO: 0.05 10*3/MM3 (ref 0–0.2)
BASOPHILS NFR BLD AUTO: 0.8 % (ref 0–1.5)
BILIRUB SERPL-MCNC: 0.4 MG/DL (ref 0–1.2)
BUN SERPL-MCNC: 12 MG/DL (ref 6–20)
BUN/CREAT SERPL: 22.2 (ref 7–25)
CALCIUM SPEC-SCNC: 9.2 MG/DL (ref 8.6–10.5)
CHLORIDE SERPL-SCNC: 106 MMOL/L (ref 98–107)
CHOLEST SERPL-MCNC: 141 MG/DL (ref 0–200)
CO2 SERPL-SCNC: 23 MMOL/L (ref 22–29)
CREAT SERPL-MCNC: 0.54 MG/DL (ref 0.57–1)
DEPRECATED RDW RBC AUTO: 42.6 FL (ref 37–54)
EOSINOPHIL # BLD AUTO: 0.22 10*3/MM3 (ref 0–0.4)
EOSINOPHIL NFR BLD AUTO: 3.6 % (ref 0.3–6.2)
ERYTHROCYTE [DISTWIDTH] IN BLOOD BY AUTOMATED COUNT: 12.9 % (ref 12.3–15.4)
GFR SERPL CREATININE-BSD FRML MDRD: 117 ML/MIN/1.73
GLOBULIN UR ELPH-MCNC: 2.2 GM/DL
GLUCOSE SERPL-MCNC: 115 MG/DL (ref 65–99)
HBA1C MFR BLD: 7.59 % (ref 4.8–5.6)
HCT VFR BLD AUTO: 37.6 % (ref 34–46.6)
HDLC SERPL-MCNC: 56 MG/DL (ref 40–60)
HGB BLD-MCNC: 12.6 G/DL (ref 12–15.9)
IMM GRANULOCYTES # BLD AUTO: 0.03 10*3/MM3 (ref 0–0.05)
IMM GRANULOCYTES NFR BLD AUTO: 0.5 % (ref 0–0.5)
IRON 24H UR-MRATE: 66 MCG/DL (ref 37–145)
IRON SATN MFR SERPL: 16 % (ref 20–50)
LDLC SERPL CALC-MCNC: 66 MG/DL (ref 0–100)
LDLC/HDLC SERPL: 1.14 {RATIO}
LYMPHOCYTES # BLD AUTO: 1.72 10*3/MM3 (ref 0.7–3.1)
LYMPHOCYTES NFR BLD AUTO: 28.2 % (ref 19.6–45.3)
MCH RBC QN AUTO: 30.6 PG (ref 26.6–33)
MCHC RBC AUTO-ENTMCNC: 33.5 G/DL (ref 31.5–35.7)
MCV RBC AUTO: 91.3 FL (ref 79–97)
MONOCYTES # BLD AUTO: 0.66 10*3/MM3 (ref 0.1–0.9)
MONOCYTES NFR BLD AUTO: 10.8 % (ref 5–12)
NEUTROPHILS NFR BLD AUTO: 3.42 10*3/MM3 (ref 1.7–7)
NEUTROPHILS NFR BLD AUTO: 56.1 % (ref 42.7–76)
NRBC BLD AUTO-RTO: 0 /100 WBC (ref 0–0.2)
PLATELET # BLD AUTO: 272 10*3/MM3 (ref 140–450)
PMV BLD AUTO: 10.9 FL (ref 6–12)
POTASSIUM SERPL-SCNC: 4.5 MMOL/L (ref 3.5–5.2)
PROT SERPL-MCNC: 6.1 G/DL (ref 6–8.5)
RBC # BLD AUTO: 4.12 10*6/MM3 (ref 3.77–5.28)
SODIUM SERPL-SCNC: 141 MMOL/L (ref 136–145)
TIBC SERPL-MCNC: 410 MCG/DL (ref 298–536)
TRANSFERRIN SERPL-MCNC: 275 MG/DL (ref 200–360)
TRIGL SERPL-MCNC: 105 MG/DL (ref 0–150)
TSH SERPL DL<=0.05 MIU/L-ACNC: 1.96 UIU/ML (ref 0.27–4.2)
VIT B12 BLD-MCNC: 446 PG/ML (ref 211–946)
VLDLC SERPL-MCNC: 19 MG/DL (ref 5–40)
WBC # BLD AUTO: 6.1 10*3/MM3 (ref 3.4–10.8)

## 2021-01-13 PROCEDURE — 80061 LIPID PANEL: CPT | Performed by: NURSE PRACTITIONER

## 2021-01-13 PROCEDURE — 82607 VITAMIN B-12: CPT

## 2021-01-13 PROCEDURE — 84443 ASSAY THYROID STIM HORMONE: CPT

## 2021-01-13 PROCEDURE — 83036 HEMOGLOBIN GLYCOSYLATED A1C: CPT | Performed by: NURSE PRACTITIONER

## 2021-01-13 PROCEDURE — 36415 COLL VENOUS BLD VENIPUNCTURE: CPT

## 2021-01-13 PROCEDURE — 84466 ASSAY OF TRANSFERRIN: CPT

## 2021-01-13 PROCEDURE — 85025 COMPLETE CBC W/AUTO DIFF WBC: CPT

## 2021-01-13 PROCEDURE — 80053 COMPREHEN METABOLIC PANEL: CPT

## 2021-01-13 PROCEDURE — 82306 VITAMIN D 25 HYDROXY: CPT | Performed by: NURSE PRACTITIONER

## 2021-01-13 PROCEDURE — 83540 ASSAY OF IRON: CPT

## 2021-01-13 PROCEDURE — 82043 UR ALBUMIN QUANTITATIVE: CPT

## 2021-01-13 NOTE — PROGRESS NOTES
Specialty Pharmacy Note      Name:  Aric Haro  :  1965  Date:  2020         Past Medical History:   Diagnosis Date   • Anemia    • Cancer (CMS/HCC)     leukemia   • Diabetes mellitus (CMS/HCC)    • Elevated cholesterol    • GERD (gastroesophageal reflux disease)    • Hypertension    • PONV (postoperative nausea and vomiting)        Past Surgical History:   Procedure Laterality Date   • BLEPHAROPLASTY Bilateral 2018    Procedure: BLEPHAROPLASTY BOTH EYES UPPER EYELIDS (PT REQUESTED TERESITA WILKES;  Surgeon: Chris Haile MD;  Location: [facility];  Service: Ophthalmology   • COLONOSCOPY N/A 3/27/2018    Procedure: COLONOSCOPY FOR SCREENING  CPTCODE:63842;  Surgeon: Drew Esparza III, MD;  Location: [facility];  Service: Gastroenterology   • SINUS SURGERY     • SINUS SURGERY     • SLEEVE GASTROPLASTY         Social History     Socioeconomic History   • Marital status:      Spouse name: Not on file   • Number of children: Not on file   • Years of education: Not on file   • Highest education level: Not on file   Tobacco Use   • Smoking status: Former Smoker     Packs/day: 0.25     Years: 4.00     Pack years: 1.00   • Smokeless tobacco: Never Used   Substance and Sexual Activity   • Alcohol use: No   • Drug use: No   • Sexual activity: Defer       Family History   Problem Relation Age of Onset   • Anemia Mother    • Hypertension Mother    • Cancer Mother    • COPD Father    • Heart disease Father    • Breast cancer Neg Hx        No Known Allergies    Current Outpatient Medications   Medication Sig Dispense Refill   • ALPRAZolam (XANAX) 1 MG tablet Take 1 tablet by mouth 2 times per day 60 tablet 0   • amoxicillin-clavulanate (AUGMENTIN) 875-125 MG per tablet Take 1 tablet by mouth Every 12 (Twelve) Hours. 20 tablet 0   • baclofen (LIORESAL) 20 MG tablet Take 1 tablet by mouth 3 (Three) Times a Day. 90 tablet 4   • celecoxib (CeleBREX) 200 MG capsule Take 1 capsule by  mouth 2 (Two) Times a Day. (Patient taking differently: Take 200 mg by mouth 2 (Two) Times a Day. Takes 1/2 tablet once daily) 180 capsule 3   • celecoxib (CeleBREX) 200 MG capsule Take 1 capsule by mouth 2 (Two) Times a Day. 180 capsule 3   • cholecalciferol (VITAMIN D3) 04314 units capsule Take 1 capsule by mouth 2 (Two) Times a Week. 24 capsule 0   • Cholecalciferol (VITAMIN D3) 61614 units capsule Take 1 capsule by mouth 2 (Two) Times a Week. 24 capsule 3   • cyanocobalamin 1000 MCG/ML injection Inject 1 mL intramuscularly monthly. 1 mL 1   • cyanocobalamin 1000 MCG/ML injection Inject 1 mL into the appropriate muscle as directed by prescriber weekly for 8 weeks then monthly thereafter. 30 mL 4   • cyanocobalamin 1000 MCG/ML injection Administer 1 ml (1,000 mcg) intramuscularly once weekly 12 mL 2   • cyanocobalamin 1000 MCG/ML injection Inject 1 mL into the appropriate muscle as directed by prescriber 1 (One) Time Per Week. 12 mL 2   • desloratadine-pseudoephedrine (CLARINEX-D 12-hour) 2.5-120 MG per tablet take 1 tablet by mouth 2 times every day 30 tablet 0   • DULoxetine (CYMBALTA) 60 MG capsule Take 1 capsule by mouth Daily. 90 capsule 3   • DULoxetine (CYMBALTA) 60 MG capsule Take 1 capsule by mouth daily 90 capsule 3   • DULoxetine (CYMBALTA) 60 MG capsule Take 1 capsule by mouth Daily. 90 capsule 3   • fenofibrate (TRICOR) 145 MG tablet Take 1 tablet by mouth Daily. 90 tablet 3   • fenofibrate (TRICOR) 145 MG tablet Take 1 tablet by mouth Daily. 90 tablet 3   • fenofibrate (TRICOR) 145 MG tablet Take 1 tablet by mouth daily 90 tablet 2   • fenofibrate (TRICOR) 145 MG tablet Take 1 tablet by mouth Daily. 90 tablet 2   • fenofibrate 160 MG tablet Take 1 tablet by mouth Daily. 90 tablet 3   • ferrous sulfate 325 (65 FE) MG EC tablet Take 1 tablet by mouth Daily. 30 tablet 3   • ferrous sulfate 325 (65 FE) MG EC tablet Take 1 tablet by mouth Daily. 90 tablet 3   • ferrous sulfate 325 (65 FE) MG tablet Take 1  tablet by mouth Daily. 90 tablet 2   • fluconazole (DIFLUCAN) 200 MG tablet Take 1 tablet by mouth Daily. 30 tablet 1   • furosemide (LASIX) 40 MG tablet Take 1 tablet by mouth Daily. 90 tablet 3   • furosemide (LASIX) 40 MG tablet Take 1 tablet by mouth Daily. 90 tablet 3   • furosemide (LASIX) 40 MG tablet Take 1 tablet by mouth Daily. 90 tablet 3   • glucose blood (FREESTYLE TEST STRIPS) test strip USE 2 TIMES DAILY AS DIRECTED 200 each 2   • glucose blood test strip USE 1 STRIP 2 TIMES DAILY AS DIRECTED 60 enema 6   • glucose blood test strip USE 1 STRIP 2 TIMES DAILY AS DIRECTED 180 each 6   • HYDROcodone-acetaminophen (NORCO)  MG per tablet Take 1 tablet by mouth every 4 - 6 hours as needed for severe pain. 20 tablet 0   • HYDROcodone-acetaminophen (NORCO)  MG per tablet Take 1 tablet by mouth Every 4-6 Hours As Needed. 30 tablet 0   • ibuprofen (ADVIL,MOTRIN) 800 MG tablet Take 1 tablet by mouth 3 (Three) Times a Day As Needed. 270 tablet 3   • ibuprofen (ADVIL,MOTRIN) 800 MG tablet take 1 tablet by mouth 3 times every day with food as needed 270 tablet 3   • ibuprofen (ADVIL,MOTRIN) 800 MG tablet Take 1 tablet by mouth 3 times per day with food or milk as needed 270 tablet 1   • ibuprofen (ADVIL,MOTRIN) 800 MG tablet Take 1 tablet  by mouth 3 times per day with food or milk as needed 270 tablet 2   • imatinib (GLEEVEC) 400 MG chemo tablet Take 1 tablet by mouth Daily. 30 tablet 3   • Insulin Glargine (LANTUS SOLOSTAR) 100 UNIT/ML injection pen Inject 45 units subcutaneously 2 times daily 30 mL 6   • Insulin Glargine (LANTUS SOLOSTAR) 100 UNIT/ML injection pen Inject 45 units Subcutaneously two times daily 30 mL 6   • Insulin Glargine (LANTUS SOLOSTAR) 100 UNIT/ML injection pen Inject 45 Units subcutaneously 2 times daily. 30 mL 6   • Insulin Glargine (LANTUS SOLOSTAR) 100 UNIT/ML injection pen Inject 44 Units under the skin into the appropriate area as directed 2 (Two) Times a Day. 30 mL 3   •  Insulin Glargine (LANTUS SOLOSTAR) 100 UNIT/ML injection pen Inject 100 Units under the skin into the appropriate area as directed Daily. 30 mL 2   • Insulin Glargine (Lantus SoloStar) 100 UNIT/ML injection pen Inject 100 Units under the skin into the appropriate area as directed Daily. 30 mL 2   • Insulin Glargine (Lantus SoloStar) 100 UNIT/ML injection pen Inject 100 Units under the skin into the appropriate area as directed Daily. 18 mL 3   • LANTUS SOLOSTAR 100 UNIT/ML injection pen Inject 45 Units under the skin 2 (Two) Times a Day. 30 mL 6   • LANTUS SOLOSTAR 100 UNIT/ML injection pen Inject 45 Units under the skin 2 (Two) Times a Day. 30 mL 6   • LANTUS SOLOSTAR 100 UNIT/ML injection pen Inject 45 Units under the skin into the appropriate area as directed 2 (Two) Times a Day. 36 mL 6   • loratadine-pseudoephedrine (CLARITIN-D 12 HOUR) 5-120 MG per 12 hr tablet Take 1 tablet by mouth two times a day 30 tablet 0   • loratadine-pseudoephedrine (CLARITIN-D 12-hour) 5-120 MG per 12 hr tablet Take 1 tablet by mouth Every 12 (Twelve) Hours As Needed. 30 tablet 0   • loratadine-pseudoephedrine (Allergy Relief D-12) 5-120 MG per 12 hr tablet Take 1 tablet by mouth Every 12 (Twelve) Hours As Needed. 30 tablet 0   • mupirocin (BACTROBAN) 2 % ointment 1 application into each nostril as directed by provider 2 (Two) Times a Day. 22 g 2   • oseltamivir (TAMIFLU) 75 MG capsule Take 1 capsule by mouth 2 (Two) Times a Day. 10 capsule 0   • pantoprazole (PROTONIX) 40 MG EC tablet Take 1 tablet by mouth daily 90 tablet 2   • pantoprazole (PROTONIX) 40 MG EC tablet Take 1 tablet by mouth Daily. 90 tablet 2   • promethazine (PHENERGAN) 25 MG tablet Take 1 tablet by mouth every 4-6 hours as needed. 20 tablet 0   • SUMAtriptan (IMITREX) 100 MG tablet Take 1 tablet by mouth after onset of migraine. May repeat after 2 hours if headache returns. Do not exceed 200mg in 24 hours. 10 tablet 2   • SUMAtriptan (IMITREX) 100 MG tablet Take 1  "tablet by mouth after onset of migraine; may repeat once 27 tablet 2   • SUMAtriptan (IMITREX) 100 MG tablet Take 1 tablet by mouth 2 times per day at least 2 hours between doses as needed 27 tablet 2   • SUMAtriptan (IMITREX) 100 MG tablet Take 1 tablet by mouth 2 times per day at least 2 hours between doses as needed 27 tablet 2   • Syringe/Needle, Disp, 25G X 1\" 3 ML misc USE 1 AS DIRECTED WITH B12 1 each 7   • triamcinolone (KENALOG) 0.1 % cream Apply topically to affected area(s) 2 Times a Day. 30 g 0   • triamcinolone (KENALOG) 0.1 % cream Apply to affected area(s) 2 times per day 30 g 0   • triamcinolone (KENALOG) 0.1 % cream Apply a Thin Layer to Affected Area Topically Twice Daily 80 g 5   • triamcinolone (KENALOG) 0.1 % cream Apply a Thin Layer to Affected Area Topically Twice Daily 240 g 5   • triamcinolone (KENALOG) 0.1 % cream Apply 1 application topically to affected area daily as needed 80 g 3   • TRUETRACK TEST test strip use to test blood sugar 8 times daily 200 each 5   • vitamin D (ERGOCALCIFEROL) 26042 units capsule capsule Take 1 capsule by mouth once weekly. 12 capsule 3   • vitamin D (ERGOCALCIFEROL) 89978 units capsule capsule Take 1 capsule by mouth 2 (Two) Times a Week. 24 capsule 0   • zolpidem (AMBIEN) 10 MG tablet Take 1 tablet by mouth Every Night. 90 tablet 0   • zolpidem (AMBIEN) 10 MG tablet Take 1 tablet by mouth Every Night at bedtime for insomnia 30 tablet 1     No current facility-administered medications for this visit.          LABORATORY:    Lab Results   Component Value Date    WBC 5.66 12/03/2020    HGB 12.5 12/03/2020    HCT 38.2 12/03/2020    MCV 92.5 12/03/2020    RDW 13.4 12/03/2020     12/03/2020    NEUTRORELPCT 58.3 12/03/2020    LYMPHORELPCT 28.4 12/03/2020    MONORELPCT 9.9 12/03/2020    EOSRELPCT 2.5 12/03/2020    BASORELPCT 0.7 12/03/2020    NEUTROABS 3.30 12/03/2020    LYMPHSABS 1.61 12/03/2020       Lab Results   Component Value Date     12/03/2020 "    K 4.3 12/03/2020    CO2 26.7 12/03/2020     12/03/2020    BUN 12 12/03/2020    CREATININE 0.69 12/03/2020    GLUCOSE 119 (H) 12/03/2020    CALCIUM 9.3 12/03/2020    ALKPHOS 64 12/03/2020    AST 20 12/03/2020    ALT 22 12/03/2020    BILITOT 0.4 12/03/2020    ALBUMIN 3.82 12/03/2020    PROTEINTOT 6.7 12/03/2020    PHOS 3.4 10/03/2018       Lab Results   Component Value Date     (H) 12/03/2020    URICACID 4.4 12/03/2020        Imaging Results (Last 24 Hours)     ** No results found for the last 24 hours. **          ASSESSMENT/PLAN:    Patient Update Assessment (new medications, allergies, medical history): No changes.      Medication(s): Gleevec 400 mg chemo tablet.      Currently Taking Medication(s): Patient reports taking medication as directed, 1 tablet daily by mouth.     Experiencing Side Effects: None expressed.      Prior Authorization Status: Approved.       Financial Assistance Status: Not needed at this time.  Patient co-pay is $5.     Any Issues Identified: No issues expressed form patient, no issues processing refill.     Appropriate to Process Prescription(s):  Yes. Medication will be dispensed to patient from [facility].     Counseling Offered: Patient previously counseled upon initiation of therapy, aware to contact pharmacy with any new questions or concerns.     Next Specialty Pharmacy Visit:  Scheduled for ~4 weeks.

## 2021-01-14 ENCOUNTER — SPECIALTY PHARMACY (OUTPATIENT)
Dept: PHARMACY | Facility: HOSPITAL | Age: 56
End: 2021-01-14

## 2021-01-14 LAB — ALBUMIN UR-MCNC: 2.7 MG/DL

## 2021-02-08 ENCOUNTER — SPECIALTY PHARMACY (OUTPATIENT)
Dept: PHARMACY | Facility: HOSPITAL | Age: 56
End: 2021-02-08

## 2021-02-08 NOTE — PROGRESS NOTES
Specialty Pharmacy Note      Name:  Aric Haro  :  1965  Date:  1/15/2021       Past Medical History:   Diagnosis Date   • Anemia    • Cancer (CMS/HCC)     leukemia   • Diabetes mellitus (CMS/HCC)    • Elevated cholesterol    • GERD (gastroesophageal reflux disease)    • Hypertension    • PONV (postoperative nausea and vomiting)        Past Surgical History:   Procedure Laterality Date   • BLEPHAROPLASTY Bilateral 2018    Procedure: BLEPHAROPLASTY BOTH EYES UPPER EYELIDS (PT REQUESTED TERESITA WILKES;  Surgeon: Chris Haile MD;  Location: [facility];  Service: Ophthalmology   • COLONOSCOPY N/A 3/27/2018    Procedure: COLONOSCOPY FOR SCREENING  CPTCODE:22458;  Surgeon: Drew Esparza III, MD;  Location: [facility];  Service: Gastroenterology   • SINUS SURGERY     • SINUS SURGERY     • SLEEVE GASTROPLASTY         Social History     Socioeconomic History   • Marital status:      Spouse name: Not on file   • Number of children: Not on file   • Years of education: Not on file   • Highest education level: Not on file   Tobacco Use   • Smoking status: Former Smoker     Packs/day: 0.25     Years: 4.00     Pack years: 1.00   • Smokeless tobacco: Never Used   Substance and Sexual Activity   • Alcohol use: No   • Drug use: No   • Sexual activity: Defer       Family History   Problem Relation Age of Onset   • Anemia Mother    • Hypertension Mother    • Cancer Mother    • COPD Father    • Heart disease Father    • Breast cancer Neg Hx        No Known Allergies    Current Outpatient Medications   Medication Sig Dispense Refill   • ALPRAZolam (XANAX) 1 MG tablet Take 1 tablet by mouth 2 times per day 60 tablet 0   • amoxicillin-clavulanate (AUGMENTIN) 875-125 MG per tablet Take 1 tablet by mouth Every 12 (Twelve) Hours. 20 tablet 0   • baclofen (LIORESAL) 20 MG tablet Take 1 tablet by mouth 3 (Three) Times a Day. 90 tablet 4   • celecoxib (CeleBREX) 200 MG capsule Take 1 capsule by  mouth 2 (Two) Times a Day. (Patient taking differently: Take 200 mg by mouth 2 (Two) Times a Day. Takes 1/2 tablet once daily) 180 capsule 3   • celecoxib (CeleBREX) 200 MG capsule Take 1 capsule by mouth 2 (Two) Times a Day. 180 capsule 3   • cholecalciferol (VITAMIN D3) 26335 units capsule Take 1 capsule by mouth 2 (Two) Times a Week. 24 capsule 0   • Cholecalciferol (VITAMIN D3) 52331 units capsule Take 1 capsule by mouth 2 (Two) Times a Week. 24 capsule 3   • cyanocobalamin 1000 MCG/ML injection Inject 1 mL intramuscularly monthly. 1 mL 1   • cyanocobalamin 1000 MCG/ML injection Inject 1 mL into the appropriate muscle as directed by prescriber weekly for 8 weeks then monthly thereafter. 30 mL 4   • cyanocobalamin 1000 MCG/ML injection Administer 1 ml (1,000 mcg) intramuscularly once weekly 12 mL 2   • cyanocobalamin 1000 MCG/ML injection Inject 1 mL into the appropriate muscle as directed by prescriber 1 (One) Time Per Week. 12 mL 2   • desloratadine-pseudoephedrine (CLARINEX-D 12-hour) 2.5-120 MG per tablet take 1 tablet by mouth 2 times every day 30 tablet 0   • DULoxetine (CYMBALTA) 60 MG capsule Take 1 capsule by mouth Daily. 90 capsule 3   • DULoxetine (CYMBALTA) 60 MG capsule Take 1 capsule by mouth daily 90 capsule 3   • DULoxetine (CYMBALTA) 60 MG capsule Take 1 capsule by mouth Daily. 90 capsule 3   • fenofibrate (TRICOR) 145 MG tablet Take 1 tablet by mouth Daily. 90 tablet 3   • fenofibrate (TRICOR) 145 MG tablet Take 1 tablet by mouth Daily. 90 tablet 3   • fenofibrate (TRICOR) 145 MG tablet Take 1 tablet by mouth daily 90 tablet 2   • fenofibrate (TRICOR) 145 MG tablet Take 1 tablet by mouth Daily. 90 tablet 2   • fenofibrate 160 MG tablet Take 1 tablet by mouth Daily. 90 tablet 3   • ferrous sulfate 325 (65 FE) MG EC tablet Take 1 tablet by mouth Daily. 30 tablet 3   • ferrous sulfate 325 (65 FE) MG EC tablet Take 1 tablet by mouth Daily. 90 tablet 3   • ferrous sulfate 325 (65 FE) MG tablet Take 1  tablet by mouth Daily. 90 tablet 2   • fluconazole (DIFLUCAN) 200 MG tablet Take 1 tablet by mouth Daily. 30 tablet 1   • furosemide (LASIX) 40 MG tablet Take 1 tablet by mouth Daily. 90 tablet 3   • furosemide (LASIX) 40 MG tablet Take 1 tablet by mouth Daily. 90 tablet 3   • furosemide (LASIX) 40 MG tablet Take 1 tablet by mouth Daily. 90 tablet 3   • glucose blood (FREESTYLE TEST STRIPS) test strip USE 2 TIMES DAILY AS DIRECTED 200 each 2   • glucose blood test strip USE 1 STRIP 2 TIMES DAILY AS DIRECTED 60 enema 6   • glucose blood test strip USE 1 STRIP 2 TIMES DAILY AS DIRECTED 180 each 6   • HYDROcodone-acetaminophen (NORCO)  MG per tablet Take 1 tablet by mouth every 4 - 6 hours as needed for severe pain. 20 tablet 0   • HYDROcodone-acetaminophen (NORCO)  MG per tablet Take 1 tablet by mouth Every 4-6 Hours As Needed. 30 tablet 0   • ibuprofen (ADVIL,MOTRIN) 800 MG tablet Take 1 tablet by mouth 3 (Three) Times a Day As Needed. 270 tablet 3   • ibuprofen (ADVIL,MOTRIN) 800 MG tablet take 1 tablet by mouth 3 times every day with food as needed 270 tablet 3   • ibuprofen (ADVIL,MOTRIN) 800 MG tablet Take 1 tablet by mouth 3 times per day with food or milk as needed 270 tablet 1   • ibuprofen (ADVIL,MOTRIN) 800 MG tablet Take 1 tablet  by mouth 3 times per day with food or milk as needed 270 tablet 2   • imatinib (GLEEVEC) 400 MG chemo tablet Take 1 tablet by mouth Daily. 30 tablet 3   • Insulin Glargine (LANTUS SOLOSTAR) 100 UNIT/ML injection pen Inject 45 units subcutaneously 2 times daily 30 mL 6   • Insulin Glargine (LANTUS SOLOSTAR) 100 UNIT/ML injection pen Inject 45 units Subcutaneously two times daily 30 mL 6   • Insulin Glargine (LANTUS SOLOSTAR) 100 UNIT/ML injection pen Inject 45 Units subcutaneously 2 times daily. 30 mL 6   • Insulin Glargine (LANTUS SOLOSTAR) 100 UNIT/ML injection pen Inject 44 Units under the skin into the appropriate area as directed 2 (Two) Times a Day. 30 mL 3   •  Insulin Glargine (LANTUS SOLOSTAR) 100 UNIT/ML injection pen Inject 100 Units under the skin into the appropriate area as directed Daily. 30 mL 2   • Insulin Glargine (Lantus SoloStar) 100 UNIT/ML injection pen Inject 100 Units under the skin into the appropriate area as directed Daily. 30 mL 2   • Insulin Glargine (Lantus SoloStar) 100 UNIT/ML injection pen Inject 100 Units under the skin into the appropriate area as directed Daily. 18 mL 3   • LANTUS SOLOSTAR 100 UNIT/ML injection pen Inject 45 Units under the skin 2 (Two) Times a Day. 30 mL 6   • LANTUS SOLOSTAR 100 UNIT/ML injection pen Inject 45 Units under the skin 2 (Two) Times a Day. 30 mL 6   • LANTUS SOLOSTAR 100 UNIT/ML injection pen Inject 45 Units under the skin into the appropriate area as directed 2 (Two) Times a Day. 36 mL 6   • loratadine-pseudoephedrine (CLARITIN-D 12 HOUR) 5-120 MG per 12 hr tablet Take 1 tablet by mouth two times a day 30 tablet 0   • loratadine-pseudoephedrine (CLARITIN-D 12-hour) 5-120 MG per 12 hr tablet Take 1 tablet by mouth Every 12 (Twelve) Hours As Needed. 30 tablet 0   • loratadine-pseudoephedrine (Allergy Relief D-12) 5-120 MG per 12 hr tablet Take 1 tablet by mouth Every 12 (Twelve) Hours As Needed. 30 tablet 0   • mupirocin (BACTROBAN) 2 % ointment 1 application into each nostril as directed by provider 2 (Two) Times a Day. 22 g 2   • oseltamivir (TAMIFLU) 75 MG capsule Take 1 capsule by mouth 2 (Two) Times a Day. 10 capsule 0   • pantoprazole (PROTONIX) 40 MG EC tablet Take 1 tablet by mouth daily 90 tablet 2   • pantoprazole (PROTONIX) 40 MG EC tablet Take 1 tablet by mouth Daily. 90 tablet 2   • promethazine (PHENERGAN) 25 MG tablet Take 1 tablet by mouth every 4-6 hours as needed. 20 tablet 0   • SUMAtriptan (IMITREX) 100 MG tablet Take 1 tablet by mouth after onset of migraine. May repeat after 2 hours if headache returns. Do not exceed 200mg in 24 hours. 10 tablet 2   • SUMAtriptan (IMITREX) 100 MG tablet Take 1  "tablet by mouth after onset of migraine; may repeat once 27 tablet 2   • SUMAtriptan (IMITREX) 100 MG tablet Take 1 tablet by mouth 2 times per day at least 2 hours between doses as needed 27 tablet 2   • SUMAtriptan (IMITREX) 100 MG tablet Take 1 tablet by mouth 2 times per day at least 2 hours between doses as needed 27 tablet 2   • Syringe/Needle, Disp, 25G X 1\" 3 ML misc USE 1 AS DIRECTED WITH B12 1 each 7   • triamcinolone (KENALOG) 0.1 % cream Apply topically to affected area(s) 2 Times a Day. 30 g 0   • triamcinolone (KENALOG) 0.1 % cream Apply to affected area(s) 2 times per day 30 g 0   • triamcinolone (KENALOG) 0.1 % cream Apply a Thin Layer to Affected Area Topically Twice Daily 80 g 5   • triamcinolone (KENALOG) 0.1 % cream Apply a Thin Layer to Affected Area Topically Twice Daily 240 g 5   • triamcinolone (KENALOG) 0.1 % cream Apply 1 application topically to affected area daily as needed 80 g 3   • TRUETRACK TEST test strip use to test blood sugar 8 times daily 200 each 5   • vitamin D (ERGOCALCIFEROL) 54874 units capsule capsule Take 1 capsule by mouth once weekly. 12 capsule 3   • vitamin D (ERGOCALCIFEROL) 12555 units capsule capsule Take 1 capsule by mouth 2 (Two) Times a Week. 24 capsule 0   • zolpidem (AMBIEN) 10 MG tablet Take 1 tablet by mouth Every Night. 90 tablet 0   • zolpidem (AMBIEN) 10 MG tablet Take 1 tablet by mouth Every Night at bedtime for insomnia 30 tablet 1     No current facility-administered medications for this visit.          LABORATORY:    Lab Results   Component Value Date    WBC 6.10 01/13/2021    HGB 12.6 01/13/2021    HCT 37.6 01/13/2021    MCV 91.3 01/13/2021    RDW 12.9 01/13/2021     01/13/2021    NEUTRORELPCT 56.1 01/13/2021    LYMPHORELPCT 28.2 01/13/2021    MONORELPCT 10.8 01/13/2021    EOSRELPCT 3.6 01/13/2021    BASORELPCT 0.8 01/13/2021    NEUTROABS 3.42 01/13/2021    LYMPHSABS 1.72 01/13/2021       Lab Results   Component Value Date     01/13/2021 "    K 4.5 01/13/2021    CO2 23.0 01/13/2021     01/13/2021    BUN 12 01/13/2021    CREATININE 0.54 (L) 01/13/2021    GLUCOSE 115 (H) 01/13/2021    CALCIUM 9.2 01/13/2021    ALKPHOS 61 01/13/2021    AST 21 01/13/2021    ALT 14 01/13/2021    BILITOT 0.4 01/13/2021    ALBUMIN 3.90 01/13/2021    PROTEINTOT 6.1 01/13/2021    PHOS 3.4 10/03/2018       Lab Results   Component Value Date     (H) 12/03/2020    URICACID 4.4 12/03/2020        Imaging Results (Last 24 Hours)     ** No results found for the last 24 hours. **          ASSESSMENT/PLAN:    Patient Update Assessment (new medications, allergies, medical history): No changes.      Medication(s): Gleevec 400 mg chemo tablet.      Currently Taking Medication(s): Patient reports taking medication as directed, 1 tablet daily by mouth.     Experiencing Side Effects: None expressed.      Prior Authorization Status: Approved.       Financial Assistance Status: Not needed at this time.  Patient co-pay is $5.     Any Issues Identified: No issues expressed form patient, no issues processing refill.     Appropriate to Process Prescription(s):  Yes. Medication will be dispensed to patient from [facility].     Counseling Offered: Patient previously counseled upon initiation of therapy, aware to contact pharmacy with any new questions or concerns.     Next Specialty Pharmacy Visit:  Scheduled for ~4 weeks.

## 2021-02-15 NOTE — PROGRESS NOTES
Specialty Pharmacy Note      Name:  Aric Haro  :  1965  Date:  2021         Past Medical History:   Diagnosis Date   • Anemia    • Cancer (CMS/HCC)     leukemia   • Diabetes mellitus (CMS/HCC)    • Elevated cholesterol    • GERD (gastroesophageal reflux disease)    • Hypertension    • PONV (postoperative nausea and vomiting)        Past Surgical History:   Procedure Laterality Date   • BLEPHAROPLASTY Bilateral 2018    Procedure: BLEPHAROPLASTY BOTH EYES UPPER EYELIDS (PT REQUESTED TERESITA WILKES;  Surgeon: Chris Haile MD;  Location: [facility];  Service: Ophthalmology   • COLONOSCOPY N/A 3/27/2018    Procedure: COLONOSCOPY FOR SCREENING  CPTCODE:08455;  Surgeon: Drew Esparza III, MD;  Location: [facility];  Service: Gastroenterology   • SINUS SURGERY     • SINUS SURGERY     • SLEEVE GASTROPLASTY         Social History     Socioeconomic History   • Marital status:      Spouse name: Not on file   • Number of children: Not on file   • Years of education: Not on file   • Highest education level: Not on file   Tobacco Use   • Smoking status: Former Smoker     Packs/day: 0.25     Years: 4.00     Pack years: 1.00   • Smokeless tobacco: Never Used   Substance and Sexual Activity   • Alcohol use: No   • Drug use: No   • Sexual activity: Defer       Family History   Problem Relation Age of Onset   • Anemia Mother    • Hypertension Mother    • Cancer Mother    • COPD Father    • Heart disease Father    • Breast cancer Neg Hx        No Known Allergies    Current Outpatient Medications   Medication Sig Dispense Refill   • ALPRAZolam (XANAX) 1 MG tablet Take 1 tablet by mouth 2 times per day 60 tablet 0   • amoxicillin-clavulanate (AUGMENTIN) 875-125 MG per tablet Take 1 tablet by mouth Every 12 (Twelve) Hours. 20 tablet 0   • baclofen (LIORESAL) 20 MG tablet Take 1 tablet by mouth 3 (Three) Times a Day. 90 tablet 4   • celecoxib (CeleBREX) 200 MG capsule Take 1 capsule by  mouth 2 (Two) Times a Day. (Patient taking differently: Take 200 mg by mouth 2 (Two) Times a Day. Takes 1/2 tablet once daily) 180 capsule 3   • celecoxib (CeleBREX) 200 MG capsule Take 1 capsule by mouth 2 (Two) Times a Day. 180 capsule 3   • cholecalciferol (VITAMIN D3) 86584 units capsule Take 1 capsule by mouth 2 (Two) Times a Week. 24 capsule 0   • Cholecalciferol (VITAMIN D3) 01922 units capsule Take 1 capsule by mouth 2 (Two) Times a Week. 24 capsule 3   • cyanocobalamin 1000 MCG/ML injection Inject 1 mL intramuscularly monthly. 1 mL 1   • cyanocobalamin 1000 MCG/ML injection Inject 1 mL into the appropriate muscle as directed by prescriber weekly for 8 weeks then monthly thereafter. 30 mL 4   • cyanocobalamin 1000 MCG/ML injection Administer 1 ml (1,000 mcg) intramuscularly once weekly 12 mL 2   • cyanocobalamin 1000 MCG/ML injection Inject 1 mL into the appropriate muscle as directed by prescriber 1 (One) Time Per Week. 12 mL 2   • desloratadine-pseudoephedrine (CLARINEX-D 12-hour) 2.5-120 MG per tablet take 1 tablet by mouth 2 times every day 30 tablet 0   • DULoxetine (CYMBALTA) 60 MG capsule Take 1 capsule by mouth Daily. 90 capsule 3   • DULoxetine (CYMBALTA) 60 MG capsule Take 1 capsule by mouth daily 90 capsule 3   • DULoxetine (CYMBALTA) 60 MG capsule Take 1 capsule by mouth Daily. 90 capsule 3   • fenofibrate (TRICOR) 145 MG tablet Take 1 tablet by mouth Daily. 90 tablet 3   • fenofibrate (TRICOR) 145 MG tablet Take 1 tablet by mouth Daily. 90 tablet 3   • fenofibrate (TRICOR) 145 MG tablet Take 1 tablet by mouth daily 90 tablet 2   • fenofibrate (TRICOR) 145 MG tablet Take 1 tablet by mouth Daily. 90 tablet 2   • fenofibrate 160 MG tablet Take 1 tablet by mouth Daily. 90 tablet 3   • ferrous sulfate 325 (65 FE) MG EC tablet Take 1 tablet by mouth Daily. 30 tablet 3   • ferrous sulfate 325 (65 FE) MG EC tablet Take 1 tablet by mouth Daily. 90 tablet 3   • ferrous sulfate 325 (65 FE) MG tablet Take 1  tablet by mouth Daily. 90 tablet 2   • fluconazole (DIFLUCAN) 200 MG tablet Take 1 tablet by mouth Daily. 30 tablet 1   • furosemide (LASIX) 40 MG tablet Take 1 tablet by mouth Daily. 90 tablet 3   • furosemide (LASIX) 40 MG tablet Take 1 tablet by mouth Daily. 90 tablet 3   • furosemide (LASIX) 40 MG tablet Take 1 tablet by mouth Daily. 90 tablet 3   • glucose blood (FREESTYLE TEST STRIPS) test strip USE 2 TIMES DAILY AS DIRECTED 200 each 2   • glucose blood test strip USE 1 STRIP 2 TIMES DAILY AS DIRECTED 60 enema 6   • glucose blood test strip USE 1 STRIP 2 TIMES DAILY AS DIRECTED 180 each 6   • HYDROcodone-acetaminophen (NORCO)  MG per tablet Take 1 tablet by mouth every 4 - 6 hours as needed for severe pain. 20 tablet 0   • HYDROcodone-acetaminophen (NORCO)  MG per tablet Take 1 tablet by mouth Every 4-6 Hours As Needed. 30 tablet 0   • ibuprofen (ADVIL,MOTRIN) 800 MG tablet Take 1 tablet by mouth 3 (Three) Times a Day As Needed. 270 tablet 3   • ibuprofen (ADVIL,MOTRIN) 800 MG tablet take 1 tablet by mouth 3 times every day with food as needed 270 tablet 3   • ibuprofen (ADVIL,MOTRIN) 800 MG tablet Take 1 tablet by mouth 3 times per day with food or milk as needed 270 tablet 1   • ibuprofen (ADVIL,MOTRIN) 800 MG tablet Take 1 tablet  by mouth 3 times per day with food or milk as needed 270 tablet 2   • imatinib (GLEEVEC) 400 MG chemo tablet Take 1 tablet by mouth Daily. 30 tablet 3   • Insulin Glargine (LANTUS SOLOSTAR) 100 UNIT/ML injection pen Inject 45 units subcutaneously 2 times daily 30 mL 6   • Insulin Glargine (LANTUS SOLOSTAR) 100 UNIT/ML injection pen Inject 45 units Subcutaneously two times daily 30 mL 6   • Insulin Glargine (LANTUS SOLOSTAR) 100 UNIT/ML injection pen Inject 45 Units subcutaneously 2 times daily. 30 mL 6   • Insulin Glargine (LANTUS SOLOSTAR) 100 UNIT/ML injection pen Inject 44 Units under the skin into the appropriate area as directed 2 (Two) Times a Day. 30 mL 3   •  Insulin Glargine (LANTUS SOLOSTAR) 100 UNIT/ML injection pen Inject 100 Units under the skin into the appropriate area as directed Daily. 30 mL 2   • Insulin Glargine (Lantus SoloStar) 100 UNIT/ML injection pen Inject 100 Units under the skin into the appropriate area as directed Daily. 30 mL 2   • Insulin Glargine (Lantus SoloStar) 100 UNIT/ML injection pen Inject 100 Units under the skin into the appropriate area as directed Daily. 18 mL 3   • LANTUS SOLOSTAR 100 UNIT/ML injection pen Inject 45 Units under the skin 2 (Two) Times a Day. 30 mL 6   • LANTUS SOLOSTAR 100 UNIT/ML injection pen Inject 45 Units under the skin 2 (Two) Times a Day. 30 mL 6   • LANTUS SOLOSTAR 100 UNIT/ML injection pen Inject 45 Units under the skin into the appropriate area as directed 2 (Two) Times a Day. 36 mL 6   • loratadine-pseudoephedrine (CLARITIN-D 12 HOUR) 5-120 MG per 12 hr tablet Take 1 tablet by mouth two times a day 30 tablet 0   • loratadine-pseudoephedrine (CLARITIN-D 12-hour) 5-120 MG per 12 hr tablet Take 1 tablet by mouth Every 12 (Twelve) Hours As Needed. 30 tablet 0   • loratadine-pseudoephedrine (Allergy Relief D-12) 5-120 MG per 12 hr tablet Take 1 tablet by mouth Every 12 (Twelve) Hours As Needed. 30 tablet 0   • mupirocin (BACTROBAN) 2 % ointment 1 application into each nostril as directed by provider 2 (Two) Times a Day. 22 g 2   • oseltamivir (TAMIFLU) 75 MG capsule Take 1 capsule by mouth 2 (Two) Times a Day. 10 capsule 0   • pantoprazole (PROTONIX) 40 MG EC tablet Take 1 tablet by mouth daily 90 tablet 2   • pantoprazole (PROTONIX) 40 MG EC tablet Take 1 tablet by mouth Daily. 90 tablet 2   • promethazine (PHENERGAN) 25 MG tablet Take 1 tablet by mouth every 4-6 hours as needed. 20 tablet 0   • SUMAtriptan (IMITREX) 100 MG tablet Take 1 tablet by mouth after onset of migraine. May repeat after 2 hours if headache returns. Do not exceed 200mg in 24 hours. 10 tablet 2   • SUMAtriptan (IMITREX) 100 MG tablet Take 1  "tablet by mouth after onset of migraine; may repeat once 27 tablet 2   • SUMAtriptan (IMITREX) 100 MG tablet Take 1 tablet by mouth 2 times per day at least 2 hours between doses as needed 27 tablet 2   • SUMAtriptan (IMITREX) 100 MG tablet Take 1 tablet by mouth 2 times per day at least 2 hours between doses as needed 27 tablet 2   • Syringe/Needle, Disp, 25G X 1\" 3 ML misc USE 1 AS DIRECTED WITH B12 1 each 7   • triamcinolone (KENALOG) 0.1 % cream Apply topically to affected area(s) 2 Times a Day. 30 g 0   • triamcinolone (KENALOG) 0.1 % cream Apply to affected area(s) 2 times per day 30 g 0   • triamcinolone (KENALOG) 0.1 % cream Apply a Thin Layer to Affected Area Topically Twice Daily 80 g 5   • triamcinolone (KENALOG) 0.1 % cream Apply a Thin Layer to Affected Area Topically Twice Daily 240 g 5   • triamcinolone (KENALOG) 0.1 % cream Apply 1 application topically to affected area daily as needed 80 g 3   • TRUETRACK TEST test strip use to test blood sugar 8 times daily 200 each 5   • vitamin D (ERGOCALCIFEROL) 89659 units capsule capsule Take 1 capsule by mouth once weekly. 12 capsule 3   • vitamin D (ERGOCALCIFEROL) 35732 units capsule capsule Take 1 capsule by mouth 2 (Two) Times a Week. 24 capsule 0   • zolpidem (AMBIEN) 10 MG tablet Take 1 tablet by mouth Every Night. 90 tablet 0   • zolpidem (AMBIEN) 10 MG tablet Take 1 tablet by mouth Every Night at bedtime for insomnia 30 tablet 1     No current facility-administered medications for this visit.          LABORATORY:    Lab Results   Component Value Date    WBC 6.10 01/13/2021    HGB 12.6 01/13/2021    HCT 37.6 01/13/2021    MCV 91.3 01/13/2021    RDW 12.9 01/13/2021     01/13/2021    NEUTRORELPCT 56.1 01/13/2021    LYMPHORELPCT 28.2 01/13/2021    MONORELPCT 10.8 01/13/2021    EOSRELPCT 3.6 01/13/2021    BASORELPCT 0.8 01/13/2021    NEUTROABS 3.42 01/13/2021    LYMPHSABS 1.72 01/13/2021       Lab Results   Component Value Date     01/13/2021 "    K 4.5 01/13/2021    CO2 23.0 01/13/2021     01/13/2021    BUN 12 01/13/2021    CREATININE 0.54 (L) 01/13/2021    GLUCOSE 115 (H) 01/13/2021    CALCIUM 9.2 01/13/2021    ALKPHOS 61 01/13/2021    AST 21 01/13/2021    ALT 14 01/13/2021    BILITOT 0.4 01/13/2021    ALBUMIN 3.90 01/13/2021    PROTEINTOT 6.1 01/13/2021    PHOS 3.4 10/03/2018       Lab Results   Component Value Date     (H) 12/03/2020    URICACID 4.4 12/03/2020        Imaging Results (Last 24 Hours)     ** No results found for the last 24 hours. **          ASSESSMENT/PLAN:     Patient Update Assessment (new medications, allergies, medical history): No changes.      Medication(s): Gleevec 400 mg chemo tablet.      Currently Taking Medication(s): Patient reports taking medication as directed, 1 tablet daily by mouth.     Experiencing Side Effects: None expressed.      Prior Authorization Status: Approved.       Financial Assistance Status: Not needed at this time.  Patient co-pay is $5.     Any Issues Identified: No issues expressed form patient, no issues processing refill.     Appropriate to Process Prescription(s):  Yes. Medication will be dispensed to patient from [facility].     Counseling Offered: Patient previously counseled upon initiation of therapy, aware to contact pharmacy with any new questions or concerns.     Next Specialty Pharmacy Visit:  03/04/2021

## 2021-03-04 ENCOUNTER — SPECIALTY PHARMACY (OUTPATIENT)
Dept: PHARMACY | Facility: HOSPITAL | Age: 56
End: 2021-03-04

## 2021-03-07 NOTE — PROGRESS NOTES
Specialty Pharmacy Note      Name:  Aric Haro  :  1965  Date:  2021         Past Medical History:   Diagnosis Date   • Anemia    • Cancer (CMS/HCC)     leukemia   • Diabetes mellitus (CMS/HCC)    • Elevated cholesterol    • GERD (gastroesophageal reflux disease)    • Hypertension    • PONV (postoperative nausea and vomiting)        Past Surgical History:   Procedure Laterality Date   • BLEPHAROPLASTY Bilateral 2018    Procedure: BLEPHAROPLASTY BOTH EYES UPPER EYELIDS (PT REQUESTED TERESITA WILKES;  Surgeon: Chris Haile MD;  Location: [facility];  Service: Ophthalmology   • COLONOSCOPY N/A 3/27/2018    Procedure: COLONOSCOPY FOR SCREENING  CPTCODE:06903;  Surgeon: Drew Esparza III, MD;  Location: [facility];  Service: Gastroenterology   • SINUS SURGERY     • SINUS SURGERY     • SLEEVE GASTROPLASTY         Social History     Socioeconomic History   • Marital status:      Spouse name: Not on file   • Number of children: Not on file   • Years of education: Not on file   • Highest education level: Not on file   Tobacco Use   • Smoking status: Former Smoker     Packs/day: 0.25     Years: 4.00     Pack years: 1.00   • Smokeless tobacco: Never Used   Substance and Sexual Activity   • Alcohol use: No   • Drug use: No   • Sexual activity: Defer       Family History   Problem Relation Age of Onset   • Anemia Mother    • Hypertension Mother    • Cancer Mother    • COPD Father    • Heart disease Father    • Breast cancer Neg Hx        No Known Allergies    Current Outpatient Medications   Medication Sig Dispense Refill   • ALPRAZolam (XANAX) 1 MG tablet Take 1 tablet by mouth 2 times per day 60 tablet 0   • amoxicillin-clavulanate (AUGMENTIN) 875-125 MG per tablet Take 1 tablet by mouth Every 12 (Twelve) Hours. 20 tablet 0   • baclofen (LIORESAL) 20 MG tablet Take 1 tablet by mouth 3 (Three) Times a Day. 90 tablet 4   • celecoxib (CeleBREX) 200 MG capsule Take 1 capsule by  mouth 2 (Two) Times a Day. (Patient taking differently: Take 200 mg by mouth 2 (Two) Times a Day. Takes 1/2 tablet once daily) 180 capsule 3   • celecoxib (CeleBREX) 200 MG capsule Take 1 capsule by mouth 2 (Two) Times a Day. 180 capsule 3   • cholecalciferol (VITAMIN D3) 41045 units capsule Take 1 capsule by mouth 2 (Two) Times a Week. 24 capsule 0   • Cholecalciferol (VITAMIN D3) 01625 units capsule Take 1 capsule by mouth 2 (Two) Times a Week. 24 capsule 3   • cyanocobalamin 1000 MCG/ML injection Inject 1 mL intramuscularly monthly. 1 mL 1   • cyanocobalamin 1000 MCG/ML injection Inject 1 mL into the appropriate muscle as directed by prescriber weekly for 8 weeks then monthly thereafter. 30 mL 4   • cyanocobalamin 1000 MCG/ML injection Administer 1 ml (1,000 mcg) intramuscularly once weekly 12 mL 2   • cyanocobalamin 1000 MCG/ML injection Inject 1 mL into the appropriate muscle as directed by prescriber 1 (One) Time Per Week. 12 mL 2   • desloratadine-pseudoephedrine (CLARINEX-D 12-hour) 2.5-120 MG per tablet take 1 tablet by mouth 2 times every day 30 tablet 0   • DULoxetine (CYMBALTA) 60 MG capsule Take 1 capsule by mouth Daily. 90 capsule 3   • DULoxetine (CYMBALTA) 60 MG capsule Take 1 capsule by mouth daily 90 capsule 3   • DULoxetine (CYMBALTA) 60 MG capsule Take 1 capsule by mouth Daily. 90 capsule 3   • fenofibrate (TRICOR) 145 MG tablet Take 1 tablet by mouth Daily. 90 tablet 3   • fenofibrate (TRICOR) 145 MG tablet Take 1 tablet by mouth Daily. 90 tablet 3   • fenofibrate (TRICOR) 145 MG tablet Take 1 tablet by mouth daily 90 tablet 2   • fenofibrate (TRICOR) 145 MG tablet Take 1 tablet by mouth Daily. 90 tablet 2   • fenofibrate 160 MG tablet Take 1 tablet by mouth Daily. 90 tablet 3   • ferrous sulfate 325 (65 FE) MG EC tablet Take 1 tablet by mouth Daily. 30 tablet 3   • ferrous sulfate 325 (65 FE) MG EC tablet Take 1 tablet by mouth Daily. 90 tablet 3   • ferrous sulfate 325 (65 FE) MG tablet Take 1  tablet by mouth Daily. 90 tablet 2   • fluconazole (DIFLUCAN) 200 MG tablet Take 1 tablet by mouth Daily. 30 tablet 1   • furosemide (LASIX) 40 MG tablet Take 1 tablet by mouth Daily. 90 tablet 3   • furosemide (LASIX) 40 MG tablet Take 1 tablet by mouth Daily. 90 tablet 3   • furosemide (LASIX) 40 MG tablet Take 1 tablet by mouth Daily. 90 tablet 3   • glucose blood (FREESTYLE TEST STRIPS) test strip USE 2 TIMES DAILY AS DIRECTED 200 each 2   • glucose blood test strip USE 1 STRIP 2 TIMES DAILY AS DIRECTED 60 enema 6   • glucose blood test strip USE 1 STRIP 2 TIMES DAILY AS DIRECTED 180 each 6   • HYDROcodone-acetaminophen (NORCO)  MG per tablet Take 1 tablet by mouth every 4 - 6 hours as needed for severe pain. 20 tablet 0   • HYDROcodone-acetaminophen (NORCO)  MG per tablet Take 1 tablet by mouth Every 4-6 Hours As Needed. 30 tablet 0   • ibuprofen (ADVIL,MOTRIN) 800 MG tablet Take 1 tablet by mouth 3 (Three) Times a Day As Needed. 270 tablet 3   • ibuprofen (ADVIL,MOTRIN) 800 MG tablet take 1 tablet by mouth 3 times every day with food as needed 270 tablet 3   • ibuprofen (ADVIL,MOTRIN) 800 MG tablet Take 1 tablet by mouth 3 times per day with food or milk as needed 270 tablet 1   • ibuprofen (ADVIL,MOTRIN) 800 MG tablet Take 1 tablet  by mouth 3 times per day with food or milk as needed 270 tablet 2   • imatinib (GLEEVEC) 400 MG chemo tablet Take 1 tablet by mouth Daily. 30 tablet 3   • Insulin Glargine (LANTUS SOLOSTAR) 100 UNIT/ML injection pen Inject 45 units subcutaneously 2 times daily 30 mL 6   • Insulin Glargine (LANTUS SOLOSTAR) 100 UNIT/ML injection pen Inject 45 units Subcutaneously two times daily 30 mL 6   • Insulin Glargine (LANTUS SOLOSTAR) 100 UNIT/ML injection pen Inject 45 Units subcutaneously 2 times daily. 30 mL 6   • Insulin Glargine (LANTUS SOLOSTAR) 100 UNIT/ML injection pen Inject 44 Units under the skin into the appropriate area as directed 2 (Two) Times a Day. 30 mL 3   •  Insulin Glargine (LANTUS SOLOSTAR) 100 UNIT/ML injection pen Inject 100 Units under the skin into the appropriate area as directed Daily. 30 mL 2   • Insulin Glargine (Lantus SoloStar) 100 UNIT/ML injection pen Inject 100 Units under the skin into the appropriate area as directed Daily. 30 mL 2   • Insulin Glargine (Lantus SoloStar) 100 UNIT/ML injection pen Inject 100 Units under the skin into the appropriate area as directed Daily. 18 mL 3   • LANTUS SOLOSTAR 100 UNIT/ML injection pen Inject 45 Units under the skin 2 (Two) Times a Day. 30 mL 6   • LANTUS SOLOSTAR 100 UNIT/ML injection pen Inject 45 Units under the skin 2 (Two) Times a Day. 30 mL 6   • LANTUS SOLOSTAR 100 UNIT/ML injection pen Inject 45 Units under the skin into the appropriate area as directed 2 (Two) Times a Day. 36 mL 6   • loratadine-pseudoephedrine (CLARITIN-D 12 HOUR) 5-120 MG per 12 hr tablet Take 1 tablet by mouth two times a day 30 tablet 0   • loratadine-pseudoephedrine (CLARITIN-D 12-hour) 5-120 MG per 12 hr tablet Take 1 tablet by mouth Every 12 (Twelve) Hours As Needed. 30 tablet 0   • loratadine-pseudoephedrine (Allergy Relief D-12) 5-120 MG per 12 hr tablet Take 1 tablet by mouth Every 12 (Twelve) Hours As Needed. 30 tablet 0   • mupirocin (BACTROBAN) 2 % ointment 1 application into each nostril as directed by provider 2 (Two) Times a Day. 22 g 2   • oseltamivir (TAMIFLU) 75 MG capsule Take 1 capsule by mouth 2 (Two) Times a Day. 10 capsule 0   • pantoprazole (PROTONIX) 40 MG EC tablet Take 1 tablet by mouth daily 90 tablet 2   • pantoprazole (PROTONIX) 40 MG EC tablet Take 1 tablet by mouth Daily. 90 tablet 2   • promethazine (PHENERGAN) 25 MG tablet Take 1 tablet by mouth every 4-6 hours as needed. 20 tablet 0   • SUMAtriptan (IMITREX) 100 MG tablet Take 1 tablet by mouth after onset of migraine. May repeat after 2 hours if headache returns. Do not exceed 200mg in 24 hours. 10 tablet 2   • SUMAtriptan (IMITREX) 100 MG tablet Take 1  "tablet by mouth after onset of migraine; may repeat once 27 tablet 2   • SUMAtriptan (IMITREX) 100 MG tablet Take 1 tablet by mouth 2 times per day at least 2 hours between doses as needed 27 tablet 2   • SUMAtriptan (IMITREX) 100 MG tablet Take 1 tablet by mouth 2 times per day at least 2 hours between doses as needed 27 tablet 2   • Syringe/Needle, Disp, 25G X 1\" 3 ML misc USE 1 AS DIRECTED WITH B12 1 each 7   • triamcinolone (KENALOG) 0.1 % cream Apply topically to affected area(s) 2 Times a Day. 30 g 0   • triamcinolone (KENALOG) 0.1 % cream Apply to affected area(s) 2 times per day 30 g 0   • triamcinolone (KENALOG) 0.1 % cream Apply a Thin Layer to Affected Area Topically Twice Daily 80 g 5   • triamcinolone (KENALOG) 0.1 % cream Apply a Thin Layer to Affected Area Topically Twice Daily 240 g 5   • triamcinolone (KENALOG) 0.1 % cream Apply 1 application topically to affected area daily as needed 80 g 3   • TRUETRACK TEST test strip use to test blood sugar 8 times daily 200 each 5   • vitamin D (ERGOCALCIFEROL) 01695 units capsule capsule Take 1 capsule by mouth once weekly. 12 capsule 3   • vitamin D (ERGOCALCIFEROL) 60827 units capsule capsule Take 1 capsule by mouth 2 (Two) Times a Week. 24 capsule 0   • zolpidem (AMBIEN) 10 MG tablet Take 1 tablet by mouth Every Night. 90 tablet 0   • zolpidem (AMBIEN) 10 MG tablet Take 1 tablet by mouth Every Night at bedtime for insomnia 30 tablet 1     No current facility-administered medications for this visit.         LABORATORY:    Lab Results   Component Value Date    WBC 6.10 01/13/2021    HGB 12.6 01/13/2021    HCT 37.6 01/13/2021    MCV 91.3 01/13/2021    RDW 12.9 01/13/2021     01/13/2021    NEUTRORELPCT 56.1 01/13/2021    LYMPHORELPCT 28.2 01/13/2021    MONORELPCT 10.8 01/13/2021    EOSRELPCT 3.6 01/13/2021    BASORELPCT 0.8 01/13/2021    NEUTROABS 3.42 01/13/2021    LYMPHSABS 1.72 01/13/2021       Lab Results   Component Value Date     01/13/2021 "    K 4.5 01/13/2021    CO2 23.0 01/13/2021     01/13/2021    BUN 12 01/13/2021    CREATININE 0.54 (L) 01/13/2021    GLUCOSE 115 (H) 01/13/2021    CALCIUM 9.2 01/13/2021    ALKPHOS 61 01/13/2021    AST 21 01/13/2021    ALT 14 01/13/2021    BILITOT 0.4 01/13/2021    ALBUMIN 3.90 01/13/2021    PROTEINTOT 6.1 01/13/2021    PHOS 3.4 10/03/2018       Lab Results   Component Value Date     (H) 12/03/2020    URICACID 4.4 12/03/2020        Imaging Results (Last 24 Hours)     ** No results found for the last 24 hours. **          ASSESSMENT/PLAN:     Patient Update Assessment (new medications, allergies, medical history): No changes.      Medication(s): Gleevec 400 mg chemo tablet.      Currently Taking Medication(s): Patient reports taking medication as directed, 1 tablet daily by mouth.     Experiencing Side Effects: None expressed.      Prior Authorization Status: Approved.       Financial Assistance Status: Not needed at this time.  Patient co-pay is $5.     Any Issues Identified: No issues expressed form patient, no issues processing refill.     Appropriate to Process Prescription(s):  Yes. Medication will be dispensed to patient from [facility].     Counseling Offered: Patient previously counseled upon initiation of therapy, aware to contact pharmacy with any new questions or concerns.     Next Specialty Pharmacy Visit:  03/31/2021

## 2021-03-16 ENCOUNTER — BULK ORDERING (OUTPATIENT)
Dept: CASE MANAGEMENT | Facility: OTHER | Age: 56
End: 2021-03-16

## 2021-03-16 DIAGNOSIS — Z23 IMMUNIZATION DUE: ICD-10-CM

## 2021-03-22 ENCOUNTER — LAB (OUTPATIENT)
Dept: ONCOLOGY | Facility: CLINIC | Age: 56
End: 2021-03-22

## 2021-03-22 DIAGNOSIS — E13.69 OTHER SPECIFIED DIABETES MELLITUS WITH OTHER SPECIFIED COMPLICATION, UNSPECIFIED WHETHER LONG TERM INSULIN USE (HCC): ICD-10-CM

## 2021-03-22 DIAGNOSIS — D51.9 ANEMIA DUE TO VITAMIN B12 DEFICIENCY, UNSPECIFIED B12 DEFICIENCY TYPE: ICD-10-CM

## 2021-03-22 DIAGNOSIS — E78.5 HYPERLIPIDEMIA, UNSPECIFIED HYPERLIPIDEMIA TYPE: ICD-10-CM

## 2021-03-22 DIAGNOSIS — E55.9 AVITAMINOSIS D: ICD-10-CM

## 2021-03-22 DIAGNOSIS — G93.32 CHRONIC FATIGUE SYNDROME: ICD-10-CM

## 2021-03-22 DIAGNOSIS — C92.10 CML (CHRONIC MYELOCYTIC LEUKEMIA) (HCC): ICD-10-CM

## 2021-03-22 DIAGNOSIS — I10 ESSENTIAL HYPERTENSION, MALIGNANT: Primary | ICD-10-CM

## 2021-03-22 DIAGNOSIS — R30.0 BURNING WITH URINATION: ICD-10-CM

## 2021-03-22 DIAGNOSIS — Z79.899 LONG-TERM USE OF HIGH-RISK MEDICATION: ICD-10-CM

## 2021-03-22 LAB
25(OH)D3 SERPL-MCNC: 23.1 NG/ML
ALBUMIN SERPL-MCNC: 4.02 G/DL (ref 3.5–5.2)
ALBUMIN UR-MCNC: 20.5 MG/DL
ALBUMIN/GLOB SERPL: 1.4 G/DL
ALP SERPL-CCNC: 70 U/L (ref 39–117)
ALT SERPL W P-5'-P-CCNC: 20 U/L (ref 1–33)
ANION GAP SERPL CALCULATED.3IONS-SCNC: 10.8 MMOL/L (ref 5–15)
AST SERPL-CCNC: 19 U/L (ref 1–32)
BASOPHILS # BLD AUTO: 0.07 10*3/MM3 (ref 0–0.2)
BASOPHILS NFR BLD AUTO: 1.1 % (ref 0–1.5)
BILIRUB SERPL-MCNC: 0.4 MG/DL (ref 0–1.2)
BUN SERPL-MCNC: 13 MG/DL (ref 6–20)
BUN/CREAT SERPL: 16.5 (ref 7–25)
CALCIUM SPEC-SCNC: 9.2 MG/DL (ref 8.6–10.5)
CHLORIDE SERPL-SCNC: 103 MMOL/L (ref 98–107)
CHOLEST SERPL-MCNC: 155 MG/DL (ref 0–200)
CO2 SERPL-SCNC: 24.2 MMOL/L (ref 22–29)
CREAT SERPL-MCNC: 0.79 MG/DL (ref 0.57–1)
DEPRECATED RDW RBC AUTO: 44.7 FL (ref 37–54)
EOSINOPHIL # BLD AUTO: 0.17 10*3/MM3 (ref 0–0.4)
EOSINOPHIL NFR BLD AUTO: 2.6 % (ref 0.3–6.2)
ERYTHROCYTE [DISTWIDTH] IN BLOOD BY AUTOMATED COUNT: 13.1 % (ref 12.3–15.4)
GFR SERPL CREATININE-BSD FRML MDRD: 76 ML/MIN/1.73
GLOBULIN UR ELPH-MCNC: 2.8 GM/DL
GLUCOSE SERPL-MCNC: 198 MG/DL (ref 65–99)
HBA1C MFR BLD: 8.2 % (ref 4.8–5.6)
HCT VFR BLD AUTO: 40.7 % (ref 34–46.6)
HDLC SERPL-MCNC: 54 MG/DL (ref 40–60)
HGB BLD-MCNC: 13.1 G/DL (ref 12–15.9)
IMM GRANULOCYTES # BLD AUTO: 0.02 10*3/MM3 (ref 0–0.05)
IMM GRANULOCYTES NFR BLD AUTO: 0.3 % (ref 0–0.5)
LDH SERPL-CCNC: 195 U/L (ref 135–214)
LDLC SERPL CALC-MCNC: 77 MG/DL (ref 0–100)
LDLC/HDLC SERPL: 1.37 {RATIO}
LYMPHOCYTES # BLD AUTO: 1.66 10*3/MM3 (ref 0.7–3.1)
LYMPHOCYTES NFR BLD AUTO: 25.3 % (ref 19.6–45.3)
MCH RBC QN AUTO: 29.9 PG (ref 26.6–33)
MCHC RBC AUTO-ENTMCNC: 32.2 G/DL (ref 31.5–35.7)
MCV RBC AUTO: 92.9 FL (ref 79–97)
MONOCYTES # BLD AUTO: 0.66 10*3/MM3 (ref 0.1–0.9)
MONOCYTES NFR BLD AUTO: 10.1 % (ref 5–12)
NEUTROPHILS NFR BLD AUTO: 3.98 10*3/MM3 (ref 1.7–7)
NEUTROPHILS NFR BLD AUTO: 60.6 % (ref 42.7–76)
NRBC BLD AUTO-RTO: 0 /100 WBC (ref 0–0.2)
PLATELET # BLD AUTO: 274 10*3/MM3 (ref 140–450)
PMV BLD AUTO: 10.5 FL (ref 6–12)
POTASSIUM SERPL-SCNC: 4.4 MMOL/L (ref 3.5–5.2)
PROT SERPL-MCNC: 6.8 G/DL (ref 6–8.5)
RBC # BLD AUTO: 4.38 10*6/MM3 (ref 3.77–5.28)
SODIUM SERPL-SCNC: 138 MMOL/L (ref 136–145)
T3FREE SERPL-MCNC: 2.53 PG/ML (ref 2–4.4)
T4 FREE SERPL-MCNC: 1.28 NG/DL (ref 0.93–1.7)
TRIGL SERPL-MCNC: 136 MG/DL (ref 0–150)
TSH SERPL DL<=0.05 MIU/L-ACNC: 1.51 UIU/ML (ref 0.27–4.2)
URATE SERPL-MCNC: 5.2 MG/DL (ref 2.4–5.7)
VIT B12 BLD-MCNC: 367 PG/ML (ref 211–946)
VLDLC SERPL-MCNC: 24 MG/DL (ref 5–40)
WBC # BLD AUTO: 6.56 10*3/MM3 (ref 3.4–10.8)

## 2021-03-22 PROCEDURE — 84439 ASSAY OF FREE THYROXINE: CPT | Performed by: NURSE PRACTITIONER

## 2021-03-22 PROCEDURE — 84481 FREE ASSAY (FT-3): CPT | Performed by: NURSE PRACTITIONER

## 2021-03-22 PROCEDURE — 82607 VITAMIN B-12: CPT | Performed by: NURSE PRACTITIONER

## 2021-03-22 PROCEDURE — 83036 HEMOGLOBIN GLYCOSYLATED A1C: CPT | Performed by: NURSE PRACTITIONER

## 2021-03-22 PROCEDURE — 81207 BCR/ABL1 GENE MINOR BP: CPT

## 2021-03-22 PROCEDURE — 82306 VITAMIN D 25 HYDROXY: CPT | Performed by: NURSE PRACTITIONER

## 2021-03-22 PROCEDURE — 81206 BCR/ABL1 GENE MAJOR BP: CPT

## 2021-03-22 PROCEDURE — 85025 COMPLETE CBC W/AUTO DIFF WBC: CPT | Performed by: NURSE PRACTITIONER

## 2021-03-22 PROCEDURE — 84550 ASSAY OF BLOOD/URIC ACID: CPT | Performed by: NURSE PRACTITIONER

## 2021-03-22 PROCEDURE — 80061 LIPID PANEL: CPT | Performed by: NURSE PRACTITIONER

## 2021-03-22 PROCEDURE — 82043 UR ALBUMIN QUANTITATIVE: CPT | Performed by: NURSE PRACTITIONER

## 2021-03-22 PROCEDURE — 36415 COLL VENOUS BLD VENIPUNCTURE: CPT

## 2021-03-22 PROCEDURE — 80053 COMPREHEN METABOLIC PANEL: CPT | Performed by: NURSE PRACTITIONER

## 2021-03-22 PROCEDURE — 83615 LACTATE (LD) (LDH) ENZYME: CPT | Performed by: NURSE PRACTITIONER

## 2021-03-22 PROCEDURE — 84443 ASSAY THYROID STIM HORMONE: CPT | Performed by: NURSE PRACTITIONER

## 2021-03-25 NOTE — PROGRESS NOTES
Aric Haro  [id number]  1965  3/26/2021      Referring Provider:   EILEEN Andrade    Reason for Follow Up:   1. Chronic Phase - Chronic Myelogenous Leukemia  2. Leukocytosis  3. Thrombocytosis     Chief Complaint:  CML      History of Present Illness:  Aric Haro is a very pleasant 55 y.o.  female who presents in follow up appointment for further management and treatment of chronic phase chronic myelogenous leukemia (CML).    Ms. Haro began experiencing extreme fatigue where she was sleeping multiple hours during the day when she initially began following with me in April 2017. 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 On laboratory evaluation she was found to have a total white blood cell count of 22.35 and a platelet count 470 thousand. In March 2016 she was also noted to have a leukocytosis (although not as elevated) as well as a thrombocytosis, however reports that these were likely secondary to other medical issues at the time her blood work was drawn. She denied of any significant weight loss however has been gradually losing weight since gastric sleeve procedure. She denied of any fevers or frequent infections but did note fluctuating neck lymphadenopathy as well as early satiety and taste in change. She denied of any abnormal or spontaneous bleeding. I did obtain a BCR ABL PCR to further assess her leukocytosis and thrombocytosis and she was positive for the b2a2/b3a2 (p210) and e1a2 (p190) fusion gene transcripts consistent with a diagnosis for CML. After reviewing the toxicities of each tyrosine kinase inhibitor we decided to start Dasatinib treatment. She had a difficult time tolerating the Dasatinib as she was unable to take her PPI with this medication. Since she had to be taken off of her PPI she had worsening reflux symptoms as well as  severe nausea, vomiting, dehydration, and headaches during this period. Given the toxicities she was experiencing she was taken off Dasatinib and this was changed to Gleevec treatment. Since starting Gleevac 400mg daily she has tolerated this much better without significant side effects. The only side effect that she has been able to see is intermittent pedal edema along with some hand swelling and muscle cramps.     Treatment History:  Started Dasatinib on 05/15/17 - experienced nausea, severe reflux, headaches while on medication and had taken 9 doses. This was discontinued due to intolerability and she was thereafter started on Imatinib.   Started Imatinib on 5/26/17      Interim History:  Since the start of Dasatinib and later Gleevec she has had a good response and normalization in her complete blood counts with normalization of BCR ABL PCR. She denies of any fevers, infections, lymphadenopathy, chest pain, dyspnea, nausea. No lower extremity edema. She has intentionally been trying to lose weight. Intermittent night sweats which she attributes to menopause. No fevers or recent infections. She does continue to have intermittent myalgias.      The following portions of the patient's history were reviewed and updated as appropriate: allergies, current medications, past family history, past medical history, past social history, past surgical history and problem list.      No Known Allergies    Past Medical History:   Diagnosis Date   • Anemia    • Cancer (CMS/HCC)     leukemia   • Diabetes mellitus (CMS/HCC)    • Elevated cholesterol    • GERD (gastroesophageal reflux disease)    • Hypertension    • PONV (postoperative nausea and vomiting)        Past Surgical History:   Procedure Laterality Date   • BLEPHAROPLASTY Bilateral 6/22/2018    Procedure: BLEPHAROPLASTY BOTH EYES UPPER EYELIDS (PT REQUESTED TERESITA WILKES;  Surgeon: Chris Haile MD;  Location: [facility];  Service: Ophthalmology   • COLONOSCOPY  N/A 3/27/2018    Procedure: COLONOSCOPY FOR SCREENING  CPTCODE:[id number];  Surgeon: Drew Esparza III, MD;  Location: [facility];  Service: Gastroenterology   • SINUS SURGERY     • SINUS SURGERY     • SLEEVE GASTROPLASTY         Social History     Socioeconomic History   • Marital status:      Spouse name: Not on file   • Number of children: Not on file   • Years of education: Not on file   • Highest education level: Not on file   Tobacco Use   • Smoking status: Former Smoker     Packs/day: 0.25     Years: 4.00     Pack years: 1.00   • Smokeless tobacco: Never Used   Vaping Use   • Vaping Use: Never used   Substance and Sexual Activity   • Alcohol use: No   • Drug use: No   • Sexual activity: Defer   She lives with her daughter. She denies of any alcohol or illicit drug use. Previous social tobacco use more then 20 years ago.        Family History   Problem Relation Age of Onset   • Anemia Mother    • Hypertension Mother    • Cancer Mother    • COPD Father    • Heart disease Father    • Breast cancer Neg Hx    Maternal Uncle - CLL  Mother - Malignancy of [place]      Current Outpatient Medications:   •  ALPRAZolam (XANAX) 1 MG tablet, Take 1 tablet by mouth 2 times per day, Disp: 60 tablet, Rfl: 0  •  celecoxib (CeleBREX) 200 MG capsule, Take 1 capsule by mouth 2 (Two) Times a Day. (Patient taking differently: Take 200 mg by mouth 2 (Two) Times a Day. Takes 1/2 tablet once daily), Disp: 180 capsule, Rfl: 3  •  celecoxib (CeleBREX) 200 MG capsule, Take 1 capsule by mouth 2 (Two) Times a Day., Disp: 180 capsule, Rfl: 3  •  cyanocobalamin 1000 MCG/ML injection, Inject 1 mL into the appropriate muscle as directed by prescriber weekly for 8 weeks then monthly thereafter., Disp: 30 mL, Rfl: 4  •  cyanocobalamin 1000 MCG/ML injection, Administer 1 ml (1,000 mcg) intramuscularly once weekly, Disp: 12 mL, Rfl: 2  •  cyanocobalamin 1000 MCG/ML injection, Inject 1 mL into the appropriate muscle as directed by  prescriber 1 (One) Time Per Week., Disp: 12 mL, Rfl: 2  •  desloratadine-pseudoephedrine (CLARINEX-D 12-hour) 2.5-120 MG per tablet, take 1 tablet by mouth 2 times every day, Disp: 30 tablet, Rfl: 0  •  DULoxetine (CYMBALTA) 60 MG capsule, Take 1 capsule by mouth Daily., Disp: 90 capsule, Rfl: 3  •  DULoxetine (CYMBALTA) 60 MG capsule, Take 1 capsule by mouth daily, Disp: 90 capsule, Rfl: 3  •  DULoxetine (CYMBALTA) 60 MG capsule, Take 1 capsule by mouth Daily., Disp: 90 capsule, Rfl: 3  •  fenofibrate (TRICOR) 145 MG tablet, Take 1 tablet by mouth Daily., Disp: 90 tablet, Rfl: 3  •  fenofibrate (TRICOR) 145 MG tablet, Take 1 tablet by mouth daily, Disp: 90 tablet, Rfl: 2  •  fenofibrate (TRICOR) 145 MG tablet, Take 1 tablet by mouth Daily., Disp: 90 tablet, Rfl: 2  •  ferrous sulfate 325 (65 FE) MG EC tablet, Take 1 tablet by mouth Daily., Disp: 90 tablet, Rfl: 3  •  ferrous sulfate 325 (65 FE) MG tablet, Take 1 tablet by mouth Daily., Disp: 90 tablet, Rfl: 2  •  glucose blood test strip, USE 1 STRIP 2 TIMES DAILY AS DIRECTED, Disp: 60 enema, Rfl: 6  •  glucose blood test strip, USE 1 STRIP 2 TIMES DAILY AS DIRECTED, Disp: 180 each, Rfl: 6  •  HYDROcodone-acetaminophen (NORCO)  MG per tablet, Take 1 tablet by mouth Every 4-6 Hours As Needed., Disp: 30 tablet, Rfl: 0  •  ibuprofen (ADVIL,MOTRIN) 800 MG tablet, take 1 tablet by mouth 3 times every day with food as needed, Disp: 270 tablet, Rfl: 3  •  ibuprofen (ADVIL,MOTRIN) 800 MG tablet, Take 1 tablet by mouth 3 times per day with food or milk as needed, Disp: 270 tablet, Rfl: 1  •  ibuprofen (ADVIL,MOTRIN) 800 MG tablet, Take 1 tablet  by mouth 3 times per day with food or milk as needed, Disp: 270 tablet, Rfl: 2  •  imatinib (GLEEVEC) 400 MG chemo tablet, Take 1 tablet by mouth Daily., Disp: 30 tablet, Rfl: 3  •  Insulin Glargine (LANTUS SOLOSTAR) 100 UNIT/ML injection pen, Inject 44 Units under the skin into the appropriate area as directed 2 (Two) Times a  Day., Disp: 30 mL, Rfl: 3  •  Insulin Glargine (LANTUS SOLOSTAR) 100 UNIT/ML injection pen, Inject 100 Units under the skin into the appropriate area as directed Daily., Disp: 30 mL, Rfl: 2  •  Insulin Glargine (Lantus SoloStar) 100 UNIT/ML injection pen, Inject 100 Units under the skin into the appropriate area as directed Daily., Disp: 30 mL, Rfl: 2  •  Insulin Glargine (Lantus SoloStar) 100 UNIT/ML injection pen, Inject 100 Units under the skin into the appropriate area as directed Daily., Disp: 18 mL, Rfl: 3  •  loratadine-pseudoephedrine (Allergy Relief D-12) 5-120 MG per 12 hr tablet, Take 1 tablet by mouth Every 12 (Twelve) Hours As Needed., Disp: 30 tablet, Rfl: 0  •  mupirocin (BACTROBAN) 2 % ointment, 1 application into each nostril as directed by provider 2 (Two) Times a Day., Disp: 22 g, Rfl: 2  •  pantoprazole (PROTONIX) 40 MG EC tablet, Take 1 tablet by mouth daily, Disp: 90 tablet, Rfl: 2  •  pantoprazole (PROTONIX) 40 MG EC tablet, Take 1 tablet by mouth Daily., Disp: 90 tablet, Rfl: 2  •  SUMAtriptan (IMITREX) 100 MG tablet, Take 1 tablet by mouth after onset of migraine; may repeat once, Disp: 27 tablet, Rfl: 2  •  SUMAtriptan (IMITREX) 100 MG tablet, Take 1 tablet by mouth 2 times per day at least 2 hours between doses as needed, Disp: 27 tablet, Rfl: 2  •  SUMAtriptan (IMITREX) 100 MG tablet, Take 1 tablet by mouth 2 times per day at least 2 hours between doses as needed, Disp: 27 tablet, Rfl: 2  •  triamcinolone (KENALOG) 0.1 % cream, Apply a Thin Layer to Affected Area Topically Twice Daily, Disp: 240 g, Rfl: 5  •  triamcinolone (KENALOG) 0.1 % cream, Apply 1 application topically to affected area daily as needed, Disp: 80 g, Rfl: 3  •  amoxicillin-clavulanate (AUGMENTIN) 875-125 MG per tablet, Take 1 tablet by mouth Every 12 (Twelve) Hours., Disp: 20 tablet, Rfl: 0  •  baclofen (LIORESAL) 20 MG tablet, Take 1 tablet by mouth 3 (Three) Times a Day., Disp: 90 tablet, Rfl: 4  •  cholecalciferol  (VITAMIN D3) 47719 units capsule, Take 1 capsule by mouth 2 (Two) Times a Week., Disp: 24 capsule, Rfl: 0  •  Cholecalciferol (VITAMIN D3) 68424 units capsule, Take 1 capsule by mouth 2 (Two) Times a Week., Disp: 24 capsule, Rfl: 3  •  cyanocobalamin 1000 MCG/ML injection, Inject 1 mL intramuscularly monthly., Disp: 1 mL, Rfl: 1  •  fenofibrate (TRICOR) 145 MG tablet, Take 1 tablet by mouth Daily., Disp: 90 tablet, Rfl: 3  •  fenofibrate 160 MG tablet, Take 1 tablet by mouth Daily., Disp: 90 tablet, Rfl: 3  •  ferrous sulfate 325 (65 FE) MG EC tablet, Take 1 tablet by mouth Daily., Disp: 30 tablet, Rfl: 3  •  fluconazole (DIFLUCAN) 200 MG tablet, Take 1 tablet by mouth Daily., Disp: 30 tablet, Rfl: 1  •  furosemide (LASIX) 40 MG tablet, Take 1 tablet by mouth Daily., Disp: 90 tablet, Rfl: 3  •  furosemide (LASIX) 40 MG tablet, Take 1 tablet by mouth Daily., Disp: 90 tablet, Rfl: 3  •  furosemide (LASIX) 40 MG tablet, Take 1 tablet by mouth Daily., Disp: 90 tablet, Rfl: 3  •  glucose blood (FREESTYLE TEST STRIPS) test strip, USE 2 TIMES DAILY AS DIRECTED, Disp: 200 each, Rfl: 2  •  HYDROcodone-acetaminophen (NORCO)  MG per tablet, Take 1 tablet by mouth every 4 - 6 hours as needed for severe pain., Disp: 20 tablet, Rfl: 0  •  ibuprofen (ADVIL,MOTRIN) 800 MG tablet, Take 1 tablet by mouth 3 (Three) Times a Day As Needed., Disp: 270 tablet, Rfl: 3  •  Insulin Glargine (LANTUS SOLOSTAR) 100 UNIT/ML injection pen, Inject 45 units subcutaneously 2 times daily, Disp: 30 mL, Rfl: 6  •  Insulin Glargine (LANTUS SOLOSTAR) 100 UNIT/ML injection pen, Inject 45 units Subcutaneously two times daily, Disp: 30 mL, Rfl: 6  •  Insulin Glargine (LANTUS SOLOSTAR) 100 UNIT/ML injection pen, Inject 45 Units subcutaneously 2 times daily., Disp: 30 mL, Rfl: 6  •  LANTUS SOLOSTAR 100 UNIT/ML injection pen, Inject 45 Units under the skin 2 (Two) Times a Day., Disp: 30 mL, Rfl: 6  •  LANTUS SOLOSTAR 100 UNIT/ML injection pen, Inject 45  "Units under the skin 2 (Two) Times a Day., Disp: 30 mL, Rfl: 6  •  LANTUS SOLOSTAR 100 UNIT/ML injection pen, Inject 45 Units under the skin into the appropriate area as directed 2 (Two) Times a Day., Disp: 36 mL, Rfl: 6  •  loratadine-pseudoephedrine (CLARITIN-D 12 HOUR) 5-120 MG per 12 hr tablet, Take 1 tablet by mouth two times a day, Disp: 30 tablet, Rfl: 0  •  loratadine-pseudoephedrine (CLARITIN-D 12-hour) 5-120 MG per 12 hr tablet, Take 1 tablet by mouth Every 12 (Twelve) Hours As Needed., Disp: 30 tablet, Rfl: 0  •  oseltamivir (TAMIFLU) 75 MG capsule, Take 1 capsule by mouth 2 (Two) Times a Day., Disp: 10 capsule, Rfl: 0  •  promethazine (PHENERGAN) 25 MG tablet, Take 1 tablet by mouth every 4-6 hours as needed., Disp: 20 tablet, Rfl: 0  •  SUMAtriptan (IMITREX) 100 MG tablet, Take 1 tablet by mouth after onset of migraine. May repeat after 2 hours if headache returns. Do not exceed 200mg in 24 hours., Disp: 10 tablet, Rfl: 2  •  Syringe/Needle, Disp, 25G X 1\" 3 ML misc, USE 1 AS DIRECTED WITH B12, Disp: 1 each, Rfl: 7  •  triamcinolone (KENALOG) 0.1 % cream, Apply topically to affected area(s) 2 Times a Day., Disp: 30 g, Rfl: 0  •  triamcinolone (KENALOG) 0.1 % cream, Apply to affected area(s) 2 times per day, Disp: 30 g, Rfl: 0  •  triamcinolone (KENALOG) 0.1 % cream, Apply a Thin Layer to Affected Area Topically Twice Daily, Disp: 80 g, Rfl: 5  •  TRUETRACK TEST test strip, use to test blood sugar 8 times daily, Disp: 200 each, Rfl: 5  •  vitamin D (ERGOCALCIFEROL) 37876 units capsule capsule, Take 1 capsule by mouth once weekly., Disp: 12 capsule, Rfl: 3  •  vitamin D (ERGOCALCIFEROL) 68196 units capsule capsule, Take 1 capsule by mouth 2 (Two) Times a Week., Disp: 24 capsule, Rfl: 0  •  zolpidem (AMBIEN) 10 MG tablet, Take 1 tablet by mouth Every Night., Disp: 90 tablet, Rfl: 0  •  zolpidem (AMBIEN) 10 MG tablet, Take 1 tablet by mouth Every Night at bedtime for insomnia, Disp: 30 tablet, Rfl: " 1        Review of Systems  A comprehensive 14-point review of systems performed.  Significant findings as mentioned above.  All other systems reviewed and are negative. No significant complaints today. She has been intentionally trying to lose weight.       Physical Exam:  Vital Signs: These were reviewed and listed as per patient’s electronic medical chart  Vitals:    03/26/21 0910   BP: 142/80   Pulse: 92   Resp: 18   Temp: 97.1 °F (36.2 °C)   SpO2: 97%     General: Awake, alert and oriented, in no distress, obese  HEENT: Head is atraumatic, normocephalic, extraocular movements full, no scleral icterus, mask in place  Neck: supple, no jvd, lymphadenopathy or masses  Cardiovascular: regular rhythm, tachycardic, without murmurs, rubs or gallops  Pulmonary: non-labored, clear to auscultation bilaterally, no wheezing  Abdomen: soft, non-tender, non-distended, normal active bowel sounds present  Extremities: No clubbing, cyanosis or edema  Lymph: No cervical or supraclavicular adenopathy, positive posterior neck lymph node (patient would prefer to monitor at present)  Neurologic: Mental status as above, alert, awake and oriented, grossly non-focal exam  Skin: warm, dry, intact  Patient was examined on 03/26/21 and changes are reflected and noted above.        Labs / Studies:    Lab on 03/22/2021   Component Date Value   • Glucose 03/22/2021 198*   • BUN 03/22/2021 13    • Creatinine 03/22/2021 0.79    • Sodium 03/22/2021 138    • Potassium 03/22/2021 4.4    • Chloride 03/22/2021 103    • CO2 03/22/2021 24.2    • Calcium 03/22/2021 9.2    • Total Protein 03/22/2021 6.8    • Albumin 03/22/2021 4.02    • ALT (SGPT) 03/22/2021 20    • AST (SGOT) 03/22/2021 19    • Alkaline Phosphatase 03/22/2021 70    • Total Bilirubin 03/22/2021 0.4    • eGFR Non African Amer 03/22/2021 76    • Globulin 03/22/2021 2.8    • A/G Ratio 03/22/2021 1.4    • BUN/Creatinine Ratio 03/22/2021 16.5    • Anion Gap 03/22/2021 10.8    • LDH  03/22/2021 195    • Uric Acid 03/22/2021 5.2    • Total Cholesterol 03/22/2021 155    • Triglycerides 03/22/2021 136    • HDL Cholesterol 03/22/2021 54    • LDL Cholesterol  03/22/2021 77    • VLDL Cholesterol 03/22/2021 24    • LDL/HDL Ratio 03/22/2021 1.37    • Hemoglobin A1C 03/22/2021 8.20*   • T3, Free 03/22/2021 2.53    • Free T4 03/22/2021 1.28    • TSH 03/22/2021 1.510    • Vitamin B-12 03/22/2021 367    • 25 Hydroxy, Vitamin D 03/22/2021 23.1    • Microalbumin, Urine 03/22/2021 20.5    • WBC 03/22/2021 6.56    • RBC 03/22/2021 4.38    • Hemoglobin 03/22/2021 13.1    • Hematocrit 03/22/2021 40.7    • MCV 03/22/2021 92.9    • MCH 03/22/2021 29.9    • MCHC 03/22/2021 32.2    • RDW 03/22/2021 13.1    • RDW-SD 03/22/2021 44.7    • MPV 03/22/2021 10.5    • Platelets 03/22/2021 274    • Neutrophil % 03/22/2021 60.6    • Lymphocyte % 03/22/2021 25.3    • Monocyte % 03/22/2021 10.1    • Eosinophil % 03/22/2021 2.6    • Basophil % 03/22/2021 1.1    • Immature Grans % 03/22/2021 0.3    • Neutrophils, Absolute 03/22/2021 3.98    • Lymphocytes, Absolute 03/22/2021 1.66    • Monocytes, Absolute 03/22/2021 0.66    • Eosinophils, Absolute 03/22/2021 0.17    • Basophils, Absolute 03/22/2021 0.07    • Immature Grans, Absolute 03/22/2021 0.02    • nRBC 03/22/2021 0.0    Lab on 01/13/2021   Component Date Value   • Glucose 01/13/2021 115*   • BUN 01/13/2021 12    • Creatinine 01/13/2021 0.54*   • Sodium 01/13/2021 141    • Potassium 01/13/2021 4.5    • Chloride 01/13/2021 106    • CO2 01/13/2021 23.0    • Calcium 01/13/2021 9.2    • Total Protein 01/13/2021 6.1    • Albumin 01/13/2021 3.90    • ALT (SGPT) 01/13/2021 14    • AST (SGOT) 01/13/2021 21    • Alkaline Phosphatase 01/13/2021 61    • Total Bilirubin 01/13/2021 0.4    • eGFR Non African Amer 01/13/2021 117    • Globulin 01/13/2021 2.2    • A/G Ratio 01/13/2021 1.8    • BUN/Creatinine Ratio 01/13/2021 22.2    • Anion Gap 01/13/2021 12.0    • TSH 01/13/2021 1.960    •  Vitamin B-12 01/13/2021 446    • Microalbumin, Urine 01/13/2021 2.7    • Iron 01/13/2021 66    • Iron Saturation 01/13/2021 16*   • Transferrin 01/13/2021 275    • TIBC 01/13/2021 410    • WBC 01/13/2021 6.10    • RBC 01/13/2021 4.12    • Hemoglobin 01/13/2021 12.6    • Hematocrit 01/13/2021 37.6    • MCV 01/13/2021 91.3    • MCH 01/13/2021 30.6    • MCHC 01/13/2021 33.5    • RDW 01/13/2021 12.9    • RDW-SD 01/13/2021 42.6    • MPV 01/13/2021 10.9    • Platelets 01/13/2021 272    • Neutrophil % 01/13/2021 56.1    • Lymphocyte % 01/13/2021 28.2    • Monocyte % 01/13/2021 10.8    • Eosinophil % 01/13/2021 3.6    • Basophil % 01/13/2021 0.8    • Immature Grans % 01/13/2021 0.5    • Neutrophils, Absolute 01/13/2021 3.42    • Lymphocytes, Absolute 01/13/2021 1.72    • Monocytes, Absolute 01/13/2021 0.66    • Eosinophils, Absolute 01/13/2021 0.22    • Basophils, Absolute 01/13/2021 0.05    • Immature Grans, Absolute 01/13/2021 0.03    • nRBC 01/13/2021 0.0    Transcribe Orders on 01/13/2021   Component Date Value   • Total Cholesterol 01/13/2021 141    • Triglycerides 01/13/2021 105    • HDL Cholesterol 01/13/2021 56    • LDL Cholesterol  01/13/2021 66    • VLDL Cholesterol 01/13/2021 19    • LDL/HDL Ratio 01/13/2021 1.14    • Hemoglobin A1C 01/13/2021 7.59*   • 25 Hydroxy, Vitamin D 01/13/2021 27.2*   Lab on 12/03/2020   Component Date Value   • Glucose 12/03/2020 119*   • BUN 12/03/2020 12    • Creatinine 12/03/2020 0.69    • Sodium 12/03/2020 137    • Potassium 12/03/2020 4.3    • Chloride 12/03/2020 100    • CO2 12/03/2020 26.7    • Calcium 12/03/2020 9.3    • Total Protein 12/03/2020 6.7    • Albumin 12/03/2020 3.82    • ALT (SGPT) 12/03/2020 22    • AST (SGOT) 12/03/2020 20    • Alkaline Phosphatase 12/03/2020 64    • Total Bilirubin 12/03/2020 0.4    • eGFR Non African Amer 12/03/2020 88    • Globulin 12/03/2020 2.9    • A/G Ratio 12/03/2020 1.3    • BUN/Creatinine Ratio 12/03/2020 17.4    • Anion Gap 12/03/2020  10.3    • LDH 2020 222*   • Uric Acid 2020 4.4    • e13a2 (b2a2) Transcript 2020 Comment    • e14a2 (b3a2) Transcript 2020 Comment    • E1A2 transcript 2020 Comment    • Interpretation 2020 Negative    • Director Review 2020 Comment    • Background: 2020 Comment    • Methodology: 2020 Comment    • Iron 2020 76    • Iron Saturation 2020 18*   • Transferrin 2020 291    • TIBC 2020 434    • Ferritin 2020 136.30    • Vitamin B-12 2020 343    • WBC 2020 5.66    • RBC 2020 4.13    • Hemoglobin 2020 12.5    • Hematocrit 2020 38.2    • MCV 2020 92.5    • MCH 2020 30.3    • MCHC 2020 32.7    • RDW 2020 13.4    • RDW-SD 2020 46.1    • MPV 2020 10.3    • Platelets 2020 258    • Neutrophil % 2020 58.3    • Lymphocyte % 2020 28.4    • Monocyte % 2020 9.9    • Eosinophil % 2020 2.5    • Basophil % 2020 0.7    • Immature Grans % 2020 0.2    • Neutrophils, Absolute 2020 3.30    • Lymphocytes, Absolute 2020 1.61    • Monocytes, Absolute 2020 0.56    • Eosinophils, Absolute 2020 0.14    • Basophils, Absolute 2020 0.04    • Immature Grans, Absolute 2020 0.01    • nRBC 2020 0.0             [id number]: US ABDOMEN COMPLETE: Visualized pancreas is unremarkable. The imaged portion of the abdominal aorta is nondilated. The liver is homogeneous. There is no intrahepatic ductal dilatation or focal hepatic mass. The imaged portion of the hepatic vessels and inferior vena cava are patent. The gallbladder has been removed. The common bile duct is normal, measuring 5.7 mm. The kidneys demonstrate no evidence of hydronephrosis or solid renal mass. The spleen is homogeneous and measures 13 cm. IMPRESSION: Spleen measures 13 cm and is homogeneous.           PATHOLOGY:    05/15/17: Bone Marrow Biopsy & Aspirate                      04/25/17: BCR ABL PCR        08/30/17: BCR ABL PCR        12/13/17: BCR ABL PCR        03/20/18: BCR ABL PCR:                                                                                                         06/20/18:                                08/2020:          12/3/20           03/22/21:            Assessment/Plan :  Aric Haro is a very pleasant 55 y.o.  female who presents in follow up appointment for further management and treatment of chronic phase chronic myelogenous leukemia.    1. Chronic Myelogenous Leukemia - CML (chronic phase)    2. Leukocytosis  3. Thrombocytosis  4. Iron deficiency  5. Healthcare Maintenance    Peripheral smear concerning for an underlying myeloproliferative disorder. Her primary provider obtained a flow cytometry which did not reveal any significant abnormalities. I obtained a quantitative BCR ABL PCR positive for the b2a2/b3a2 (p210) and e1a2 (p190) fusion gene transcripts consistent with a diagnosis for CML. Bone marrow biopsy performed on initial diagnosis 5/17/17 and prior to starting Dasatinib treatment with results above and consistent with chronic phase CML.     To further evaluate for a myeloproliferative disorder I did also assess for JAK2 (V617F and exon 12)/MPL/CALR mutations which were negative. ESR, CRP, EMMANUEL, Rheumatoid factor, as well as an acute hepatitis panel and HIV test which were all negative. No iron deficiency seen. Abdominal ultrasound significant for a spleen size 13.9cm.     For treatment of her CML she was started on Dasatinib 100mg oral daily. She does have a history significant for pancreatitis, therefore, I held on using Nilotinib. Patient however was unable to tolerate Dasatinib secondary to worsening reflux while being off of her PPI, and experiencing severe nausea, vomiting, and dehydration. She was then started on Imatinib 400mg daily and is overall tolerating this well. I have previously advised her of  Ibuprofen and Tylenol use for her myalgias. She does also experience intermittent lower extremity edema (which she denies today). I did order baseline Echo which showed an intact EF. Therefore I did repeat echocardiogram to further evaluate and repeat echo showed an intact EF 61-65% which is stable.    She has had a complete hematological response, and BCR ABL PCR has normalized since start of Gleevac. This will need to continue to be monitored every three months to assess ongoing molecular response, today's level is <0.0032%. The blood counts have now stabilized therefore will continue to monitor every 3 months given stability.    She underwent a colonoscopy February 2018 with Dr. Esparza who recommended repeat colonoscopy in 3-5 years due to polyps that were removed. She has also received her Influenza and Hepatitis A vaccine. Iron has been discontinued and iron studies are more consistent with anemia of chronic inflammation.     6. ACO Quality measures  -  Aric Haro does not use tobacco products.  -  Patient's BMI has been discussed with her. BMI is above normal parameters. She has been changing her dietary habits and eating healthier.  -  Aric Haro received 2020 flu vaccine.  -  Aric Haro reports a pain score of 0.  Given her pain assessment as noted, treatment options were discussed and the following options were decided upon as a follow-up plan to address the patient's pain: continuation of current treatment plan for pain.  -  Current outpatient and discharge medications have been reconciled for the patient.  Reviewed by: Evita Serrano MD            I will have the patient return for follow up visit in three months with labs one to two weeks prior. Lab orders have been placed. She understands that should she have any questions or concerns prior to her appointment she should give us a call at any time and I would be happy to see her sooner. It was a pleasure to see this patient in clinic today,  thank you for allowing me to participate in the care of this patient.      Evita Serrano MD  03/26/21  10:09 EDT

## 2021-03-26 ENCOUNTER — OFFICE VISIT (OUTPATIENT)
Dept: ONCOLOGY | Facility: CLINIC | Age: 56
End: 2021-03-26

## 2021-03-26 VITALS
HEART RATE: 92 BPM | OXYGEN SATURATION: 97 % | SYSTOLIC BLOOD PRESSURE: 142 MMHG | DIASTOLIC BLOOD PRESSURE: 80 MMHG | RESPIRATION RATE: 18 BRPM | BODY MASS INDEX: 42.97 KG/M2 | TEMPERATURE: 97.1 F | WEIGHT: 266.2 LBS

## 2021-03-26 DIAGNOSIS — D50.9 IRON DEFICIENCY ANEMIA, UNSPECIFIED IRON DEFICIENCY ANEMIA TYPE: ICD-10-CM

## 2021-03-26 DIAGNOSIS — C92.10 CML (CHRONIC MYELOCYTIC LEUKEMIA) (HCC): Primary | ICD-10-CM

## 2021-03-26 PROCEDURE — 99214 OFFICE O/P EST MOD 30 MIN: CPT | Performed by: INTERNAL MEDICINE

## 2021-03-31 ENCOUNTER — SPECIALTY PHARMACY (OUTPATIENT)
Dept: PHARMACY | Facility: HOSPITAL | Age: 56
End: 2021-03-31

## 2021-04-08 NOTE — PROGRESS NOTES
Specialty Pharmacy Note      Name:  Aric Haro  :  1965  Date:  2021         Past Medical History:   Diagnosis Date   • Anemia    • Cancer (CMS/HCC)     leukemia   • Diabetes mellitus (CMS/HCC)    • Elevated cholesterol    • GERD (gastroesophageal reflux disease)    • Hypertension    • PONV (postoperative nausea and vomiting)        Past Surgical History:   Procedure Laterality Date   • BLEPHAROPLASTY Bilateral 2018    Procedure: BLEPHAROPLASTY BOTH EYES UPPER EYELIDS (PT REQUESTED TERESITA WILKES;  Surgeon: Chris Haile MD;  Location: [facility];  Service: Ophthalmology   • COLONOSCOPY N/A 3/27/2018    Procedure: COLONOSCOPY FOR SCREENING  CPTCODE:83713;  Surgeon: Drew Esparza III, MD;  Location: [facility];  Service: Gastroenterology   • SINUS SURGERY     • SINUS SURGERY     • SLEEVE GASTROPLASTY         Social History     Socioeconomic History   • Marital status:      Spouse name: Not on file   • Number of children: Not on file   • Years of education: Not on file   • Highest education level: Not on file   Tobacco Use   • Smoking status: Former Smoker     Packs/day: 0.25     Years: 4.00     Pack years: 1.00   • Smokeless tobacco: Never Used   Vaping Use   • Vaping Use: Never used   Substance and Sexual Activity   • Alcohol use: No   • Drug use: No   • Sexual activity: Defer       Family History   Problem Relation Age of Onset   • Anemia Mother    • Hypertension Mother    • Cancer Mother    • COPD Father    • Heart disease Father    • Breast cancer Neg Hx        No Known Allergies    Current Outpatient Medications   Medication Sig Dispense Refill   • ALPRAZolam (XANAX) 1 MG tablet Take 1 tablet by mouth 2 times per day 60 tablet 0   • amoxicillin-clavulanate (AUGMENTIN) 875-125 MG per tablet Take 1 tablet by mouth Every 12 (Twelve) Hours. 20 tablet 0   • baclofen (LIORESAL) 20 MG tablet Take 1 tablet by mouth 3 (Three) Times a Day. 90 tablet 4   • celecoxib  (CeleBREX) 200 MG capsule Take 1 capsule by mouth 2 (Two) Times a Day. (Patient taking differently: Take 200 mg by mouth 2 (Two) Times a Day. Takes 1/2 tablet once daily) 180 capsule 3   • celecoxib (CeleBREX) 200 MG capsule Take 1 capsule by mouth 2 (Two) Times a Day. 180 capsule 3   • cholecalciferol (VITAMIN D3) 16436 units capsule Take 1 capsule by mouth 2 (Two) Times a Week. 24 capsule 0   • Cholecalciferol (VITAMIN D3) 97994 units capsule Take 1 capsule by mouth 2 (Two) Times a Week. 24 capsule 3   • cyanocobalamin 1000 MCG/ML injection Inject 1 mL intramuscularly monthly. 1 mL 1   • cyanocobalamin 1000 MCG/ML injection Inject 1 mL into the appropriate muscle as directed by prescriber weekly for 8 weeks then monthly thereafter. 30 mL 4   • cyanocobalamin 1000 MCG/ML injection Administer 1 ml (1,000 mcg) intramuscularly once weekly 12 mL 2   • cyanocobalamin 1000 MCG/ML injection Inject 1 mL into the appropriate muscle as directed by prescriber 1 (One) Time Per Week. 12 mL 2   • desloratadine-pseudoephedrine (CLARINEX-D 12-hour) 2.5-120 MG per tablet take 1 tablet by mouth 2 times every day 30 tablet 0   • DULoxetine (CYMBALTA) 60 MG capsule Take 1 capsule by mouth Daily. 90 capsule 3   • DULoxetine (CYMBALTA) 60 MG capsule Take 1 capsule by mouth daily 90 capsule 3   • DULoxetine (CYMBALTA) 60 MG capsule Take 1 capsule by mouth Daily. 90 capsule 3   • fenofibrate (TRICOR) 145 MG tablet Take 1 tablet by mouth Daily. 90 tablet 3   • fenofibrate (TRICOR) 145 MG tablet Take 1 tablet by mouth Daily. 90 tablet 3   • fenofibrate (TRICOR) 145 MG tablet Take 1 tablet by mouth daily 90 tablet 2   • fenofibrate (TRICOR) 145 MG tablet Take 1 tablet by mouth Daily. 90 tablet 2   • fenofibrate 160 MG tablet Take 1 tablet by mouth Daily. 90 tablet 3   • ferrous sulfate 325 (65 FE) MG EC tablet Take 1 tablet by mouth Daily. 30 tablet 3   • ferrous sulfate 325 (65 FE) MG EC tablet Take 1 tablet by mouth Daily. 90 tablet 3   •  ferrous sulfate 325 (65 FE) MG tablet Take 1 tablet by mouth Daily. 90 tablet 2   • fluconazole (DIFLUCAN) 200 MG tablet Take 1 tablet by mouth Daily. 30 tablet 1   • furosemide (LASIX) 40 MG tablet Take 1 tablet by mouth Daily. 90 tablet 3   • furosemide (LASIX) 40 MG tablet Take 1 tablet by mouth Daily. 90 tablet 3   • furosemide (LASIX) 40 MG tablet Take 1 tablet by mouth Daily. 90 tablet 3   • glucose blood (FREESTYLE TEST STRIPS) test strip USE 2 TIMES DAILY AS DIRECTED 200 each 2   • glucose blood test strip USE 1 STRIP 2 TIMES DAILY AS DIRECTED 60 enema 6   • glucose blood test strip USE 1 STRIP 2 TIMES DAILY AS DIRECTED 180 each 6   • HYDROcodone-acetaminophen (NORCO)  MG per tablet Take 1 tablet by mouth every 4 - 6 hours as needed for severe pain. 20 tablet 0   • HYDROcodone-acetaminophen (NORCO)  MG per tablet Take 1 tablet by mouth Every 4-6 Hours As Needed. 30 tablet 0   • ibuprofen (ADVIL,MOTRIN) 800 MG tablet Take 1 tablet by mouth 3 (Three) Times a Day As Needed. 270 tablet 3   • ibuprofen (ADVIL,MOTRIN) 800 MG tablet take 1 tablet by mouth 3 times every day with food as needed 270 tablet 3   • ibuprofen (ADVIL,MOTRIN) 800 MG tablet Take 1 tablet by mouth 3 times per day with food or milk as needed 270 tablet 1   • ibuprofen (ADVIL,MOTRIN) 800 MG tablet Take 1 tablet  by mouth 3 times per day with food or milk as needed 270 tablet 2   • imatinib (GLEEVEC) 400 MG chemo tablet Take 1 tablet by mouth Daily. 30 tablet 3   • Insulin Glargine (LANTUS SOLOSTAR) 100 UNIT/ML injection pen Inject 45 units subcutaneously 2 times daily 30 mL 6   • Insulin Glargine (LANTUS SOLOSTAR) 100 UNIT/ML injection pen Inject 45 units Subcutaneously two times daily 30 mL 6   • Insulin Glargine (LANTUS SOLOSTAR) 100 UNIT/ML injection pen Inject 45 Units subcutaneously 2 times daily. 30 mL 6   • Insulin Glargine (LANTUS SOLOSTAR) 100 UNIT/ML injection pen Inject 44 Units under the skin into the appropriate area as  directed 2 (Two) Times a Day. 30 mL 3   • Insulin Glargine (LANTUS SOLOSTAR) 100 UNIT/ML injection pen Inject 100 Units under the skin into the appropriate area as directed Daily. 30 mL 2   • Insulin Glargine (Lantus SoloStar) 100 UNIT/ML injection pen Inject 100 Units under the skin into the appropriate area as directed Daily. 30 mL 2   • Insulin Glargine (Lantus SoloStar) 100 UNIT/ML injection pen Inject 100 Units under the skin into the appropriate area as directed Daily. 18 mL 3   • LANTUS SOLOSTAR 100 UNIT/ML injection pen Inject 45 Units under the skin 2 (Two) Times a Day. 30 mL 6   • LANTUS SOLOSTAR 100 UNIT/ML injection pen Inject 45 Units under the skin 2 (Two) Times a Day. 30 mL 6   • LANTUS SOLOSTAR 100 UNIT/ML injection pen Inject 45 Units under the skin into the appropriate area as directed 2 (Two) Times a Day. 36 mL 6   • loratadine-pseudoephedrine (Allergy Relief D-12) 5-120 MG per 12 hr tablet Take 1 tablet by mouth Every 12 (Twelve) Hours As Needed. 30 tablet 0   • loratadine-pseudoephedrine (CLARITIN-D 12 HOUR) 5-120 MG per 12 hr tablet Take 1 tablet by mouth two times a day 30 tablet 0   • loratadine-pseudoephedrine (CLARITIN-D 12-hour) 5-120 MG per 12 hr tablet Take 1 tablet by mouth Every 12 (Twelve) Hours As Needed. 30 tablet 0   • mupirocin (BACTROBAN) 2 % ointment 1 application into each nostril as directed by provider 2 (Two) Times a Day. 22 g 2   • oseltamivir (TAMIFLU) 75 MG capsule Take 1 capsule by mouth 2 (Two) Times a Day. 10 capsule 0   • pantoprazole (PROTONIX) 40 MG EC tablet Take 1 tablet by mouth daily 90 tablet 2   • pantoprazole (PROTONIX) 40 MG EC tablet Take 1 tablet by mouth Daily. 90 tablet 2   • promethazine (PHENERGAN) 25 MG tablet Take 1 tablet by mouth every 4-6 hours as needed. 20 tablet 0   • SUMAtriptan (IMITREX) 100 MG tablet Take 1 tablet by mouth after onset of migraine. May repeat after 2 hours if headache returns. Do not exceed 200mg in 24 hours. 10 tablet 2   •  "SUMAtriptan (IMITREX) 100 MG tablet Take 1 tablet by mouth after onset of migraine; may repeat once 27 tablet 2   • SUMAtriptan (IMITREX) 100 MG tablet Take 1 tablet by mouth 2 times per day at least 2 hours between doses as needed 27 tablet 2   • SUMAtriptan (IMITREX) 100 MG tablet Take 1 tablet by mouth 2 times per day at least 2 hours between doses as needed 27 tablet 2   • Syringe/Needle, Disp, 25G X 1\" 3 ML misc USE 1 AS DIRECTED WITH B12 1 each 7   • triamcinolone (KENALOG) 0.1 % cream Apply topically to affected area(s) 2 Times a Day. 30 g 0   • triamcinolone (KENALOG) 0.1 % cream Apply to affected area(s) 2 times per day 30 g 0   • triamcinolone (KENALOG) 0.1 % cream Apply a Thin Layer to Affected Area Topically Twice Daily 80 g 5   • triamcinolone (KENALOG) 0.1 % cream Apply a Thin Layer to Affected Area Topically Twice Daily 240 g 5   • triamcinolone (KENALOG) 0.1 % cream Apply 1 application topically to affected area daily as needed 80 g 3   • TRUETRACK TEST test strip use to test blood sugar 8 times daily 200 each 5   • vitamin D (ERGOCALCIFEROL) 24121 units capsule capsule Take 1 capsule by mouth once weekly. 12 capsule 3   • vitamin D (ERGOCALCIFEROL) 35078 units capsule capsule Take 1 capsule by mouth 2 (Two) Times a Week. 24 capsule 0   • zolpidem (AMBIEN) 10 MG tablet Take 1 tablet by mouth Every Night. 90 tablet 0   • zolpidem (AMBIEN) 10 MG tablet Take 1 tablet by mouth Every Night at bedtime for insomnia 30 tablet 1     No current facility-administered medications for this visit.         LABORATORY:    Lab Results   Component Value Date    WBC 6.56 03/22/2021    HGB 13.1 03/22/2021    HCT 40.7 03/22/2021    MCV 92.9 03/22/2021    RDW 13.1 03/22/2021     03/22/2021    NEUTRORELPCT 60.6 03/22/2021    LYMPHORELPCT 25.3 03/22/2021    MONORELPCT 10.1 03/22/2021    EOSRELPCT 2.6 03/22/2021    BASORELPCT 1.1 03/22/2021    NEUTROABS 3.98 03/22/2021    LYMPHSABS 1.66 03/22/2021       Lab Results "   Component Value Date     03/22/2021    K 4.4 03/22/2021    CO2 24.2 03/22/2021     03/22/2021    BUN 13 03/22/2021    CREATININE 0.79 03/22/2021    GLUCOSE 198 (H) 03/22/2021    CALCIUM 9.2 03/22/2021    ALKPHOS 70 03/22/2021    AST 19 03/22/2021    ALT 20 03/22/2021    BILITOT 0.4 03/22/2021    ALBUMIN 4.02 03/22/2021    PROTEINTOT 6.8 03/22/2021    PHOS 3.4 10/03/2018       Lab Results   Component Value Date     03/22/2021    URICACID 5.2 03/22/2021        Imaging Results (Last 24 Hours)     ** No results found for the last 24 hours. **          ASSESSMENT/PLAN:    Patient Update Assessment (new medications, allergies, medical history): No changes.      Medication(s): Gleevec 400 mg chemo tablet.      Currently Taking Medication(s): Patient reports taking medication as directed, 1 tablet daily by mouth.     Experiencing Side Effects: None expressed.      Prior Authorization Status: Approved.       Financial Assistance Status: Not needed at this time.  Patient co-pay is $5.     Any Issues Identified: No issues expressed form patient, no issues processing refill.     Appropriate to Process Prescription(s):  Yes. Medication will be dispensed to patient from [facility].     Counseling Offered: Patient previously counseled upon initiation of therapy, aware to contact pharmacy with any new questions or concerns.     Next Specialty Pharmacy Visit:  Scheduled for ~4 weeks

## 2021-04-23 ENCOUNTER — SPECIALTY PHARMACY (OUTPATIENT)
Dept: PHARMACY | Facility: HOSPITAL | Age: 56
End: 2021-04-23

## 2021-04-23 DIAGNOSIS — C92.10 CML (CHRONIC MYELOCYTIC LEUKEMIA) (HCC): ICD-10-CM

## 2021-04-23 RX ORDER — IMATINIB MESYLATE 400 MG/1
400 TABLET, FILM COATED ORAL DAILY
Qty: 30 TABLET | Refills: 3 | Status: SHIPPED | OUTPATIENT
Start: 2021-04-23 | End: 2021-08-13 | Stop reason: SDUPTHER

## 2021-04-23 RX ORDER — IMATINIB MESYLATE 400 MG/1
400 TABLET, FILM COATED ORAL DAILY
Qty: 30 TABLET | Refills: 3 | Status: CANCELLED | OUTPATIENT
Start: 2021-04-23

## 2021-04-27 ENCOUNTER — SPECIALTY PHARMACY (OUTPATIENT)
Dept: PHARMACY | Facility: HOSPITAL | Age: 56
End: 2021-04-27

## 2021-05-03 NOTE — PROGRESS NOTES
Specialty Pharmacy Note      Name:  Aric Haro  :  1965  Date:  5/3/2021         Past Medical History:   Diagnosis Date   • Anemia    • Cancer (CMS/HCC)     leukemia   • Diabetes mellitus (CMS/HCC)    • Elevated cholesterol    • GERD (gastroesophageal reflux disease)    • Hypertension    • PONV (postoperative nausea and vomiting)        Past Surgical History:   Procedure Laterality Date   • BLEPHAROPLASTY Bilateral 2018    Procedure: BLEPHAROPLASTY BOTH EYES UPPER EYELIDS (PT REQUESTED TERESITA WILKES;  Surgeon: Chris Haile MD;  Location: [facility];  Service: Ophthalmology   • COLONOSCOPY N/A 3/27/2018    Procedure: COLONOSCOPY FOR SCREENING  CPTCODE:04787;  Surgeon: Drew Esparza III, MD;  Location: [facility];  Service: Gastroenterology   • SINUS SURGERY     • SINUS SURGERY     • SLEEVE GASTROPLASTY         Social History     Socioeconomic History   • Marital status:      Spouse name: Not on file   • Number of children: Not on file   • Years of education: Not on file   • Highest education level: Not on file   Tobacco Use   • Smoking status: Former Smoker     Packs/day: 0.25     Years: 4.00     Pack years: 1.00   • Smokeless tobacco: Never Used   Vaping Use   • Vaping Use: Never used   Substance and Sexual Activity   • Alcohol use: No   • Drug use: No   • Sexual activity: Defer       Family History   Problem Relation Age of Onset   • Anemia Mother    • Hypertension Mother    • Cancer Mother    • COPD Father    • Heart disease Father    • Breast cancer Neg Hx        No Known Allergies    Current Outpatient Medications   Medication Sig Dispense Refill   • ALPRAZolam (XANAX) 1 MG tablet Take 1 tablet by mouth 2 times per day 60 tablet 0   • amoxicillin-clavulanate (AUGMENTIN) 875-125 MG per tablet Take 1 tablet by mouth Every 12 (Twelve) Hours. 20 tablet 0   • baclofen (LIORESAL) 20 MG tablet Take 1 tablet by mouth 3 (Three) Times a Day. 90 tablet 4   • celecoxib  (CeleBREX) 200 MG capsule Take 1 capsule by mouth 2 (Two) Times a Day. (Patient taking differently: Take 200 mg by mouth 2 (Two) Times a Day. Takes 1/2 tablet once daily) 180 capsule 3   • celecoxib (CeleBREX) 200 MG capsule Take 1 capsule by mouth 2 (Two) Times a Day. 180 capsule 3   • cholecalciferol (VITAMIN D3) 77365 units capsule Take 1 capsule by mouth 2 (Two) Times a Week. 24 capsule 0   • Cholecalciferol (VITAMIN D3) 42470 units capsule Take 1 capsule by mouth 2 (Two) Times a Week. 24 capsule 3   • cyanocobalamin 1000 MCG/ML injection Inject 1 mL intramuscularly monthly. 1 mL 1   • cyanocobalamin 1000 MCG/ML injection Inject 1 mL into the appropriate muscle as directed by prescriber weekly for 8 weeks then monthly thereafter. 30 mL 4   • cyanocobalamin 1000 MCG/ML injection Administer 1 ml (1,000 mcg) intramuscularly once weekly 12 mL 2   • cyanocobalamin 1000 MCG/ML injection Inject 1 mL into the appropriate muscle as directed by prescriber 1 (One) Time Per Week. 12 mL 2   • desloratadine-pseudoephedrine (CLARINEX-D 12-hour) 2.5-120 MG per tablet take 1 tablet by mouth 2 times every day 30 tablet 0   • DULoxetine (CYMBALTA) 60 MG capsule Take 1 capsule by mouth Daily. 90 capsule 3   • DULoxetine (CYMBALTA) 60 MG capsule Take 1 capsule by mouth daily 90 capsule 3   • DULoxetine (CYMBALTA) 60 MG capsule Take 1 capsule by mouth Daily. 90 capsule 3   • fenofibrate (TRICOR) 145 MG tablet Take 1 tablet by mouth Daily. 90 tablet 3   • fenofibrate (TRICOR) 145 MG tablet Take 1 tablet by mouth Daily. 90 tablet 3   • fenofibrate (TRICOR) 145 MG tablet Take 1 tablet by mouth daily 90 tablet 2   • fenofibrate (TRICOR) 145 MG tablet Take 1 tablet by mouth Daily. 90 tablet 2   • fenofibrate 160 MG tablet Take 1 tablet by mouth Daily. 90 tablet 3   • ferrous sulfate 325 (65 FE) MG EC tablet Take 1 tablet by mouth Daily. 30 tablet 3   • ferrous sulfate 325 (65 FE) MG EC tablet Take 1 tablet by mouth Daily. 90 tablet 3   •  ferrous sulfate 325 (65 FE) MG tablet Take 1 tablet by mouth Daily. 90 tablet 2   • fluconazole (DIFLUCAN) 200 MG tablet Take 1 tablet by mouth Daily. 30 tablet 1   • furosemide (LASIX) 40 MG tablet Take 1 tablet by mouth Daily. 90 tablet 3   • furosemide (LASIX) 40 MG tablet Take 1 tablet by mouth Daily. 90 tablet 3   • furosemide (LASIX) 40 MG tablet Take 1 tablet by mouth Daily. 90 tablet 3   • glucose blood (FREESTYLE TEST STRIPS) test strip USE 2 TIMES DAILY AS DIRECTED 200 each 2   • glucose blood test strip USE 1 STRIP 2 TIMES DAILY AS DIRECTED 60 enema 6   • glucose blood test strip USE 1 STRIP 2 TIMES DAILY AS DIRECTED 180 each 6   • HYDROcodone-acetaminophen (NORCO)  MG per tablet Take 1 tablet by mouth every 4 - 6 hours as needed for severe pain. 20 tablet 0   • HYDROcodone-acetaminophen (NORCO)  MG per tablet Take 1 tablet by mouth Every 4-6 Hours As Needed. 30 tablet 0   • ibuprofen (ADVIL,MOTRIN) 800 MG tablet Take 1 tablet by mouth 3 (Three) Times a Day As Needed. 270 tablet 3   • ibuprofen (ADVIL,MOTRIN) 800 MG tablet take 1 tablet by mouth 3 times every day with food as needed 270 tablet 3   • ibuprofen (ADVIL,MOTRIN) 800 MG tablet Take 1 tablet by mouth 3 times per day with food or milk as needed 270 tablet 1   • ibuprofen (ADVIL,MOTRIN) 800 MG tablet Take 1 tablet  by mouth 3 times per day with food or milk as needed 270 tablet 2   • imatinib (GLEEVEC) 400 MG chemo tablet Take 1 tablet by mouth Daily. 30 tablet 3   • Insulin Glargine (LANTUS SOLOSTAR) 100 UNIT/ML injection pen Inject 45 units subcutaneously 2 times daily 30 mL 6   • Insulin Glargine (LANTUS SOLOSTAR) 100 UNIT/ML injection pen Inject 45 units Subcutaneously two times daily 30 mL 6   • Insulin Glargine (LANTUS SOLOSTAR) 100 UNIT/ML injection pen Inject 45 Units subcutaneously 2 times daily. 30 mL 6   • Insulin Glargine (LANTUS SOLOSTAR) 100 UNIT/ML injection pen Inject 44 Units under the skin into the appropriate area as  directed 2 (Two) Times a Day. 30 mL 3   • Insulin Glargine (LANTUS SOLOSTAR) 100 UNIT/ML injection pen Inject 100 Units under the skin into the appropriate area as directed Daily. 30 mL 2   • Insulin Glargine (Lantus SoloStar) 100 UNIT/ML injection pen Inject 100 Units under the skin into the appropriate area as directed Daily. 30 mL 2   • Insulin Glargine (Lantus SoloStar) 100 UNIT/ML injection pen Inject 100 Units under the skin into the appropriate area as directed Daily. 18 mL 3   • LANTUS SOLOSTAR 100 UNIT/ML injection pen Inject 45 Units under the skin 2 (Two) Times a Day. 30 mL 6   • LANTUS SOLOSTAR 100 UNIT/ML injection pen Inject 45 Units under the skin 2 (Two) Times a Day. 30 mL 6   • LANTUS SOLOSTAR 100 UNIT/ML injection pen Inject 45 Units under the skin into the appropriate area as directed 2 (Two) Times a Day. 36 mL 6   • loratadine-pseudoephedrine (Allergy Relief D-12) 5-120 MG per 12 hr tablet Take 1 tablet by mouth Every 12 (Twelve) Hours As Needed. 30 tablet 0   • loratadine-pseudoephedrine (CLARITIN-D 12 HOUR) 5-120 MG per 12 hr tablet Take 1 tablet by mouth two times a day 30 tablet 0   • loratadine-pseudoephedrine (CLARITIN-D 12-hour) 5-120 MG per 12 hr tablet Take 1 tablet by mouth Every 12 (Twelve) Hours As Needed. 30 tablet 0   • mupirocin (BACTROBAN) 2 % ointment 1 application into each nostril as directed by provider 2 (Two) Times a Day. 22 g 2   • oseltamivir (TAMIFLU) 75 MG capsule Take 1 capsule by mouth 2 (Two) Times a Day. 10 capsule 0   • pantoprazole (PROTONIX) 40 MG EC tablet Take 1 tablet by mouth daily 90 tablet 2   • pantoprazole (PROTONIX) 40 MG EC tablet Take 1 tablet by mouth Daily. 90 tablet 2   • promethazine (PHENERGAN) 25 MG tablet Take 1 tablet by mouth every 4-6 hours as needed. 20 tablet 0   • SUMAtriptan (IMITREX) 100 MG tablet Take 1 tablet by mouth after onset of migraine. May repeat after 2 hours if headache returns. Do not exceed 200mg in 24 hours. 10 tablet 2   •  "SUMAtriptan (IMITREX) 100 MG tablet Take 1 tablet by mouth after onset of migraine; may repeat once 27 tablet 2   • SUMAtriptan (IMITREX) 100 MG tablet Take 1 tablet by mouth 2 times per day at least 2 hours between doses as needed 27 tablet 2   • SUMAtriptan (IMITREX) 100 MG tablet Take 1 tablet by mouth 2 times per day at least 2 hours between doses as needed 27 tablet 2   • Syringe/Needle, Disp, 25G X 1\" 3 ML misc USE 1 AS DIRECTED WITH B12 1 each 7   • triamcinolone (KENALOG) 0.1 % cream Apply topically to affected area(s) 2 Times a Day. 30 g 0   • triamcinolone (KENALOG) 0.1 % cream Apply to affected area(s) 2 times per day 30 g 0   • triamcinolone (KENALOG) 0.1 % cream Apply a Thin Layer to Affected Area Topically Twice Daily 80 g 5   • triamcinolone (KENALOG) 0.1 % cream Apply a Thin Layer to Affected Area Topically Twice Daily 240 g 5   • triamcinolone (KENALOG) 0.1 % cream Apply 1 application topically to affected area daily as needed 80 g 3   • TRUETRACK TEST test strip use to test blood sugar 8 times daily 200 each 5   • vitamin D (ERGOCALCIFEROL) 64045 units capsule capsule Take 1 capsule by mouth once weekly. 12 capsule 3   • vitamin D (ERGOCALCIFEROL) 10068 units capsule capsule Take 1 capsule by mouth 2 (Two) Times a Week. 24 capsule 0   • zolpidem (AMBIEN) 10 MG tablet Take 1 tablet by mouth Every Night. 90 tablet 0   • zolpidem (AMBIEN) 10 MG tablet Take 1 tablet by mouth Every Night at bedtime for insomnia 30 tablet 1     No current facility-administered medications for this visit.         LABORATORY:    Lab Results   Component Value Date    WBC 6.56 03/22/2021    HGB 13.1 03/22/2021    HCT 40.7 03/22/2021    MCV 92.9 03/22/2021    RDW 13.1 03/22/2021     03/22/2021    NEUTRORELPCT 60.6 03/22/2021    LYMPHORELPCT 25.3 03/22/2021    MONORELPCT 10.1 03/22/2021    EOSRELPCT 2.6 03/22/2021    BASORELPCT 1.1 03/22/2021    NEUTROABS 3.98 03/22/2021    LYMPHSABS 1.66 03/22/2021       Lab Results "   Component Value Date     03/22/2021    K 4.4 03/22/2021    CO2 24.2 03/22/2021     03/22/2021    BUN 13 03/22/2021    CREATININE 0.79 03/22/2021    GLUCOSE 198 (H) 03/22/2021    CALCIUM 9.2 03/22/2021    ALKPHOS 70 03/22/2021    AST 19 03/22/2021    ALT 20 03/22/2021    BILITOT 0.4 03/22/2021    ALBUMIN 4.02 03/22/2021    PROTEINTOT 6.8 03/22/2021    PHOS 3.4 10/03/2018       Lab Results   Component Value Date     03/22/2021    URICACID 5.2 03/22/2021        Imaging Results (Last 24 Hours)     ** No results found for the last 24 hours. **          ASSESSMENT/PLAN:    Patient Update Assessment (new medications, allergies, medical history): No changes.      Medication(s): Gleevec 400 mg chemo tablet.      Currently Taking Medication(s): Patient reports taking medication as directed, 1 tablet daily by mouth.     Experiencing Side Effects: None expressed.      Prior Authorization Status: Approved.       Financial Assistance Status: Not needed at this time.  Patient co-pay is $0.     Any Issues Identified: No issues expressed form patient, no issues processing refill.     Appropriate to Process Prescription(s):  Yes. Medication will be dispensed to patient from [facility].     Counseling Offered: Patient previously counseled upon initiation of therapy, aware to contact pharmacy with any new questions or concerns.     Next Specialty Pharmacy Visit:  Scheduled for ~4 weeks

## 2021-05-26 NOTE — PROGRESS NOTES
Specialty Pharmacy Note      Name:  Aric Haro  :  1965  Date:  2021         Past Medical History:   Diagnosis Date   • Anemia    • Cancer (CMS/HCC)     leukemia   • Diabetes mellitus (CMS/HCC)    • Elevated cholesterol    • GERD (gastroesophageal reflux disease)    • Hypertension    • PONV (postoperative nausea and vomiting)        Past Surgical History:   Procedure Laterality Date   • BLEPHAROPLASTY Bilateral 2018    Procedure: BLEPHAROPLASTY BOTH EYES UPPER EYELIDS (PT REQUESTED TERESITA WILKES;  Surgeon: Chris Haile MD;  Location: [facility];  Service: Ophthalmology   • COLONOSCOPY N/A 3/27/2018    Procedure: COLONOSCOPY FOR SCREENING  CPTCODE:60638;  Surgeon: Drew Esparza III, MD;  Location: [facility];  Service: Gastroenterology   • SINUS SURGERY     • SINUS SURGERY     • SLEEVE GASTROPLASTY         Social History     Socioeconomic History   • Marital status:      Spouse name: Not on file   • Number of children: Not on file   • Years of education: Not on file   • Highest education level: Not on file   Tobacco Use   • Smoking status: Former Smoker     Packs/day: 0.25     Years: 4.00     Pack years: 1.00   • Smokeless tobacco: Never Used   Vaping Use   • Vaping Use: Never used   Substance and Sexual Activity   • Alcohol use: No   • Drug use: No   • Sexual activity: Defer       Family History   Problem Relation Age of Onset   • Anemia Mother    • Hypertension Mother    • Cancer Mother    • COPD Father    • Heart disease Father    • Breast cancer Neg Hx        No Known Allergies    Current Outpatient Medications   Medication Sig Dispense Refill   • ALPRAZolam (XANAX) 1 MG tablet Take 1 tablet by mouth 2 times per day 60 tablet 0   • amoxicillin-clavulanate (AUGMENTIN) 875-125 MG per tablet Take 1 tablet by mouth Every 12 (Twelve) Hours. 20 tablet 0   • baclofen (LIORESAL) 20 MG tablet Take 1 tablet by mouth 3 (Three) Times a Day. 90 tablet 4   • celecoxib  (CeleBREX) 200 MG capsule Take 1 capsule by mouth 2 (Two) Times a Day. (Patient taking differently: Take 200 mg by mouth 2 (Two) Times a Day. Takes 1/2 tablet once daily) 180 capsule 3   • celecoxib (CeleBREX) 200 MG capsule Take 1 capsule by mouth 2 (Two) Times a Day. 180 capsule 3   • cholecalciferol (VITAMIN D3) 41052 units capsule Take 1 capsule by mouth 2 (Two) Times a Week. 24 capsule 0   • Cholecalciferol (VITAMIN D3) 93021 units capsule Take 1 capsule by mouth 2 (Two) Times a Week. 24 capsule 3   • cyanocobalamin 1000 MCG/ML injection Inject 1 mL intramuscularly monthly. 1 mL 1   • cyanocobalamin 1000 MCG/ML injection Inject 1 mL into the appropriate muscle as directed by prescriber weekly for 8 weeks then monthly thereafter. 30 mL 4   • cyanocobalamin 1000 MCG/ML injection Administer 1 ml (1,000 mcg) intramuscularly once weekly 12 mL 2   • cyanocobalamin 1000 MCG/ML injection Inject 1 mL into the appropriate muscle as directed by prescriber 1 (One) Time Per Week. 12 mL 2   • desloratadine-pseudoephedrine (CLARINEX-D 12-hour) 2.5-120 MG per tablet take 1 tablet by mouth 2 times every day 30 tablet 0   • DULoxetine (CYMBALTA) 60 MG capsule Take 1 capsule by mouth Daily. 90 capsule 3   • DULoxetine (CYMBALTA) 60 MG capsule Take 1 capsule by mouth daily 90 capsule 3   • DULoxetine (CYMBALTA) 60 MG capsule Take 1 capsule by mouth Daily. 90 capsule 3   • fenofibrate (TRICOR) 145 MG tablet Take 1 tablet by mouth Daily. 90 tablet 3   • fenofibrate (TRICOR) 145 MG tablet Take 1 tablet by mouth Daily. 90 tablet 3   • fenofibrate (TRICOR) 145 MG tablet Take 1 tablet by mouth daily 90 tablet 2   • fenofibrate (TRICOR) 145 MG tablet Take 1 tablet by mouth Daily. 90 tablet 2   • fenofibrate 160 MG tablet Take 1 tablet by mouth Daily. 90 tablet 3   • ferrous sulfate 325 (65 FE) MG EC tablet Take 1 tablet by mouth Daily. 30 tablet 3   • ferrous sulfate 325 (65 FE) MG EC tablet Take 1 tablet by mouth Daily. 90 tablet 3   •  ferrous sulfate 325 (65 FE) MG tablet Take 1 tablet by mouth Daily. 90 tablet 2   • fluconazole (DIFLUCAN) 200 MG tablet Take 1 tablet by mouth Daily. 30 tablet 1   • furosemide (LASIX) 40 MG tablet Take 1 tablet by mouth Daily. 90 tablet 3   • furosemide (LASIX) 40 MG tablet Take 1 tablet by mouth Daily. 90 tablet 3   • furosemide (LASIX) 40 MG tablet Take 1 tablet by mouth Daily. 90 tablet 3   • glucose blood (FREESTYLE TEST STRIPS) test strip USE 2 TIMES DAILY AS DIRECTED 200 each 2   • glucose blood test strip USE 1 STRIP 2 TIMES DAILY AS DIRECTED 60 enema 6   • glucose blood test strip USE 1 STRIP 2 TIMES DAILY AS DIRECTED 180 each 6   • HYDROcodone-acetaminophen (NORCO)  MG per tablet Take 1 tablet by mouth every 4 - 6 hours as needed for severe pain. 20 tablet 0   • HYDROcodone-acetaminophen (NORCO)  MG per tablet Take 1 tablet by mouth Every 4-6 Hours As Needed. 30 tablet 0   • HYDROcodone-acetaminophen (NORCO)  MG per tablet Take 1 tablet by mouth every 4 hours as needed for severe pain due to kidney stone 20 tablet 0   • ibuprofen (ADVIL,MOTRIN) 800 MG tablet Take 1 tablet by mouth 3 (Three) Times a Day As Needed. 270 tablet 3   • ibuprofen (ADVIL,MOTRIN) 800 MG tablet take 1 tablet by mouth 3 times every day with food as needed 270 tablet 3   • ibuprofen (ADVIL,MOTRIN) 800 MG tablet Take 1 tablet by mouth 3 times per day with food or milk as needed 270 tablet 1   • ibuprofen (ADVIL,MOTRIN) 800 MG tablet Take 1 tablet  by mouth 3 times per day with food or milk as needed 270 tablet 2   • imatinib (GLEEVEC) 400 MG chemo tablet Take 1 tablet by mouth Daily. 30 tablet 3   • Insulin Glargine (LANTUS SOLOSTAR) 100 UNIT/ML injection pen Inject 45 units subcutaneously 2 times daily 30 mL 6   • Insulin Glargine (LANTUS SOLOSTAR) 100 UNIT/ML injection pen Inject 45 units Subcutaneously two times daily 30 mL 6   • Insulin Glargine (LANTUS SOLOSTAR) 100 UNIT/ML injection pen Inject 45 Units  "subcutaneously 2 times daily. 30 mL 6   • Insulin Glargine (LANTUS SOLOSTAR) 100 UNIT/ML injection pen Inject 44 Units under the skin into the appropriate area as directed 2 (Two) Times a Day. 30 mL 3   • Insulin Glargine (LANTUS SOLOSTAR) 100 UNIT/ML injection pen Inject 100 Units under the skin into the appropriate area as directed Daily. 30 mL 2   • Insulin Glargine (Lantus SoloStar) 100 UNIT/ML injection pen Inject 100 Units under the skin into the appropriate area as directed Daily. 30 mL 2   • Insulin Glargine (Lantus SoloStar) 100 UNIT/ML injection pen Inject 100 Units under the skin into the appropriate area as directed Daily. 18 mL 3   • Insulin Syringe-Needle U-100 (TRUEplus Insulin Syringe) 31G X 5/16\" 0.5 ML misc Use to inject 3 times a day 90 each 4   • LANTUS SOLOSTAR 100 UNIT/ML injection pen Inject 45 Units under the skin 2 (Two) Times a Day. 30 mL 6   • LANTUS SOLOSTAR 100 UNIT/ML injection pen Inject 45 Units under the skin 2 (Two) Times a Day. 30 mL 6   • LANTUS SOLOSTAR 100 UNIT/ML injection pen Inject 45 Units under the skin into the appropriate area as directed 2 (Two) Times a Day. 36 mL 6   • loratadine-pseudoephedrine (Allergy Relief D-12) 5-120 MG per 12 hr tablet Take 1 tablet by mouth Every 12 (Twelve) Hours As Needed. 30 tablet 0   • loratadine-pseudoephedrine (CLARITIN-D 12 HOUR) 5-120 MG per 12 hr tablet Take 1 tablet by mouth two times a day 30 tablet 0   • loratadine-pseudoephedrine (CLARITIN-D 12-hour) 5-120 MG per 12 hr tablet Take 1 tablet by mouth Every 12 (Twelve) Hours As Needed. 30 tablet 0   • mupirocin (BACTROBAN) 2 % ointment 1 application into each nostril as directed by provider 2 (Two) Times a Day. 22 g 2   • oseltamivir (TAMIFLU) 75 MG capsule Take 1 capsule by mouth 2 (Two) Times a Day. 10 capsule 0   • pantoprazole (PROTONIX) 40 MG EC tablet Take 1 tablet by mouth daily 90 tablet 2   • pantoprazole (PROTONIX) 40 MG EC tablet Take 1 tablet by mouth Daily. 90 tablet 2 " "  • promethazine (PHENERGAN) 25 MG tablet Take 1 tablet by mouth every 4-6 hours as needed. 20 tablet 0   • SUMAtriptan (IMITREX) 100 MG tablet Take 1 tablet by mouth after onset of migraine. May repeat after 2 hours if headache returns. Do not exceed 200mg in 24 hours. 10 tablet 2   • SUMAtriptan (IMITREX) 100 MG tablet Take 1 tablet by mouth after onset of migraine; may repeat once 27 tablet 2   • SUMAtriptan (IMITREX) 100 MG tablet Take 1 tablet by mouth 2 times per day at least 2 hours between doses as needed 27 tablet 2   • SUMAtriptan (IMITREX) 100 MG tablet Take 1 tablet by mouth 2 times per day at least 2 hours between doses as needed 27 tablet 2   • Syringe/Needle, Disp, 25G X 1\" 3 ML misc USE 1 AS DIRECTED WITH B12 1 each 7   • triamcinolone (KENALOG) 0.1 % cream Apply topically to affected area(s) 2 Times a Day. 30 g 0   • triamcinolone (KENALOG) 0.1 % cream Apply to affected area(s) 2 times per day 30 g 0   • triamcinolone (KENALOG) 0.1 % cream Apply a Thin Layer to Affected Area Topically Twice Daily 80 g 5   • triamcinolone (KENALOG) 0.1 % cream Apply a Thin Layer to Affected Area Topically Twice Daily 240 g 5   • triamcinolone (KENALOG) 0.1 % cream Apply 1 application topically to affected area daily as needed 80 g 3   • TRUETRACK TEST test strip use to test blood sugar 8 times daily 200 each 5   • vitamin D (ERGOCALCIFEROL) 60697 units capsule capsule Take 1 capsule by mouth once weekly. 12 capsule 3   • vitamin D (ERGOCALCIFEROL) 88818 units capsule capsule Take 1 capsule by mouth 2 (Two) Times a Week. 24 capsule 0   • zolpidem (AMBIEN) 10 MG tablet Take 1 tablet by mouth Every Night. 90 tablet 0   • zolpidem (AMBIEN) 10 MG tablet Take 1 tablet by mouth Every Night at bedtime for insomnia 30 tablet 1     No current facility-administered medications for this visit.         LABORATORY:    Lab Results   Component Value Date    WBC 6.56 03/22/2021    HGB 13.1 03/22/2021    HCT 40.7 03/22/2021    MCV " 92.9 03/22/2021    RDW 13.1 03/22/2021     03/22/2021    NEUTRORELPCT 60.6 03/22/2021    LYMPHORELPCT 25.3 03/22/2021    MONORELPCT 10.1 03/22/2021    EOSRELPCT 2.6 03/22/2021    BASORELPCT 1.1 03/22/2021    NEUTROABS 3.98 03/22/2021    LYMPHSABS 1.66 03/22/2021       Lab Results   Component Value Date     03/22/2021    K 4.4 03/22/2021    CO2 24.2 03/22/2021     03/22/2021    BUN 13 03/22/2021    CREATININE 0.79 03/22/2021    GLUCOSE 198 (H) 03/22/2021    CALCIUM 9.2 03/22/2021    ALKPHOS 70 03/22/2021    AST 19 03/22/2021    ALT 20 03/22/2021    BILITOT 0.4 03/22/2021    ALBUMIN 4.02 03/22/2021    PROTEINTOT 6.8 03/22/2021    PHOS 3.4 10/03/2018       Lab Results   Component Value Date     03/22/2021    URICACID 5.2 03/22/2021        Imaging Results (Last 24 Hours)     ** No results found for the last 24 hours. **          ASSESSMENT/PLAN:    Patient Update Assessment (new medications, allergies, medical history): No changes.      Medication(s): Gleevec 400 mg chemo tablet.      Currently Taking Medication(s): Patient reports taking medication as directed, 1 tablet daily by mouth.     Experiencing Side Effects: None expressed.      Prior Authorization Status: Approved.       Financial Assistance Status: Not needed at this time.  Patient co-pay is $0.     Any Issues Identified: No issues expressed form patient, no issues processing refill.     Appropriate to Process Prescription(s):  Yes. Medication will be dispensed to patient from [facility].     Counseling Offered: Patient previously counseled upon initiation of therapy, aware to contact pharmacy with any new questions or concerns.     Next Specialty Pharmacy Visit:  Scheduled for ~4 weeks

## 2021-05-27 ENCOUNTER — SPECIALTY PHARMACY (OUTPATIENT)
Dept: PHARMACY | Facility: HOSPITAL | Age: 56
End: 2021-05-27

## 2021-06-18 ENCOUNTER — LAB (OUTPATIENT)
Dept: ONCOLOGY | Facility: CLINIC | Age: 56
End: 2021-06-18

## 2021-06-18 DIAGNOSIS — Z79.899 LONG-TERM USE OF HIGH-RISK MEDICATION: ICD-10-CM

## 2021-06-18 DIAGNOSIS — I10 ESSENTIAL HYPERTENSION, MALIGNANT: Primary | ICD-10-CM

## 2021-06-18 DIAGNOSIS — G93.32 CHRONIC FATIGUE SYNDROME: ICD-10-CM

## 2021-06-18 DIAGNOSIS — E13.69 OTHER SPECIFIED DIABETES MELLITUS WITH OTHER SPECIFIED COMPLICATION, UNSPECIFIED WHETHER LONG TERM INSULIN USE (HCC): ICD-10-CM

## 2021-06-18 DIAGNOSIS — D50.9 IRON DEFICIENCY ANEMIA, UNSPECIFIED IRON DEFICIENCY ANEMIA TYPE: ICD-10-CM

## 2021-06-18 DIAGNOSIS — D51.9 ANEMIA DUE TO VITAMIN B12 DEFICIENCY, UNSPECIFIED B12 DEFICIENCY TYPE: ICD-10-CM

## 2021-06-18 DIAGNOSIS — C92.10 CML (CHRONIC MYELOCYTIC LEUKEMIA) (HCC): ICD-10-CM

## 2021-06-18 DIAGNOSIS — E55.9 VITAMIN D DEFICIENCY DISEASE: ICD-10-CM

## 2021-06-18 DIAGNOSIS — E78.2 MIXED HYPERLIPIDEMIA: ICD-10-CM

## 2021-06-18 LAB
25(OH)D3 SERPL-MCNC: 24.5 NG/ML (ref 30–100)
ALBUMIN SERPL-MCNC: 3.65 G/DL (ref 3.5–5.2)
ALBUMIN UR-MCNC: 5 MG/DL
ALBUMIN/GLOB SERPL: 1.3 G/DL
ALP SERPL-CCNC: 68 U/L (ref 39–117)
ALT SERPL W P-5'-P-CCNC: 18 U/L (ref 1–33)
ANION GAP SERPL CALCULATED.3IONS-SCNC: 9.3 MMOL/L (ref 5–15)
AST SERPL-CCNC: 18 U/L (ref 1–32)
BASOPHILS # BLD AUTO: 0.05 10*3/MM3 (ref 0–0.2)
BASOPHILS NFR BLD AUTO: 0.6 % (ref 0–1.5)
BILIRUB SERPL-MCNC: 0.4 MG/DL (ref 0–1.2)
BUN SERPL-MCNC: 17 MG/DL (ref 6–20)
BUN/CREAT SERPL: 20.5 (ref 7–25)
CALCIUM SPEC-SCNC: 9 MG/DL (ref 8.6–10.5)
CHLORIDE SERPL-SCNC: 104 MMOL/L (ref 98–107)
CO2 SERPL-SCNC: 26.7 MMOL/L (ref 22–29)
CREAT SERPL-MCNC: 0.83 MG/DL (ref 0.57–1)
DEPRECATED RDW RBC AUTO: 46 FL (ref 37–54)
EOSINOPHIL # BLD AUTO: 0.09 10*3/MM3 (ref 0–0.4)
EOSINOPHIL NFR BLD AUTO: 1.2 % (ref 0.3–6.2)
ERYTHROCYTE [DISTWIDTH] IN BLOOD BY AUTOMATED COUNT: 13.3 % (ref 12.3–15.4)
FERRITIN SERPL-MCNC: 120.4 NG/ML (ref 13–150)
GFR SERPL CREATININE-BSD FRML MDRD: 71 ML/MIN/1.73
GLOBULIN UR ELPH-MCNC: 2.8 GM/DL
GLUCOSE SERPL-MCNC: 110 MG/DL (ref 65–99)
HBA1C MFR BLD: 8.1 % (ref 4.8–5.6)
HCT VFR BLD AUTO: 39.2 % (ref 34–46.6)
HGB BLD-MCNC: 12.3 G/DL (ref 12–15.9)
IMM GRANULOCYTES # BLD AUTO: 0.02 10*3/MM3 (ref 0–0.05)
IMM GRANULOCYTES NFR BLD AUTO: 0.3 % (ref 0–0.5)
IRON 24H UR-MRATE: 70 MCG/DL (ref 37–145)
IRON SATN MFR SERPL: 17 % (ref 20–50)
LDH SERPL-CCNC: 186 U/L (ref 135–214)
LYMPHOCYTES # BLD AUTO: 2.43 10*3/MM3 (ref 0.7–3.1)
LYMPHOCYTES NFR BLD AUTO: 31.2 % (ref 19.6–45.3)
MCH RBC QN AUTO: 29.4 PG (ref 26.6–33)
MCHC RBC AUTO-ENTMCNC: 31.4 G/DL (ref 31.5–35.7)
MCV RBC AUTO: 93.8 FL (ref 79–97)
MONOCYTES # BLD AUTO: 0.72 10*3/MM3 (ref 0.1–0.9)
MONOCYTES NFR BLD AUTO: 9.2 % (ref 5–12)
NEUTROPHILS NFR BLD AUTO: 4.49 10*3/MM3 (ref 1.7–7)
NEUTROPHILS NFR BLD AUTO: 57.5 % (ref 42.7–76)
NRBC BLD AUTO-RTO: 0 /100 WBC (ref 0–0.2)
PLATELET # BLD AUTO: 277 10*3/MM3 (ref 140–450)
PMV BLD AUTO: 10.8 FL (ref 6–12)
POTASSIUM SERPL-SCNC: 4.5 MMOL/L (ref 3.5–5.2)
PROT SERPL-MCNC: 6.4 G/DL (ref 6–8.5)
RBC # BLD AUTO: 4.18 10*6/MM3 (ref 3.77–5.28)
SODIUM SERPL-SCNC: 140 MMOL/L (ref 136–145)
T3FREE SERPL-MCNC: 2.62 PG/ML (ref 2–4.4)
T4 FREE SERPL-MCNC: 1.38 NG/DL (ref 0.93–1.7)
TIBC SERPL-MCNC: 417 MCG/DL (ref 298–536)
TRANSFERRIN SERPL-MCNC: 280 MG/DL (ref 200–360)
TSH SERPL DL<=0.05 MIU/L-ACNC: 0.99 UIU/ML (ref 0.27–4.2)
URATE SERPL-MCNC: 4.9 MG/DL (ref 2.4–5.7)
VIT B12 BLD-MCNC: 346 PG/ML (ref 211–946)
WBC # BLD AUTO: 7.8 10*3/MM3 (ref 3.4–10.8)

## 2021-06-18 PROCEDURE — 84550 ASSAY OF BLOOD/URIC ACID: CPT | Performed by: INTERNAL MEDICINE

## 2021-06-18 PROCEDURE — 82306 VITAMIN D 25 HYDROXY: CPT | Performed by: NURSE PRACTITIONER

## 2021-06-18 PROCEDURE — 83615 LACTATE (LD) (LDH) ENZYME: CPT | Performed by: INTERNAL MEDICINE

## 2021-06-18 PROCEDURE — 82607 VITAMIN B-12: CPT | Performed by: NURSE PRACTITIONER

## 2021-06-18 PROCEDURE — 84443 ASSAY THYROID STIM HORMONE: CPT | Performed by: NURSE PRACTITIONER

## 2021-06-18 PROCEDURE — 82043 UR ALBUMIN QUANTITATIVE: CPT | Performed by: NURSE PRACTITIONER

## 2021-06-18 PROCEDURE — 84439 ASSAY OF FREE THYROXINE: CPT | Performed by: NURSE PRACTITIONER

## 2021-06-18 PROCEDURE — 83540 ASSAY OF IRON: CPT | Performed by: INTERNAL MEDICINE

## 2021-06-18 PROCEDURE — 84481 FREE ASSAY (FT-3): CPT | Performed by: NURSE PRACTITIONER

## 2021-06-18 PROCEDURE — 81207 BCR/ABL1 GENE MINOR BP: CPT

## 2021-06-18 PROCEDURE — 82728 ASSAY OF FERRITIN: CPT | Performed by: INTERNAL MEDICINE

## 2021-06-18 PROCEDURE — 80053 COMPREHEN METABOLIC PANEL: CPT | Performed by: INTERNAL MEDICINE

## 2021-06-18 PROCEDURE — 85025 COMPLETE CBC W/AUTO DIFF WBC: CPT | Performed by: INTERNAL MEDICINE

## 2021-06-18 PROCEDURE — 81206 BCR/ABL1 GENE MAJOR BP: CPT

## 2021-06-18 PROCEDURE — 84466 ASSAY OF TRANSFERRIN: CPT | Performed by: INTERNAL MEDICINE

## 2021-06-18 PROCEDURE — 83036 HEMOGLOBIN GLYCOSYLATED A1C: CPT | Performed by: NURSE PRACTITIONER

## 2021-06-18 PROCEDURE — 36415 COLL VENOUS BLD VENIPUNCTURE: CPT

## 2021-06-22 ENCOUNTER — SPECIALTY PHARMACY (OUTPATIENT)
Dept: PHARMACY | Facility: HOSPITAL | Age: 56
End: 2021-06-22

## 2021-06-25 ENCOUNTER — OFFICE VISIT (OUTPATIENT)
Dept: ONCOLOGY | Facility: CLINIC | Age: 56
End: 2021-06-25

## 2021-06-25 VITALS
TEMPERATURE: 97.3 F | HEART RATE: 109 BPM | WEIGHT: 266.2 LBS | OXYGEN SATURATION: 97 % | RESPIRATION RATE: 18 BRPM | SYSTOLIC BLOOD PRESSURE: 143 MMHG | BODY MASS INDEX: 42.97 KG/M2 | DIASTOLIC BLOOD PRESSURE: 80 MMHG

## 2021-06-25 DIAGNOSIS — D50.9 IRON DEFICIENCY ANEMIA, UNSPECIFIED IRON DEFICIENCY ANEMIA TYPE: ICD-10-CM

## 2021-06-25 DIAGNOSIS — C92.10 CML (CHRONIC MYELOCYTIC LEUKEMIA) (HCC): Primary | ICD-10-CM

## 2021-06-25 PROCEDURE — 99214 OFFICE O/P EST MOD 30 MIN: CPT | Performed by: INTERNAL MEDICINE

## 2021-06-25 NOTE — PROGRESS NOTES
Aric Haro  [id number]  1965  6/25/2021      Referring Provider:   EILEEN Andrade    Reason for Follow Up:   1. Chronic Phase - Chronic Myelogenous Leukemia  2. Leukocytosis  3. Thrombocytosis     Chief Complaint:  CML      History of Present Illness:  Aric Haro is a very pleasant 55 y.o.  female who presents in follow up appointment for further management and treatment of chronic phase chronic myelogenous leukemia (CML).    Ms. Haro began experiencing extreme fatigue where she was sleeping multiple hours during the day when she initially began following with me in April 2017. 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 On laboratory evaluation she was found to have a total white blood cell count of 22.35 and a platelet count 470 thousand. In March 2016 she was also noted to have a leukocytosis (although not as elevated) as well as a thrombocytosis, however reports that these were likely secondary to other medical issues at the time her blood work was drawn. She denied of any significant weight loss however has been gradually losing weight since gastric sleeve procedure. She denied of any fevers or frequent infections but did note fluctuating neck lymphadenopathy as well as early satiety and taste in change. She denied of any abnormal or spontaneous bleeding. I did obtain a BCR ABL PCR to further assess her leukocytosis and thrombocytosis and she was positive for the b2a2/b3a2 (p210) and e1a2 (p190) fusion gene transcripts consistent with a diagnosis for CML. After reviewing the toxicities of each tyrosine kinase inhibitor we decided to start Dasatinib treatment. She had a difficult time tolerating the Dasatinib as she was unable to take her PPI with this medication. Since she had to be taken off of her PPI she had worsening reflux symptoms as well as  severe nausea, vomiting, dehydration, and headaches during this period. Given the toxicities she was experiencing she was taken off Dasatinib and this was changed to Gleevec treatment. Since starting Gleevac 400mg daily she has tolerated this much better without significant side effects. The only side effect that she has been able to see is intermittent pedal edema along with some hand swelling and muscle cramps.     Treatment History:  Started Dasatinib on 05/15/17 - experienced nausea, severe reflux, headaches while on medication and had taken 9 doses. This was discontinued due to intolerability and she was thereafter started on Imatinib.   Started Imatinib on 5/26/17      Interim History:  Since the start of Dasatinib and later Gleevec she has had a good response and normalization in her complete blood counts with normalization of BCR ABL PCR. She denies of any fevers, infections, lymphadenopathy, chest pain, dyspnea, nausea. No lower extremity edema. She is trying to lose weight and currently weight stable. She has had intermittent night sweats however this has improved since treatment. No fevers. Recently diagnosed with pleurisy and rosacea. She does not reports any myalgias with exception of one time where she attributes this to the heat and not being hydrated.       The following portions of the patient's history were reviewed and updated as appropriate: allergies, current medications, past family history, past medical history, past social history, past surgical history and problem list.      No Known Allergies    Past Medical History:   Diagnosis Date   • Anemia    • Cancer (CMS/HCC)     leukemia   • Diabetes mellitus (CMS/HCC)    • Elevated cholesterol    • GERD (gastroesophageal reflux disease)    • Hypertension    • PONV (postoperative nausea and vomiting)        Past Surgical History:   Procedure Laterality Date   • BLEPHAROPLASTY Bilateral 6/22/2018    Procedure: BLEPHAROPLASTY BOTH EYES UPPER EYELIDS  (PT REQUESTED TERESITA KATRIN;  Surgeon: Chris Haile MD;  Location: [facility];  Service: Ophthalmology   • COLONOSCOPY N/A 3/27/2018    Procedure: COLONOSCOPY FOR SCREENING  CPTCODE:78634;  Surgeon: Drew Esparza III, MD;  Location: [facility];  Service: Gastroenterology   • SINUS SURGERY     • SINUS SURGERY     • SLEEVE GASTROPLASTY         Social History     Socioeconomic History   • Marital status:      Spouse name: Not on file   • Number of children: Not on file   • Years of education: Not on file   • Highest education level: Not on file   Tobacco Use   • Smoking status: Former Smoker     Packs/day: 0.25     Years: 4.00     Pack years: 1.00   • Smokeless tobacco: Never Used   Vaping Use   • Vaping Use: Never used   Substance and Sexual Activity   • Alcohol use: No   • Drug use: No   • Sexual activity: Defer   She lives with her daughter. She denies of any alcohol or illicit drug use. Previous social tobacco use more then 20 years ago.        Family History   Problem Relation Age of Onset   • Anemia Mother    • Hypertension Mother    • Cancer Mother    • COPD Father    • Heart disease Father    • Breast cancer Neg Hx    Maternal Uncle - CLL  Mother - Malignancy of [place]      Current Outpatient Medications:   •  ALPRAZolam (XANAX) 1 MG tablet, Take 1 tablet by mouth 2 times per day, Disp: 60 tablet, Rfl: 0  •  celecoxib (CeleBREX) 200 MG capsule, Take 1 capsule by mouth 2 (Two) Times a Day., Disp: 180 capsule, Rfl: 3  •  cyanocobalamin 1000 MCG/ML injection, Administer 1 ml (1,000 mcg) intramuscularly once weekly, Disp: 12 mL, Rfl: 2  •  desloratadine-pseudoephedrine (CLARINEX-D 12-hour) 2.5-120 MG per tablet, take 1 tablet by mouth 2 times every day, Disp: 30 tablet, Rfl: 0  •  DULoxetine (CYMBALTA) 60 MG capsule, Take 1 capsule by mouth Daily., Disp: 90 capsule, Rfl: 3  •  DULoxetine (CYMBALTA) 60 MG capsule, Take 1 capsule by mouth daily, Disp: 90 capsule, Rfl: 3  •  DULoxetine  "(CYMBALTA) 60 MG capsule, Take 1 capsule by mouth Daily., Disp: 90 capsule, Rfl: 3  •  fenofibrate (TRICOR) 145 MG tablet, Take 1 tablet by mouth Daily., Disp: 90 tablet, Rfl: 3  •  fenofibrate (TRICOR) 145 MG tablet, Take 1 tablet by mouth daily, Disp: 90 tablet, Rfl: 2  •  glucose blood test strip, USE 1 STRIP 2 TIMES DAILY AS DIRECTED, Disp: 60 enema, Rfl: 6  •  glucose blood test strip, USE 1 STRIP 2 TIMES DAILY AS DIRECTED, Disp: 180 each, Rfl: 6  •  HYDROcodone-acetaminophen (NORCO)  MG per tablet, Take 1 tablet by mouth every 4 hours as needed for severe pain due to kidney stone, Disp: 20 tablet, Rfl: 0  •  ibuprofen (ADVIL,MOTRIN) 800 MG tablet, Take 1 tablet  by mouth 3 times per day with food or milk as needed, Disp: 270 tablet, Rfl: 2  •  imatinib (GLEEVEC) 400 MG chemo tablet, Take 1 tablet by mouth Daily., Disp: 30 tablet, Rfl: 3  •  Insulin Glargine (LANTUS SOLOSTAR) 100 UNIT/ML injection pen, Inject 44 Units under the skin into the appropriate area as directed 2 (Two) Times a Day., Disp: 30 mL, Rfl: 3  •  Insulin Glargine (LANTUS SOLOSTAR) 100 UNIT/ML injection pen, Inject 100 Units under the skin into the appropriate area as directed Daily., Disp: 30 mL, Rfl: 2  •  Insulin Glargine (Lantus SoloStar) 100 UNIT/ML injection pen, Inject 100 Units under the skin into the appropriate area as directed Daily., Disp: 30 mL, Rfl: 2  •  Insulin Glargine (Lantus SoloStar) 100 UNIT/ML injection pen, Inject 100 Units under the skin into the appropriate area as directed Daily., Disp: 18 mL, Rfl: 3  •  Insulin Syringe-Needle U-100 (TRUEplus Insulin Syringe) 31G X 5/16\" 0.5 ML misc, Use to inject 3 times a day, Disp: 90 each, Rfl: 4  •  loratadine-pseudoephedrine (Allergy Relief D-12) 5-120 MG per 12 hr tablet, Take 1 tablet by mouth Every 12 (Twelve) Hours As Needed., Disp: 30 tablet, Rfl: 0  •  mupirocin (BACTROBAN) 2 % ointment, 1 application into each nostril as directed by provider 2 (Two) Times a Day., " Disp: 22 g, Rfl: 2  •  pantoprazole (PROTONIX) 40 MG EC tablet, Take 1 tablet by mouth daily, Disp: 90 tablet, Rfl: 2  •  pantoprazole (PROTONIX) 40 MG EC tablet, Take 1 tablet by mouth Daily., Disp: 90 tablet, Rfl: 2  •  SUMAtriptan (IMITREX) 100 MG tablet, Take 1 tablet by mouth after onset of migraine; may repeat once, Disp: 27 tablet, Rfl: 2  •  SUMAtriptan (IMITREX) 100 MG tablet, Take 1 tablet by mouth 2 times per day at least 2 hours between doses as needed, Disp: 27 tablet, Rfl: 2  •  SUMAtriptan (IMITREX) 100 MG tablet, Take 1 tablet by mouth 2 times per day at least 2 hours between doses as needed, Disp: 27 tablet, Rfl: 2  •  triamcinolone (KENALOG) 0.1 % cream, Apply a Thin Layer to Affected Area Topically Twice Daily, Disp: 240 g, Rfl: 5  •  triamcinolone (KENALOG) 0.1 % cream, Apply 1 application topically to affected area daily as needed, Disp: 80 g, Rfl: 3  •  baclofen (LIORESAL) 20 MG tablet, Take 1 tablet by mouth 3 (Three) Times a Day., Disp: 90 tablet, Rfl: 4  •  Cholecalciferol (VITAMIN D3) 26003 units capsule, Take 1 capsule by mouth 2 (Two) Times a Week., Disp: 24 capsule, Rfl: 3  •  fenofibrate (TRICOR) 145 MG tablet, Take 1 tablet by mouth Daily., Disp: 90 tablet, Rfl: 3  •  fenofibrate 160 MG tablet, Take 1 tablet by mouth Daily., Disp: 90 tablet, Rfl: 3  •  ferrous sulfate 325 (65 FE) MG EC tablet, Take 1 tablet by mouth Daily., Disp: 30 tablet, Rfl: 3  •  fluconazole (DIFLUCAN) 200 MG tablet, Take 1 tablet by mouth Daily., Disp: 30 tablet, Rfl: 1  •  furosemide (LASIX) 40 MG tablet, Take 1 tablet by mouth Daily., Disp: 90 tablet, Rfl: 3  •  glucose blood (FREESTYLE TEST STRIPS) test strip, USE 2 TIMES DAILY AS DIRECTED, Disp: 200 each, Rfl: 2  •  ibuprofen (ADVIL,MOTRIN) 800 MG tablet, Take 1 tablet by mouth 3 (Three) Times a Day As Needed., Disp: 270 tablet, Rfl: 3  •  LANTUS SOLOSTAR 100 UNIT/ML injection pen, Inject 45 Units under the skin into the appropriate area as directed 2 (Two)  "Times a Day., Disp: 36 mL, Rfl: 6  •  SUMAtriptan (IMITREX) 100 MG tablet, Take 1 tablet by mouth after onset of migraine. May repeat after 2 hours if headache returns. Do not exceed 200mg in 24 hours., Disp: 10 tablet, Rfl: 2  •  Syringe/Needle, Disp, 25G X 1\" 3 ML misc, USE 1 AS DIRECTED WITH B12, Disp: 1 each, Rfl: 7        Review of Systems  A comprehensive 14-point review of systems performed.  Significant findings as mentioned above.  All other systems reviewed and are negative. No significant complaints today. No fevers, night sweats, lymphadenopathy, or weight loss.       Physical Exam:  Vital Signs: These were reviewed and listed as per patient’s electronic medical chart  Vitals:    06/25/21 0858   BP: 143/80   Pulse: 109   Resp: 18   Temp: 97.3 °F (36.3 °C)   SpO2: 97%     General: Awake, alert and oriented, in no distress, obese  HEENT: Head is atraumatic, normocephalic, extraocular movements full, no scleral icterus, mask in place  Neck: supple, no jvd, lymphadenopathy or masses  Cardiovascular: regular rhythm, tachycardic, without murmurs, rubs or gallops  Pulmonary: non-labored, clear to auscultation bilaterally, no wheezing  Abdomen: soft, non-tender, non-distended, normal active bowel sounds present  Extremities: No clubbing, cyanosis or edema  Lymph: No cervical or supraclavicular adenopathy  Neurologic: Mental status as above, alert, awake and oriented, grossly non-focal exam  Skin: warm, dry, intact  Patient was examined on 06/25/21 and changes are reflected and noted above.        Labs / Studies:    Lab on 06/18/2021   Component Date Value   • Glucose 06/18/2021 110*   • BUN 06/18/2021 17    • Creatinine 06/18/2021 0.83    • Sodium 06/18/2021 140    • Potassium 06/18/2021 4.5    • Chloride 06/18/2021 104    • CO2 06/18/2021 26.7    • Calcium 06/18/2021 9.0    • Total Protein 06/18/2021 6.4    • Albumin 06/18/2021 3.65    • ALT (SGPT) 06/18/2021 18    • AST (SGOT) 06/18/2021 18    • Alkaline " Phosphatase 06/18/2021 68    • Total Bilirubin 06/18/2021 0.4    • eGFR Non  Amer 06/18/2021 71    • Globulin 06/18/2021 2.8    • A/G Ratio 06/18/2021 1.3    • BUN/Creatinine Ratio 06/18/2021 20.5    • Anion Gap 06/18/2021 9.3    • LDH 06/18/2021 186    • Uric Acid 06/18/2021 4.9    • Iron 06/18/2021 70    • Iron Saturation 06/18/2021 17*   • Transferrin 06/18/2021 280    • TIBC 06/18/2021 417    • Ferritin 06/18/2021 120.40    • WBC 06/18/2021 7.80    • RBC 06/18/2021 4.18    • Hemoglobin 06/18/2021 12.3    • Hematocrit 06/18/2021 39.2    • MCV 06/18/2021 93.8    • MCH 06/18/2021 29.4    • MCHC 06/18/2021 31.4*   • RDW 06/18/2021 13.3    • RDW-SD 06/18/2021 46.0    • MPV 06/18/2021 10.8    • Platelets 06/18/2021 277    • Neutrophil % 06/18/2021 57.5    • Lymphocyte % 06/18/2021 31.2    • Monocyte % 06/18/2021 9.2    • Eosinophil % 06/18/2021 1.2    • Basophil % 06/18/2021 0.6    • Immature Grans % 06/18/2021 0.3    • Neutrophils, Absolute 06/18/2021 4.49    • Lymphocytes, Absolute 06/18/2021 2.43    • Monocytes, Absolute 06/18/2021 0.72    • Eosinophils, Absolute 06/18/2021 0.09    • Basophils, Absolute 06/18/2021 0.05    • Immature Grans, Absolute 06/18/2021 0.02    • nRBC 06/18/2021 0.0    • Hemoglobin A1C 06/18/2021 8.10*   • TSH 06/18/2021 0.991    • Free T4 06/18/2021 1.38    • T3, Free 06/18/2021 2.62    • Vitamin B-12 06/18/2021 346    • 25 Hydroxy, Vitamin D 06/18/2021 24.5*   • Microalbumin, Urine 06/18/2021 5.0    Lab on 03/22/2021   Component Date Value   • Glucose 03/22/2021 198*   • BUN 03/22/2021 13    • Creatinine 03/22/2021 0.79    • Sodium 03/22/2021 138    • Potassium 03/22/2021 4.4    • Chloride 03/22/2021 103    • CO2 03/22/2021 24.2    • Calcium 03/22/2021 9.2    • Total Protein 03/22/2021 6.8    • Albumin 03/22/2021 4.02    • ALT (SGPT) 03/22/2021 20    • AST (SGOT) 03/22/2021 19    • Alkaline Phosphatase 03/22/2021 70    • Total Bilirubin 03/22/2021 0.4    • eGFR Non African Amer  03/22/2021 76    • Globulin 03/22/2021 2.8    • A/G Ratio 03/22/2021 1.4    • BUN/Creatinine Ratio 03/22/2021 16.5    • Anion Gap 03/22/2021 10.8    • LDH 03/22/2021 195    • Uric Acid 03/22/2021 5.2    • e13a2 (b2a2) Transcript 03/22/2021 Comment    • e14a2 (b3a2) Transcript 03/22/2021 Comment    • E1A2 transcript 03/22/2021 Comment    • Interpretation 03/22/2021 Negative    • Director Review 03/22/2021 Comment    • Background: 03/22/2021 Comment    • Methodology: 03/22/2021 Comment    • Total Cholesterol 03/22/2021 155    • Triglycerides 03/22/2021 136    • HDL Cholesterol 03/22/2021 54    • LDL Cholesterol  03/22/2021 77    • VLDL Cholesterol 03/22/2021 24    • LDL/HDL Ratio 03/22/2021 1.37    • Hemoglobin A1C 03/22/2021 8.20*   • T3, Free 03/22/2021 2.53    • Free T4 03/22/2021 1.28    • TSH 03/22/2021 1.510    • Vitamin B-12 03/22/2021 367    • 25 Hydroxy, Vitamin D 03/22/2021 23.1    • Microalbumin, Urine 03/22/2021 20.5    • WBC 03/22/2021 6.56    • RBC 03/22/2021 4.38    • Hemoglobin 03/22/2021 13.1    • Hematocrit 03/22/2021 40.7    • MCV 03/22/2021 92.9    • MCH 03/22/2021 29.9    • MCHC 03/22/2021 32.2    • RDW 03/22/2021 13.1    • RDW-SD 03/22/2021 44.7    • MPV 03/22/2021 10.5    • Platelets 03/22/2021 274    • Neutrophil % 03/22/2021 60.6    • Lymphocyte % 03/22/2021 25.3    • Monocyte % 03/22/2021 10.1    • Eosinophil % 03/22/2021 2.6    • Basophil % 03/22/2021 1.1    • Immature Grans % 03/22/2021 0.3    • Neutrophils, Absolute 03/22/2021 3.98    • Lymphocytes, Absolute 03/22/2021 1.66    • Monocytes, Absolute 03/22/2021 0.66    • Eosinophils, Absolute 03/22/2021 0.17    • Basophils, Absolute 03/22/2021 0.07    • Immature Grans, Absolute 03/22/2021 0.02    • nRBC 03/22/2021 0.0    Lab on 01/13/2021   Component Date Value   • Glucose 01/13/2021 115*   • BUN 01/13/2021 12    • Creatinine 01/13/2021 0.54*   • Sodium 01/13/2021 141    • Potassium 01/13/2021 4.5    • Chloride 01/13/2021 106    • CO2  2021 23.0    • Calcium 2021 9.2    • Total Protein 2021 6.1    • Albumin 2021 3.90    • ALT (SGPT) 2021 14    • AST (SGOT) 2021 21    • Alkaline Phosphatase 2021 61    • Total Bilirubin 2021 0.4    • eGFR Non African Amer 2021 117    • Globulin 2021 2.2    • A/G Ratio 2021 1.8    • BUN/Creatinine Ratio 2021 22.2    • Anion Gap 2021 12.0    • TSH 2021 1.960    • Vitamin B-12 2021 446    • Microalbumin, Urine 2021 2.7    • Iron 2021 66    • Iron Saturation 2021 16*   • Transferrin 2021 275    • TIBC 2021 410    • WBC 2021 6.10    • RBC 2021 4.12    • Hemoglobin 2021 12.6    • Hematocrit 2021 37.6    • MCV 2021 91.3    • MCH 2021 30.6    • MCHC 2021 33.5    • RDW 2021 12.9    • RDW-SD 2021 42.6    • MPV 2021 10.9    • Platelets 2021 272    • Neutrophil % 2021 56.1    • Lymphocyte % 2021 28.2    • Monocyte % 2021 10.8    • Eosinophil % 2021 3.6    • Basophil % 2021 0.8    • Immature Grans % 2021 0.5    • Neutrophils, Absolute 2021 3.42    • Lymphocytes, Absolute 2021 1.72    • Monocytes, Absolute 2021 0.66    • Eosinophils, Absolute 2021 0.22    • Basophils, Absolute 2021 0.05    • Immature Grans, Absolute 2021 0.03    • nRBC 2021 0.0    Transcribe Orders on 2021   Component Date Value   • Total Cholesterol 2021 141    • Triglycerides 2021 105    • HDL Cholesterol 2021 56    • LDL Cholesterol  2021 66    • VLDL Cholesterol 2021 19    • LDL/HDL Ratio 2021 1.14    • Hemoglobin A1C 2021 7.59*   • 25 Hydroxy, Vitamin D 2021 27.2*            [id number]: US ABDOMEN COMPLETE: Visualized pancreas is unremarkable. The imaged portion of the abdominal aorta is nondilated. The liver is homogeneous. There is no  intrahepatic ductal dilatation or focal hepatic mass. The imaged portion of the hepatic vessels and inferior vena cava are patent. The gallbladder has been removed. The common bile duct is normal, measuring 5.7 mm. The kidneys demonstrate no evidence of hydronephrosis or solid renal mass. The spleen is homogeneous and measures 13 cm. IMPRESSION: Spleen measures 13 cm and is homogeneous.           PATHOLOGY:    05/15/17: Bone Marrow Biopsy & Aspirate                     04/25/17: BCR ABL PCR        08/30/17: BCR ABL PCR        12/13/17: BCR ABL PCR        03/20/18: BCR ABL PCR:                                                                                                         06/20/18:                                08/2020:          12/3/20           03/22/21:            Assessment/Plan :  Aric Haro is a very pleasant 55 y.o.  female who presents in follow up appointment for further management and treatment of chronic phase chronic myelogenous leukemia.    1. Chronic Myelogenous Leukemia - CML (chronic phase)    2. Leukocytosis  3. Thrombocytosis  4. Iron deficiency - no longer on iron   5. Healthcare Maintenance    Peripheral smear concerning for an underlying myeloproliferative disorder. Her primary provider obtained a flow cytometry which did not reveal any significant abnormalities. I obtained a quantitative BCR ABL PCR positive for the b2a2/b3a2 (p210) and e1a2 (p190) fusion gene transcripts consistent with a diagnosis for CML. Bone marrow biopsy performed on initial diagnosis 5/17/17 and prior to starting Dasatinib treatment with results above and consistent with chronic phase CML.     To further evaluate for a myeloproliferative disorder I did also assess for JAK2 (V617F and exon 12)/MPL/CALR mutations which were negative. ESR, CRP, EMMANUEL, Rheumatoid factor, as well as an acute hepatitis panel and HIV test which were all negative. No iron deficiency seen. Abdominal ultrasound significant for a  spleen size 13.9cm.     For treatment of her CML she was started on Dasatinib 100mg oral daily. She does have a history significant for pancreatitis, therefore, I held on using Nilotinib. Patient however was unable to tolerate Dasatinib secondary to worsening reflux while being off of her PPI, and experiencing severe nausea, vomiting, and dehydration. She was then started on Imatinib 400mg daily and is overall tolerating this well. I have previously advised her of Ibuprofen and Tylenol use for her myalgias. She does also experience intermittent lower extremity edema (which she denies today). I did order baseline Echo which showed an intact EF. Therefore I did repeat echocardiogram to further evaluate and repeat echo showed an intact EF 61-65% which is stable.    She has had a complete hematological response, and BCR ABL PCR has normalized since start of Gleevac. This will need to continue to be monitored every three months to assess ongoing molecular response, today's level is <0.0032%. The blood counts have now stabilized therefore will continue to monitor every 3 months given stability.    She underwent a colonoscopy February 2018 with Dr. Esparza who recommended repeat colonoscopy in 3-5 years due to polyps that were removed. This was discusses with her today as it has been three years since her last colonoscopy. She has also received her Influenza and Hepatitis A vaccine. Iron has been discontinued several months ago and iron studies are normal.     6. ACO Quality measures  -  Aric Haro does not use tobacco products.  -  Patient's BMI has been discussed with her. BMI is above normal parameters. She has been changing her dietary habits and eating healthier.  -  Aric Haro received 2020 flu vaccine.  -  Aric Haro reports a pain score of 0.  Given her pain assessment as noted, treatment options were discussed and the following options were decided upon as a follow-up plan to address the patient's pain:  continuation of current treatment plan for pain.  -  Current outpatient and discharge medications have been reconciled for the patient.  Reviewed by: Evita Serrano MD      I will have the patient return for follow up visit in three months with labs one to two weeks prior. Lab orders have been placed. She understands that should she have any questions or concerns prior to her appointment she should give us a call at any time and I would be happy to see her sooner. It was a pleasure to see this patient in clinic today, thank you for allowing me to participate in the care of this patient.      Evita Serrano MD  06/25/21  09:48 EDT

## 2021-06-25 NOTE — PROGRESS NOTES
Specialty Pharmacy Note      Name:  Aric Haro  :  1965  Date:  2021         Past Medical History:   Diagnosis Date   • Anemia    • Cancer (CMS/HCC)     leukemia   • Diabetes mellitus (CMS/HCC)    • Elevated cholesterol    • GERD (gastroesophageal reflux disease)    • Hypertension    • PONV (postoperative nausea and vomiting)        Past Surgical History:   Procedure Laterality Date   • BLEPHAROPLASTY Bilateral 2018    Procedure: BLEPHAROPLASTY BOTH EYES UPPER EYELIDS (PT REQUESTED TERESITA WILKES;  Surgeon: Chris Haile MD;  Location: [facility];  Service: Ophthalmology   • COLONOSCOPY N/A 3/27/2018    Procedure: COLONOSCOPY FOR SCREENING  CPTCODE:70684;  Surgeon: Drew Esparza III, MD;  Location: [facility];  Service: Gastroenterology   • SINUS SURGERY     • SINUS SURGERY     • SLEEVE GASTROPLASTY         Social History     Socioeconomic History   • Marital status:      Spouse name: Not on file   • Number of children: Not on file   • Years of education: Not on file   • Highest education level: Not on file   Tobacco Use   • Smoking status: Former Smoker     Packs/day: 0.25     Years: 4.00     Pack years: 1.00   • Smokeless tobacco: Never Used   Vaping Use   • Vaping Use: Never used   Substance and Sexual Activity   • Alcohol use: No   • Drug use: No   • Sexual activity: Defer       Family History   Problem Relation Age of Onset   • Anemia Mother    • Hypertension Mother    • Cancer Mother    • COPD Father    • Heart disease Father    • Breast cancer Neg Hx        No Known Allergies    Current Outpatient Medications   Medication Sig Dispense Refill   • ALPRAZolam (XANAX) 1 MG tablet Take 1 tablet by mouth 2 times per day 60 tablet 0   • baclofen (LIORESAL) 20 MG tablet Take 1 tablet by mouth 3 (Three) Times a Day. 90 tablet 4   • celecoxib (CeleBREX) 200 MG capsule Take 1 capsule by mouth 2 (Two) Times a Day. 180 capsule 3   • Cholecalciferol (VITAMIN D3) 07653  units capsule Take 1 capsule by mouth 2 (Two) Times a Week. 24 capsule 3   • cyanocobalamin 1000 MCG/ML injection Administer 1 ml (1,000 mcg) intramuscularly once weekly 12 mL 2   • desloratadine-pseudoephedrine (CLARINEX-D 12-hour) 2.5-120 MG per tablet take 1 tablet by mouth 2 times every day 30 tablet 0   • DULoxetine (CYMBALTA) 60 MG capsule Take 1 capsule by mouth Daily. 90 capsule 3   • DULoxetine (CYMBALTA) 60 MG capsule Take 1 capsule by mouth daily 90 capsule 3   • DULoxetine (CYMBALTA) 60 MG capsule Take 1 capsule by mouth Daily. 90 capsule 3   • fenofibrate (TRICOR) 145 MG tablet Take 1 tablet by mouth Daily. 90 tablet 3   • fenofibrate (TRICOR) 145 MG tablet Take 1 tablet by mouth Daily. 90 tablet 3   • fenofibrate (TRICOR) 145 MG tablet Take 1 tablet by mouth daily 90 tablet 2   • fenofibrate 160 MG tablet Take 1 tablet by mouth Daily. 90 tablet 3   • ferrous sulfate 325 (65 FE) MG EC tablet Take 1 tablet by mouth Daily. 30 tablet 3   • fluconazole (DIFLUCAN) 200 MG tablet Take 1 tablet by mouth Daily. 30 tablet 1   • furosemide (LASIX) 40 MG tablet Take 1 tablet by mouth Daily. 90 tablet 3   • glucose blood (FREESTYLE TEST STRIPS) test strip USE 2 TIMES DAILY AS DIRECTED 200 each 2   • glucose blood test strip USE 1 STRIP 2 TIMES DAILY AS DIRECTED 60 enema 6   • glucose blood test strip USE 1 STRIP 2 TIMES DAILY AS DIRECTED 180 each 6   • HYDROcodone-acetaminophen (NORCO)  MG per tablet Take 1 tablet by mouth every 4 hours as needed for severe pain due to kidney stone 20 tablet 0   • ibuprofen (ADVIL,MOTRIN) 800 MG tablet Take 1 tablet by mouth 3 (Three) Times a Day As Needed. 270 tablet 3   • ibuprofen (ADVIL,MOTRIN) 800 MG tablet Take 1 tablet  by mouth 3 times per day with food or milk as needed 270 tablet 2   • imatinib (GLEEVEC) 400 MG chemo tablet Take 1 tablet by mouth Daily. 30 tablet 3   • Insulin Glargine (LANTUS SOLOSTAR) 100 UNIT/ML injection pen Inject 44 Units under the skin into  "the appropriate area as directed 2 (Two) Times a Day. 30 mL 3   • Insulin Glargine (LANTUS SOLOSTAR) 100 UNIT/ML injection pen Inject 100 Units under the skin into the appropriate area as directed Daily. 30 mL 2   • Insulin Glargine (Lantus SoloStar) 100 UNIT/ML injection pen Inject 100 Units under the skin into the appropriate area as directed Daily. 30 mL 2   • Insulin Glargine (Lantus SoloStar) 100 UNIT/ML injection pen Inject 100 Units under the skin into the appropriate area as directed Daily. 18 mL 3   • Insulin Syringe-Needle U-100 (TRUEplus Insulin Syringe) 31G X 5/16\" 0.5 ML misc Use to inject 3 times a day 90 each 4   • LANTUS SOLOSTAR 100 UNIT/ML injection pen Inject 45 Units under the skin into the appropriate area as directed 2 (Two) Times a Day. 36 mL 6   • loratadine-pseudoephedrine (Allergy Relief D-12) 5-120 MG per 12 hr tablet Take 1 tablet by mouth Every 12 (Twelve) Hours As Needed. 30 tablet 0   • mupirocin (BACTROBAN) 2 % ointment 1 application into each nostril as directed by provider 2 (Two) Times a Day. 22 g 2   • pantoprazole (PROTONIX) 40 MG EC tablet Take 1 tablet by mouth daily 90 tablet 2   • pantoprazole (PROTONIX) 40 MG EC tablet Take 1 tablet by mouth Daily. 90 tablet 2   • SUMAtriptan (IMITREX) 100 MG tablet Take 1 tablet by mouth after onset of migraine. May repeat after 2 hours if headache returns. Do not exceed 200mg in 24 hours. 10 tablet 2   • SUMAtriptan (IMITREX) 100 MG tablet Take 1 tablet by mouth after onset of migraine; may repeat once 27 tablet 2   • SUMAtriptan (IMITREX) 100 MG tablet Take 1 tablet by mouth 2 times per day at least 2 hours between doses as needed 27 tablet 2   • SUMAtriptan (IMITREX) 100 MG tablet Take 1 tablet by mouth 2 times per day at least 2 hours between doses as needed 27 tablet 2   • Syringe/Needle, Disp, 25G X 1\" 3 ML misc USE 1 AS DIRECTED WITH B12 1 each 7   • triamcinolone (KENALOG) 0.1 % cream Apply a Thin Layer to Affected Area Topically " Twice Daily 240 g 5   • triamcinolone (KENALOG) 0.1 % cream Apply 1 application topically to affected area daily as needed 80 g 3     No current facility-administered medications for this visit.         LABORATORY:    Lab Results   Component Value Date    WBC 7.80 06/18/2021    HGB 12.3 06/18/2021    HCT 39.2 06/18/2021    MCV 93.8 06/18/2021    RDW 13.3 06/18/2021     06/18/2021    NEUTRORELPCT 57.5 06/18/2021    LYMPHORELPCT 31.2 06/18/2021    MONORELPCT 9.2 06/18/2021    EOSRELPCT 1.2 06/18/2021    BASORELPCT 0.6 06/18/2021    NEUTROABS 4.49 06/18/2021    LYMPHSABS 2.43 06/18/2021       Lab Results   Component Value Date     06/18/2021    K 4.5 06/18/2021    CO2 26.7 06/18/2021     06/18/2021    BUN 17 06/18/2021    CREATININE 0.83 06/18/2021    GLUCOSE 110 (H) 06/18/2021    CALCIUM 9.0 06/18/2021    ALKPHOS 68 06/18/2021    AST 18 06/18/2021    ALT 18 06/18/2021    BILITOT 0.4 06/18/2021    ALBUMIN 3.65 06/18/2021    PROTEINTOT 6.4 06/18/2021    PHOS 3.4 10/03/2018       Lab Results   Component Value Date     06/18/2021    URICACID 4.9 06/18/2021        Imaging Results (Last 24 Hours)     ** No results found for the last 24 hours. **          ASSESSMENT/PLAN:    Patient Update Assessment (new medications, allergies, medical history): No updates reported  Medication(s): Maintenance meds    Currently Taking Medication(s): As directed    Prior Authorization Status: Approved    Financial Assistance Status: None needed - $0 co-pay    Any Issues Identified: None    Appropriate to Process Prescription(s): Refills due    Counseling Offered: Declined    Next Specialty Pharmacy Visit: ~30 days

## 2021-07-19 NOTE — PROGRESS NOTES
Specialty Pharmacy Note      Name:  Aric Haro  :  1965  Date:  2021         Past Medical History:   Diagnosis Date   • Anemia    • Cancer (CMS/HCC)     leukemia   • Diabetes mellitus (CMS/HCC)    • Elevated cholesterol    • GERD (gastroesophageal reflux disease)    • Hypertension    • PONV (postoperative nausea and vomiting)        Past Surgical History:   Procedure Laterality Date   • BLEPHAROPLASTY Bilateral 2018    Procedure: BLEPHAROPLASTY BOTH EYES UPPER EYELIDS (PT REQUESTED TERESITA WILKES;  Surgeon: Chris Haile MD;  Location: [facility];  Service: Ophthalmology   • COLONOSCOPY N/A 3/27/2018    Procedure: COLONOSCOPY FOR SCREENING  CPTCODE:52331;  Surgeon: Drew Esparza III, MD;  Location: [facility];  Service: Gastroenterology   • SINUS SURGERY     • SINUS SURGERY     • SLEEVE GASTROPLASTY         Social History     Socioeconomic History   • Marital status:      Spouse name: Not on file   • Number of children: Not on file   • Years of education: Not on file   • Highest education level: Not on file   Tobacco Use   • Smoking status: Former Smoker     Packs/day: 0.25     Years: 4.00     Pack years: 1.00   • Smokeless tobacco: Never Used   Vaping Use   • Vaping Use: Never used   Substance and Sexual Activity   • Alcohol use: No   • Drug use: No   • Sexual activity: Defer       Family History   Problem Relation Age of Onset   • Anemia Mother    • Hypertension Mother    • Cancer Mother    • COPD Father    • Heart disease Father    • Breast cancer Neg Hx        No Known Allergies    Current Outpatient Medications   Medication Sig Dispense Refill   • ALPRAZolam (XANAX) 1 MG tablet Take 1 tablet by mouth 2 times per day 60 tablet 0   • baclofen (LIORESAL) 20 MG tablet Take 1 tablet by mouth 3 (Three) Times a Day. 90 tablet 4   • celecoxib (CeleBREX) 200 MG capsule Take 1 capsule by mouth 2 (Two) Times a Day. 180 capsule 3   • Cholecalciferol (VITAMIN D3) 58250  units capsule Take 1 capsule by mouth 2 (Two) Times a Week. 24 capsule 3   • cyanocobalamin 1000 MCG/ML injection Administer 1 ml (1,000 mcg) intramuscularly once weekly 12 mL 2   • desloratadine-pseudoephedrine (CLARINEX-D 12-hour) 2.5-120 MG per tablet take 1 tablet by mouth 2 times every day 30 tablet 0   • DULoxetine (CYMBALTA) 60 MG capsule Take 1 capsule by mouth Daily. 90 capsule 3   • DULoxetine (CYMBALTA) 60 MG capsule Take 1 capsule by mouth daily 90 capsule 3   • DULoxetine (CYMBALTA) 60 MG capsule Take 1 capsule by mouth Daily. 90 capsule 3   • fenofibrate (TRICOR) 145 MG tablet Take 1 tablet by mouth Daily. 90 tablet 3   • fenofibrate (TRICOR) 145 MG tablet Take 1 tablet by mouth Daily. 90 tablet 3   • fenofibrate (TRICOR) 145 MG tablet Take 1 tablet by mouth daily 90 tablet 2   • fenofibrate 160 MG tablet Take 1 tablet by mouth Daily. 90 tablet 3   • ferrous sulfate 325 (65 FE) MG EC tablet Take 1 tablet by mouth Daily. 30 tablet 3   • fluconazole (DIFLUCAN) 200 MG tablet Take 1 tablet by mouth Daily. 30 tablet 1   • furosemide (LASIX) 40 MG tablet Take 1 tablet by mouth Daily. 90 tablet 3   • glucose blood (FREESTYLE TEST STRIPS) test strip USE 2 TIMES DAILY AS DIRECTED 200 each 2   • glucose blood test strip USE 1 STRIP 2 TIMES DAILY AS DIRECTED 60 enema 6   • glucose blood test strip USE 1 STRIP 2 TIMES DAILY AS DIRECTED 180 each 6   • HYDROcodone-acetaminophen (NORCO)  MG per tablet Take 1 tablet by mouth every 4 hours as needed for severe pain due to kidney stone 20 tablet 0   • ibuprofen (ADVIL,MOTRIN) 800 MG tablet Take 1 tablet by mouth 3 (Three) Times a Day As Needed. 270 tablet 3   • ibuprofen (ADVIL,MOTRIN) 800 MG tablet Take 1 tablet  by mouth 3 times per day with food or milk as needed 270 tablet 2   • imatinib (GLEEVEC) 400 MG chemo tablet Take 1 tablet by mouth Daily. 30 tablet 3   • Insulin Glargine (LANTUS SOLOSTAR) 100 UNIT/ML injection pen Inject 44 Units under the skin into  "the appropriate area as directed 2 (Two) Times a Day. 30 mL 3   • Insulin Glargine (LANTUS SOLOSTAR) 100 UNIT/ML injection pen Inject 100 Units under the skin into the appropriate area as directed Daily. 30 mL 2   • Insulin Glargine (Lantus SoloStar) 100 UNIT/ML injection pen Inject 100 Units under the skin into the appropriate area as directed Daily. 30 mL 2   • Insulin Glargine (Lantus SoloStar) 100 UNIT/ML injection pen Inject 100 Units under the skin into the appropriate area as directed Daily. 18 mL 3   • Insulin Syringe-Needle U-100 (TRUEplus Insulin Syringe) 31G X 5/16\" 0.5 ML misc Use to inject 3 times a day 90 each 4   • LANTUS SOLOSTAR 100 UNIT/ML injection pen Inject 45 Units under the skin into the appropriate area as directed 2 (Two) Times a Day. 36 mL 6   • loratadine-pseudoephedrine (Allergy Relief D-12) 5-120 MG per 12 hr tablet Take 1 tablet by mouth Every 12 (Twelve) Hours As Needed. 30 tablet 0   • mupirocin (BACTROBAN) 2 % ointment 1 application into each nostril as directed by provider 2 (Two) Times a Day. 22 g 2   • pantoprazole (PROTONIX) 40 MG EC tablet Take 1 tablet by mouth daily 90 tablet 2   • pantoprazole (PROTONIX) 40 MG EC tablet Take 1 tablet by mouth Daily. 90 tablet 2   • SUMAtriptan (IMITREX) 100 MG tablet Take 1 tablet by mouth after onset of migraine. May repeat after 2 hours if headache returns. Do not exceed 200mg in 24 hours. 10 tablet 2   • SUMAtriptan (IMITREX) 100 MG tablet Take 1 tablet by mouth after onset of migraine; may repeat once 27 tablet 2   • SUMAtriptan (IMITREX) 100 MG tablet Take 1 tablet by mouth 2 times per day at least 2 hours between doses as needed 27 tablet 2   • SUMAtriptan (IMITREX) 100 MG tablet Take 1 tablet by mouth 2 times per day at least 2 hours between doses as needed 27 tablet 2   • Syringe/Needle, Disp, 25G X 1\" 3 ML misc USE 1 AS DIRECTED WITH B12 1 each 7   • triamcinolone (KENALOG) 0.1 % cream Apply a Thin Layer to Affected Area Topically " Twice Daily 240 g 5   • triamcinolone (KENALOG) 0.1 % cream Apply 1 application topically to affected area daily as needed 80 g 3     No current facility-administered medications for this visit.         LABORATORY:    Lab Results   Component Value Date    WBC 7.80 06/18/2021    HGB 12.3 06/18/2021    HCT 39.2 06/18/2021    MCV 93.8 06/18/2021    RDW 13.3 06/18/2021     06/18/2021    NEUTRORELPCT 57.5 06/18/2021    LYMPHORELPCT 31.2 06/18/2021    MONORELPCT 9.2 06/18/2021    EOSRELPCT 1.2 06/18/2021    BASORELPCT 0.6 06/18/2021    NEUTROABS 4.49 06/18/2021    LYMPHSABS 2.43 06/18/2021       Lab Results   Component Value Date     06/18/2021    K 4.5 06/18/2021    CO2 26.7 06/18/2021     06/18/2021    BUN 17 06/18/2021    CREATININE 0.83 06/18/2021    GLUCOSE 110 (H) 06/18/2021    CALCIUM 9.0 06/18/2021    ALKPHOS 68 06/18/2021    AST 18 06/18/2021    ALT 18 06/18/2021    BILITOT 0.4 06/18/2021    ALBUMIN 3.65 06/18/2021    PROTEINTOT 6.4 06/18/2021    PHOS 3.4 10/03/2018       Lab Results   Component Value Date     06/18/2021    URICACID 4.9 06/18/2021        Imaging Results (Last 24 Hours)     ** No results found for the last 24 hours. **          ASSESSMENT/PLAN:    Patient Update Assessment (new medications, allergies, medical history): No updates reported    Medication(s): Gleevec, Pantoprazole, Ibuprofen, Duloxetine    Currently Taking Medication(s): As directed    Prior Authorization Status: Approved    Financial Assistance Status: None needed    Any Issues Identified: None    Appropriate to Process Prescription(s): Refill due    Counseling Offered: (Spoke with apothecary)    Next Specialty Pharmacy Visit: ~30 days

## 2021-07-20 ENCOUNTER — SPECIALTY PHARMACY (OUTPATIENT)
Dept: PHARMACY | Facility: HOSPITAL | Age: 56
End: 2021-07-20

## 2021-08-13 DIAGNOSIS — C92.10 CML (CHRONIC MYELOCYTIC LEUKEMIA) (HCC): ICD-10-CM

## 2021-08-13 RX ORDER — IMATINIB MESYLATE 400 MG/1
400 TABLET, FILM COATED ORAL DAILY
Qty: 30 TABLET | Refills: 3 | Status: SHIPPED | OUTPATIENT
Start: 2021-08-13 | End: 2021-12-02 | Stop reason: SDUPTHER

## 2021-08-16 ENCOUNTER — SPECIALTY PHARMACY (OUTPATIENT)
Dept: PHARMACY | Facility: HOSPITAL | Age: 56
End: 2021-08-16

## 2021-08-16 NOTE — PROGRESS NOTES
Specialty Pharmacy Note      Name:  Aric Haro  :  1965  Date:  2021         Past Medical History:   Diagnosis Date   • Anemia    • Cancer (CMS/HCC)     leukemia   • Diabetes mellitus (CMS/HCC)    • Elevated cholesterol    • GERD (gastroesophageal reflux disease)    • Hypertension    • PONV (postoperative nausea and vomiting)        Past Surgical History:   Procedure Laterality Date   • BLEPHAROPLASTY Bilateral 2018    Procedure: BLEPHAROPLASTY BOTH EYES UPPER EYELIDS (PT REQUESTED TERESITA WILKES;  Surgeon: Chris Haile MD;  Location: [facility];  Service: Ophthalmology   • COLONOSCOPY N/A 3/27/2018    Procedure: COLONOSCOPY FOR SCREENING  CPTCODE:45408;  Surgeon: Drew Esparza III, MD;  Location: [facility];  Service: Gastroenterology   • SINUS SURGERY     • SINUS SURGERY     • SLEEVE GASTROPLASTY         Social History     Socioeconomic History   • Marital status:      Spouse name: Not on file   • Number of children: Not on file   • Years of education: Not on file   • Highest education level: Not on file   Tobacco Use   • Smoking status: Former Smoker     Packs/day: 0.25     Years: 4.00     Pack years: 1.00   • Smokeless tobacco: Never Used   Vaping Use   • Vaping Use: Never used   Substance and Sexual Activity   • Alcohol use: No   • Drug use: No   • Sexual activity: Defer       Family History   Problem Relation Age of Onset   • Anemia Mother    • Hypertension Mother    • Cancer Mother    • COPD Father    • Heart disease Father    • Breast cancer Neg Hx        No Known Allergies    Current Outpatient Medications   Medication Sig Dispense Refill   • ALPRAZolam (XANAX) 1 MG tablet Take 1 tablet by mouth 2 times per day 60 tablet 0   • baclofen (LIORESAL) 20 MG tablet Take 1 tablet by mouth 3 (Three) Times a Day. 90 tablet 4   • celecoxib (CeleBREX) 200 MG capsule Take 1 capsule by mouth 2 (Two) Times a Day. 180 capsule 3   • Cholecalciferol (VITAMIN D3) 36188  units capsule Take 1 capsule by mouth 2 (Two) Times a Week. 24 capsule 3   • cyanocobalamin 1000 MCG/ML injection Administer 1 ml (1,000 mcg) intramuscularly once weekly 12 mL 2   • desloratadine-pseudoephedrine (CLARINEX-D 12-hour) 2.5-120 MG per tablet take 1 tablet by mouth 2 times every day 30 tablet 0   • DULoxetine (CYMBALTA) 60 MG capsule Take 1 capsule by mouth Daily. 90 capsule 3   • DULoxetine (CYMBALTA) 60 MG capsule Take 1 capsule by mouth daily 90 capsule 3   • DULoxetine (CYMBALTA) 60 MG capsule Take 1 capsule by mouth Daily. 90 capsule 3   • fenofibrate (TRICOR) 145 MG tablet Take 1 tablet by mouth Daily. 90 tablet 3   • fenofibrate (TRICOR) 145 MG tablet Take 1 tablet by mouth Daily. 90 tablet 3   • fenofibrate (TRICOR) 145 MG tablet Take 1 tablet by mouth daily 90 tablet 2   • fenofibrate 160 MG tablet Take 1 tablet by mouth Daily. 90 tablet 3   • ferrous sulfate 325 (65 FE) MG EC tablet Take 1 tablet by mouth Daily. 30 tablet 3   • fluconazole (DIFLUCAN) 200 MG tablet Take 1 tablet by mouth Daily. 30 tablet 1   • furosemide (LASIX) 40 MG tablet Take 1 tablet by mouth Daily. 90 tablet 3   • glucose blood (FREESTYLE TEST STRIPS) test strip USE 2 TIMES DAILY AS DIRECTED 200 each 2   • glucose blood test strip USE 1 STRIP 2 TIMES DAILY AS DIRECTED 60 enema 6   • glucose blood test strip USE 1 STRIP 2 TIMES DAILY AS DIRECTED 180 each 6   • HYDROcodone-acetaminophen (NORCO)  MG per tablet Take 1 tablet by mouth every 4 hours as needed for severe pain due to kidney stone 20 tablet 0   • ibuprofen (ADVIL,MOTRIN) 800 MG tablet Take 1 tablet by mouth 3 (Three) Times a Day As Needed. 270 tablet 3   • ibuprofen (ADVIL,MOTRIN) 800 MG tablet Take 1 tablet  by mouth 3 times per day with food or milk as needed 270 tablet 2   • imatinib (GLEEVEC) 400 MG chemo tablet Take 1 tablet by mouth Daily. 30 tablet 3   • Insulin Glargine (LANTUS SOLOSTAR) 100 UNIT/ML injection pen Inject 44 Units under the skin into  "the appropriate area as directed 2 (Two) Times a Day. 30 mL 3   • Insulin Glargine (LANTUS SOLOSTAR) 100 UNIT/ML injection pen Inject 100 Units under the skin into the appropriate area as directed Daily. 30 mL 2   • Insulin Glargine (Lantus SoloStar) 100 UNIT/ML injection pen Inject 100 Units under the skin into the appropriate area as directed Daily. 30 mL 2   • Insulin Glargine (Lantus SoloStar) 100 UNIT/ML injection pen Inject 100 Units under the skin into the appropriate area as directed Daily. 18 mL 3   • Insulin Syringe-Needle U-100 (TRUEplus Insulin Syringe) 31G X 5/16\" 0.5 ML misc Use to inject 3 times a day 90 each 4   • LANTUS SOLOSTAR 100 UNIT/ML injection pen Inject 45 Units under the skin into the appropriate area as directed 2 (Two) Times a Day. 36 mL 6   • loratadine-pseudoephedrine (Allergy Relief D-12) 5-120 MG per 12 hr tablet Take 1 tablet by mouth Every 12 (Twelve) Hours As Needed. 30 tablet 0   • mupirocin (BACTROBAN) 2 % ointment 1 application into each nostril as directed by provider 2 (Two) Times a Day. 22 g 2   • pantoprazole (PROTONIX) 40 MG EC tablet Take 1 tablet by mouth daily 90 tablet 2   • pantoprazole (PROTONIX) 40 MG EC tablet Take 1 tablet by mouth Daily. 90 tablet 2   • SUMAtriptan (IMITREX) 100 MG tablet Take 1 tablet by mouth after onset of migraine. May repeat after 2 hours if headache returns. Do not exceed 200mg in 24 hours. 10 tablet 2   • SUMAtriptan (IMITREX) 100 MG tablet Take 1 tablet by mouth after onset of migraine; may repeat once 27 tablet 2   • SUMAtriptan (IMITREX) 100 MG tablet Take 1 tablet by mouth 2 times per day at least 2 hours between doses as needed 27 tablet 2   • SUMAtriptan (IMITREX) 100 MG tablet Take 1 tablet by mouth 2 times per day at least 2 hours between doses as needed 27 tablet 2   • Syringe/Needle, Disp, 25G X 1\" 3 ML misc USE 1 AS DIRECTED WITH B12 1 each 7   • triamcinolone (KENALOG) 0.1 % cream Apply a Thin Layer to Affected Area Topically " Twice Daily 240 g 5   • triamcinolone (KENALOG) 0.1 % cream Apply 1 application topically to affected area daily as needed 80 g 3     No current facility-administered medications for this visit.         LABORATORY:    Lab Results   Component Value Date    WBC 7.80 06/18/2021    HGB 12.3 06/18/2021    HCT 39.2 06/18/2021    MCV 93.8 06/18/2021    RDW 13.3 06/18/2021     06/18/2021    NEUTRORELPCT 57.5 06/18/2021    LYMPHORELPCT 31.2 06/18/2021    MONORELPCT 9.2 06/18/2021    EOSRELPCT 1.2 06/18/2021    BASORELPCT 0.6 06/18/2021    NEUTROABS 4.49 06/18/2021    LYMPHSABS 2.43 06/18/2021       Lab Results   Component Value Date     06/18/2021    K 4.5 06/18/2021    CO2 26.7 06/18/2021     06/18/2021    BUN 17 06/18/2021    CREATININE 0.83 06/18/2021    GLUCOSE 110 (H) 06/18/2021    CALCIUM 9.0 06/18/2021    ALKPHOS 68 06/18/2021    AST 18 06/18/2021    ALT 18 06/18/2021    BILITOT 0.4 06/18/2021    ALBUMIN 3.65 06/18/2021    PROTEINTOT 6.4 06/18/2021    PHOS 3.4 10/03/2018       Lab Results   Component Value Date     06/18/2021    URICACID 4.9 06/18/2021        Imaging Results (Last 24 Hours)     ** No results found for the last 24 hours. **          ASSESSMENT/PLAN:    Patient Update Assessment (new medications, allergies, medical history): No updates reported    Medication(s): Gleevec    Currently Taking Medication(s): As directed    Experiencing Side Effects: None reported    Prior Authorization Status: Approved    Financial Assistance Status: None    Any Issues Identified: None    Appropriate to Process Prescription(s): Refill due    Counseling Offered: Declined    Next Specialty Pharmacy Visit: ~30 days

## 2021-09-01 ENCOUNTER — LAB (OUTPATIENT)
Dept: ONCOLOGY | Facility: CLINIC | Age: 56
End: 2021-09-01

## 2021-09-01 VITALS
DIASTOLIC BLOOD PRESSURE: 75 MMHG | RESPIRATION RATE: 18 BRPM | HEART RATE: 92 BPM | TEMPERATURE: 97.5 F | SYSTOLIC BLOOD PRESSURE: 133 MMHG | OXYGEN SATURATION: 98 %

## 2021-09-01 DIAGNOSIS — C92.10 CML (CHRONIC MYELOCYTIC LEUKEMIA) (HCC): ICD-10-CM

## 2021-09-01 DIAGNOSIS — D50.9 IRON DEFICIENCY ANEMIA, UNSPECIFIED IRON DEFICIENCY ANEMIA TYPE: ICD-10-CM

## 2021-09-01 LAB
ALBUMIN SERPL-MCNC: 3.75 G/DL (ref 3.5–5.2)
ALBUMIN/GLOB SERPL: 1.5 G/DL
ALP SERPL-CCNC: 74 U/L (ref 39–117)
ALT SERPL W P-5'-P-CCNC: 21 U/L (ref 1–33)
ANION GAP SERPL CALCULATED.3IONS-SCNC: 10.9 MMOL/L (ref 5–15)
AST SERPL-CCNC: 20 U/L (ref 1–32)
BASOPHILS # BLD AUTO: 0.06 10*3/MM3 (ref 0–0.2)
BASOPHILS NFR BLD AUTO: 1 % (ref 0–1.5)
BILIRUB SERPL-MCNC: 0.4 MG/DL (ref 0–1.2)
BUN SERPL-MCNC: 11 MG/DL (ref 6–20)
BUN/CREAT SERPL: 15.3 (ref 7–25)
CALCIUM SPEC-SCNC: 8.8 MG/DL (ref 8.6–10.5)
CHLORIDE SERPL-SCNC: 104 MMOL/L (ref 98–107)
CO2 SERPL-SCNC: 24.1 MMOL/L (ref 22–29)
CREAT SERPL-MCNC: 0.72 MG/DL (ref 0.57–1)
DEPRECATED RDW RBC AUTO: 43.8 FL (ref 37–54)
EOSINOPHIL # BLD AUTO: 0.25 10*3/MM3 (ref 0–0.4)
EOSINOPHIL NFR BLD AUTO: 4.3 % (ref 0.3–6.2)
ERYTHROCYTE [DISTWIDTH] IN BLOOD BY AUTOMATED COUNT: 13.3 % (ref 12.3–15.4)
FERRITIN SERPL-MCNC: 122 NG/ML (ref 13–150)
GFR SERPL CREATININE-BSD FRML MDRD: 84 ML/MIN/1.73
GLOBULIN UR ELPH-MCNC: 2.6 GM/DL
GLUCOSE SERPL-MCNC: 174 MG/DL (ref 65–99)
HCT VFR BLD AUTO: 38.2 % (ref 34–46.6)
HGB BLD-MCNC: 12.6 G/DL (ref 12–15.9)
IMM GRANULOCYTES # BLD AUTO: 0.02 10*3/MM3 (ref 0–0.05)
IMM GRANULOCYTES NFR BLD AUTO: 0.3 % (ref 0–0.5)
IRON 24H UR-MRATE: 76 MCG/DL (ref 37–145)
IRON SATN MFR SERPL: 19 % (ref 20–50)
LDH SERPL-CCNC: 182 U/L (ref 135–214)
LYMPHOCYTES # BLD AUTO: 2.02 10*3/MM3 (ref 0.7–3.1)
LYMPHOCYTES NFR BLD AUTO: 34.6 % (ref 19.6–45.3)
MCH RBC QN AUTO: 29.9 PG (ref 26.6–33)
MCHC RBC AUTO-ENTMCNC: 33 G/DL (ref 31.5–35.7)
MCV RBC AUTO: 90.7 FL (ref 79–97)
MONOCYTES # BLD AUTO: 0.55 10*3/MM3 (ref 0.1–0.9)
MONOCYTES NFR BLD AUTO: 9.4 % (ref 5–12)
NEUTROPHILS NFR BLD AUTO: 2.94 10*3/MM3 (ref 1.7–7)
NEUTROPHILS NFR BLD AUTO: 50.4 % (ref 42.7–76)
NRBC BLD AUTO-RTO: 0 /100 WBC (ref 0–0.2)
PLATELET # BLD AUTO: 243 10*3/MM3 (ref 140–450)
PMV BLD AUTO: 10.6 FL (ref 6–12)
POTASSIUM SERPL-SCNC: 4 MMOL/L (ref 3.5–5.2)
PROT SERPL-MCNC: 6.3 G/DL (ref 6–8.5)
RBC # BLD AUTO: 4.21 10*6/MM3 (ref 3.77–5.28)
SODIUM SERPL-SCNC: 139 MMOL/L (ref 136–145)
TIBC SERPL-MCNC: 408 MCG/DL (ref 298–536)
TRANSFERRIN SERPL-MCNC: 274 MG/DL (ref 200–360)
WBC # BLD AUTO: 5.84 10*3/MM3 (ref 3.4–10.8)

## 2021-09-01 PROCEDURE — 81206 BCR/ABL1 GENE MAJOR BP: CPT

## 2021-09-01 PROCEDURE — 81207 BCR/ABL1 GENE MINOR BP: CPT

## 2021-09-01 PROCEDURE — 83540 ASSAY OF IRON: CPT | Performed by: INTERNAL MEDICINE

## 2021-09-01 PROCEDURE — 84466 ASSAY OF TRANSFERRIN: CPT | Performed by: INTERNAL MEDICINE

## 2021-09-01 PROCEDURE — 83615 LACTATE (LD) (LDH) ENZYME: CPT | Performed by: INTERNAL MEDICINE

## 2021-09-01 PROCEDURE — 85025 COMPLETE CBC W/AUTO DIFF WBC: CPT | Performed by: INTERNAL MEDICINE

## 2021-09-01 PROCEDURE — 82728 ASSAY OF FERRITIN: CPT | Performed by: INTERNAL MEDICINE

## 2021-09-01 PROCEDURE — 80053 COMPREHEN METABOLIC PANEL: CPT | Performed by: INTERNAL MEDICINE

## 2021-09-09 ENCOUNTER — SPECIALTY PHARMACY (OUTPATIENT)
Dept: PHARMACY | Facility: HOSPITAL | Age: 56
End: 2021-09-09

## 2021-09-09 NOTE — PROGRESS NOTES
Specialty Pharmacy Note      Name:  Aric Haro  :  1965  Date:  2021         Past Medical History:   Diagnosis Date   • Anemia    • Cancer (CMS/HCC)     leukemia   • Diabetes mellitus (CMS/HCC)    • Elevated cholesterol    • GERD (gastroesophageal reflux disease)    • Hypertension    • PONV (postoperative nausea and vomiting)        Past Surgical History:   Procedure Laterality Date   • BLEPHAROPLASTY Bilateral 2018    Procedure: BLEPHAROPLASTY BOTH EYES UPPER EYELIDS (PT REQUESTED TERESITA WILKES;  Surgeon: Chris Haile MD;  Location: [facility];  Service: Ophthalmology   • COLONOSCOPY N/A 3/27/2018    Procedure: COLONOSCOPY FOR SCREENING  CPTCODE:72334;  Surgeon: Drew Esparza III, MD;  Location: [facility];  Service: Gastroenterology   • SINUS SURGERY     • SINUS SURGERY     • SLEEVE GASTROPLASTY         Social History     Socioeconomic History   • Marital status:      Spouse name: Not on file   • Number of children: Not on file   • Years of education: Not on file   • Highest education level: Not on file   Tobacco Use   • Smoking status: Former Smoker     Packs/day: 0.25     Years: 4.00     Pack years: 1.00   • Smokeless tobacco: Never Used   Vaping Use   • Vaping Use: Never used   Substance and Sexual Activity   • Alcohol use: No   • Drug use: No   • Sexual activity: Defer       Family History   Problem Relation Age of Onset   • Anemia Mother    • Hypertension Mother    • Cancer Mother    • COPD Father    • Heart disease Father    • Breast cancer Neg Hx        No Known Allergies    Current Outpatient Medications   Medication Sig Dispense Refill   • ALPRAZolam (XANAX) 1 MG tablet Take 1 tablet by mouth 2 times per day 60 tablet 0   • baclofen (LIORESAL) 20 MG tablet Take 1 tablet by mouth 3 (Three) Times a Day. 90 tablet 4   • celecoxib (CeleBREX) 200 MG capsule Take 1 capsule by mouth 2 (Two) Times a Day. 180 capsule 3   • Cholecalciferol (VITAMIN D3) 88571 units  capsule Take 1 capsule by mouth 2 (Two) Times a Week. 24 capsule 3   • cyanocobalamin 1000 MCG/ML injection Administer 1 ml (1,000 mcg) intramuscularly once weekly 12 mL 2   • desloratadine-pseudoephedrine (CLARINEX-D 12-hour) 2.5-120 MG per tablet take 1 tablet by mouth 2 times every day 30 tablet 0   • DULoxetine (CYMBALTA) 60 MG capsule Take 1 capsule by mouth Daily. 90 capsule 3   • DULoxetine (CYMBALTA) 60 MG capsule Take 1 capsule by mouth daily 90 capsule 3   • DULoxetine (CYMBALTA) 60 MG capsule Take 1 capsule by mouth Daily. 90 capsule 3   • fenofibrate (TRICOR) 145 MG tablet Take 1 tablet by mouth Daily. 90 tablet 3   • fenofibrate (TRICOR) 145 MG tablet Take 1 tablet by mouth Daily. 90 tablet 3   • fenofibrate (TRICOR) 145 MG tablet Take 1 tablet by mouth daily 90 tablet 2   • fenofibrate 160 MG tablet Take 1 tablet by mouth Daily. 90 tablet 3   • ferrous sulfate 325 (65 FE) MG EC tablet Take 1 tablet by mouth Daily. 30 tablet 3   • fluconazole (DIFLUCAN) 200 MG tablet Take 1 tablet by mouth Daily. 30 tablet 1   • furosemide (LASIX) 40 MG tablet Take 1 tablet by mouth Daily. 90 tablet 3   • glucose blood (FREESTYLE TEST STRIPS) test strip USE 2 TIMES DAILY AS DIRECTED 200 each 2   • glucose blood test strip USE 1 STRIP 2 TIMES DAILY AS DIRECTED 60 enema 6   • glucose blood test strip USE 1 STRIP 2 TIMES DAILY AS DIRECTED 180 each 6   • HYDROcodone-acetaminophen (NORCO)  MG per tablet Take 1 tablet by mouth every 4 hours as needed for severe pain due to kidney stone 20 tablet 0   • ibuprofen (ADVIL,MOTRIN) 800 MG tablet Take 1 tablet by mouth 3 (Three) Times a Day As Needed. 270 tablet 3   • ibuprofen (ADVIL,MOTRIN) 800 MG tablet Take 1 tablet  by mouth 3 times per day with food or milk as needed 270 tablet 2   • imatinib (GLEEVEC) 400 MG chemo tablet Take 1 tablet by mouth Daily. 30 tablet 3   • Insulin Glargine (LANTUS SOLOSTAR) 100 UNIT/ML injection pen Inject 44 Units under the skin into the  "appropriate area as directed 2 (Two) Times a Day. 30 mL 3   • Insulin Glargine (LANTUS SOLOSTAR) 100 UNIT/ML injection pen Inject 100 Units under the skin into the appropriate area as directed Daily. 30 mL 2   • Insulin Glargine (Lantus SoloStar) 100 UNIT/ML injection pen Inject 100 Units under the skin into the appropriate area as directed Daily. 30 mL 2   • Insulin Glargine (Lantus SoloStar) 100 UNIT/ML injection pen Inject 100 Units under the skin into the appropriate area as directed Daily. 18 mL 3   • Insulin Glargine (Lantus SoloStar) 100 UNIT/ML injection pen Inject 45 Units under the skin into the appropriate area as directed 2 (two) times a day. 30 mL 0   • Insulin Glargine (Lantus SoloStar) 100 UNIT/ML injection pen Inject 100 Units under the skin into the appropriate area as directed Daily. 30 mL 0   • Insulin Syringe-Needle U-100 (TRUEplus Insulin Syringe) 31G X 5/16\" 0.5 ML misc Use to inject 3 times a day 90 each 4   • LANTUS SOLOSTAR 100 UNIT/ML injection pen Inject 45 Units under the skin into the appropriate area as directed 2 (Two) Times a Day. 36 mL 6   • loratadine-pseudoephedrine (Allergy Relief D-12) 5-120 MG per 12 hr tablet Take 1 tablet by mouth Every 12 (Twelve) Hours As Needed. 30 tablet 0   • mupirocin (BACTROBAN) 2 % ointment 1 application into each nostril as directed by provider 2 (Two) Times a Day. 22 g 2   • pantoprazole (PROTONIX) 40 MG EC tablet Take 1 tablet by mouth daily 90 tablet 2   • pantoprazole (PROTONIX) 40 MG EC tablet Take 1 tablet by mouth Daily. 90 tablet 2   • SUMAtriptan (IMITREX) 100 MG tablet Take 1 tablet by mouth after onset of migraine. May repeat after 2 hours if headache returns. Do not exceed 200mg in 24 hours. 10 tablet 2   • SUMAtriptan (IMITREX) 100 MG tablet Take 1 tablet by mouth after onset of migraine; may repeat once 27 tablet 2   • SUMAtriptan (IMITREX) 100 MG tablet Take 1 tablet by mouth 2 times per day at least 2 hours between doses as needed 27 " "tablet 2   • SUMAtriptan (IMITREX) 100 MG tablet Take 1 tablet by mouth 2 times per day at least 2 hours between doses as needed 27 tablet 2   • Syringe/Needle, Disp, 25G X 1\" 3 ML misc USE 1 AS DIRECTED WITH B12 1 each 7   • triamcinolone (KENALOG) 0.1 % cream Apply a Thin Layer to Affected Area Topically Twice Daily 240 g 5   • triamcinolone (KENALOG) 0.1 % cream Apply 1 application topically to affected area daily as needed 80 g 3     No current facility-administered medications for this visit.         LABORATORY:    Lab Results   Component Value Date    WBC 5.84 09/01/2021    HGB 12.6 09/01/2021    HCT 38.2 09/01/2021    MCV 90.7 09/01/2021    RDW 13.3 09/01/2021     09/01/2021    NEUTRORELPCT 50.4 09/01/2021    LYMPHORELPCT 34.6 09/01/2021    MONORELPCT 9.4 09/01/2021    EOSRELPCT 4.3 09/01/2021    BASORELPCT 1.0 09/01/2021    NEUTROABS 2.94 09/01/2021    LYMPHSABS 2.02 09/01/2021       Lab Results   Component Value Date     09/01/2021    K 4.0 09/01/2021    CO2 24.1 09/01/2021     09/01/2021    BUN 11 09/01/2021    CREATININE 0.72 09/01/2021    GLUCOSE 174 (H) 09/01/2021    CALCIUM 8.8 09/01/2021    ALKPHOS 74 09/01/2021    AST 20 09/01/2021    ALT 21 09/01/2021    BILITOT 0.4 09/01/2021    ALBUMIN 3.75 09/01/2021    PROTEINTOT 6.3 09/01/2021    PHOS 3.4 10/03/2018       Lab Results   Component Value Date     09/01/2021    URICACID 4.9 06/18/2021        Imaging Results (Last 24 Hours)     ** No results found for the last 24 hours. **          ASSESSMENT/PLAN:    Patient Update Assessment (new medications, allergies, medical history): No updates reported    Medication(s): Gleevec, Vitamin B-12, Syringes, Ibuprofen, Sumatriptan, Duloxetine     Currently Taking Medication(s): As directed    Effectiveness of Medication: Medications seem to be working as expected    Experiencing Side Effects: None reported    Prior Authorization Status: Approved    Financial Assistance Status: None " needed    Any Issues Identified: None    Appropriate to Process Prescription(s): Refills due    Counseling Offered: Declined    Next Specialty Pharmacy Visit: ~30 days

## 2021-09-17 ENCOUNTER — OFFICE VISIT (OUTPATIENT)
Dept: ONCOLOGY | Facility: CLINIC | Age: 56
End: 2021-09-17

## 2021-09-17 VITALS
HEART RATE: 102 BPM | DIASTOLIC BLOOD PRESSURE: 76 MMHG | HEIGHT: 66 IN | RESPIRATION RATE: 18 BRPM | BODY MASS INDEX: 42.85 KG/M2 | WEIGHT: 266.6 LBS | SYSTOLIC BLOOD PRESSURE: 141 MMHG | OXYGEN SATURATION: 98 % | TEMPERATURE: 97.3 F

## 2021-09-17 DIAGNOSIS — D50.9 IRON DEFICIENCY ANEMIA, UNSPECIFIED IRON DEFICIENCY ANEMIA TYPE: ICD-10-CM

## 2021-09-17 DIAGNOSIS — C92.10 CML (CHRONIC MYELOCYTIC LEUKEMIA) (HCC): Primary | ICD-10-CM

## 2021-09-17 PROCEDURE — 99214 OFFICE O/P EST MOD 30 MIN: CPT | Performed by: INTERNAL MEDICINE

## 2021-09-17 NOTE — PROGRESS NOTES
Aric Haro  [id number]  1965  9/17/2021      Referring Provider:   EILEEN Andrade    Reason for Follow Up:   1. Chronic Phase - Chronic Myelogenous Leukemia  2. Leukocytosis  3. Thrombocytosis     Chief Complaint:  CML      History of Present Illness:  Aric Haro is a very pleasant 55 y.o.  female who presents in follow up appointment for further management and treatment of chronic phase chronic myelogenous leukemia (CML).    Ms. Haro began experiencing extreme fatigue where she was sleeping multiple hours during the day when she initially began following with me in April 2017. 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 On laboratory evaluation she was found to have a total white blood cell count of 22.35 and a platelet count 470 thousand. In March 2016 she was also noted to have a leukocytosis (although not as elevated) as well as a thrombocytosis, however reports that these were likely secondary to other medical issues at the time her blood work was drawn. She denied of any significant weight loss however has been gradually losing weight since gastric sleeve procedure. She denied of any fevers or frequent infections but did note fluctuating neck lymphadenopathy as well as early satiety and taste in change. She denied of any abnormal or spontaneous bleeding. I did obtain a BCR ABL PCR to further assess her leukocytosis and thrombocytosis and she was positive for the b2a2/b3a2 (p210) and e1a2 (p190) fusion gene transcripts consistent with a diagnosis for CML. After reviewing the toxicities of each tyrosine kinase inhibitor we decided to start Dasatinib treatment. She had a difficult time tolerating the Dasatinib as she was unable to take her PPI with this medication. Since she had to be taken off of her PPI she had worsening reflux symptoms as well as  severe nausea, vomiting, dehydration, and headaches during this period. Given the toxicities she was experiencing she was taken off Dasatinib and this was changed to Gleevec treatment. Since starting Gleevac 400mg daily she has tolerated this much better without significant side effects. The only side effect that she has been able to see is intermittent pedal edema along with some hand swelling and muscle cramps.     Treatment History:  Started Dasatinib on 05/15/17 - experienced nausea, severe reflux, headaches while on medication and had taken 9 doses. This was discontinued due to intolerability and she was thereafter started on Imatinib.   Started Imatinib on 5/26/17      Interim History:  Since the start of Dasatinib and later Gleevec she has had a good response and normalization in her complete blood counts with normalization of BCR ABL PCR. She denies of any fevers, infections, lymphadenopathy, chest pain, dyspnea, nausea. She has been trying to lose weight and currently weight stable. She has had intermittent night sweats however this has improved since treatment. No fevers. Recently diagnosed with rosacea resolved with doxycycline. She does experience intermittent myalgias and trace lower extremity swelling.      The following portions of the patient's history were reviewed and updated as appropriate: allergies, current medications, past family history, past medical history, past social history, past surgical history and problem list.      No Known Allergies    Past Medical History:   Diagnosis Date   • Anemia    • Cancer (CMS/HCC)     leukemia   • Diabetes mellitus (CMS/HCC)    • Elevated cholesterol    • GERD (gastroesophageal reflux disease)    • Hypertension    • PONV (postoperative nausea and vomiting)        Past Surgical History:   Procedure Laterality Date   • BLEPHAROPLASTY Bilateral 6/22/2018    Procedure: BLEPHAROPLASTY BOTH EYES UPPER EYELIDS (PT REQUESTED TERESITA WILKES;  Surgeon: Chris Brandon  MD Mic;  Location: [facility];  Service: Ophthalmology   • COLONOSCOPY N/A 3/27/2018    Procedure: COLONOSCOPY FOR SCREENING  CPTCODE:[id number];  Surgeon: Drew Esparza III, MD;  Location: [facility];  Service: Gastroenterology   • SINUS SURGERY     • SINUS SURGERY     • SLEEVE GASTROPLASTY         Social History     Socioeconomic History   • Marital status:      Spouse name: Not on file   • Number of children: Not on file   • Years of education: Not on file   • Highest education level: Not on file   Tobacco Use   • Smoking status: Former Smoker     Packs/day: 0.25     Years: 4.00     Pack years: 1.00   • Smokeless tobacco: Never Used   Vaping Use   • Vaping Use: Never used   Substance and Sexual Activity   • Alcohol use: No   • Drug use: No   • Sexual activity: Defer   She lives with her daughter. She denies of any alcohol or illicit drug use. Previous social tobacco use more then 20 years ago.  Recent job change.       Family History   Problem Relation Age of Onset   • Anemia Mother    • Hypertension Mother    • Cancer Mother    • COPD Father    • Heart disease Father    • Breast cancer Neg Hx    Maternal Uncle - CLL  Mother - Malignancy of [place]      Current Outpatient Medications:   •  celecoxib (CeleBREX) 200 MG capsule, Take 1 capsule by mouth 2 (Two) Times a Day., Disp: 180 capsule, Rfl: 3  •  cyanocobalamin 1000 MCG/ML injection, Administer 1 ml (1,000 mcg) intramuscularly once weekly, Disp: 12 mL, Rfl: 2  •  desloratadine-pseudoephedrine (CLARINEX-D 12-hour) 2.5-120 MG per tablet, take 1 tablet by mouth 2 times every day, Disp: 30 tablet, Rfl: 0  •  DULoxetine (CYMBALTA) 60 MG capsule, Take 1 capsule by mouth Daily., Disp: 90 capsule, Rfl: 3  •  DULoxetine (CYMBALTA) 60 MG capsule, Take 1 capsule by mouth daily, Disp: 90 capsule, Rfl: 3  •  DULoxetine (CYMBALTA) 60 MG capsule, Take 1 capsule by mouth Daily., Disp: 90 capsule, Rfl: 3  •  fenofibrate (TRICOR) 145 MG tablet, Take 1 tablet by  "mouth Daily., Disp: 90 tablet, Rfl: 3  •  fenofibrate (TRICOR) 145 MG tablet, Take 1 tablet by mouth daily, Disp: 90 tablet, Rfl: 2  •  HYDROcodone-acetaminophen (NORCO)  MG per tablet, Take 1 tablet by mouth every 4 hours as needed for severe pain due to kidney stone, Disp: 20 tablet, Rfl: 0  •  ibuprofen (ADVIL,MOTRIN) 800 MG tablet, Take 1 tablet  by mouth 3 times per day with food or milk as needed, Disp: 270 tablet, Rfl: 2  •  imatinib (GLEEVEC) 400 MG chemo tablet, Take 1 tablet by mouth Daily., Disp: 30 tablet, Rfl: 3  •  Insulin Glargine (Lantus SoloStar) 100 UNIT/ML injection pen, Inject 100 Units under the skin into the appropriate area as directed Daily., Disp: 18 mL, Rfl: 3  •  Insulin Glargine (Lantus SoloStar) 100 UNIT/ML injection pen, Inject 45 Units under the skin into the appropriate area as directed 2 (two) times a day., Disp: 30 mL, Rfl: 0  •  Insulin Glargine (Lantus SoloStar) 100 UNIT/ML injection pen, Inject 100 Units under the skin into the appropriate area as directed Daily., Disp: 30 mL, Rfl: 0  •  Insulin Syringe-Needle U-100 (TRUEplus Insulin Syringe) 31G X 5/16\" 0.5 ML misc, Use to inject 3 times a day, Disp: 90 each, Rfl: 4  •  pantoprazole (PROTONIX) 40 MG EC tablet, Take 1 tablet by mouth daily, Disp: 90 tablet, Rfl: 2  •  SUMAtriptan (IMITREX) 100 MG tablet, Take 1 tablet by mouth after onset of migraine; may repeat once, Disp: 27 tablet, Rfl: 2  •  SUMAtriptan (IMITREX) 100 MG tablet, Take 1 tablet by mouth 2 times per day at least 2 hours between doses as needed, Disp: 27 tablet, Rfl: 2  •  SUMAtriptan (IMITREX) 100 MG tablet, Take 1 tablet by mouth 2 times per day at least 2 hours between doses as needed, Disp: 27 tablet, Rfl: 2  •  triamcinolone (KENALOG) 0.1 % cream, Apply 1 application topically to affected area daily as needed, Disp: 80 g, Rfl: 3  •  ALPRAZolam (XANAX) 1 MG tablet, Take 1 tablet by mouth 2 times per day, Disp: 60 tablet, Rfl: 0  •  baclofen (LIORESAL) 20 " MG tablet, Take 1 tablet by mouth 3 (Three) Times a Day., Disp: 90 tablet, Rfl: 4  •  Cholecalciferol (VITAMIN D3) 09512 units capsule, Take 1 capsule by mouth 2 (Two) Times a Week., Disp: 24 capsule, Rfl: 3  •  fenofibrate (TRICOR) 145 MG tablet, Take 1 tablet by mouth Daily., Disp: 90 tablet, Rfl: 3  •  fenofibrate 160 MG tablet, Take 1 tablet by mouth Daily., Disp: 90 tablet, Rfl: 3  •  ferrous sulfate 325 (65 FE) MG EC tablet, Take 1 tablet by mouth Daily., Disp: 30 tablet, Rfl: 3  •  fluconazole (DIFLUCAN) 200 MG tablet, Take 1 tablet by mouth Daily., Disp: 30 tablet, Rfl: 1  •  furosemide (LASIX) 40 MG tablet, Take 1 tablet by mouth Daily., Disp: 90 tablet, Rfl: 3  •  glucose blood (FREESTYLE TEST STRIPS) test strip, USE 2 TIMES DAILY AS DIRECTED, Disp: 200 each, Rfl: 2  •  glucose blood test strip, USE 1 STRIP 2 TIMES DAILY AS DIRECTED, Disp: 60 enema, Rfl: 6  •  glucose blood test strip, USE 1 STRIP 2 TIMES DAILY AS DIRECTED, Disp: 180 each, Rfl: 6  •  ibuprofen (ADVIL,MOTRIN) 800 MG tablet, Take 1 tablet by mouth 3 (Three) Times a Day As Needed., Disp: 270 tablet, Rfl: 3  •  Insulin Glargine (LANTUS SOLOSTAR) 100 UNIT/ML injection pen, Inject 44 Units under the skin into the appropriate area as directed 2 (Two) Times a Day., Disp: 30 mL, Rfl: 3  •  Insulin Glargine (LANTUS SOLOSTAR) 100 UNIT/ML injection pen, Inject 100 Units under the skin into the appropriate area as directed Daily., Disp: 30 mL, Rfl: 2  •  Insulin Glargine (Lantus SoloStar) 100 UNIT/ML injection pen, Inject 100 Units under the skin into the appropriate area as directed Daily., Disp: 30 mL, Rfl: 2  •  LANTUS SOLOSTAR 100 UNIT/ML injection pen, Inject 45 Units under the skin into the appropriate area as directed 2 (Two) Times a Day., Disp: 36 mL, Rfl: 6  •  loratadine-pseudoephedrine (Allergy Relief D-12) 5-120 MG per 12 hr tablet, Take 1 tablet by mouth Every 12 (Twelve) Hours As Needed., Disp: 30 tablet, Rfl: 0  •  mupirocin (BACTROBAN)  "2 % ointment, 1 application into each nostril as directed by provider 2 (Two) Times a Day., Disp: 22 g, Rfl: 2  •  pantoprazole (PROTONIX) 40 MG EC tablet, Take 1 tablet by mouth Daily., Disp: 90 tablet, Rfl: 2  •  SUMAtriptan (IMITREX) 100 MG tablet, Take 1 tablet by mouth after onset of migraine. May repeat after 2 hours if headache returns. Do not exceed 200mg in 24 hours., Disp: 10 tablet, Rfl: 2  •  Syringe/Needle, Disp, 25G X 1\" 3 ML misc, USE 1 AS DIRECTED WITH B12, Disp: 1 each, Rfl: 7  •  triamcinolone (KENALOG) 0.1 % cream, Apply a Thin Layer to Affected Area Topically Twice Daily, Disp: 240 g, Rfl: 5        Review of Systems  A comprehensive 14-point review of systems performed.  Significant findings as mentioned above.  All other systems reviewed and are negative. No fevers, night sweats, lymphadenopathy, or weight loss. Mild intermittent lower extremity swelling.       Physical Exam:  Vital Signs: These were reviewed and listed as per patient’s electronic medical chart  Vitals:    09/17/21 0933   BP: 141/76   Pulse: 102   Resp: 18   Temp: 97.3 °F (36.3 °C)   SpO2: 98%     General: Awake, alert and oriented, in no distress, obese  HEENT: Head is atraumatic, normocephalic, extraocular movements full, no scleral icterus, mask in place  Neck: supple, no jvd, lymphadenopathy or masses  Cardiovascular: regular rhythm, tachycardic, without murmurs, rubs or gallops  Pulmonary: non-labored, clear to auscultation bilaterally, no wheezing  Abdomen: soft, non-tender, non-distended, normal active bowel sounds present  Extremities: No clubbing, cyanosis or edema  Lymph: No cervical or supraclavicular adenopathy  Neurologic: Mental status as above, alert, awake and oriented, grossly non-focal exam  Skin: warm, dry, intact  Patient was examined on 09/17/21 and changes are reflected and noted above.        Labs / Studies:    Lab on 09/01/2021   Component Date Value   • Glucose 09/01/2021 174*   • BUN 09/01/2021 11    • " Creatinine 09/01/2021 0.72    • Sodium 09/01/2021 139    • Potassium 09/01/2021 4.0    • Chloride 09/01/2021 104    • CO2 09/01/2021 24.1    • Calcium 09/01/2021 8.8    • Total Protein 09/01/2021 6.3    • Albumin 09/01/2021 3.75    • ALT (SGPT) 09/01/2021 21    • AST (SGOT) 09/01/2021 20    • Alkaline Phosphatase 09/01/2021 74    • Total Bilirubin 09/01/2021 0.4    • eGFR Non  Amer 09/01/2021 84    • Globulin 09/01/2021 2.6    • A/G Ratio 09/01/2021 1.5    • BUN/Creatinine Ratio 09/01/2021 15.3    • Anion Gap 09/01/2021 10.9    • LDH 09/01/2021 182    • Iron 09/01/2021 76    • Iron Saturation 09/01/2021 19*   • Transferrin 09/01/2021 274    • TIBC 09/01/2021 408    • Ferritin 09/01/2021 122.00    • e13a2 (b2a2) Transcript 09/01/2021 Comment    • e14a2 (b3a2) Transcript 09/01/2021 Comment    • E1A2 transcript 09/01/2021 Comment    • Interpretation 09/01/2021 Negative    • Director Review 09/01/2021 Comment    • Background: 09/01/2021 Comment    • Methodology: 09/01/2021 Comment    • WBC 09/01/2021 5.84    • RBC 09/01/2021 4.21    • Hemoglobin 09/01/2021 12.6    • Hematocrit 09/01/2021 38.2    • MCV 09/01/2021 90.7    • MCH 09/01/2021 29.9    • MCHC 09/01/2021 33.0    • RDW 09/01/2021 13.3    • RDW-SD 09/01/2021 43.8    • MPV 09/01/2021 10.6    • Platelets 09/01/2021 243    • Neutrophil % 09/01/2021 50.4    • Lymphocyte % 09/01/2021 34.6    • Monocyte % 09/01/2021 9.4    • Eosinophil % 09/01/2021 4.3    • Basophil % 09/01/2021 1.0    • Immature Grans % 09/01/2021 0.3    • Neutrophils, Absolute 09/01/2021 2.94    • Lymphocytes, Absolute 09/01/2021 2.02    • Monocytes, Absolute 09/01/2021 0.55    • Eosinophils, Absolute 09/01/2021 0.25    • Basophils, Absolute 09/01/2021 0.06    • Immature Grans, Absolute 09/01/2021 0.02    • nRBC 09/01/2021 0.0    Lab on 06/18/2021   Component Date Value   • Glucose 06/18/2021 110*   • BUN 06/18/2021 17    • Creatinine 06/18/2021 0.83    • Sodium 06/18/2021 140    • Potassium  06/18/2021 4.5    • Chloride 06/18/2021 104    • CO2 06/18/2021 26.7    • Calcium 06/18/2021 9.0    • Total Protein 06/18/2021 6.4    • Albumin 06/18/2021 3.65    • ALT (SGPT) 06/18/2021 18    • AST (SGOT) 06/18/2021 18    • Alkaline Phosphatase 06/18/2021 68    • Total Bilirubin 06/18/2021 0.4    • eGFR Non  Amer 06/18/2021 71    • Globulin 06/18/2021 2.8    • A/G Ratio 06/18/2021 1.3    • BUN/Creatinine Ratio 06/18/2021 20.5    • Anion Gap 06/18/2021 9.3    • LDH 06/18/2021 186    • Uric Acid 06/18/2021 4.9    • Iron 06/18/2021 70    • Iron Saturation 06/18/2021 17*   • Transferrin 06/18/2021 280    • TIBC 06/18/2021 417    • Ferritin 06/18/2021 120.40    • e13a2 (b2a2) Transcript 06/18/2021 Comment    • e14a2 (b3a2) Transcript 06/18/2021 Comment    • E1A2 transcript 06/18/2021 Comment    • Interpretation 06/18/2021 Negative    • Director Review 06/18/2021 Comment    • Background: 06/18/2021 Comment    • Methodology: 06/18/2021 Comment    • WBC 06/18/2021 7.80    • RBC 06/18/2021 4.18    • Hemoglobin 06/18/2021 12.3    • Hematocrit 06/18/2021 39.2    • MCV 06/18/2021 93.8    • MCH 06/18/2021 29.4    • MCHC 06/18/2021 31.4*   • RDW 06/18/2021 13.3    • RDW-SD 06/18/2021 46.0    • MPV 06/18/2021 10.8    • Platelets 06/18/2021 277    • Neutrophil % 06/18/2021 57.5    • Lymphocyte % 06/18/2021 31.2    • Monocyte % 06/18/2021 9.2    • Eosinophil % 06/18/2021 1.2    • Basophil % 06/18/2021 0.6    • Immature Grans % 06/18/2021 0.3    • Neutrophils, Absolute 06/18/2021 4.49    • Lymphocytes, Absolute 06/18/2021 2.43    • Monocytes, Absolute 06/18/2021 0.72    • Eosinophils, Absolute 06/18/2021 0.09    • Basophils, Absolute 06/18/2021 0.05    • Immature Grans, Absolute 06/18/2021 0.02    • nRBC 06/18/2021 0.0    • Hemoglobin A1C 06/18/2021 8.10*   • TSH 06/18/2021 0.991    • Free T4 06/18/2021 1.38    • T3, Free 06/18/2021 2.62    • Vitamin B-12 06/18/2021 346    • 25 Hydroxy, Vitamin D 06/18/2021 24.5*   •  Microalbumin, Urine 06/18/2021 5.0    Lab on 03/22/2021   Component Date Value   • Glucose 03/22/2021 198*   • BUN 03/22/2021 13    • Creatinine 03/22/2021 0.79    • Sodium 03/22/2021 138    • Potassium 03/22/2021 4.4    • Chloride 03/22/2021 103    • CO2 03/22/2021 24.2    • Calcium 03/22/2021 9.2    • Total Protein 03/22/2021 6.8    • Albumin 03/22/2021 4.02    • ALT (SGPT) 03/22/2021 20    • AST (SGOT) 03/22/2021 19    • Alkaline Phosphatase 03/22/2021 70    • Total Bilirubin 03/22/2021 0.4    • eGFR Non African Amer 03/22/2021 76    • Globulin 03/22/2021 2.8    • A/G Ratio 03/22/2021 1.4    • BUN/Creatinine Ratio 03/22/2021 16.5    • Anion Gap 03/22/2021 10.8    • LDH 03/22/2021 195    • Uric Acid 03/22/2021 5.2    • e13a2 (b2a2) Transcript 03/22/2021 Comment    • e14a2 (b3a2) Transcript 03/22/2021 Comment    • E1A2 transcript 03/22/2021 Comment    • Interpretation 03/22/2021 Negative    • Director Review 03/22/2021 Comment    • Background: 03/22/2021 Comment    • Methodology: 03/22/2021 Comment    • Total Cholesterol 03/22/2021 155    • Triglycerides 03/22/2021 136    • HDL Cholesterol 03/22/2021 54    • LDL Cholesterol  03/22/2021 77    • VLDL Cholesterol 03/22/2021 24    • LDL/HDL Ratio 03/22/2021 1.37    • Hemoglobin A1C 03/22/2021 8.20*   • T3, Free 03/22/2021 2.53    • Free T4 03/22/2021 1.28    • TSH 03/22/2021 1.510    • Vitamin B-12 03/22/2021 367    • 25 Hydroxy, Vitamin D 03/22/2021 23.1    • Microalbumin, Urine 03/22/2021 20.5    • WBC 03/22/2021 6.56    • RBC 03/22/2021 4.38    • Hemoglobin 03/22/2021 13.1    • Hematocrit 03/22/2021 40.7    • MCV 03/22/2021 92.9    • MCH 03/22/2021 29.9    • MCHC 03/22/2021 32.2    • RDW 03/22/2021 13.1    • RDW-SD 03/22/2021 44.7    • MPV 03/22/2021 10.5    • Platelets 03/22/2021 274    • Neutrophil % 03/22/2021 60.6    • Lymphocyte % 03/22/2021 25.3    • Monocyte % 03/22/2021 10.1    • Eosinophil % 03/22/2021 2.6    • Basophil % 03/22/2021 1.1    • Immature Grans  % 2021 0.3    • Neutrophils, Absolute 2021 3.98    • Lymphocytes, Absolute 2021 1.66    • Monocytes, Absolute 2021 0.66    • Eosinophils, Absolute 2021 0.17    • Basophils, Absolute 2021 0.07    • Immature Grans, Absolute 2021 0.02    • nRBC 2021 0.0             IMAGIN17: US ABDOMEN COMPLETE: Visualized pancreas is unremarkable. The imaged portion of the abdominal aorta is nondilated. The liver is homogeneous. There is no intrahepatic ductal dilatation or focal hepatic mass. The imaged portion of the hepatic vessels and inferior vena cava are patent. The gallbladder has been removed. The common bile duct is normal, measuring 5.7 mm. The kidneys demonstrate no evidence of hydronephrosis or solid renal mass. The spleen is homogeneous and measures 13 cm. IMPRESSION: Spleen measures 13 cm and is homogeneous.           PATHOLOGY:    05/15/17: Bone Marrow Biopsy & Aspirate                     17: BCR ABL PCR        17: BCR ABL PCR        17: BCR ABL PCR        18: BCR ABL PCR:                                                                                                         18:                                2020:          12/3/20           03/22/21:            Assessment/Plan :  Aric Haro is a very pleasant 55 y.o.  female who presents in follow up appointment for further management and treatment of chronic phase chronic myelogenous leukemia.    1. Chronic Myelogenous Leukemia - CML (chronic phase)    2. Leukocytosis  3. Thrombocytosis  4. Iron deficiency - no longer on iron   5. Healthcare Maintenance    Peripheral smear concerning for an underlying myeloproliferative disorder. Her primary provider obtained a flow cytometry which did not reveal any significant abnormalities. I obtained a quantitative BCR ABL PCR positive for the b2a2/b3a2 (p210) and e1a2 (p190) fusion gene transcripts consistent with a diagnosis  for CML. Bone marrow biopsy performed on initial diagnosis 5/17/17 and prior to starting Dasatinib treatment with results above and consistent with chronic phase CML.     To further evaluate for a myeloproliferative disorder I did also assess for JAK2 (V617F and exon 12)/MPL/CALR mutations which were negative. ESR, CRP, EMMANUEL, Rheumatoid factor, as well as an acute hepatitis panel and HIV test which were all negative. No iron deficiency seen. Abdominal ultrasound significant for a spleen size 13.9cm.     For treatment of her CML she was started on Dasatinib 100mg oral daily. She does have a history significant for pancreatitis, therefore, I held on using Nilotinib. Patient however was unable to tolerate Dasatinib secondary to worsening reflux while being off of her PPI, and experiencing severe nausea, vomiting, and dehydration. She was then started on Imatinib 400mg daily and is overall tolerating this well. I have previously advised her of Ibuprofen and Tylenol use for her myalgias. She does also experience intermittent lower extremity edema (which she denies today). I did order baseline Echo which showed an intact EF. Therefore I did repeat echocardiogram to further evaluate and repeat echo showed an intact EF 61-65% which is stable.    She has had a complete hematological response, and BCR ABL PCR has normalized since start of Gleevac. This will need to continue to be monitored every three months to assess ongoing molecular response, today's level is <0.0032%. The blood counts have now stabilized therefore will continue to monitor every 3 months given stability.    She underwent a colonoscopy February 2018 with Dr. Esparza who recommended repeat colonoscopy in 3-5 years due to polyps that were removed. This was discusses with her today as it has been three years since her last colonoscopy. She has also received her Influenza and Hepatitis A vaccine. Iron has been discontinued several months ago and iron studies are  normal.     6. ACO Quality measures  -  Aric Haro does not use tobacco products.  -  Patient's BMI has been discussed with her. BMI is above normal parameters. She has been changing her dietary habits and eating healthier.  -  Aric Haro received 2020 flu vaccine. She received COVID J&J vaccine on April 12, 2021.    -  Aric Haro reports a pain score of 0.  Given her pain assessment as noted, treatment options were discussed and the following options were decided upon as a follow-up plan to address the patient's pain: continuation of current treatment plan for pain.  -  Current outpatient and discharge medications have been reconciled for the patient.  Reviewed by: Evita Serrano MD        I will have the patient return for follow up visit in three months with labs one to two weeks prior. Lab orders have been placed. She understands that should she have any questions or concerns prior to her appointment she should give us a call at any time and I would be happy to see her sooner. It was a pleasure to see this patient in clinic today, thank you for allowing me to participate in the care of this patient.        Evita Serrano MD  09/17/21  09:41 EDT

## 2021-10-06 NOTE — PROGRESS NOTES
Specialty Pharmacy Note      Name:  Aric Haro  :  1965  Date:  10/6/2021         Past Medical History:   Diagnosis Date   • Anemia    • Cancer (CMS/HCC)     leukemia   • Diabetes mellitus (CMS/HCC)    • Elevated cholesterol    • GERD (gastroesophageal reflux disease)    • Hypertension    • PONV (postoperative nausea and vomiting)        Past Surgical History:   Procedure Laterality Date   • BLEPHAROPLASTY Bilateral 2018    Procedure: BLEPHAROPLASTY BOTH EYES UPPER EYELIDS (PT REQUESTED TERESITA WILKES;  Surgeon: Chris Haile MD;  Location: [facility];  Service: Ophthalmology   • COLONOSCOPY N/A 3/27/2018    Procedure: COLONOSCOPY FOR SCREENING  CPTCODE:78530;  Surgeon: Drew Esparza III, MD;  Location: [facility];  Service: Gastroenterology   • SINUS SURGERY     • SINUS SURGERY     • SLEEVE GASTROPLASTY         Social History     Socioeconomic History   • Marital status:      Spouse name: Not on file   • Number of children: Not on file   • Years of education: Not on file   • Highest education level: Not on file   Tobacco Use   • Smoking status: Former Smoker     Packs/day: 0.25     Years: 4.00     Pack years: 1.00   • Smokeless tobacco: Never Used   Vaping Use   • Vaping Use: Never used   Substance and Sexual Activity   • Alcohol use: No   • Drug use: No   • Sexual activity: Defer       Family History   Problem Relation Age of Onset   • Anemia Mother    • Hypertension Mother    • Cancer Mother    • COPD Father    • Heart disease Father    • Breast cancer Neg Hx        No Known Allergies    Current Outpatient Medications   Medication Sig Dispense Refill   • ALPRAZolam (XANAX) 1 MG tablet Take 1 tablet by mouth 2 times per day 60 tablet 0   • baclofen (LIORESAL) 20 MG tablet Take 1 tablet by mouth 3 (Three) Times a Day. 90 tablet 4   • celecoxib (CeleBREX) 200 MG capsule Take 1 capsule by mouth 2 (Two) Times a Day. 180 capsule 3   • Cholecalciferol (VITAMIN D3) 92449  units capsule Take 1 capsule by mouth 2 (Two) Times a Week. 24 capsule 3   • cyanocobalamin 1000 MCG/ML injection Administer 1 ml (1,000 mcg) intramuscularly once weekly 12 mL 2   • desloratadine-pseudoephedrine (CLARINEX-D 12-hour) 2.5-120 MG per tablet take 1 tablet by mouth 2 times every day 30 tablet 0   • doxycycline (ADOXA) 100 MG tablet Take 1 tablet (100 mg) by mouth every 12 hours for 10 days 20 tablet 2   • DULoxetine (CYMBALTA) 60 MG capsule Take 1 capsule by mouth Daily. 90 capsule 3   • DULoxetine (CYMBALTA) 60 MG capsule Take 1 capsule by mouth daily 90 capsule 3   • DULoxetine (CYMBALTA) 60 MG capsule Take 1 capsule by mouth Daily. 90 capsule 3   • fenofibrate (TRICOR) 145 MG tablet Take 1 tablet by mouth Daily. 90 tablet 3   • fenofibrate (TRICOR) 145 MG tablet Take 1 tablet by mouth Daily. 90 tablet 3   • fenofibrate (TRICOR) 145 MG tablet Take 1 tablet by mouth daily 90 tablet 2   • fenofibrate 160 MG tablet Take 1 tablet by mouth Daily. 90 tablet 3   • ferrous sulfate 325 (65 FE) MG EC tablet Take 1 tablet by mouth Daily. 30 tablet 3   • fluconazole (DIFLUCAN) 200 MG tablet Take 1 tablet by mouth Daily. 30 tablet 1   • furosemide (LASIX) 40 MG tablet Take 1 tablet by mouth Daily. 90 tablet 3   • glucose blood (FREESTYLE TEST STRIPS) test strip USE 2 TIMES DAILY AS DIRECTED 200 each 2   • glucose blood test strip USE 1 STRIP 2 TIMES DAILY AS DIRECTED 60 enema 6   • glucose blood test strip USE 1 STRIP 2 TIMES DAILY AS DIRECTED 180 each 6   • HYDROcodone-acetaminophen (NORCO)  MG per tablet Take 1 tablet by mouth every 4 hours as needed for severe pain due to kidney stone 20 tablet 0   • ibuprofen (ADVIL,MOTRIN) 800 MG tablet Take 1 tablet by mouth 3 (Three) Times a Day As Needed. 270 tablet 3   • ibuprofen (ADVIL,MOTRIN) 800 MG tablet Take 1 tablet  by mouth 3 times per day with food or milk as needed 270 tablet 2   • imatinib (GLEEVEC) 400 MG chemo tablet Take 1 tablet by mouth Daily. 30  "tablet 3   • Insulin Glargine (LANTUS SOLOSTAR) 100 UNIT/ML injection pen Inject 44 Units under the skin into the appropriate area as directed 2 (Two) Times a Day. 30 mL 3   • Insulin Glargine (LANTUS SOLOSTAR) 100 UNIT/ML injection pen Inject 100 Units under the skin into the appropriate area as directed Daily. 30 mL 2   • Insulin Glargine (Lantus SoloStar) 100 UNIT/ML injection pen Inject 100 Units under the skin into the appropriate area as directed Daily. 30 mL 2   • Insulin Glargine (Lantus SoloStar) 100 UNIT/ML injection pen Inject 100 Units under the skin into the appropriate area as directed Daily. 18 mL 3   • Insulin Glargine (Lantus SoloStar) 100 UNIT/ML injection pen Inject 45 Units under the skin into the appropriate area as directed 2 (two) times a day. 30 mL 0   • Insulin Glargine (Lantus SoloStar) 100 UNIT/ML injection pen Inject 100 Units under the skin into the appropriate area as directed Daily. 30 mL 0   • Insulin Syringe-Needle U-100 (TRUEplus Insulin Syringe) 31G X 5/16\" 0.5 ML misc Use to inject 3 times a day 90 each 4   • LANTUS SOLOSTAR 100 UNIT/ML injection pen Inject 45 Units under the skin into the appropriate area as directed 2 (Two) Times a Day. 36 mL 6   • loratadine-pseudoephedrine (Allergy Relief D-12) 5-120 MG per 12 hr tablet Take 1 tablet by mouth Every 12 (Twelve) Hours As Needed. 30 tablet 0   • mupirocin (BACTROBAN) 2 % ointment 1 application into each nostril as directed by provider 2 (Two) Times a Day. 22 g 2   • pantoprazole (PROTONIX) 40 MG EC tablet Take 1 tablet by mouth Daily. 90 tablet 2   • pantoprazole (PROTONIX) 40 MG EC tablet Take 1 tablet by mouth daily 90 tablet 2   • pantoprazole (PROTONIX) 40 MG EC tablet Take 1 tablet (40 mg) by mouth daily 90 tablet 2   • SUMAtriptan (IMITREX) 100 MG tablet Take 1 tablet by mouth after onset of migraine. May repeat after 2 hours if headache returns. Do not exceed 200mg in 24 hours. 10 tablet 2   • SUMAtriptan (IMITREX) 100 MG " "tablet Take 1 tablet by mouth after onset of migraine; may repeat once 27 tablet 2   • SUMAtriptan (IMITREX) 100 MG tablet Take 1 tablet by mouth 2 times per day at least 2 hours between doses as needed 27 tablet 2   • SUMAtriptan (IMITREX) 100 MG tablet Take 1 tablet by mouth 2 times per day at least 2 hours between doses as needed 27 tablet 2   • Syringe/Needle, Disp, 25G X 1\" 3 ML misc USE 1 AS DIRECTED WITH B12 1 each 7   • triamcinolone (KENALOG) 0.1 % cream Apply a Thin Layer to Affected Area Topically Twice Daily 240 g 5   • triamcinolone (KENALOG) 0.1 % cream Apply 1 application topically to affected area daily as needed 80 g 3     No current facility-administered medications for this visit.         LABORATORY:    Lab Results   Component Value Date    WBC 5.84 09/01/2021    HGB 12.6 09/01/2021    HCT 38.2 09/01/2021    MCV 90.7 09/01/2021    RDW 13.3 09/01/2021     09/01/2021    NEUTRORELPCT 50.4 09/01/2021    LYMPHORELPCT 34.6 09/01/2021    MONORELPCT 9.4 09/01/2021    EOSRELPCT 4.3 09/01/2021    BASORELPCT 1.0 09/01/2021    NEUTROABS 2.94 09/01/2021    LYMPHSABS 2.02 09/01/2021       Lab Results   Component Value Date     09/01/2021    K 4.0 09/01/2021    CO2 24.1 09/01/2021     09/01/2021    BUN 11 09/01/2021    CREATININE 0.72 09/01/2021    GLUCOSE 174 (H) 09/01/2021    CALCIUM 8.8 09/01/2021    ALKPHOS 74 09/01/2021    AST 20 09/01/2021    ALT 21 09/01/2021    BILITOT 0.4 09/01/2021    ALBUMIN 3.75 09/01/2021    PROTEINTOT 6.3 09/01/2021    PHOS 3.4 10/03/2018       Lab Results   Component Value Date     09/01/2021    URICACID 4.9 06/18/2021        Imaging Results (Last 24 Hours)     ** No results found for the last 24 hours. **          ASSESSMENT/PLAN:    Patient Update Assessment (new medications, allergies, medical history): No updates reported at this time.    Medication(s): Gleevec 40 mg chemo tablet & Maintenance medications    Currently Taking Medication(s): Patient is " currently taking medication as prescribed.    Effectiveness of Medication: Effective    Experiencing Side Effects: No side effects reported at this time.    Prior Authorization Status: Approved    Financial Assistance Status: None needed at this time.    Any Issues Identified: None    Appropriate to Process Prescription(s): Refill due    Counseling Offered: Declined    Next Specialty Pharmacy Visit: ~28 days

## 2021-10-07 ENCOUNTER — SPECIALTY PHARMACY (OUTPATIENT)
Dept: PHARMACY | Facility: HOSPITAL | Age: 56
End: 2021-10-07

## 2021-11-02 ENCOUNTER — SPECIALTY PHARMACY (OUTPATIENT)
Dept: PHARMACY | Facility: HOSPITAL | Age: 56
End: 2021-11-02

## 2021-11-04 ENCOUNTER — SPECIALTY PHARMACY (OUTPATIENT)
Dept: PHARMACY | Facility: HOSPITAL | Age: 56
End: 2021-11-04

## 2021-11-04 NOTE — PROGRESS NOTES
Specialty Pharmacy Note      Name:  Aric Haro  :  1965  Date:  2021         Past Medical History:   Diagnosis Date   • Anemia    • Cancer (CMS/HCC)     leukemia   • Diabetes mellitus (CMS/HCC)    • Elevated cholesterol    • GERD (gastroesophageal reflux disease)    • Hypertension    • PONV (postoperative nausea and vomiting)        Past Surgical History:   Procedure Laterality Date   • BLEPHAROPLASTY Bilateral 2018    Procedure: BLEPHAROPLASTY BOTH EYES UPPER EYELIDS (PT REQUESTED TERESITA WILKES;  Surgeon: Chris Haile MD;  Location: [facility];  Service: Ophthalmology   • COLONOSCOPY N/A 3/27/2018    Procedure: COLONOSCOPY FOR SCREENING  CPTCODE:16105;  Surgeon: Drew Esparza III, MD;  Location: [facility];  Service: Gastroenterology   • SINUS SURGERY     • SINUS SURGERY     • SLEEVE GASTROPLASTY         Social History     Socioeconomic History   • Marital status:    Tobacco Use   • Smoking status: Former Smoker     Packs/day: 0.25     Years: 4.00     Pack years: 1.00   • Smokeless tobacco: Never Used   Vaping Use   • Vaping Use: Never used   Substance and Sexual Activity   • Alcohol use: No   • Drug use: No   • Sexual activity: Defer       Family History   Problem Relation Age of Onset   • Anemia Mother    • Hypertension Mother    • Cancer Mother    • COPD Father    • Heart disease Father    • Breast cancer Neg Hx        No Known Allergies    Current Outpatient Medications   Medication Sig Dispense Refill   • ALPRAZolam (XANAX) 1 MG tablet Take 1 tablet by mouth 2 times per day 60 tablet 0   • baclofen (LIORESAL) 20 MG tablet Take 1 tablet by mouth 3 (Three) Times a Day. 90 tablet 4   • celecoxib (CeleBREX) 200 MG capsule Take 1 capsule by mouth 2 (Two) Times a Day. 180 capsule 3   • Cholecalciferol (VITAMIN D3) 09251 units capsule Take 1 capsule by mouth 2 (Two) Times a Week. 24 capsule 3   • cyanocobalamin 1000 MCG/ML injection Administer 1 ml (1,000 mcg)  intramuscularly once weekly 12 mL 2   • desloratadine-pseudoephedrine (CLARINEX-D 12-hour) 2.5-120 MG per tablet take 1 tablet by mouth 2 times every day 30 tablet 0   • doxycycline (ADOXA) 100 MG tablet Take 1 tablet (100 mg) by mouth every 12 hours for 10 days 20 tablet 2   • DULoxetine (CYMBALTA) 60 MG capsule Take 1 capsule by mouth Daily. 90 capsule 3   • DULoxetine (CYMBALTA) 60 MG capsule Take 1 capsule by mouth daily 90 capsule 3   • DULoxetine (CYMBALTA) 60 MG capsule Take 1 capsule by mouth Daily. 90 capsule 3   • fenofibrate (TRICOR) 145 MG tablet Take 1 tablet by mouth Daily. 90 tablet 3   • fenofibrate (TRICOR) 145 MG tablet Take 1 tablet by mouth Daily. 90 tablet 3   • fenofibrate (TRICOR) 145 MG tablet Take 1 tablet by mouth daily 90 tablet 2   • fenofibrate 160 MG tablet Take 1 tablet by mouth Daily. 90 tablet 3   • ferrous sulfate 325 (65 FE) MG EC tablet Take 1 tablet by mouth Daily. 30 tablet 3   • fluconazole (DIFLUCAN) 200 MG tablet Take 1 tablet by mouth Daily. 30 tablet 1   • furosemide (LASIX) 40 MG tablet Take 1 tablet by mouth Daily. 90 tablet 3   • glucose blood (FREESTYLE TEST STRIPS) test strip USE 2 TIMES DAILY AS DIRECTED 200 each 2   • glucose blood test strip USE 1 STRIP 2 TIMES DAILY AS DIRECTED 60 enema 6   • glucose blood test strip USE 1 STRIP 2 TIMES DAILY AS DIRECTED 180 each 6   • HYDROcodone-acetaminophen (NORCO)  MG per tablet Take 1 tablet by mouth every 4 hours as needed for severe pain due to kidney stone 20 tablet 0   • ibuprofen (ADVIL,MOTRIN) 800 MG tablet Take 1 tablet by mouth 3 (Three) Times a Day As Needed. 270 tablet 3   • ibuprofen (ADVIL,MOTRIN) 800 MG tablet Take 1 tablet  by mouth 3 times per day with food or milk as needed 270 tablet 2   • imatinib (GLEEVEC) 400 MG chemo tablet Take 1 tablet by mouth Daily. 30 tablet 3   • Insulin Glargine (LANTUS SOLOSTAR) 100 UNIT/ML injection pen Inject 44 Units under the skin into the appropriate area as directed 2  "(Two) Times a Day. 30 mL 3   • Insulin Glargine (LANTUS SOLOSTAR) 100 UNIT/ML injection pen Inject 100 Units under the skin into the appropriate area as directed Daily. 30 mL 2   • Insulin Glargine (Lantus SoloStar) 100 UNIT/ML injection pen Inject 100 Units under the skin into the appropriate area as directed Daily. 30 mL 2   • Insulin Glargine (Lantus SoloStar) 100 UNIT/ML injection pen Inject 100 Units under the skin into the appropriate area as directed Daily. 18 mL 3   • Insulin Glargine (Lantus SoloStar) 100 UNIT/ML injection pen Inject 45 Units under the skin into the appropriate area as directed 2 (two) times a day. 30 mL 0   • Insulin Glargine (Lantus SoloStar) 100 UNIT/ML injection pen Inject 100 Units under the skin into the appropriate area as directed Daily. 30 mL 0   • Insulin Syringe-Needle U-100 (TRUEplus Insulin Syringe) 31G X 5/16\" 0.5 ML misc Use to inject 3 times a day 90 each 4   • LANTUS SOLOSTAR 100 UNIT/ML injection pen Inject 45 Units under the skin into the appropriate area as directed 2 (Two) Times a Day. 36 mL 6   • loratadine-pseudoephedrine (Allergy Relief D-12) 5-120 MG per 12 hr tablet Take 1 tablet by mouth Every 12 (Twelve) Hours As Needed. 30 tablet 0   • mupirocin (BACTROBAN) 2 % ointment 1 application into each nostril as directed by provider 2 (Two) Times a Day. 22 g 2   • pantoprazole (PROTONIX) 40 MG EC tablet Take 1 tablet by mouth Daily. 90 tablet 2   • pantoprazole (PROTONIX) 40 MG EC tablet Take 1 tablet by mouth daily 90 tablet 2   • pantoprazole (PROTONIX) 40 MG EC tablet Take 1 tablet (40 mg) by mouth daily 90 tablet 2   • SUMAtriptan (IMITREX) 100 MG tablet Take 1 tablet by mouth after onset of migraine. May repeat after 2 hours if headache returns. Do not exceed 200mg in 24 hours. 10 tablet 2   • SUMAtriptan (IMITREX) 100 MG tablet Take 1 tablet by mouth after onset of migraine; may repeat once 27 tablet 2   • SUMAtriptan (IMITREX) 100 MG tablet Take 1 tablet by mouth " "2 times per day at least 2 hours between doses as needed 27 tablet 2   • SUMAtriptan (IMITREX) 100 MG tablet Take 1 tablet by mouth 2 times per day at least 2 hours between doses as needed 27 tablet 2   • Syringe/Needle, Disp, 25G X 1\" 3 ML misc USE 1 AS DIRECTED WITH B12 1 each 7   • triamcinolone (KENALOG) 0.1 % cream Apply a Thin Layer to Affected Area Topically Twice Daily 240 g 5   • triamcinolone (KENALOG) 0.1 % cream Apply 1 application topically to affected area daily as needed 80 g 3     No current facility-administered medications for this visit.         LABORATORY:    Lab Results   Component Value Date    WBC 5.84 09/01/2021    HGB 12.6 09/01/2021    HCT 38.2 09/01/2021    MCV 90.7 09/01/2021    RDW 13.3 09/01/2021     09/01/2021    NEUTRORELPCT 50.4 09/01/2021    LYMPHORELPCT 34.6 09/01/2021    MONORELPCT 9.4 09/01/2021    EOSRELPCT 4.3 09/01/2021    BASORELPCT 1.0 09/01/2021    NEUTROABS 2.94 09/01/2021    LYMPHSABS 2.02 09/01/2021       Lab Results   Component Value Date     09/01/2021    K 4.0 09/01/2021    CO2 24.1 09/01/2021     09/01/2021    BUN 11 09/01/2021    CREATININE 0.72 09/01/2021    GLUCOSE 174 (H) 09/01/2021    CALCIUM 8.8 09/01/2021    ALKPHOS 74 09/01/2021    AST 20 09/01/2021    ALT 21 09/01/2021    BILITOT 0.4 09/01/2021    ALBUMIN 3.75 09/01/2021    PROTEINTOT 6.3 09/01/2021    PHOS 3.4 10/03/2018       Lab Results   Component Value Date     09/01/2021    URICACID 4.9 06/18/2021        Imaging Results (Last 24 Hours)     ** No results found for the last 24 hours. **          ASSESSMENT/PLAN:    Patient Update Assessment (new medications, allergies, medical history): Assessed, no changes    Medication(s): Imatinib     Currently Taking Medication(s): Yes, she is compliant and taking as directed    Effectiveness of Medication: Effective    Experiencing Side Effects: Minimal to none.    Prior Authorization Status: Approved with insurance    Financial Assistance " Status: None needed at this time    Any Issues Identified: None    Appropriate to Process Prescription(s): Yes, after chart review and discussion with patient. It is appropriate to fill.    Counseling Offered: Patient declined    Next Specialty Pharmacy Visit: In ~ 4 weeks

## 2021-11-08 DIAGNOSIS — C92.10 CML (CHRONIC MYELOCYTIC LEUKEMIA) (HCC): ICD-10-CM

## 2021-11-08 RX ORDER — IMATINIB MESYLATE 400 MG/1
400 TABLET, FILM COATED ORAL DAILY
Qty: 30 TABLET | Refills: 3 | Status: CANCELLED | OUTPATIENT
Start: 2021-11-08

## 2021-12-02 ENCOUNTER — SPECIALTY PHARMACY (OUTPATIENT)
Dept: PHARMACY | Facility: HOSPITAL | Age: 56
End: 2021-12-02

## 2021-12-02 DIAGNOSIS — C92.10 CML (CHRONIC MYELOCYTIC LEUKEMIA) (HCC): ICD-10-CM

## 2021-12-02 DIAGNOSIS — C92.10 CML (CHRONIC MYELOCYTIC LEUKEMIA): ICD-10-CM

## 2021-12-02 RX ORDER — IMATINIB MESYLATE 400 MG/1
400 TABLET, FILM COATED ORAL DAILY
Qty: 30 TABLET | Refills: 3 | Status: CANCELLED | OUTPATIENT
Start: 2021-12-02

## 2021-12-02 RX ORDER — IMATINIB MESYLATE 400 MG/1
400 TABLET, FILM COATED ORAL DAILY
Qty: 30 TABLET | Refills: 5 | Status: SHIPPED | OUTPATIENT
Start: 2021-12-02 | End: 2022-07-05 | Stop reason: SDUPTHER

## 2021-12-13 ENCOUNTER — LAB (OUTPATIENT)
Dept: ONCOLOGY | Facility: CLINIC | Age: 56
End: 2021-12-13

## 2021-12-13 VITALS
HEART RATE: 105 BPM | TEMPERATURE: 97.1 F | SYSTOLIC BLOOD PRESSURE: 155 MMHG | RESPIRATION RATE: 18 BRPM | OXYGEN SATURATION: 98 % | DIASTOLIC BLOOD PRESSURE: 82 MMHG

## 2021-12-13 DIAGNOSIS — C92.10 CML (CHRONIC MYELOCYTIC LEUKEMIA) (HCC): ICD-10-CM

## 2021-12-13 LAB
ALBUMIN SERPL-MCNC: 3.85 G/DL (ref 3.5–5.2)
ALBUMIN/GLOB SERPL: 1.5 G/DL
ALP SERPL-CCNC: 74 U/L (ref 39–117)
ALT SERPL W P-5'-P-CCNC: 26 U/L (ref 1–33)
ANION GAP SERPL CALCULATED.3IONS-SCNC: 14.4 MMOL/L (ref 5–15)
AST SERPL-CCNC: 23 U/L (ref 1–32)
BASOPHILS # BLD AUTO: 0.07 10*3/MM3 (ref 0–0.2)
BASOPHILS NFR BLD AUTO: 1.1 % (ref 0–1.5)
BILIRUB SERPL-MCNC: 0.4 MG/DL (ref 0–1.2)
BUN SERPL-MCNC: 12 MG/DL (ref 6–20)
BUN/CREAT SERPL: 14.8 (ref 7–25)
CALCIUM SPEC-SCNC: 9.1 MG/DL (ref 8.6–10.5)
CHLORIDE SERPL-SCNC: 104 MMOL/L (ref 98–107)
CO2 SERPL-SCNC: 22.6 MMOL/L (ref 22–29)
CREAT SERPL-MCNC: 0.81 MG/DL (ref 0.57–1)
DEPRECATED RDW RBC AUTO: 46.9 FL (ref 37–54)
EOSINOPHIL # BLD AUTO: 0.2 10*3/MM3 (ref 0–0.4)
EOSINOPHIL NFR BLD AUTO: 3.1 % (ref 0.3–6.2)
ERYTHROCYTE [DISTWIDTH] IN BLOOD BY AUTOMATED COUNT: 13.8 % (ref 12.3–15.4)
GFR SERPL CREATININE-BSD FRML MDRD: 73 ML/MIN/1.73
GLOBULIN UR ELPH-MCNC: 2.7 GM/DL
GLUCOSE SERPL-MCNC: 238 MG/DL (ref 65–99)
HCT VFR BLD AUTO: 39.9 % (ref 34–46.6)
HGB BLD-MCNC: 12.8 G/DL (ref 12–15.9)
IMM GRANULOCYTES # BLD AUTO: 0.01 10*3/MM3 (ref 0–0.05)
IMM GRANULOCYTES NFR BLD AUTO: 0.2 % (ref 0–0.5)
LDH SERPL-CCNC: 208 U/L (ref 135–214)
LYMPHOCYTES # BLD AUTO: 2.14 10*3/MM3 (ref 0.7–3.1)
LYMPHOCYTES NFR BLD AUTO: 33.3 % (ref 19.6–45.3)
MCH RBC QN AUTO: 29.8 PG (ref 26.6–33)
MCHC RBC AUTO-ENTMCNC: 32.1 G/DL (ref 31.5–35.7)
MCV RBC AUTO: 92.8 FL (ref 79–97)
MONOCYTES # BLD AUTO: 0.62 10*3/MM3 (ref 0.1–0.9)
MONOCYTES NFR BLD AUTO: 9.6 % (ref 5–12)
NEUTROPHILS NFR BLD AUTO: 3.39 10*3/MM3 (ref 1.7–7)
NEUTROPHILS NFR BLD AUTO: 52.7 % (ref 42.7–76)
NRBC BLD AUTO-RTO: 0 /100 WBC (ref 0–0.2)
PLATELET # BLD AUTO: 255 10*3/MM3 (ref 140–450)
PMV BLD AUTO: 10.7 FL (ref 6–12)
POTASSIUM SERPL-SCNC: 4.1 MMOL/L (ref 3.5–5.2)
PROT SERPL-MCNC: 6.5 G/DL (ref 6–8.5)
RBC # BLD AUTO: 4.3 10*6/MM3 (ref 3.77–5.28)
SODIUM SERPL-SCNC: 141 MMOL/L (ref 136–145)
URATE SERPL-MCNC: 4.4 MG/DL (ref 2.4–5.7)
WBC NRBC COR # BLD: 6.43 10*3/MM3 (ref 3.4–10.8)

## 2021-12-13 PROCEDURE — 84550 ASSAY OF BLOOD/URIC ACID: CPT | Performed by: INTERNAL MEDICINE

## 2021-12-13 PROCEDURE — 81206 BCR/ABL1 GENE MAJOR BP: CPT

## 2021-12-13 PROCEDURE — 81207 BCR/ABL1 GENE MINOR BP: CPT

## 2021-12-13 PROCEDURE — 83615 LACTATE (LD) (LDH) ENZYME: CPT | Performed by: INTERNAL MEDICINE

## 2021-12-13 PROCEDURE — 85025 COMPLETE CBC W/AUTO DIFF WBC: CPT | Performed by: INTERNAL MEDICINE

## 2021-12-13 PROCEDURE — 80053 COMPREHEN METABOLIC PANEL: CPT | Performed by: INTERNAL MEDICINE

## 2021-12-20 ENCOUNTER — OFFICE VISIT (OUTPATIENT)
Dept: ONCOLOGY | Facility: CLINIC | Age: 56
End: 2021-12-20

## 2021-12-20 ENCOUNTER — TELEPHONE (OUTPATIENT)
Dept: ONCOLOGY | Facility: CLINIC | Age: 56
End: 2021-12-20

## 2021-12-20 VITALS
BODY MASS INDEX: 41.5 KG/M2 | HEIGHT: 66 IN | DIASTOLIC BLOOD PRESSURE: 82 MMHG | WEIGHT: 258.2 LBS | OXYGEN SATURATION: 98 % | TEMPERATURE: 97.1 F | HEART RATE: 90 BPM | SYSTOLIC BLOOD PRESSURE: 151 MMHG | RESPIRATION RATE: 18 BRPM

## 2021-12-20 DIAGNOSIS — C92.10 CML (CHRONIC MYELOCYTIC LEUKEMIA) (HCC): Primary | ICD-10-CM

## 2021-12-20 DIAGNOSIS — D50.9 IRON DEFICIENCY ANEMIA, UNSPECIFIED IRON DEFICIENCY ANEMIA TYPE: ICD-10-CM

## 2021-12-20 PROCEDURE — 99214 OFFICE O/P EST MOD 30 MIN: CPT | Performed by: INTERNAL MEDICINE

## 2021-12-20 NOTE — TELEPHONE ENCOUNTER
----- Message from Evita Serrano MD sent at 12/20/2021  3:16 PM EST -----  Can we let the patient know her BCR ABL PCR is normal. Thank you!

## 2021-12-20 NOTE — PROGRESS NOTES
Aric Haro  [id number]  1965  12/20/2021      Referring Provider:   EILEEN Andrade    Reason for Follow Up:   1. Chronic Phase - Chronic Myelogenous Leukemia  2. Leukocytosis  3. Thrombocytosis     Chief Complaint:  CML      History of Present Illness:  Airc Haro is a very pleasant 56 y.o.  female who presents in follow up appointment for further management and treatment of chronic phase chronic myelogenous leukemia (CML).    Ms. Haro began experiencing extreme fatigue where she was sleeping multiple hours during the day when she initially began following with me in April 2017. 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 On laboratory evaluation she was found to have a total white blood cell count of 22.35 and a platelet count 470 thousand. In March 2016 she was also noted to have a leukocytosis (although not as elevated) as well as a thrombocytosis, however reports that these were likely secondary to other medical issues at the time her blood work was drawn. She denied of any significant weight loss however has been gradually losing weight since gastric sleeve procedure. She denied of any fevers or frequent infections but did note fluctuating neck lymphadenopathy as well as early satiety and taste in change. She denied of any abnormal or spontaneous bleeding. I did obtain a BCR ABL PCR to further assess her leukocytosis and thrombocytosis and she was positive for the b2a2/b3a2 (p210) and e1a2 (p190) fusion gene transcripts consistent with a diagnosis for CML. After reviewing the toxicities of each tyrosine kinase inhibitor we decided to start Dasatinib treatment. She had a difficult time tolerating the Dasatinib as she was unable to take her PPI with this medication. Since she had to be taken off of her PPI she had worsening reflux symptoms as well  as severe nausea, vomiting, dehydration, and headaches during this period. Given the toxicities she was experiencing she was taken off Dasatinib and this was changed to Gleevec treatment. Since starting Gleevac 400mg daily she has tolerated this much better without significant side effects. The only side effect that she has been able to see is intermittent pedal edema along with some hand swelling and muscle cramps.     Treatment History:  Started Dasatinib on 05/15/17 - experienced nausea, severe reflux, headaches while on medication and had taken 9 doses. This was discontinued due to intolerability and she was thereafter started on Imatinib.   Started Imatinib on 5/26/17      Interim History:  Since the start of Dasatinib and later Gleevec she has had a good response and normalization in her complete blood counts with normalization of BCR ABL PCR. She denies of any fevers, infections, lymphadenopathy, chest pain, dyspnea, nausea. She has been trying to lose weight and has lost eight pounds intentionally since her last visit. She does complain of night sweats and may possibly be going through menopause. She does experience intermittent myalgias and trace joint extremity swelling.      The following portions of the patient's history were reviewed and updated as appropriate: allergies, current medications, past family history, past medical history, past social history, past surgical history and problem list.      No Known Allergies    Past Medical History:   Diagnosis Date   • Anemia    • Cancer (HCC)     leukemia   • Diabetes mellitus (HCC)    • Elevated cholesterol    • GERD (gastroesophageal reflux disease)    • Hypertension    • PONV (postoperative nausea and vomiting)        Past Surgical History:   Procedure Laterality Date   • BLEPHAROPLASTY Bilateral 6/22/2018    Procedure: BLEPHAROPLASTY BOTH EYES UPPER EYELIDS (PT REQUESTED TERESITA WILKES;  Surgeon: Chris Haile MD;  Location: [facility] OR;   Service: Ophthalmology   • COLONOSCOPY N/A 3/27/2018    Procedure: COLONOSCOPY FOR SCREENING  CPTCODE:97101;  Surgeon: Drew Esparza III, MD;  Location: [facility];  Service: Gastroenterology   • SINUS SURGERY     • SINUS SURGERY     • SLEEVE GASTROPLASTY         Social History     Socioeconomic History   • Marital status:    Tobacco Use   • Smoking status: Former Smoker     Packs/day: 0.25     Years: 4.00     Pack years: 1.00   • Smokeless tobacco: Never Used   Vaping Use   • Vaping Use: Never used   Substance and Sexual Activity   • Alcohol use: No   • Drug use: No   • Sexual activity: Defer   She lives with her daughter. She denies of any alcohol or illicit drug use. Previous social tobacco use more then 20 years ago.  Recent job change.       Family History   Problem Relation Age of Onset   • Anemia Mother    • Hypertension Mother    • Cancer Mother    • COPD Father    • Heart disease Father    • Breast cancer Neg Hx    Maternal Uncle - CLL  Mother - Malignancy of [place]          Current Outpatient Medications:   •  celecoxib (CeleBREX) 200 MG capsule, Take 1 capsule by mouth 2 (Two) Times a Day., Disp: 180 capsule, Rfl: 3  •  cyanocobalamin 1000 MCG/ML injection, Administer 1 ml (1,000 mcg) intramuscularly once weekly, Disp: 12 mL, Rfl: 2  •  desloratadine-pseudoephedrine (CLARINEX-D 12-hour) 2.5-120 MG per tablet, take 1 tablet by mouth 2 times every day, Disp: 30 tablet, Rfl: 0  •  doxycycline (ADOXA) 100 MG tablet, Take 1 tablet (100 mg) by mouth every 12 hours for 10 days, Disp: 20 tablet, Rfl: 2  •  DULoxetine (CYMBALTA) 60 MG capsule, Take 1 capsule by mouth Daily., Disp: 90 capsule, Rfl: 3  •  fenofibrate (TRICOR) 145 MG tablet, Take 1 tablet by mouth Daily., Disp: 90 tablet, Rfl: 3  •  fenofibrate (TRICOR) 145 MG tablet, Take 1 tablet by mouth daily, Disp: 90 tablet, Rfl: 2  •  HYDROcodone-acetaminophen (NORCO)  MG per tablet, Take 1 tablet by mouth every 4 hours as needed for  "severe pain due to kidney stone, Disp: 20 tablet, Rfl: 0  •  ibuprofen (ADVIL,MOTRIN) 800 MG tablet, Take 1 tablet  by mouth 3 times per day with food or milk as needed, Disp: 270 tablet, Rfl: 2  •  imatinib (GLEEVEC) 400 MG chemo tablet, Take 1 tablet by mouth Daily., Disp: 30 tablet, Rfl: 5  •  Insulin Glargine (BASAGLAR KWIKPEN) 100 UNIT/ML injection pen, Inject 90 units under the skin once daily., Disp: 30 mL, Rfl: 4  •  Insulin Glargine (Lantus SoloStar) 100 UNIT/ML injection pen, Inject 100 Units under the skin into the appropriate area as directed Daily., Disp: 18 mL, Rfl: 3  •  Insulin Glargine (Lantus SoloStar) 100 UNIT/ML injection pen, Inject 45 Units under the skin into the appropriate area as directed 2 (two) times a day., Disp: 30 mL, Rfl: 0  •  Insulin Glargine (Lantus SoloStar) 100 UNIT/ML injection pen, Inject 100 Units under the skin into the appropriate area as directed Daily., Disp: 30 mL, Rfl: 0  •  Insulin Glargine, 2 Unit Dial, (Toujeo Max SoloStar) 300 UNIT/ML solution pen-injector injection, Inject 60 Units under the skin into the appropriate area as directed 2 (Two) Times a Day., Disp: 36 mL, Rfl: 2  •  Insulin Syringe-Needle U-100 (TRUEplus Insulin Syringe) 31G X 5/16\" 0.5 ML misc, Use to inject 3 times a day, Disp: 90 each, Rfl: 4  •  pantoprazole (PROTONIX) 40 MG EC tablet, Take 1 tablet by mouth daily, Disp: 90 tablet, Rfl: 2  •  pantoprazole (PROTONIX) 40 MG EC tablet, Take 1 tablet (40 mg) by mouth daily, Disp: 90 tablet, Rfl: 2  •  SUMAtriptan (IMITREX) 100 MG tablet, Take 1 tablet by mouth 2 times per day at least 2 hours between doses as needed., Disp: 27 tablet, Rfl: 2  •  triamcinolone (KENALOG) 0.1 % cream, Apply 1 application topically to affected area daily as needed, Disp: 80 g, Rfl: 3  •  Cholecalciferol (VITAMIN D3) 94407 units capsule, Take 1 capsule by mouth 2 (Two) Times a Week., Disp: 24 capsule, Rfl: 3  •  glucose blood test strip, USE 1 STRIP 2 TIMES DAILY AS " DIRECTED, Disp: 180 each, Rfl: 6  •  LANTUS SOLOSTAR 100 UNIT/ML injection pen, Inject 45 Units under the skin into the appropriate area as directed 2 (Two) Times a Day., Disp: 36 mL, Rfl: 6        Review of Systems  A comprehensive 14-point review of systems performed.  Significant findings as mentioned above.  All other systems reviewed and are negative. No fevers, night sweats, lymphadenopathy, or weight loss. Intermittent myalgias.        Physical Exam:  Vital Signs: These were reviewed and listed as per patient’s electronic medical chart  Vitals:    12/20/21 0904   BP: 151/82   Pulse: 90   Resp: 18   Temp: 97.1 °F (36.2 °C)   SpO2: 98%     General: Awake, alert and oriented, in no distress, obese  HEENT: Head is atraumatic, normocephalic, extraocular movements full, no scleral icterus, mask in place  Neck: supple, no jvd, lymphadenopathy or masses  Cardiovascular: regular rhythm, tachycardic, without murmurs, rubs or gallops  Pulmonary: non-labored, clear to auscultation bilaterally, no wheezing  Abdomen: soft, non-tender, non-distended, normal active bowel sounds present  Extremities: No clubbing, cyanosis or edema  Lymph: No cervical or supraclavicular adenopathy  Neurologic: Mental status as above, alert, awake and oriented, grossly non-focal exam  Skin: warm, dry, intact  Patient was examined on 12/20/21 and changes are reflected and noted above.        Labs / Studies:    Lab on 12/13/2021   Component Date Value   • Glucose 12/13/2021 238*   • BUN 12/13/2021 12    • Creatinine 12/13/2021 0.81    • Sodium 12/13/2021 141    • Potassium 12/13/2021 4.1    • Chloride 12/13/2021 104    • CO2 12/13/2021 22.6    • Calcium 12/13/2021 9.1    • Total Protein 12/13/2021 6.5    • Albumin 12/13/2021 3.85    • ALT (SGPT) 12/13/2021 26    • AST (SGOT) 12/13/2021 23    • Alkaline Phosphatase 12/13/2021 74    • Total Bilirubin 12/13/2021 0.4    • eGFR Non  Amer 12/13/2021 73    • Globulin 12/13/2021 2.7    • A/G Ratio  12/13/2021 1.5    • BUN/Creatinine Ratio 12/13/2021 14.8    • Anion Gap 12/13/2021 14.4    • LDH 12/13/2021 208    • Uric Acid 12/13/2021 4.4    • WBC 12/13/2021 6.43    • RBC 12/13/2021 4.30    • Hemoglobin 12/13/2021 12.8    • Hematocrit 12/13/2021 39.9    • MCV 12/13/2021 92.8    • MCH 12/13/2021 29.8    • MCHC 12/13/2021 32.1    • RDW 12/13/2021 13.8    • RDW-SD 12/13/2021 46.9    • MPV 12/13/2021 10.7    • Platelets 12/13/2021 255    • Neutrophil % 12/13/2021 52.7    • Lymphocyte % 12/13/2021 33.3    • Monocyte % 12/13/2021 9.6    • Eosinophil % 12/13/2021 3.1    • Basophil % 12/13/2021 1.1    • Immature Grans % 12/13/2021 0.2    • Neutrophils, Absolute 12/13/2021 3.39    • Lymphocytes, Absolute 12/13/2021 2.14    • Monocytes, Absolute 12/13/2021 0.62    • Eosinophils, Absolute 12/13/2021 0.20    • Basophils, Absolute 12/13/2021 0.07    • Immature Grans, Absolute 12/13/2021 0.01    • nRBC 12/13/2021 0.0    Lab on 09/01/2021   Component Date Value   • Glucose 09/01/2021 174*   • BUN 09/01/2021 11    • Creatinine 09/01/2021 0.72    • Sodium 09/01/2021 139    • Potassium 09/01/2021 4.0    • Chloride 09/01/2021 104    • CO2 09/01/2021 24.1    • Calcium 09/01/2021 8.8    • Total Protein 09/01/2021 6.3    • Albumin 09/01/2021 3.75    • ALT (SGPT) 09/01/2021 21    • AST (SGOT) 09/01/2021 20    • Alkaline Phosphatase 09/01/2021 74    • Total Bilirubin 09/01/2021 0.4    • eGFR Non  Amer 09/01/2021 84    • Globulin 09/01/2021 2.6    • A/G Ratio 09/01/2021 1.5    • BUN/Creatinine Ratio 09/01/2021 15.3    • Anion Gap 09/01/2021 10.9    • LDH 09/01/2021 182    • Iron 09/01/2021 76    • Iron Saturation 09/01/2021 19*   • Transferrin 09/01/2021 274    • TIBC 09/01/2021 408    • Ferritin 09/01/2021 122.00    • e13a2 (b2a2) Transcript 09/01/2021 Comment    • e14a2 (b3a2) Transcript 09/01/2021 Comment    • E1A2 transcript 09/01/2021 Comment    • Interpretation 09/01/2021 Negative    • Director Review 09/01/2021 Comment     • Background: 2021 Comment    • Methodology: 2021 Comment    • WBC 2021 5.84    • RBC 2021 4.21    • Hemoglobin 2021 12.6    • Hematocrit 2021 38.2    • MCV 2021 90.7    • MCH 2021 29.9    • MCHC 2021 33.0    • RDW 2021 13.3    • RDW-SD 2021 43.8    • MPV 2021 10.6    • Platelets 2021 243    • Neutrophil % 2021 50.4    • Lymphocyte % 2021 34.6    • Monocyte % 2021 9.4    • Eosinophil % 2021 4.3    • Basophil % 2021 1.0    • Immature Grans % 2021 0.3    • Neutrophils, Absolute 2021 2.94    • Lymphocytes, Absolute 2021 2.02    • Monocytes, Absolute 2021 0.55    • Eosinophils, Absolute 2021 0.25    • Basophils, Absolute 2021 0.06    • Immature Grans, Absolute 2021 0.02    • nRBC 2021 0.0             IMAGIN17: US ABDOMEN COMPLETE: Visualized pancreas is unremarkable. The imaged portion of the abdominal aorta is nondilated. The liver is homogeneous. There is no intrahepatic ductal dilatation or focal hepatic mass. The imaged portion of the hepatic vessels and inferior vena cava are patent. The gallbladder has been removed. The common bile duct is normal, measuring 5.7 mm. The kidneys demonstrate no evidence of hydronephrosis or solid renal mass. The spleen is homogeneous and measures 13 cm. IMPRESSION: Spleen measures 13 cm and is homogeneous.           PATHOLOGY:    05/15/17: Bone Marrow Biopsy & Aspirate                     17: BCR ABL PCR        17: BCR ABL PCR        17: BCR ABL PCR        18: BCR ABL PCR:                                                                                                         18:                                2020:          12/3/20           03/22/21:            Assessment/Plan :  Aric Haro is a very pleasant 56 y.o.  female who presents in follow up appointment for  further management and treatment of chronic phase chronic myelogenous leukemia.    1. Chronic Myelogenous Leukemia - CML (chronic phase)    2. Iron deficiency - no longer on iron   3. Healthcare Maintenance    Peripheral smear concerning for an underlying myeloproliferative disorder. Her primary provider obtained a flow cytometry which did not reveal any significant abnormalities. I obtained a quantitative BCR ABL PCR positive for the b2a2/b3a2 (p210) and e1a2 (p190) fusion gene transcripts consistent with a diagnosis for CML. Bone marrow biopsy performed on initial diagnosis 5/17/17 and prior to starting Dasatinib treatment with results above and consistent with chronic phase CML.     To further evaluate for a myeloproliferative disorder I did also assess for JAK2 (V617F and exon 12)/MPL/CALR mutations which were negative. ESR, CRP, EMMANUEL, Rheumatoid factor, as well as an acute hepatitis panel and HIV test which were all negative. No iron deficiency seen. Abdominal ultrasound significant for a spleen size 13.9cm.     For treatment of her CML she was started on Dasatinib 100mg oral daily. She does have a history significant for pancreatitis, therefore, I held on using Nilotinib. Patient however was unable to tolerate Dasatinib secondary to worsening reflux while being off of her PPI, and experiencing severe nausea, vomiting, and dehydration. She was then started on Imatinib 400mg daily and is overall tolerating this well. I have previously advised her of Ibuprofen and Tylenol use for her myalgias. She does also experience intermittent lower extremity edema (which she denies today). I did order baseline Echo which showed an intact EF. Therefore I did repeat echocardiogram to further evaluate and repeat echo showed an intact EF 61-65% which is stable.    She has had a complete hematological response, and BCR ABL PCR has normalized since start of Gleevac. This will need to continue to be monitored every three months to  assess ongoing molecular response, today's level is <0.0032%. The blood counts have now stabilized therefore will continue to monitor every 3 months given stability.  BCR ABL PCR is pending today and she was advised to call next week to follow up on these results.     She underwent a colonoscopy February 2018 with Dr. Esparza who recommended repeat colonoscopy in 3-5 years due to polyps that were removed. This was discussed with her as it has been three years since her last colonoscopy. She has also received her Influenza and Hepatitis A vaccine. Iron has been discontinued and iron studies have been normal.     6. ACO Quality measures  -  Aric Haro does not use tobacco products.  -  Arci Haro received 2021 flu vaccine. She received COVID J&J vaccine on April 12, 2021.    -  Aric Haro reports a pain score of 0.  Given her pain assessment as noted, treatment options were discussed and the following options were decided upon as a follow-up plan to address the patient's pain: continuation of current treatment plan for pain.  -  Current outpatient and discharge medications have been reconciled for the patient.  Reviewed by: Evita Serrano MD      I will have the patient return for follow up visit in three months with labs (CBC, CMP, LDH, Uric acid, BCR ABL PCR) one to two weeks prior. Lab orders have been placed. She understands that should she have any questions or concerns prior to her appointment she should give us a call at any time and I would be happy to see her sooner. It was a pleasure to see this patient in clinic today, thank you for allowing me to participate in the care of this patient.        Evita Serrano MD  12/20/21  09:26 EST

## 2022-01-19 ENCOUNTER — SPECIALTY PHARMACY (OUTPATIENT)
Dept: PHARMACY | Facility: HOSPITAL | Age: 57
End: 2022-01-19

## 2022-02-11 ENCOUNTER — SPECIALTY PHARMACY (OUTPATIENT)
Dept: PHARMACY | Facility: HOSPITAL | Age: 57
End: 2022-02-11

## 2022-03-01 NOTE — PROGRESS NOTES
Specialty Pharmacy Refill Coordination Note      Name:  Aric Haro  :  1965  Date:  3/1/2022         Past Medical History:   Diagnosis Date   • Anemia    • Cancer (HCC)     leukemia   • Diabetes mellitus (HCC)    • Elevated cholesterol    • GERD (gastroesophageal reflux disease)    • Hypertension    • PONV (postoperative nausea and vomiting)        Past Surgical History:   Procedure Laterality Date   • BLEPHAROPLASTY Bilateral 2018    Procedure: BLEPHAROPLASTY BOTH EYES UPPER EYELIDS (PT REQUESTED TERESITA WILKES;  Surgeon: Chris Haile MD;  Location: [facility];  Service: Ophthalmology   • COLONOSCOPY N/A 3/27/2018    Procedure: COLONOSCOPY FOR SCREENING  CPTCODE:49344;  Surgeon: Drew Esparza III, MD;  Location: [facility];  Service: Gastroenterology   • SINUS SURGERY     • SINUS SURGERY     • SLEEVE GASTROPLASTY         Social History     Socioeconomic History   • Marital status:    Tobacco Use   • Smoking status: Former Smoker     Packs/day: 0.25     Years: 4.00     Pack years: 1.00   • Smokeless tobacco: Never Used   Vaping Use   • Vaping Use: Never used   Substance and Sexual Activity   • Alcohol use: No   • Drug use: No   • Sexual activity: Defer       Family History   Problem Relation Age of Onset   • Anemia Mother    • Hypertension Mother    • Cancer Mother    • COPD Father    • Heart disease Father    • Breast cancer Neg Hx        No Known Allergies    Current Outpatient Medications   Medication Sig Dispense Refill   • celecoxib (CeleBREX) 200 MG capsule Take 1 capsule by mouth 2 (Two) Times a Day. 180 capsule 3   • Cholecalciferol (VITAMIN D3) 49308 units capsule Take 1 capsule by mouth 2 (Two) Times a Week. 24 capsule 3   • cyanocobalamin 1000 MCG/ML injection Administer 1 ml (1,000 mcg) intramuscularly once weekly 12 mL 2   • desloratadine-pseudoephedrine (CLARINEX-D 12-hour) 2.5-120 MG per tablet take 1 tablet by mouth 2 times every day 30 tablet 0   •  "DULoxetine (CYMBALTA) 60 MG capsule Take 1 capsule by mouth Daily. 90 capsule 3   • fenofibrate (TRICOR) 145 MG tablet Take 1 tablet by mouth Daily. 90 tablet 3   • fenofibrate (TRICOR) 145 MG tablet Take 1 tablet by mouth daily 90 tablet 2   • glucose blood test strip USE 1 STRIP 2 TIMES DAILY AS DIRECTED 180 each 6   • HYDROcodone-acetaminophen (NORCO)  MG per tablet Take 1 tablet by mouth every 4 hours as needed for severe pain due to kidney stone 20 tablet 0   • ibuprofen (ADVIL,MOTRIN) 800 MG tablet Take 1 tablet  by mouth 3 times per day with food or milk as needed 270 tablet 2   • imatinib (GLEEVEC) 400 MG chemo tablet Take 1 tablet by mouth Daily. 30 tablet 5   • Insulin Glargine (BASAGLAR KWIKPEN) 100 UNIT/ML injection pen Inject 90 units under the skin once daily. 30 mL 4   • Insulin Glargine (Lantus SoloStar) 100 UNIT/ML injection pen Inject 100 Units under the skin into the appropriate area as directed Daily. 18 mL 3   • Insulin Glargine (Lantus SoloStar) 100 UNIT/ML injection pen Inject 45 Units under the skin into the appropriate area as directed 2 (two) times a day. 30 mL 0   • Insulin Glargine (Lantus SoloStar) 100 UNIT/ML injection pen Inject 100 Units under the skin into the appropriate area as directed Daily. 30 mL 0   • Insulin Glargine, 2 Unit Dial, (Toujeo Max SoloStar) 300 UNIT/ML solution pen-injector injection Inject 60 Units under the skin into the appropriate area as directed 2 (Two) Times a Day. 36 mL 2   • Insulin Syringe-Needle U-100 (TRUEplus Insulin Syringe) 31G X 5/16\" 0.5 ML misc Use to inject 3 times a day 90 each 4   • LANTUS SOLOSTAR 100 UNIT/ML injection pen Inject 45 Units under the skin into the appropriate area as directed 2 (Two) Times a Day. 36 mL 6   • pantoprazole (PROTONIX) 40 MG EC tablet Take 1 tablet by mouth daily 90 tablet 2   • pantoprazole (PROTONIX) 40 MG EC tablet Take 1 tablet (40 mg) by mouth daily 90 tablet 2   • SUMAtriptan (IMITREX) 100 MG tablet Take " 1 tablet by mouth 2 times per day at least 2 hours between doses as needed. 27 tablet 2   • triamcinolone (KENALOG) 0.1 % cream Apply 1 application topically to affected area daily as needed 80 g 3     No current facility-administered medications for this visit.         ASSESSMENT/PLAN:      Aric is a 56 y.o. female contacted today regarding refills of Gleevec 100  Mg tablet specialty medication(s).    Reviewed and verified with patient:       Specialty medication(s) and dose(s) confirmed: yes    Refill Questions      Most Recent Value   Changes to allergies? No   Changes to medications? No   New conditions since last clinic visit No   Unplanned office visit, urgent care, ED, or hospital admission in the last 4 weeks  No   How does patient/caregiver feel medication is working? Very good   Financial problems or insurance changes  No   If yes, describe changes in insurance or financial issues. None   How many doses of your specialty medications were missed in the last 4 weeks? 0   Why were doses missed? NA   Does this patient require a clinical escalation to a pharmacist? No            Gap in therapy - After reviewing claims history for 2021, patient should have extra tablets on hand while remaining compliant.        Follow-up: 28 day(s)     Opal Law, Pharmacy Technician  Specialty Pharmacy Technician

## 2022-03-02 ENCOUNTER — TELEPHONE (OUTPATIENT)
Dept: ONCOLOGY | Facility: CLINIC | Age: 57
End: 2022-03-02

## 2022-03-02 NOTE — TELEPHONE ENCOUNTER
Caller: Aric Haro    Relationship: Self    Best call back number: [phone]    What is the best time to reach you: ANYTIME    Who are you requesting to speak with (clinical staff, provider,  specific staff member): TERESITA    What was the call regarding: LAB BILL AND UPCOMING LAB APPT    Do you require a callback: YES

## 2022-03-10 ENCOUNTER — LAB (OUTPATIENT)
Dept: ONCOLOGY | Facility: CLINIC | Age: 57
End: 2022-03-10

## 2022-03-10 VITALS
HEART RATE: 68 BPM | SYSTOLIC BLOOD PRESSURE: 117 MMHG | TEMPERATURE: 97.5 F | RESPIRATION RATE: 18 BRPM | OXYGEN SATURATION: 99 % | DIASTOLIC BLOOD PRESSURE: 71 MMHG

## 2022-03-10 DIAGNOSIS — D50.9 IRON DEFICIENCY ANEMIA, UNSPECIFIED IRON DEFICIENCY ANEMIA TYPE: ICD-10-CM

## 2022-03-10 DIAGNOSIS — D51.9 ANEMIA DUE TO VITAMIN B12 DEFICIENCY, UNSPECIFIED B12 DEFICIENCY TYPE: ICD-10-CM

## 2022-03-10 DIAGNOSIS — C92.10 CML (CHRONIC MYELOCYTIC LEUKEMIA): ICD-10-CM

## 2022-03-10 DIAGNOSIS — G93.32 CHRONIC FATIGUE SYNDROME: ICD-10-CM

## 2022-03-10 DIAGNOSIS — E78.2 MIXED HYPERLIPIDEMIA: Primary | ICD-10-CM

## 2022-03-10 DIAGNOSIS — E55.9 AVITAMINOSIS D: ICD-10-CM

## 2022-03-10 LAB
25(OH)D3 SERPL-MCNC: 25.7 NG/ML (ref 30–100)
ALBUMIN SERPL-MCNC: 3.82 G/DL (ref 3.5–5.2)
ALBUMIN/GLOB SERPL: 1.4 G/DL
ALP SERPL-CCNC: 63 U/L (ref 39–117)
ALT SERPL W P-5'-P-CCNC: 22 U/L (ref 1–33)
ANION GAP SERPL CALCULATED.3IONS-SCNC: 12.2 MMOL/L (ref 5–15)
AST SERPL-CCNC: 27 U/L (ref 1–32)
BASOPHILS # BLD AUTO: 0.04 10*3/MM3 (ref 0–0.2)
BASOPHILS NFR BLD AUTO: 0.8 % (ref 0–1.5)
BILIRUB SERPL-MCNC: 0.4 MG/DL (ref 0–1.2)
BUN SERPL-MCNC: 17 MG/DL (ref 6–20)
BUN/CREAT SERPL: 27 (ref 7–25)
CALCIUM SPEC-SCNC: 9.2 MG/DL (ref 8.6–10.5)
CHLORIDE SERPL-SCNC: 105 MMOL/L (ref 98–107)
CHOLEST SERPL-MCNC: 160 MG/DL (ref 0–200)
CO2 SERPL-SCNC: 22.8 MMOL/L (ref 22–29)
CREAT SERPL-MCNC: 0.63 MG/DL (ref 0.57–1)
DEPRECATED RDW RBC AUTO: 46.1 FL (ref 37–54)
EGFRCR SERPLBLD CKD-EPI 2021: 104.3 ML/MIN/1.73
EOSINOPHIL # BLD AUTO: 0.07 10*3/MM3 (ref 0–0.4)
EOSINOPHIL NFR BLD AUTO: 1.5 % (ref 0.3–6.2)
ERYTHROCYTE [DISTWIDTH] IN BLOOD BY AUTOMATED COUNT: 13.6 % (ref 12.3–15.4)
FOLATE SERPL-MCNC: >20 NG/ML (ref 4.78–24.2)
GLOBULIN UR ELPH-MCNC: 2.8 GM/DL
GLUCOSE SERPL-MCNC: 140 MG/DL (ref 65–99)
HBA1C MFR BLD: 8.4 % (ref 4.8–5.6)
HCT VFR BLD AUTO: 36.8 % (ref 34–46.6)
HDLC SERPL-MCNC: 58 MG/DL (ref 40–60)
HGB BLD-MCNC: 12.1 G/DL (ref 12–15.9)
IMM GRANULOCYTES # BLD AUTO: 0.01 10*3/MM3 (ref 0–0.05)
IMM GRANULOCYTES NFR BLD AUTO: 0.2 % (ref 0–0.5)
LDH SERPL-CCNC: 230 U/L (ref 135–214)
LDLC SERPL CALC-MCNC: 78 MG/DL (ref 0–100)
LDLC/HDLC SERPL: 1.28 {RATIO}
LYMPHOCYTES # BLD AUTO: 1.61 10*3/MM3 (ref 0.7–3.1)
LYMPHOCYTES NFR BLD AUTO: 33.4 % (ref 19.6–45.3)
MCH RBC QN AUTO: 30 PG (ref 26.6–33)
MCHC RBC AUTO-ENTMCNC: 32.9 G/DL (ref 31.5–35.7)
MCV RBC AUTO: 91.3 FL (ref 79–97)
MONOCYTES # BLD AUTO: 0.52 10*3/MM3 (ref 0.1–0.9)
MONOCYTES NFR BLD AUTO: 10.8 % (ref 5–12)
NEUTROPHILS NFR BLD AUTO: 2.57 10*3/MM3 (ref 1.7–7)
NEUTROPHILS NFR BLD AUTO: 53.3 % (ref 42.7–76)
NRBC BLD AUTO-RTO: 0 /100 WBC (ref 0–0.2)
PLATELET # BLD AUTO: 255 10*3/MM3 (ref 140–450)
PMV BLD AUTO: 10.6 FL (ref 6–12)
POTASSIUM SERPL-SCNC: 4 MMOL/L (ref 3.5–5.2)
PROT SERPL-MCNC: 6.6 G/DL (ref 6–8.5)
RBC # BLD AUTO: 4.03 10*6/MM3 (ref 3.77–5.28)
SODIUM SERPL-SCNC: 140 MMOL/L (ref 136–145)
T4 FREE SERPL-MCNC: 1.31 NG/DL (ref 0.93–1.7)
TRIGL SERPL-MCNC: 138 MG/DL (ref 0–150)
TSH SERPL DL<=0.05 MIU/L-ACNC: 2.05 UIU/ML (ref 0.27–4.2)
URATE SERPL-MCNC: 5.3 MG/DL (ref 2.4–5.7)
VIT B12 BLD-MCNC: 402 PG/ML (ref 211–946)
VLDLC SERPL-MCNC: 24 MG/DL (ref 5–40)
WBC NRBC COR # BLD: 4.82 10*3/MM3 (ref 3.4–10.8)

## 2022-03-10 PROCEDURE — 85025 COMPLETE CBC W/AUTO DIFF WBC: CPT | Performed by: INTERNAL MEDICINE

## 2022-03-10 PROCEDURE — 84443 ASSAY THYROID STIM HORMONE: CPT | Performed by: NURSE PRACTITIONER

## 2022-03-10 PROCEDURE — 80061 LIPID PANEL: CPT | Performed by: NURSE PRACTITIONER

## 2022-03-10 PROCEDURE — 83615 LACTATE (LD) (LDH) ENZYME: CPT | Performed by: INTERNAL MEDICINE

## 2022-03-10 PROCEDURE — 82607 VITAMIN B-12: CPT | Performed by: NURSE PRACTITIONER

## 2022-03-10 PROCEDURE — 84550 ASSAY OF BLOOD/URIC ACID: CPT | Performed by: INTERNAL MEDICINE

## 2022-03-10 PROCEDURE — 81206 BCR/ABL1 GENE MAJOR BP: CPT

## 2022-03-10 PROCEDURE — 84439 ASSAY OF FREE THYROXINE: CPT | Performed by: NURSE PRACTITIONER

## 2022-03-10 PROCEDURE — 82746 ASSAY OF FOLIC ACID SERUM: CPT | Performed by: NURSE PRACTITIONER

## 2022-03-10 PROCEDURE — 81207 BCR/ABL1 GENE MINOR BP: CPT

## 2022-03-10 PROCEDURE — 82306 VITAMIN D 25 HYDROXY: CPT | Performed by: NURSE PRACTITIONER

## 2022-03-10 PROCEDURE — 83036 HEMOGLOBIN GLYCOSYLATED A1C: CPT | Performed by: NURSE PRACTITIONER

## 2022-03-10 PROCEDURE — 80053 COMPREHEN METABOLIC PANEL: CPT | Performed by: INTERNAL MEDICINE

## 2022-03-21 ENCOUNTER — OFFICE VISIT (OUTPATIENT)
Dept: ONCOLOGY | Facility: CLINIC | Age: 57
End: 2022-03-21

## 2022-03-21 VITALS
RESPIRATION RATE: 18 BRPM | HEART RATE: 108 BPM | OXYGEN SATURATION: 96 % | DIASTOLIC BLOOD PRESSURE: 82 MMHG | BODY MASS INDEX: 42.36 KG/M2 | SYSTOLIC BLOOD PRESSURE: 143 MMHG | WEIGHT: 263.6 LBS | HEIGHT: 66 IN | TEMPERATURE: 97.5 F

## 2022-03-21 DIAGNOSIS — C92.10 CML (CHRONIC MYELOCYTIC LEUKEMIA): Primary | ICD-10-CM

## 2022-03-21 PROCEDURE — 99214 OFFICE O/P EST MOD 30 MIN: CPT | Performed by: INTERNAL MEDICINE

## 2022-03-21 RX ORDER — DIPHENOXYLATE HYDROCHLORIDE AND ATROPINE SULFATE 2.5; .025 MG/1; MG/1
TABLET ORAL DAILY
COMMUNITY

## 2022-03-21 NOTE — PROGRESS NOTES
Aric Haro  [id number]  1965  3/21/2022      Referring Provider:   EILEEN Andrade    Reason for Follow Up:   1. Chronic Phase - Chronic Myelogenous Leukemia  2. Leukocytosis  3. Thrombocytosis     Chief Complaint:  CML      History of Present Illness:  Aric Haro is a very pleasant 56 y.o.  female who presents in follow up appointment for further management and treatment of chronic phase chronic myelogenous leukemia (CML).    Ms. Haro began experiencing extreme fatigue where she was sleeping multiple hours during the day when she initially began following with me in April 2017. 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 On laboratory evaluation she was found to have a total white blood cell count of 22.35 and a platelet count 470 thousand. In March 2016 she was also noted to have a leukocytosis (although not as elevated) as well as a thrombocytosis, however reports that these were likely secondary to other medical issues at the time her blood work was drawn. She denied of any significant weight loss however has been gradually losing weight since gastric sleeve procedure. She denied of any fevers or frequent infections but did note fluctuating neck lymphadenopathy as well as early satiety and taste in change. She denied of any abnormal or spontaneous bleeding. I did obtain a BCR ABL PCR to further assess her leukocytosis and thrombocytosis and she was positive for the b2a2/b3a2 (p210) and e1a2 (p190) fusion gene transcripts consistent with a diagnosis for CML. After reviewing the toxicities of each tyrosine kinase inhibitor we decided to start Dasatinib treatment. She had a difficult time tolerating the Dasatinib as she was unable to take her PPI with this medication. Since she had to be taken off of her PPI she had worsening reflux symptoms as well as  severe nausea, vomiting, dehydration, and headaches during this period. Given the toxicities she was experiencing she was taken off Dasatinib and this was changed to Gleevec treatment. Since starting Gleevac 400mg daily she has tolerated this much better without significant side effects. The only side effect that she has been able to see is intermittent pedal edema along with some hand swelling and muscle cramps which are now intermittant.     Treatment History:  Started Dasatinib on 05/15/17 - experienced nausea, severe reflux, headaches while on medication and had taken 9 doses. This was discontinued due to intolerability and she was thereafter started on Imatinib.   Started Imatinib on 5/26/17      Interim History:  From the start of Dasatinib and later Gleevec she has had a good response and normalization in her complete blood counts with normalization of BCR ABL PCR. She denies of any fevers, infections, lymphadenopathy, chest pain, dyspnea, nausea. Since her last visit she was diagnosed with COVID 19 at the end of January 2022. She reports ongoing fatigue as well as hair loss since that time. Also in the last six months she started experiencing psoriasis and rosacea.          The following portions of the patient's history were reviewed and updated as appropriate: allergies, current medications, past family history, past medical history, past social history, past surgical history and problem list.      No Known Allergies    Past Medical History:   Diagnosis Date   • Anemia    • Cancer (HCC)     leukemia   • Diabetes mellitus (HCC)    • Elevated cholesterol    • GERD (gastroesophageal reflux disease)    • Hypertension    • PONV (postoperative nausea and vomiting)        Past Surgical History:   Procedure Laterality Date   • BLEPHAROPLASTY Bilateral 6/22/2018    Procedure: BLEPHAROPLASTY BOTH EYES UPPER EYELIDS (PT REQUESTED TERESITA WILKES;  Surgeon: Chris Haile MD;  Location: [facility];  Service:  Ophthalmology   • COLONOSCOPY N/A 3/27/2018    Procedure: COLONOSCOPY FOR SCREENING  CPTCODE:56496;  Surgeon: Drew Esparza III, MD;  Location: [facility];  Service: Gastroenterology   • SINUS SURGERY     • SINUS SURGERY     • SLEEVE GASTROPLASTY         Social History     Socioeconomic History   • Marital status:    Tobacco Use   • Smoking status: Former Smoker     Packs/day: 0.25     Years: 4.00     Pack years: 1.00   • Smokeless tobacco: Never Used   Vaping Use   • Vaping Use: Never used   Substance and Sexual Activity   • Alcohol use: No   • Drug use: No   • Sexual activity: Defer   She lives with her daughter. She denies of any alcohol or illicit drug use. Previous social tobacco use more then 20 years ago.  Recent job change.       Family History   Problem Relation Age of Onset   • Anemia Mother    • Hypertension Mother    • Cancer Mother    • COPD Father    • Heart disease Father    • Breast cancer Neg Hx    Maternal Uncle - CLL  Mother - Malignancy of [place]          Current Outpatient Medications:   •  BIOTIN PO, Take 100 mg by mouth., Disp: , Rfl:   •  celecoxib (CeleBREX) 200 MG capsule, Take 1 capsule by mouth 2 (Two) Times a Day., Disp: 180 capsule, Rfl: 3  •  cyanocobalamin 1000 MCG/ML injection, Administer 1 ml (1,000 mcg) intramuscularly once weekly, Disp: 12 mL, Rfl: 2  •  desloratadine-pseudoephedrine (CLARINEX-D 12-hour) 2.5-120 MG per tablet, take 1 tablet by mouth 2 times every day, Disp: 30 tablet, Rfl: 0  •  doxycycline (ADOXA) 100 MG tablet, Take 1 tablet (100 mg) by mouth every 12 hours for 10 days, Disp: 20 tablet, Rfl: 2  •  DULoxetine (CYMBALTA) 60 MG capsule, Take 1 capsule by mouth Daily., Disp: 90 capsule, Rfl: 3  •  fenofibrate (TRICOR) 145 MG tablet, Take 1 tablet by mouth Daily., Disp: 90 tablet, Rfl: 3  •  fenofibrate (TRICOR) 145 MG tablet, Take 1 tablet by mouth daily, Disp: 90 tablet, Rfl: 2  •  HYDROcodone-acetaminophen (NORCO)  MG per tablet, Take 1  "tablet by mouth every 4 hours as needed for severe pain due to kidney stone, Disp: 20 tablet, Rfl: 0  •  ibuprofen (ADVIL,MOTRIN) 800 MG tablet, Take 1 tablet  by mouth 3 times per day with food or milk as needed, Disp: 270 tablet, Rfl: 2  •  imatinib (GLEEVEC) 400 MG chemo tablet, Take 1 tablet by mouth Daily., Disp: 30 tablet, Rfl: 5  •  Insulin Glargine (BASAGLAR KWIKPEN) 100 UNIT/ML injection pen, Inject 90 units under the skin once daily., Disp: 30 mL, Rfl: 4  •  Insulin Glargine (Lantus SoloStar) 100 UNIT/ML injection pen, Inject 100 Units under the skin into the appropriate area as directed Daily., Disp: 18 mL, Rfl: 3  •  Insulin Glargine (Lantus SoloStar) 100 UNIT/ML injection pen, Inject 45 Units under the skin into the appropriate area as directed 2 (two) times a day., Disp: 30 mL, Rfl: 0  •  Insulin Glargine (Lantus SoloStar) 100 UNIT/ML injection pen, Inject 100 Units under the skin into the appropriate area as directed Daily., Disp: 30 mL, Rfl: 0  •  Insulin Glargine, 2 Unit Dial, (Toujeo Max SoloStar) 300 UNIT/ML solution pen-injector injection, Inject 60 Units under the skin into the appropriate area as directed 2 (Two) Times a Day., Disp: 36 mL, Rfl: 2  •  Insulin Syringe-Needle U-100 (TRUEplus Insulin Syringe) 31G X 5/16\" 0.5 ML misc, Use to inject 3 times a day, Disp: 90 each, Rfl: 4  •  multivitamin (MULTI-VITAMIN DAILY PO), Take  by mouth Daily., Disp: , Rfl:   •  pantoprazole (PROTONIX) 40 MG EC tablet, Take 1 tablet by mouth daily, Disp: 90 tablet, Rfl: 2  •  pantoprazole (PROTONIX) 40 MG EC tablet, Take 1 tablet (40 mg) by mouth daily, Disp: 90 tablet, Rfl: 2  •  SUMAtriptan (IMITREX) 100 MG tablet, Take 1 tablet by mouth 2 times per day at least 2 hours between doses as needed., Disp: 27 tablet, Rfl: 2  •  triamcinolone (KENALOG) 0.1 % cream, Apply 1 application topically to affected area daily as needed, Disp: 80 g, Rfl: 3  •  Cholecalciferol (VITAMIN D3) 27094 units capsule, Take 1 capsule " by mouth 2 (Two) Times a Week., Disp: 24 capsule, Rfl: 3  •  glucose blood test strip, USE 1 STRIP 2 TIMES DAILY AS DIRECTED, Disp: 180 each, Rfl: 6  •  LANTUS SOLOSTAR 100 UNIT/ML injection pen, Inject 45 Units under the skin into the appropriate area as directed 2 (Two) Times a Day., Disp: 36 mL, Rfl: 6        Review of Systems  A comprehensive 14-point review of systems performed.  Significant findings as mentioned above.  All other systems reviewed and are negative. No fevers, night sweats, lymphadenopathy, or weight loss. Positive fatigue.         Physical Exam:  Vital Signs: These were reviewed and listed as per patient’s electronic medical chart  Vitals:    03/21/22 1016   BP: 143/82   Pulse: 108   Resp: 18   Temp: 97.5 °F (36.4 °C)   SpO2: 96%     General: Awake, alert and oriented, in no distress, obese  HEENT: Head is atraumatic, normocephalic, extraocular movements full, no scleral icterus, mask in place  Neck: supple, no jvd, lymphadenopathy or masses  Cardiovascular: regular rhythm, tachycardic, without murmurs, rubs or gallops  Pulmonary: non-labored, clear to auscultation bilaterally, no wheezing  Abdomen: soft, non-tender, non-distended, normal active bowel sounds present  Extremities: No clubbing, cyanosis or edema  Lymph: No cervical or supraclavicular adenopathy  Neurologic: Mental status as above, alert, awake and oriented, grossly non-focal exam  Skin: warm, dry, intact, no ecchymosis  Patient was examined on 03/21/22 and changes are reflected and noted above.        Labs / Studies:    Lab on 03/10/2022   Component Date Value   • Glucose 03/10/2022 140 (A)   • BUN 03/10/2022 17    • Creatinine 03/10/2022 0.63    • Sodium 03/10/2022 140    • Potassium 03/10/2022 4.0    • Chloride 03/10/2022 105    • CO2 03/10/2022 22.8    • Calcium 03/10/2022 9.2    • Total Protein 03/10/2022 6.6    • Albumin 03/10/2022 3.82    • ALT (SGPT) 03/10/2022 22    • AST (SGOT) 03/10/2022 27    • Alkaline Phosphatase  03/10/2022 63    • Total Bilirubin 03/10/2022 0.4    • Globulin 03/10/2022 2.8    • A/G Ratio 03/10/2022 1.4    • BUN/Creatinine Ratio 03/10/2022 27.0 (A)   • Anion Gap 03/10/2022 12.2    • eGFR 03/10/2022 104.3    • e13a2 (b2a2) Transcript 03/10/2022 Comment    • e14a2 (b3a2) Transcript 03/10/2022 Comment    • E1A2 transcript 03/10/2022 Comment    • Interpretation 03/10/2022 Negative    • Director Review 03/10/2022 Comment    • Background: 03/10/2022 Comment    • Methodology: 03/10/2022 Comment    • Uric Acid 03/10/2022 5.3    • LDH 03/10/2022 230 (A)   • WBC 03/10/2022 4.82    • RBC 03/10/2022 4.03    • Hemoglobin 03/10/2022 12.1    • Hematocrit 03/10/2022 36.8    • MCV 03/10/2022 91.3    • MCH 03/10/2022 30.0    • MCHC 03/10/2022 32.9    • RDW 03/10/2022 13.6    • RDW-SD 03/10/2022 46.1    • MPV 03/10/2022 10.6    • Platelets 03/10/2022 255    • Neutrophil % 03/10/2022 53.3    • Lymphocyte % 03/10/2022 33.4    • Monocyte % 03/10/2022 10.8    • Eosinophil % 03/10/2022 1.5    • Basophil % 03/10/2022 0.8    • Immature Grans % 03/10/2022 0.2    • Neutrophils, Absolute 03/10/2022 2.57    • Lymphocytes, Absolute 03/10/2022 1.61    • Monocytes, Absolute 03/10/2022 0.52    • Eosinophils, Absolute 03/10/2022 0.07    • Basophils, Absolute 03/10/2022 0.04    • Immature Grans, Absolute 03/10/2022 0.01    • nRBC 03/10/2022 0.0    • Total Cholesterol 03/10/2022 160    • Triglycerides 03/10/2022 138    • HDL Cholesterol 03/10/2022 58    • LDL Cholesterol  03/10/2022 78    • VLDL Cholesterol 03/10/2022 24    • LDL/HDL Ratio 03/10/2022 1.28    • Folate 03/10/2022 >20.00    • Vitamin B-12 03/10/2022 402    • 25 Hydroxy, Vitamin D 03/10/2022 25.7 (A)   • Hemoglobin A1C 03/10/2022 8.40 (A)   • TSH 03/10/2022 2.050    • Free T4 03/10/2022 1.31    Lab on 12/13/2021   Component Date Value   • Glucose 12/13/2021 238 (A)   • BUN 12/13/2021 12    • Creatinine 12/13/2021 0.81    • Sodium 12/13/2021 141    • Potassium 12/13/2021 4.1    •  Chloride 2021 104    • CO2 2021 22.6    • Calcium 2021 9.1    • Total Protein 2021 6.5    • Albumin 2021 3.85    • ALT (SGPT) 2021 26    • AST (SGOT) 2021 23    • Alkaline Phosphatase 2021 74    • Total Bilirubin 2021 0.4    • eGFR Non  Amer 2021 73    • Globulin 2021 2.7    • A/G Ratio 2021 1.5    • BUN/Creatinine Ratio 2021 14.8    • Anion Gap 2021 14.4    • LDH 2021 208    • Uric Acid 2021 4.4    • e13a2 (b2a2) Transcript 2021 Comment    • e14a2 (b3a2) Transcript 2021 Comment    • E1A2 transcript 2021 Comment    • Interpretation 2021 Negative    • Director Review 2021 Comment    • Background: 2021 Comment    • Methodology: 2021 Comment    • WBC 2021 6.43    • RBC 2021 4.30    • Hemoglobin 2021 12.8    • Hematocrit 2021 39.9    • MCV 2021 92.8    • MCH 2021 29.8    • MCHC 2021 32.1    • RDW 2021 13.8    • RDW-SD 2021 46.9    • MPV 2021 10.7    • Platelets 2021 255    • Neutrophil % 2021 52.7    • Lymphocyte % 2021 33.3    • Monocyte % 2021 9.6    • Eosinophil % 2021 3.1    • Basophil % 2021 1.1    • Immature Grans % 2021 0.2    • Neutrophils, Absolute 2021 3.39    • Lymphocytes, Absolute 2021 2.14    • Monocytes, Absolute 2021 0.62    • Eosinophils, Absolute 2021 0.20    • Basophils, Absolute 2021 0.07    • Immature Grans, Absolute 2021 0.01    • nRBC 2021 0.0             IMAGIN17: US ABDOMEN COMPLETE: Visualized pancreas is unremarkable. The imaged portion of the abdominal aorta is nondilated. The liver is homogeneous. There is no intrahepatic ductal dilatation or focal hepatic mass. The imaged portion of the hepatic vessels and inferior vena cava are patent. The gallbladder has been removed. The common bile duct is  normal, measuring 5.7 mm. The kidneys demonstrate no evidence of hydronephrosis or solid renal mass. The spleen is homogeneous and measures 13 cm. IMPRESSION: Spleen measures 13 cm and is homogeneous.           PATHOLOGY:    05/15/17: Bone Marrow Biopsy & Aspirate                     04/25/17: BCR ABL PCR        08/30/17: BCR ABL PCR        12/13/17: BCR ABL PCR        03/20/18: BCR ABL PCR:                                                                                                         06/20/18:                                08/2020:          12/3/20           03/10/22:            Assessment/Plan :  Aric Haro is a very pleasant 56 y.o.  female who presents in follow up appointment for further management and treatment of chronic phase chronic myelogenous leukemia.    1. Chronic Myelogenous Leukemia - CML (chronic phase)    2. Iron deficiency - no longer on iron   3. Healthcare Maintenance    Peripheral smear concerning for an underlying myeloproliferative disorder. Her primary provider obtained a flow cytometry which did not reveal any significant abnormalities. I obtained a quantitative BCR ABL PCR positive for the b2a2/b3a2 (p210) and e1a2 (p190) fusion gene transcripts consistent with a diagnosis for CML. Bone marrow biopsy performed on initial diagnosis 5/17/17 and prior to starting Dasatinib treatment with results above and consistent with chronic phase CML.     To further evaluate for a myeloproliferative disorder I did also assess for JAK2 (V617F and exon 12)/MPL/CALR mutations which were negative. ESR, CRP, EMMANUEL, Rheumatoid factor, as well as an acute hepatitis panel and HIV test which were all negative. No iron deficiency seen. Abdominal ultrasound significant for a spleen size 13.9cm.     For treatment of her CML she was started on Dasatinib 100mg oral daily. She does have a history significant for pancreatitis, therefore, I held on using Nilotinib. Patient however was unable to  tolerate Dasatinib secondary to worsening reflux while being off of her PPI, and experiencing severe nausea, vomiting, and dehydration. She was then started on Imatinib 400mg daily and is overall tolerating this well. I have previously advised her of Ibuprofen and Tylenol use for her myalgias. She does also experience intermittent lower extremity edema (which she denies today). I did order baseline Echo which showed an intact EF. I also repeated echocardiogram to further evaluate and repeat echo showed an intact EF 61-65% which is stable.    She has had a complete hematological response, and BCR ABL PCR has normalized since start of Gleevac. This will need to continue to be monitored every three months to assess ongoing molecular response, today's level is <0.0032%. The blood counts have now stabilized therefore will continue to monitor every 3 months given stability.    She underwent a colonoscopy February 2018 with Dr. Esparza who recommended repeat colonoscopy in 3-5 years due to polyps that were removed. This was discussed with her as it has been three years since her last colonoscopy. She has also received her Influenza and Hepatitis A vaccine. Iron has been discontinued and iron studies have been normal. Will re-discuss repeat GI visit at her next appointment.     6. ACO Quality measures  -  Aric Haro does not use tobacco products.  -  Aric Haro received 2021 flu vaccine. She received COVID J&J vaccine on April 12, 2021.    -  Aric Haro reports a pain score of 0.  Given her pain assessment as noted, treatment options were discussed and the following options were decided upon as a follow-up plan to address the patient's pain: continuation of current treatment plan for pain.  -  Current outpatient and discharge medications have been reconciled for the patient.  Reviewed by: Evita Serrano MD      I will have the patient return for follow up visit in three months with labs (CBC, CMP, LDH, Uric  acid, BCR ABL PCR) one to two weeks prior. Lab orders have been placed. She understands that should she have any questions or concerns prior to her appointment she should give us a call at any time and I would be happy to see her sooner. It was a pleasure to see this patient in clinic today, thank you for allowing me to participate in the care of this patient.        Evita Serrano MD  03/21/22  11:33 EDT

## 2022-03-28 ENCOUNTER — SPECIALTY PHARMACY (OUTPATIENT)
Dept: PHARMACY | Facility: HOSPITAL | Age: 57
End: 2022-03-28

## 2022-03-29 NOTE — PROGRESS NOTES
Specialty Pharmacy Refill Coordination Note      Name:  Aric Haro  :  1965  Date:  3/29/2022         Past Medical History:   Diagnosis Date   • Anemia    • Cancer (HCC)     leukemia   • Diabetes mellitus (HCC)    • Elevated cholesterol    • GERD (gastroesophageal reflux disease)    • Hypertension    • PONV (postoperative nausea and vomiting)        Past Surgical History:   Procedure Laterality Date   • BLEPHAROPLASTY Bilateral 2018    Procedure: BLEPHAROPLASTY BOTH EYES UPPER EYELIDS (PT REQUESTED TERESITA WILKES;  Surgeon: Chris Haile MD;  Location: [facility];  Service: Ophthalmology   • COLONOSCOPY N/A 3/27/2018    Procedure: COLONOSCOPY FOR SCREENING  CPTCODE:94016;  Surgeon: Drew Esparza III, MD;  Location: [facility];  Service: Gastroenterology   • SINUS SURGERY     • SINUS SURGERY     • SLEEVE GASTROPLASTY         Social History     Socioeconomic History   • Marital status:    Tobacco Use   • Smoking status: Former Smoker     Packs/day: 0.25     Years: 4.00     Pack years: 1.00   • Smokeless tobacco: Never Used   Vaping Use   • Vaping Use: Never used   Substance and Sexual Activity   • Alcohol use: No   • Drug use: No   • Sexual activity: Defer       Family History   Problem Relation Age of Onset   • Anemia Mother    • Hypertension Mother    • Cancer Mother    • COPD Father    • Heart disease Father    • Breast cancer Neg Hx        No Known Allergies    Current Outpatient Medications   Medication Sig Dispense Refill   • BIOTIN PO Take 100 mg by mouth.     • celecoxib (CeleBREX) 200 MG capsule Take 1 capsule by mouth 2 (Two) Times a Day. 180 capsule 3   • Cholecalciferol (VITAMIN D3) 33746 units capsule Take 1 capsule by mouth 2 (Two) Times a Week. 24 capsule 3   • cyanocobalamin 1000 MCG/ML injection Administer 1 ml (1,000 mcg) intramuscularly once weekly 12 mL 2   • desloratadine-pseudoephedrine (CLARINEX-D 12-hour) 2.5-120 MG per tablet take 1 tablet by mouth 2  "times every day 30 tablet 0   • doxycycline (ADOXA) 100 MG tablet Take 1 tablet (100 mg) by mouth every 12 hours for 10 days 20 tablet 2   • DULoxetine (CYMBALTA) 60 MG capsule Take 1 capsule by mouth Daily. 90 capsule 3   • fenofibrate (TRICOR) 145 MG tablet Take 1 tablet by mouth Daily. 90 tablet 3   • fenofibrate (TRICOR) 145 MG tablet Take 1 tablet by mouth daily 90 tablet 2   • glucose blood test strip USE 1 STRIP 2 TIMES DAILY AS DIRECTED 180 each 6   • HYDROcodone-acetaminophen (NORCO)  MG per tablet Take 1 tablet by mouth every 4 hours as needed for severe pain due to kidney stone 20 tablet 0   • ibuprofen (ADVIL,MOTRIN) 800 MG tablet Take 1 tablet  by mouth 3 times per day with food or milk as needed 270 tablet 2   • imatinib (GLEEVEC) 400 MG chemo tablet Take 1 tablet by mouth Daily. 30 tablet 5   • Insulin Glargine (BASAGLAR KWIKPEN) 100 UNIT/ML injection pen Inject 90 units under the skin once daily. 30 mL 4   • Insulin Glargine (Lantus SoloStar) 100 UNIT/ML injection pen Inject 100 Units under the skin into the appropriate area as directed Daily. 18 mL 3   • Insulin Glargine (Lantus SoloStar) 100 UNIT/ML injection pen Inject 45 Units under the skin into the appropriate area as directed 2 (two) times a day. 30 mL 0   • Insulin Glargine (Lantus SoloStar) 100 UNIT/ML injection pen Inject 100 Units under the skin into the appropriate area as directed Daily. 30 mL 0   • Insulin Glargine, 2 Unit Dial, (Toujeo Max SoloStar) 300 UNIT/ML solution pen-injector injection Inject 60 Units under the skin into the appropriate area as directed 2 (Two) Times a Day. 36 mL 2   • Insulin Syringe-Needle U-100 (TRUEplus Insulin Syringe) 31G X 5/16\" 0.5 ML misc Use to inject 3 times a day 90 each 4   • LANTUS SOLOSTAR 100 UNIT/ML injection pen Inject 45 Units under the skin into the appropriate area as directed 2 (Two) Times a Day. 36 mL 6   • multivitamin (MULTI-VITAMIN DAILY PO) Take  by mouth Daily.     • " pantoprazole (PROTONIX) 40 MG EC tablet Take 1 tablet by mouth daily 90 tablet 2   • pantoprazole (PROTONIX) 40 MG EC tablet Take 1 tablet (40 mg) by mouth daily 90 tablet 2   • SUMAtriptan (IMITREX) 100 MG tablet Take 1 tablet by mouth 2 times per day at least 2 hours between doses as needed. 27 tablet 2   • triamcinolone (KENALOG) 0.1 % cream Apply 1 application topically to affected area daily as needed 80 g 3     No current facility-administered medications for this visit.         ASSESSMENT/PLAN:      Aric is a 56 y.o. female contacted today regarding refills of Gleevec 400 mg tablet specialty medication(s).    Reviewed and verified with patient:         Specialty medication(s) and dose(s) confirmed: yes    Refill Questions    Flowsheet Row Most Recent Value   Changes to allergies? No   Changes to medications? No   New conditions since last clinic visit No   Unplanned office visit, urgent care, ED, or hospital admission in the last 4 weeks  No   How does patient/caregiver feel medication is working? Very good   Financial problems or insurance changes  No   If yes, describe changes in insurance or financial issues. None   How many doses of your specialty medications were missed in the last 4 weeks? 0   Why were doses missed? NA   Does this patient require a clinical escalation to a pharmacist? No                     Follow-up: 30 day(s)     Opal Law, Pharmacy Technician  Specialty Pharmacy Technician

## 2022-04-27 ENCOUNTER — SPECIALTY PHARMACY (OUTPATIENT)
Dept: PHARMACY | Facility: HOSPITAL | Age: 57
End: 2022-04-27

## 2022-05-04 ENCOUNTER — SPECIALTY PHARMACY (OUTPATIENT)
Dept: PHARMACY | Facility: HOSPITAL | Age: 57
End: 2022-05-04

## 2022-05-04 NOTE — PROGRESS NOTES
Specialty Pharmacy Patient Management Program  Oncology Reassessment     Aric Haro is a 56 y.o. female with CML seen by an Oncology provider and enrolled in the Oncology Patient Management program offered by [facility].  A follow-up outreach was conducted, including assessment of continued therapy appropriateness, medication adherence, and side effect incidence and management for imatinib.       Relevant Past Medical History and Comorbidities  Relevant medical history and concomitant health conditions were discussed with the patient. The patient's chart has been reviewed for relevant past medical history and comorbid health conditions and updated as necessary.   Past Medical History:   Diagnosis Date   • Anemia    • Cancer (HCC)     leukemia   • Diabetes mellitus (HCC)    • Elevated cholesterol    • GERD (gastroesophageal reflux disease)    • Hypertension    • PONV (postoperative nausea and vomiting)      Social History     Socioeconomic History   • Marital status:    Tobacco Use   • Smoking status: Former Smoker     Packs/day: 0.25     Years: 4.00     Pack years: 1.00   • Smokeless tobacco: Never Used   Vaping Use   • Vaping Use: Never used   Substance and Sexual Activity   • Alcohol use: No   • Drug use: No   • Sexual activity: Defer       Allergies  Known allergies and reactions were discussed with the patient. The patient's chart has been reviewed for allergy information and updated as necessary.   Patient has no known allergies.    Relevant Laboratory Values  Lab Results   Component Value Date    GLUCOSE 140 (H) 03/10/2022    CALCIUM 9.2 03/10/2022     03/10/2022    K 4.0 03/10/2022    CO2 22.8 03/10/2022     03/10/2022    BUN 17 03/10/2022    CREATININE 0.63 03/10/2022    EGFRIFNONA 73 12/13/2021    BCR 27.0 (H) 03/10/2022    ANIONGAP 12.2 03/10/2022     Lab Results   Component Value Date    WBC 4.82 03/10/2022    RBC 4.03 03/10/2022    HGB 12.1 03/10/2022     HCT 36.8 03/10/2022    MCV 91.3 03/10/2022    MCH 30.0 03/10/2022    MCHC 32.9 03/10/2022    RDW 13.6 03/10/2022    RDWSD 46.1 03/10/2022    MPV 10.6 03/10/2022     03/10/2022    NEUTRORELPCT 53.3 03/10/2022    LYMPHORELPCT 33.4 03/10/2022    MONORELPCT 10.8 03/10/2022    EOSRELPCT 1.5 03/10/2022    BASORELPCT 0.8 03/10/2022    AUTOIGPER 0.2 03/10/2022    NEUTROABS 2.57 03/10/2022    LYMPHSABS 1.61 03/10/2022    MONOSABS 0.52 03/10/2022    EOSABS 0.07 03/10/2022    BASOSABS 0.04 03/10/2022    AUTOIGNUM 0.01 03/10/2022    NRBC 0.0 03/10/2022        Current Medication List  This medication list has been reviewed with the patient and evaluated for any interactions or necessary modifications/recommendations, and updated to include all prescription medications, OTC medications, and supplements the patient is currently taking.  This list reflects what is contained in the patient's profile, which has also been marked as reviewed to communicate to other providers it is the most up to date version of the patient's current medication therapy.     Current Outpatient Medications:   •  BIOTIN PO, Take 100 mg by mouth., Disp: , Rfl:   •  celecoxib (CeleBREX) 200 MG capsule, Take 1 capsule by mouth 2 (Two) Times a Day., Disp: 180 capsule, Rfl: 3  •  Cholecalciferol (VITAMIN D3) 88627 units capsule, Take 1 capsule by mouth 2 (Two) Times a Week., Disp: 24 capsule, Rfl: 3  •  cyanocobalamin 1000 MCG/ML injection, Administer 1 ml (1,000 mcg) intramuscularly once weekly, Disp: 12 mL, Rfl: 2  •  desloratadine-pseudoephedrine (CLARINEX-D 12-hour) 2.5-120 MG per tablet, take 1 tablet by mouth 2 times every day, Disp: 30 tablet, Rfl: 0  •  doxycycline (ADOXA) 100 MG tablet, Take 1 tablet (100 mg) by mouth every 12 hours for 10 days, Disp: 20 tablet, Rfl: 2  •  DULoxetine (CYMBALTA) 60 MG capsule, Take 1 capsule by mouth Daily., Disp: 90 capsule, Rfl: 3  •  fenofibrate (TRICOR) 145 MG tablet, Take 1 tablet by mouth Daily., Disp: 90  "tablet, Rfl: 3  •  fenofibrate (TRICOR) 145 MG tablet, Take 1 tablet by mouth daily, Disp: 90 tablet, Rfl: 2  •  glucose blood test strip, USE 1 STRIP 2 TIMES DAILY AS DIRECTED, Disp: 180 each, Rfl: 6  •  HYDROcodone-acetaminophen (NORCO)  MG per tablet, Take 1 tablet by mouth every 4 hours as needed for severe pain due to kidney stone, Disp: 20 tablet, Rfl: 0  •  ibuprofen (ADVIL,MOTRIN) 800 MG tablet, Take 1 tablet  by mouth 3 times per day with food or milk as needed, Disp: 270 tablet, Rfl: 2  •  imatinib (GLEEVEC) 400 MG chemo tablet, Take 1 tablet by mouth Daily., Disp: 30 tablet, Rfl: 5  •  Insulin Glargine (BASAGLAR KWIKPEN) 100 UNIT/ML injection pen, Inject 90 units under the skin once daily., Disp: 30 mL, Rfl: 4  •  Insulin Glargine (Lantus SoloStar) 100 UNIT/ML injection pen, Inject 100 Units under the skin into the appropriate area as directed Daily., Disp: 18 mL, Rfl: 3  •  Insulin Glargine (Lantus SoloStar) 100 UNIT/ML injection pen, Inject 45 Units under the skin into the appropriate area as directed 2 (two) times a day., Disp: 30 mL, Rfl: 0  •  Insulin Glargine (Lantus SoloStar) 100 UNIT/ML injection pen, Inject 100 Units under the skin into the appropriate area as directed Daily., Disp: 30 mL, Rfl: 0  •  Insulin Glargine, 2 Unit Dial, (Toujeo Max SoloStar) 300 UNIT/ML solution pen-injector injection, Inject 60 Units under the skin into the appropriate area as directed 2 (Two) Times a Day., Disp: 36 mL, Rfl: 2  •  Insulin Syringe-Needle U-100 (TRUEplus Insulin Syringe) 31G X 5/16\" 0.5 ML misc, Use to inject 3 times a day, Disp: 90 each, Rfl: 4  •  LANTUS SOLOSTAR 100 UNIT/ML injection pen, Inject 45 Units under the skin into the appropriate area as directed 2 (Two) Times a Day., Disp: 36 mL, Rfl: 6  •  multivitamin (MULTI-VITAMIN DAILY PO), Take  by mouth Daily., Disp: , Rfl:   •  pantoprazole (PROTONIX) 40 MG EC tablet, Take 1 tablet by mouth daily, Disp: 90 tablet, Rfl: 2  •  pantoprazole " (PROTONIX) 40 MG EC tablet, Take 1 tablet (40 mg) by mouth daily, Disp: 90 tablet, Rfl: 2  •  SUMAtriptan (IMITREX) 100 MG tablet, Take 1 tablet by mouth 2 times per day at least 2 hours between doses as needed., Disp: 27 tablet, Rfl: 2  •  triamcinolone (KENALOG) 0.1 % cream, Apply 1 application topically to affected area daily as needed, Disp: 80 g, Rfl: 3    Drug Interactions  None     Adverse Drug Reactions  • Adverse Reactions Experienced: None   • Plan for ADR Management: Not required     Hospitalizations and Urgent Care Since Last Assessment  • Hospitalizations or Admissions: 0    • ED Visits: 0   • Urgent Office Visits: 0     Adherence and Self-Administration  • Approximate Number of Doses Missed Since Last Assessment: 0   • Ongoing or New Barriers to Patient Adherence and/or Self-Administration: None   • Methods for Supporting Patient Adherence and/or Self-Administration: Patient remembers to take each day      Goals of Therapy  • Progress Toward Meeting Patient-Identified Goals of Therapy:  On track  o Patient-Identified Goals  - Consistently take medications as prescribed  - Patient will adhere to medication regimen  - Patient will report any medication side effects to healthcare provider    • Progress Toward Meeting Clinical Goals or Therapeutic Targets: On track  o Clinical Goals or Therapeutic Targets  - Support patient understanding of medication regimen  - Ensure patient knows the pharmacy contact information  - Schedule regular follow-up to monitor the treatment serious adverse events  - Schedule regular follow-up to confirm medication adherence  - Schedule regular follow-up to monitor disease progression or stabilty    Quality of Life Assessment   Quality of Life related to the patient's specialty therapy was discussed with the patient. The QOL segment of this outreach has been reviewed and updated.   • Quality of Life Score: 5    Reassessment Plan & Follow-Up  1. Pharmacist to continue to perform  regular reassessments no more than (6) months from the previous assessment.  2. Care Coordinator to facilitate future refill outreaches, coordinate prescription delivery, and escalate clinical questions to pharmacist.     Additional Plans, Therapy Recommendations or Therapy Problems to Be Addressed: None  Recent Provider Changes:  None    Attestation  I attest that the specialty medication(s) addressed above are appropriate for the patient based on my reassessment.  If the prescribed therapy is at any point deemed not appropriate based on the current or future assessments, a consultation will be initiated with the patient's specialty care provider to determine the best course of action. The revised plan of therapy will be documented along with any additional patient education provided.     Costa Saldaña, PharmD, [facility]  Oncology Clinical Pharmacist  5/4/2022  14:36 EDT

## 2022-06-08 ENCOUNTER — LAB (OUTPATIENT)
Dept: ONCOLOGY | Facility: CLINIC | Age: 57
End: 2022-06-08

## 2022-06-08 DIAGNOSIS — C92.10 CML (CHRONIC MYELOCYTIC LEUKEMIA): ICD-10-CM

## 2022-06-08 LAB
ALBUMIN SERPL-MCNC: 3.93 G/DL (ref 3.5–5.2)
ALBUMIN/GLOB SERPL: 1.5 G/DL
ALP SERPL-CCNC: 61 U/L (ref 39–117)
ALT SERPL W P-5'-P-CCNC: 23 U/L (ref 1–33)
ANION GAP SERPL CALCULATED.3IONS-SCNC: 10.6 MMOL/L (ref 5–15)
AST SERPL-CCNC: 26 U/L (ref 1–32)
BASOPHILS # BLD AUTO: 0.05 10*3/MM3 (ref 0–0.2)
BASOPHILS NFR BLD AUTO: 0.9 % (ref 0–1.5)
BILIRUB SERPL-MCNC: 0.5 MG/DL (ref 0–1.2)
BUN SERPL-MCNC: 11 MG/DL (ref 6–20)
BUN/CREAT SERPL: 14.5 (ref 7–25)
CALCIUM SPEC-SCNC: 9.1 MG/DL (ref 8.6–10.5)
CHLORIDE SERPL-SCNC: 104 MMOL/L (ref 98–107)
CO2 SERPL-SCNC: 24.4 MMOL/L (ref 22–29)
CREAT SERPL-MCNC: 0.76 MG/DL (ref 0.57–1)
DEPRECATED RDW RBC AUTO: 43.8 FL (ref 37–54)
EGFRCR SERPLBLD CKD-EPI 2021: 92.1 ML/MIN/1.73
EOSINOPHIL # BLD AUTO: 0.21 10*3/MM3 (ref 0–0.4)
EOSINOPHIL NFR BLD AUTO: 3.8 % (ref 0.3–6.2)
ERYTHROCYTE [DISTWIDTH] IN BLOOD BY AUTOMATED COUNT: 13.4 % (ref 12.3–15.4)
GLOBULIN UR ELPH-MCNC: 2.7 GM/DL
GLUCOSE SERPL-MCNC: 173 MG/DL (ref 65–99)
HCT VFR BLD AUTO: 37.3 % (ref 34–46.6)
HGB BLD-MCNC: 12.4 G/DL (ref 12–15.9)
IMM GRANULOCYTES # BLD AUTO: 0.02 10*3/MM3 (ref 0–0.05)
IMM GRANULOCYTES NFR BLD AUTO: 0.4 % (ref 0–0.5)
LDH SERPL-CCNC: 209 U/L (ref 135–214)
LYMPHOCYTES # BLD AUTO: 1.95 10*3/MM3 (ref 0.7–3.1)
LYMPHOCYTES NFR BLD AUTO: 34.9 % (ref 19.6–45.3)
MCH RBC QN AUTO: 30.1 PG (ref 26.6–33)
MCHC RBC AUTO-ENTMCNC: 33.2 G/DL (ref 31.5–35.7)
MCV RBC AUTO: 90.5 FL (ref 79–97)
MONOCYTES # BLD AUTO: 0.61 10*3/MM3 (ref 0.1–0.9)
MONOCYTES NFR BLD AUTO: 10.9 % (ref 5–12)
NEUTROPHILS NFR BLD AUTO: 2.75 10*3/MM3 (ref 1.7–7)
NEUTROPHILS NFR BLD AUTO: 49.1 % (ref 42.7–76)
NRBC BLD AUTO-RTO: 0 /100 WBC (ref 0–0.2)
PLATELET # BLD AUTO: 233 10*3/MM3 (ref 140–450)
PMV BLD AUTO: 10.4 FL (ref 6–12)
POTASSIUM SERPL-SCNC: 4.2 MMOL/L (ref 3.5–5.2)
PROT SERPL-MCNC: 6.6 G/DL (ref 6–8.5)
RBC # BLD AUTO: 4.12 10*6/MM3 (ref 3.77–5.28)
SODIUM SERPL-SCNC: 139 MMOL/L (ref 136–145)
URATE SERPL-MCNC: 5.2 MG/DL (ref 2.4–5.7)
WBC NRBC COR # BLD: 5.59 10*3/MM3 (ref 3.4–10.8)

## 2022-06-08 PROCEDURE — 81207 BCR/ABL1 GENE MINOR BP: CPT

## 2022-06-08 PROCEDURE — 84550 ASSAY OF BLOOD/URIC ACID: CPT | Performed by: INTERNAL MEDICINE

## 2022-06-08 PROCEDURE — 81206 BCR/ABL1 GENE MAJOR BP: CPT

## 2022-06-08 PROCEDURE — 83615 LACTATE (LD) (LDH) ENZYME: CPT | Performed by: INTERNAL MEDICINE

## 2022-06-08 PROCEDURE — 85025 COMPLETE CBC W/AUTO DIFF WBC: CPT | Performed by: INTERNAL MEDICINE

## 2022-06-08 PROCEDURE — 80053 COMPREHEN METABOLIC PANEL: CPT | Performed by: INTERNAL MEDICINE

## 2022-06-20 ENCOUNTER — OFFICE VISIT (OUTPATIENT)
Dept: ONCOLOGY | Facility: CLINIC | Age: 57
End: 2022-06-20

## 2022-06-20 VITALS
BODY MASS INDEX: 42.87 KG/M2 | RESPIRATION RATE: 18 BRPM | DIASTOLIC BLOOD PRESSURE: 74 MMHG | OXYGEN SATURATION: 98 % | SYSTOLIC BLOOD PRESSURE: 148 MMHG | HEART RATE: 90 BPM | WEIGHT: 265.6 LBS | TEMPERATURE: 97.1 F

## 2022-06-20 DIAGNOSIS — C92.10 CML (CHRONIC MYELOCYTIC LEUKEMIA): Primary | ICD-10-CM

## 2022-06-20 DIAGNOSIS — D50.9 IRON DEFICIENCY ANEMIA, UNSPECIFIED IRON DEFICIENCY ANEMIA TYPE: ICD-10-CM

## 2022-06-20 PROCEDURE — 99214 OFFICE O/P EST MOD 30 MIN: CPT | Performed by: INTERNAL MEDICINE

## 2022-06-20 NOTE — PROGRESS NOTES
Aric Haro  [id number]  1965  6/20/2022      Referring Provider:   EILEEN Andrade    Reason for Follow Up:   1. Chronic Phase - Chronic Myelogenous Leukemia  2. Leukocytosis  3. Thrombocytosis     Chief Complaint:  CML      History of Present Illness:  Aric Haro is a very pleasant 56 y.o.  female who presents in follow up appointment for further management and treatment of chronic phase chronic myelogenous leukemia (CML).    Ms. Haro began experiencing extreme fatigue where she was sleeping multiple hours during the day when she initially began following with me in April 2017. 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 On laboratory evaluation she was found to have a total white blood cell count of 22.35 and a platelet count 470 thousand. In March 2016 she was also noted to have a leukocytosis (although not as elevated) as well as a thrombocytosis, however reports that these were likely secondary to other medical issues at the time her blood work was drawn. She denied of any significant weight loss however has been gradually losing weight since gastric sleeve procedure. She denied of any fevers or frequent infections but did note fluctuating neck lymphadenopathy as well as early satiety and taste in change. She denied of any abnormal or spontaneous bleeding. I did obtain a BCR ABL PCR to further assess her leukocytosis and thrombocytosis and she was positive for the b2a2/b3a2 (p210) and e1a2 (p190) fusion gene transcripts consistent with a diagnosis for CML. After reviewing the toxicities of each tyrosine kinase inhibitor we decided to start Dasatinib treatment. She had a difficult time tolerating the Dasatinib as she was unable to take her PPI with this medication. Since she had to be taken off of her PPI she had worsening reflux symptoms as well as  severe nausea, vomiting, dehydration, and headaches during this period. Given the toxicities she was experiencing she was taken off Dasatinib and this was changed to Gleevec treatment. Since starting Gleevac 400mg daily she has tolerated this much better without significant side effects. The only side effect that she has experienced is intermittent pedal edema along with some hand swelling and muscle cramps which are now intermittant.     Treatment History:  Started Dasatinib on 05/15/17 - experienced nausea, severe reflux, headaches while on medication and had taken 9 doses. This was discontinued due to intolerability and she was thereafter started on Imatinib.   Started Imatinib on 5/26/17      Interim History:  From the start of Dasatinib and later Gleevec she has had a good response and normalization in her complete blood counts with normalization of BCR ABL PCR. She denies of any fevers, infections, lymphadenopathy, chest pain, dyspnea, nausea. She reports that her hair loss has improved which she began experiencing after COVID 19 which she was diagnosed in January 2022 and no further infections since then. Today her biggest complaint is headaches and states that her migraines have been occurring more frequently.       The following portions of the patient's history were reviewed and updated as appropriate: allergies, current medications, past family history, past medical history, past social history, past surgical history and problem list.      No Known Allergies    Past Medical History:   Diagnosis Date   • Anemia    • Cancer (HCC)     leukemia   • Diabetes mellitus (HCC)    • Elevated cholesterol    • GERD (gastroesophageal reflux disease)    • Hypertension    • PONV (postoperative nausea and vomiting)        Past Surgical History:   Procedure Laterality Date   • BLEPHAROPLASTY Bilateral 6/22/2018    Procedure: BLEPHAROPLASTY BOTH EYES UPPER EYELIDS (PT REQUESTED TERESITA WILKES;  Surgeon: Chris Brandon  MD Mic;  Location: [facility];  Service: Ophthalmology   • COLONOSCOPY N/A 3/27/2018    Procedure: COLONOSCOPY FOR SCREENING  CPTCODE:08773;  Surgeon: Drew Esparza III, MD;  Location: [facility];  Service: Gastroenterology   • SINUS SURGERY     • SINUS SURGERY     • SLEEVE GASTROPLASTY         Social History     Socioeconomic History   • Marital status:    Tobacco Use   • Smoking status: Former Smoker     Packs/day: 0.25     Years: 4.00     Pack years: 1.00   • Smokeless tobacco: Never Used   Vaping Use   • Vaping Use: Never used   Substance and Sexual Activity   • Alcohol use: No   • Drug use: No   • Sexual activity: Defer   She lives with her daughter. She denies of any alcohol or illicit drug use. Previous social tobacco use more then 20 years ago.  Recent job change.       Family History   Problem Relation Age of Onset   • Anemia Mother    • Hypertension Mother    • Cancer Mother    • COPD Father    • Heart disease Father    • Breast cancer Neg Hx    Maternal Uncle - CLL  Mother - Malignancy of [place]          Current Outpatient Medications:   •  BIOTIN PO, Take 100 mg by mouth., Disp: , Rfl:   •  celecoxib (CeleBREX) 200 MG capsule, Take 1 capsule by mouth 2 (Two) Times a Day., Disp: 180 capsule, Rfl: 3  •  Cholecalciferol (VITAMIN D3) 78929 units capsule, Take 1 capsule by mouth 2 (Two) Times a Week., Disp: 24 capsule, Rfl: 3  •  cyanocobalamin 1000 MCG/ML injection, Administer 1 ml (1,000 mcg) intramuscularly once weekly, Disp: 12 mL, Rfl: 2  •  desloratadine-pseudoephedrine (CLARINEX-D 12-hour) 2.5-120 MG per tablet, take 1 tablet by mouth 2 times every day, Disp: 30 tablet, Rfl: 0  •  doxycycline (ADOXA) 100 MG tablet, Take 1 tablet (100 mg) by mouth every 12 hours for 10 days, Disp: 20 tablet, Rfl: 2  •  DULoxetine (CYMBALTA) 60 MG capsule, Take 1 capsule by mouth Daily., Disp: 90 capsule, Rfl: 3  •  fenofibrate (TRICOR) 145 MG tablet, Take 1 tablet by mouth Daily., Disp: 90 tablet,  "Rfl: 3  •  fenofibrate (TRICOR) 145 MG tablet, Take 1 tablet by mouth daily, Disp: 90 tablet, Rfl: 2  •  glucose blood test strip, USE 1 STRIP 2 TIMES DAILY AS DIRECTED, Disp: 180 each, Rfl: 6  •  HYDROcodone-acetaminophen (NORCO)  MG per tablet, Take 1 tablet by mouth every 4 hours as needed for severe pain due to kidney stone, Disp: 20 tablet, Rfl: 0  •  ibuprofen (ADVIL,MOTRIN) 800 MG tablet, Take 1 tablet  by mouth 3 times per day with food or milk as needed, Disp: 270 tablet, Rfl: 2  •  imatinib (GLEEVEC) 400 MG chemo tablet, Take 1 tablet by mouth Daily., Disp: 30 tablet, Rfl: 5  •  Insulin Glargine (BASAGLAR KWIKPEN) 100 UNIT/ML injection pen, Inject 90 units under the skin once daily., Disp: 30 mL, Rfl: 4  •  Insulin Glargine (Lantus SoloStar) 100 UNIT/ML injection pen, Inject 100 Units under the skin into the appropriate area as directed Daily., Disp: 18 mL, Rfl: 3  •  Insulin Glargine (Lantus SoloStar) 100 UNIT/ML injection pen, Inject 45 Units under the skin into the appropriate area as directed 2 (two) times a day., Disp: 30 mL, Rfl: 0  •  Insulin Glargine (Lantus SoloStar) 100 UNIT/ML injection pen, Inject 100 Units under the skin into the appropriate area as directed Daily., Disp: 30 mL, Rfl: 0  •  Insulin Glargine, 2 Unit Dial, (Toujeo Max SoloStar) 300 UNIT/ML solution pen-injector injection, Inject 60 Units under the skin into the appropriate area as directed 2 (Two) Times a Day., Disp: 36 mL, Rfl: 2  •  Insulin Syringe-Needle U-100 (TRUEplus Insulin Syringe) 31G X 5/16\" 0.5 ML misc, Use to inject 3 times a day, Disp: 90 each, Rfl: 4  •  LANTUS SOLOSTAR 100 UNIT/ML injection pen, Inject 45 Units under the skin into the appropriate area as directed 2 (Two) Times a Day., Disp: 36 mL, Rfl: 6  •  multivitamin (MULTI-VITAMIN DAILY PO), Take  by mouth Daily., Disp: , Rfl:   •  pantoprazole (PROTONIX) 40 MG EC tablet, Take 1 tablet by mouth daily, Disp: 90 tablet, Rfl: 2  •  pantoprazole (PROTONIX) " 40 MG EC tablet, Take 1 tablet (40 mg) by mouth daily, Disp: 90 tablet, Rfl: 2  •  SUMAtriptan (IMITREX) 100 MG tablet, Take 1 tablet by mouth 2 times per day at least 2 hours between doses as needed., Disp: 27 tablet, Rfl: 2  •  triamcinolone (KENALOG) 0.1 % cream, Apply 1 application topically to affected area daily as needed, Disp: 80 g, Rfl: 3        Review of Systems  A comprehensive 14-point review of systems performed.  Significant findings as mentioned above.  All other systems reviewed and are negative. No fevers, night sweats, lymphadenopathy, or weight loss. Positive migraines.         Physical Exam:  Vital Signs: These were reviewed and listed as per patient’s electronic medical chart  Vitals:    06/20/22 0941   BP: 148/74   Pulse: 90   Resp: 18   Temp: 97.1 °F (36.2 °C)   SpO2: 98%     General: Awake, alert and oriented, in no distress, obese  HEENT: Head is atraumatic, normocephalic, extraocular movements full, no scleral icterus, mask in place  Neck: supple, no jvd, lymphadenopathy or masses  Cardiovascular: regular rhythm, tachycardic, without murmurs, rubs or gallops  Pulmonary: non-labored, clear to auscultation bilaterally, no wheezing  Abdomen: soft, non-tender, non-distended, normal active bowel sounds present  Extremities: No clubbing, cyanosis or edema  Lymph: No cervical or supraclavicular adenopathy  Neurologic: Mental status as above, alert, awake and oriented, grossly non-focal exam  Skin: warm, dry, intact, no ecchymosis  Patient was examined on 06/20/22 and changes are reflected and noted above.        Labs / Studies:    Lab on 06/08/2022   Component Date Value   • Glucose 06/08/2022 173 (A)   • BUN 06/08/2022 11    • Creatinine 06/08/2022 0.76    • Sodium 06/08/2022 139    • Potassium 06/08/2022 4.2    • Chloride 06/08/2022 104    • CO2 06/08/2022 24.4    • Calcium 06/08/2022 9.1    • Total Protein 06/08/2022 6.6    • Albumin 06/08/2022 3.93    • ALT (SGPT) 06/08/2022 23    • AST (SGOT)  06/08/2022 26    • Alkaline Phosphatase 06/08/2022 61    • Total Bilirubin 06/08/2022 0.5    • Globulin 06/08/2022 2.7    • A/G Ratio 06/08/2022 1.5    • BUN/Creatinine Ratio 06/08/2022 14.5    • Anion Gap 06/08/2022 10.6    • eGFR 06/08/2022 92.1    • LDH 06/08/2022 209    • Uric Acid 06/08/2022 5.2    • e13a2 (b2a2) Transcript 06/08/2022 Comment    • e14a2 (b3a2) Transcript 06/08/2022 Comment    • E1A2 transcript 06/08/2022 Comment    • Interpretation 06/08/2022 Negative    • Director Review 06/08/2022 Comment    • Background: 06/08/2022 Comment    • Methodology: 06/08/2022 Comment    • WBC 06/08/2022 5.59    • RBC 06/08/2022 4.12    • Hemoglobin 06/08/2022 12.4    • Hematocrit 06/08/2022 37.3    • MCV 06/08/2022 90.5    • MCH 06/08/2022 30.1    • MCHC 06/08/2022 33.2    • RDW 06/08/2022 13.4    • RDW-SD 06/08/2022 43.8    • MPV 06/08/2022 10.4    • Platelets 06/08/2022 233    • Neutrophil % 06/08/2022 49.1    • Lymphocyte % 06/08/2022 34.9    • Monocyte % 06/08/2022 10.9    • Eosinophil % 06/08/2022 3.8    • Basophil % 06/08/2022 0.9    • Immature Grans % 06/08/2022 0.4    • Neutrophils, Absolute 06/08/2022 2.75    • Lymphocytes, Absolute 06/08/2022 1.95    • Monocytes, Absolute 06/08/2022 0.61    • Eosinophils, Absolute 06/08/2022 0.21    • Basophils, Absolute 06/08/2022 0.05    • Immature Grans, Absolute 06/08/2022 0.02    • nRBC 06/08/2022 0.0    Lab on 03/10/2022   Component Date Value   • Glucose 03/10/2022 140 (A)   • BUN 03/10/2022 17    • Creatinine 03/10/2022 0.63    • Sodium 03/10/2022 140    • Potassium 03/10/2022 4.0    • Chloride 03/10/2022 105    • CO2 03/10/2022 22.8    • Calcium 03/10/2022 9.2    • Total Protein 03/10/2022 6.6    • Albumin 03/10/2022 3.82    • ALT (SGPT) 03/10/2022 22    • AST (SGOT) 03/10/2022 27    • Alkaline Phosphatase 03/10/2022 63    • Total Bilirubin 03/10/2022 0.4    • Globulin 03/10/2022 2.8    • A/G Ratio 03/10/2022 1.4    • BUN/Creatinine Ratio 03/10/2022 27.0 (A)   •  Anion Gap 03/10/2022 12.2    • eGFR 03/10/2022 104.3    • e13a2 (b2a2) Transcript 03/10/2022 Comment    • e14a2 (b3a2) Transcript 03/10/2022 Comment    • E1A2 transcript 03/10/2022 Comment    • Interpretation 03/10/2022 Negative    • Director Review 03/10/2022 Comment    • Background: 03/10/2022 Comment    • Methodology: 03/10/2022 Comment    • Uric Acid 03/10/2022 5.3    • LDH 03/10/2022 230 (A)   • WBC 03/10/2022 4.82    • RBC 03/10/2022 4.03    • Hemoglobin 03/10/2022 12.1    • Hematocrit 03/10/2022 36.8    • MCV 03/10/2022 91.3    • MCH 03/10/2022 30.0    • MCHC 03/10/2022 32.9    • RDW 03/10/2022 13.6    • RDW-SD 03/10/2022 46.1    • MPV 03/10/2022 10.6    • Platelets 03/10/2022 255    • Neutrophil % 03/10/2022 53.3    • Lymphocyte % 03/10/2022 33.4    • Monocyte % 03/10/2022 10.8    • Eosinophil % 03/10/2022 1.5    • Basophil % 03/10/2022 0.8    • Immature Grans % 03/10/2022 0.2    • Neutrophils, Absolute 03/10/2022 2.57    • Lymphocytes, Absolute 03/10/2022 1.61    • Monocytes, Absolute 03/10/2022 0.52    • Eosinophils, Absolute 03/10/2022 0.07    • Basophils, Absolute 03/10/2022 0.04    • Immature Grans, Absolute 03/10/2022 0.01    • nRBC 03/10/2022 0.0    • Total Cholesterol 03/10/2022 160    • Triglycerides 03/10/2022 138    • HDL Cholesterol 03/10/2022 58    • LDL Cholesterol  03/10/2022 78    • VLDL Cholesterol 03/10/2022 24    • LDL/HDL Ratio 03/10/2022 1.28    • Folate 03/10/2022 >20.00    • Vitamin B-12 03/10/2022 402    • 25 Hydroxy, Vitamin D 03/10/2022 25.7 (A)   • Hemoglobin A1C 03/10/2022 8.40 (A)   • TSH 03/10/2022 2.050    • Free T4 03/10/2022 1.31             IMAGIN17: US ABDOMEN COMPLETE: Visualized pancreas is unremarkable. The imaged portion of the abdominal aorta is nondilated. The liver is homogeneous. There is no intrahepatic ductal dilatation or focal hepatic mass. The imaged portion of the hepatic vessels and inferior vena cava are patent. The gallbladder has been removed.  The common bile duct is normal, measuring 5.7 mm. The kidneys demonstrate no evidence of hydronephrosis or solid renal mass. The spleen is homogeneous and measures 13 cm. IMPRESSION: Spleen measures 13 cm and is homogeneous.           PATHOLOGY:    05/15/17: Bone Marrow Biopsy & Aspirate                     04/25/17: BCR ABL PCR        08/30/17: BCR ABL PCR        12/13/17: BCR ABL PCR        03/20/18: BCR ABL PCR:                                                                                                         06/20/18:                                08/2020:          12/3/20           03/10/22:            Assessment & Plan :  Aric Haro is a very pleasant 56 y.o.  female who presents in follow up appointment for further management and treatment of chronic phase chronic myelogenous leukemia.    1. Chronic Myelogenous Leukemia - CML (chronic phase)    2. Iron deficiency - no longer on iron   3. Healthcare Maintenance    Peripheral smear concerning for an underlying myeloproliferative disorder. Her primary provider obtained a flow cytometry which did not reveal any significant abnormalities. I obtained a quantitative BCR ABL PCR positive for the b2a2/b3a2 (p210) and e1a2 (p190) fusion gene transcripts consistent with a diagnosis for CML. Bone marrow biopsy performed on initial diagnosis 5/17/17 and prior to starting Dasatinib treatment with results above and consistent with chronic phase CML.     To further evaluate for a myeloproliferative disorder I did also assess for JAK2 (V617F and exon 12)/MPL/CALR mutations which were negative. ESR, CRP, EMMANUEL, Rheumatoid factor, as well as an acute hepatitis panel and HIV test which were all negative. No iron deficiency seen. Abdominal ultrasound significant for a spleen size 13.9cm.     For treatment of her CML she was started on Dasatinib 100mg oral daily. She does have a history significant for pancreatitis, therefore, I held on using Nilotinib. Patient  however was unable to tolerate Dasatinib secondary to worsening reflux while being off of her PPI, and experiencing severe nausea, vomiting, and dehydration. She was then started on Imatinib 400mg daily and is overall tolerating this well. I have previously advised her of Ibuprofen and Tylenol use for her myalgias. She does also experience intermittent lower extremity edema (which she denies today). I did order baseline Echo which showed an intact EF. I also repeated echocardiogram to further evaluate and repeat echo showed an intact EF 61-65% which is stable.    She has had a complete hematological response, and BCR ABL PCR has normalized since start of Gleevac. This will need to continue to be monitored every three months to assess ongoing molecular response, today's level is <0.0032%. The blood counts have now stabilized therefore will continue to monitor every 3 months given stability.    She underwent a colonoscopy February 2018 with Dr. Esparza who recommended repeat colonoscopy in 3-5 years due to polyps that were removed. This was discussed with her as it has been three years since her last colonoscopy. She has also received her Influenza and Hepatitis A vaccine. Iron has been discontinued and iron studies have been normal. Will re-discuss repeat GI visit at her next appointment.     4. ACO Quality measures  -  Aric Haro does not use tobacco products.  -  Aric Haro received 2021 flu vaccine. She received COVID J&J vaccine on April 12, 2021.    -  Aric Haro reports a pain score of 5.  Given her pain assessment as noted, treatment options were discussed and the following options were decided upon as a follow-up plan to address the patient's pain: continuation of current treatment plan for pain. She has an appointment with her PCP today to discuss her migraines.   -  Current outpatient and discharge medications have been reconciled for the patient.  Reviewed by: Evita Serrano MD      I will  have the patient return for follow up visit in three months with labs (CBC, CMP, LDH, Uric acid, BCR ABL PCR) one to two weeks prior and will rediscuss colonoscopy at next visit. Lab orders have been placed. She understands that should she have any questions or concerns prior to her appointment she should give us a call at any time and I would be happy to see her sooner. It was a pleasure to see this patient in clinic today, thank you for allowing me to participate in the care of this patient.        Evita Serrano MD  06/20/22  09:41 EDT

## 2022-07-05 ENCOUNTER — SPECIALTY PHARMACY (OUTPATIENT)
Dept: PHARMACY | Facility: HOSPITAL | Age: 57
End: 2022-07-05

## 2022-07-05 DIAGNOSIS — C92.10 CML (CHRONIC MYELOCYTIC LEUKEMIA): ICD-10-CM

## 2022-07-05 RX ORDER — IMATINIB MESYLATE 400 MG/1
400 TABLET, FILM COATED ORAL DAILY
Qty: 30 TABLET | Refills: 5 | Status: SHIPPED | OUTPATIENT
Start: 2022-07-05 | End: 2022-12-15 | Stop reason: SDUPTHER

## 2022-07-05 NOTE — PROGRESS NOTES
Specialty Pharmacy Refill Coordination Note      Name:  Aric Haro  :  1965  Date:  2022         Past Medical History:   Diagnosis Date   • Anemia    • Cancer (HCC)     leukemia   • Diabetes mellitus (HCC)    • Elevated cholesterol    • GERD (gastroesophageal reflux disease)    • Hypertension    • PONV (postoperative nausea and vomiting)        Past Surgical History:   Procedure Laterality Date   • BLEPHAROPLASTY Bilateral 2018    Procedure: BLEPHAROPLASTY BOTH EYES UPPER EYELIDS (PT REQUESTED TERESITA WILKES;  Surgeon: Chris Haile MD;  Location: [facility];  Service: Ophthalmology   • COLONOSCOPY N/A 3/27/2018    Procedure: COLONOSCOPY FOR SCREENING  CPTCODE:03351;  Surgeon: Drew Esparza III, MD;  Location: [facility];  Service: Gastroenterology   • SINUS SURGERY     • SINUS SURGERY     • SLEEVE GASTROPLASTY         Social History     Socioeconomic History   • Marital status:    Tobacco Use   • Smoking status: Former Smoker     Packs/day: 0.25     Years: 4.00     Pack years: 1.00   • Smokeless tobacco: Never Used   Vaping Use   • Vaping Use: Never used   Substance and Sexual Activity   • Alcohol use: No   • Drug use: No   • Sexual activity: Defer       Family History   Problem Relation Age of Onset   • Anemia Mother    • Hypertension Mother    • Cancer Mother    • COPD Father    • Heart disease Father    • Breast cancer Neg Hx        No Known Allergies    Current Outpatient Medications   Medication Sig Dispense Refill   • BIOTIN PO Take 100 mg by mouth.     • celecoxib (CeleBREX) 200 MG capsule Take 1 capsule by mouth 2 (Two) Times a Day. 180 capsule 3   • Cholecalciferol (VITAMIN D3) 90200 units capsule Take 1 capsule by mouth 2 (Two) Times a Week. 24 capsule 3   • cyanocobalamin 1000 MCG/ML injection Administer 1 ml (1,000 mcg) intramuscularly once weekly 12 mL 2   • desloratadine-pseudoephedrine (CLARINEX-D 12-hour) 2.5-120 MG per tablet take 1 tablet by mouth 2  times every day 30 tablet 0   • doxycycline (ADOXA) 100 MG tablet Take 1 tablet (100 mg) by mouth every 12 hours for 10 days 20 tablet 2   • DULoxetine (CYMBALTA) 60 MG capsule Take 1 capsule by mouth Daily. 90 capsule 3   • DULoxetine (CYMBALTA) 60 MG capsule Take 1 capsule by mouth Daily. 90 capsule 3   • fenofibrate (TRICOR) 145 MG tablet Take 1 tablet by mouth Daily. 90 tablet 3   • fenofibrate (TRICOR) 145 MG tablet Take 1 tablet by mouth daily 90 tablet 2   • fenofibrate (TRICOR) 145 MG tablet Take 1 tablet by mouth once daily 90 tablet 2   • glucose blood test strip USE 1 STRIP 2 TIMES DAILY AS DIRECTED 180 each 6   • HYDROcodone-acetaminophen (NORCO)  MG per tablet Take 1 tablet by mouth every 4 hours as needed for severe pain due to kidney stone 20 tablet 0   • ibuprofen (ADVIL,MOTRIN) 800 MG tablet Take 1 tablet  by mouth 3 times per day with food or milk as needed 270 tablet 2   • ibuprofen (ADVIL,MOTRIN) 800 MG tablet Take 1 tablet by mouth 3 times per day with food or milk as needed 270 tablet 2   • imatinib (GLEEVEC) 400 MG chemo tablet Take 1 tablet by mouth Daily. 30 tablet 5   • Insulin Glargine (BASAGLAR KWIKPEN) 100 UNIT/ML injection pen Inject 90 units under the skin once daily. 30 mL 4   • Insulin Glargine (Lantus SoloStar) 100 UNIT/ML injection pen Inject 100 Units under the skin into the appropriate area as directed Daily. 18 mL 3   • Insulin Glargine (Lantus SoloStar) 100 UNIT/ML injection pen Inject 45 Units under the skin into the appropriate area as directed 2 (two) times a day. 30 mL 0   • Insulin Glargine (Lantus SoloStar) 100 UNIT/ML injection pen Inject 100 Units under the skin into the appropriate area as directed Daily. 30 mL 0   • Insulin Glargine, 2 Unit Dial, (Toujeo Max SoloStar) 300 UNIT/ML solution pen-injector injection Inject 60 Units under the skin into the appropriate area as directed 2 (Two) Times a Day. 36 mL 2   • Insulin Syringe-Needle U-100 (TRUEplus Insulin  "Syringe) 31G X 5/16\" 0.5 ML misc Use to inject 3 times a day 90 each 4   • LANTUS SOLOSTAR 100 UNIT/ML injection pen Inject 45 Units under the skin into the appropriate area as directed 2 (Two) Times a Day. 36 mL 6   • multivitamin (THERAGRAN) tablet tablet Take  by mouth Daily.     • pantoprazole (PROTONIX) 40 MG EC tablet Take 1 tablet by mouth daily 90 tablet 2   • pantoprazole (PROTONIX) 40 MG EC tablet Take 1 tablet (40 mg) by mouth daily 90 tablet 2   • pantoprazole (PROTONIX) 40 MG EC tablet Take 1 tablet by mouth daily 90 tablet 2   • SUMAtriptan (IMITREX) 100 MG tablet Take 1 tablet by mouth 2 times per day at least 2 hours between doses as needed. 27 tablet 2   • triamcinolone (KENALOG) 0.1 % cream Apply 1 application topically to affected area daily as needed 80 g 3     No current facility-administered medications for this visit.         ASSESSMENT/PLAN:      Aric is a 56 y.o. female contacted today regarding refills of  Gleevec 400mg specialty medication(s).    Reviewed and verified with patient:         Specialty medication(s) and dose(s) confirmed: yes    Refill Questions    Flowsheet Row Most Recent Value   Changes to allergies? No   Changes to medications? No   New conditions since last clinic visit No   Unplanned office visit, urgent care, ED, or hospital admission in the last 4 weeks  No   How does patient/caregiver feel medication is working? Very good   Financial problems or insurance changes  No   If yes, describe changes in insurance or financial issues. none   Since the previous refill, were any specialty medication doses or scheduled injections missed or delayed?  No   If yes, please provide the amount 0   Why were doses missed? n/a   Does this patient require a clinical escalation to a pharmacist? No                Medication Adherence    Adherence tools used: directed education          Follow-up: 30 day(s)     Tiffanie Shelton, Pharmacy Technician  Specialty Pharmacy Technician "

## 2022-08-02 ENCOUNTER — SPECIALTY PHARMACY (OUTPATIENT)
Dept: PHARMACY | Facility: HOSPITAL | Age: 57
End: 2022-08-02

## 2022-08-02 NOTE — PROGRESS NOTES
Specialty Pharmacy Refill Coordination Note      Name:  Aric Haro  :  1965  Date:  2022         Past Medical History:   Diagnosis Date   • Anemia    • Cancer (HCC)     leukemia   • Diabetes mellitus (HCC)    • Elevated cholesterol    • GERD (gastroesophageal reflux disease)    • Hypertension    • PONV (postoperative nausea and vomiting)        Past Surgical History:   Procedure Laterality Date   • BLEPHAROPLASTY Bilateral 2018    Procedure: BLEPHAROPLASTY BOTH EYES UPPER EYELIDS (PT REQUESTED TERESITA WILKES;  Surgeon: Chris Haile MD;  Location: [facility];  Service: Ophthalmology   • COLONOSCOPY N/A 3/27/2018    Procedure: COLONOSCOPY FOR SCREENING  CPTCODE:17570;  Surgeon: Drew Esparza III, MD;  Location: [facility];  Service: Gastroenterology   • SINUS SURGERY     • SINUS SURGERY     • SLEEVE GASTROPLASTY         Social History     Socioeconomic History   • Marital status:    Tobacco Use   • Smoking status: Former Smoker     Packs/day: 0.25     Years: 4.00     Pack years: 1.00   • Smokeless tobacco: Never Used   Vaping Use   • Vaping Use: Never used   Substance and Sexual Activity   • Alcohol use: No   • Drug use: No   • Sexual activity: Defer       Family History   Problem Relation Age of Onset   • Anemia Mother    • Hypertension Mother    • Cancer Mother    • COPD Father    • Heart disease Father    • Breast cancer Neg Hx        No Known Allergies    Current Outpatient Medications   Medication Sig Dispense Refill   • BIOTIN PO Take 100 mg by mouth.     • celecoxib (CeleBREX) 200 MG capsule Take 1 capsule by mouth 2 (Two) Times a Day. 180 capsule 3   • Cholecalciferol (VITAMIN D3) 35536 units capsule Take 1 capsule by mouth 2 (Two) Times a Week. 24 capsule 3   • cyanocobalamin 1000 MCG/ML injection Administer 1 ml (1,000 mcg) intramuscularly once weekly 12 mL 2   • desloratadine-pseudoephedrine (CLARINEX-D 12-hour) 2.5-120 MG per tablet take 1 tablet by mouth 2  times every day 30 tablet 0   • doxycycline (ADOXA) 100 MG tablet Take 1 tablet (100 mg) by mouth every 12 hours for 10 days 20 tablet 2   • DULoxetine (CYMBALTA) 60 MG capsule Take 1 capsule by mouth Daily. 90 capsule 3   • DULoxetine (CYMBALTA) 60 MG capsule Take 1 capsule by mouth Daily. 90 capsule 3   • fenofibrate (TRICOR) 145 MG tablet Take 1 tablet by mouth Daily. 90 tablet 3   • fenofibrate (TRICOR) 145 MG tablet Take 1 tablet by mouth daily 90 tablet 2   • fenofibrate (TRICOR) 145 MG tablet Take 1 tablet by mouth once daily 90 tablet 2   • glucose blood test strip USE 1 STRIP 2 TIMES DAILY AS DIRECTED 180 each 6   • HYDROcodone-acetaminophen (NORCO)  MG per tablet Take 1 tablet by mouth every 4 hours as needed for severe pain due to kidney stone 20 tablet 0   • ibuprofen (ADVIL,MOTRIN) 800 MG tablet Take 1 tablet  by mouth 3 times per day with food or milk as needed 270 tablet 2   • ibuprofen (ADVIL,MOTRIN) 800 MG tablet Take 1 tablet by mouth 3 times per day with food or milk as needed 270 tablet 2   • imatinib (GLEEVEC) 400 MG chemo tablet Take 1 tablet by mouth Daily. 30 tablet 5   • Insulin Glargine (BASAGLAR KWIKPEN) 100 UNIT/ML injection pen Inject 90 units under the skin once daily. 30 mL 4   • Insulin Glargine (Lantus SoloStar) 100 UNIT/ML injection pen Inject 100 Units under the skin into the appropriate area as directed Daily. 18 mL 3   • Insulin Glargine (Lantus SoloStar) 100 UNIT/ML injection pen Inject 45 Units under the skin into the appropriate area as directed 2 (two) times a day. 30 mL 0   • Insulin Glargine (Lantus SoloStar) 100 UNIT/ML injection pen Inject 100 Units under the skin into the appropriate area as directed Daily. 30 mL 0   • Insulin Glargine, 2 Unit Dial, (Toujeo Max SoloStar) 300 UNIT/ML solution pen-injector injection Inject 60 Units under the skin into the appropriate area as directed 2 (Two) Times a Day. 36 mL 2   • Insulin Syringe-Needle U-100 (TRUEplus Insulin  "Syringe) 31G X 5/16\" 0.5 ML misc Use to inject 3 times a day 90 each 4   • LANTUS SOLOSTAR 100 UNIT/ML injection pen Inject 45 Units under the skin into the appropriate area as directed 2 (Two) Times a Day. 36 mL 6   • multivitamin (THERAGRAN) tablet tablet Take  by mouth Daily.     • pantoprazole (PROTONIX) 40 MG EC tablet Take 1 tablet by mouth daily 90 tablet 2   • pantoprazole (PROTONIX) 40 MG EC tablet Take 1 tablet (40 mg) by mouth daily 90 tablet 2   • pantoprazole (PROTONIX) 40 MG EC tablet Take 1 tablet by mouth daily 90 tablet 2   • SUMAtriptan (IMITREX) 100 MG tablet Take 1 tablet by mouth 2 times per day at least 2 hours between doses as needed. 27 tablet 2   • triamcinolone (KENALOG) 0.1 % cream Apply 1 application topically to affected area daily as needed 80 g 3     No current facility-administered medications for this visit.         ASSESSMENT/PLAN:      Aric is a 56 y.o. female contacted today regarding refills of  Gleevec 400mg specialty medication(s).    Reviewed and verified with patient:       Specialty medication(s) and dose(s) confirmed: yes    Refill Questions    Flowsheet Row Most Recent Value   Changes to allergies? No   Changes to medications? No   New conditions since last clinic visit No   Unplanned office visit, urgent care, ED, or hospital admission in the last 4 weeks  No   How does patient/caregiver feel medication is working? Very good   Financial problems or insurance changes  No   If yes, describe changes in insurance or financial issues. none   Since the previous refill, were any specialty medication doses or scheduled injections missed or delayed?  No   If yes, please provide the amount 0   Why were doses missed? n/a   Does this patient require a clinical escalation to a pharmacist? No                Medication Adherence    Adherence tools used: directed education          Follow-up: 30 day(s)     Tiffanie Shelton, Pharmacy Technician  Specialty Pharmacy Technician "

## 2022-08-29 ENCOUNTER — SPECIALTY PHARMACY (OUTPATIENT)
Dept: PHARMACY | Facility: HOSPITAL | Age: 57
End: 2022-08-29

## 2022-08-29 NOTE — PROGRESS NOTES
Specialty Pharmacy Refill Coordination Note      Name:  Aric Haro  :  1965  Date:  2022         Past Medical History:   Diagnosis Date   • Anemia    • Cancer (HCC)     leukemia   • Diabetes mellitus (HCC)    • Elevated cholesterol    • GERD (gastroesophageal reflux disease)    • Hypertension    • PONV (postoperative nausea and vomiting)        Past Surgical History:   Procedure Laterality Date   • BLEPHAROPLASTY Bilateral 2018    Procedure: BLEPHAROPLASTY BOTH EYES UPPER EYELIDS (PT REQUESTED TERESITA WILKES;  Surgeon: Chris Haile MD;  Location: [facility];  Service: Ophthalmology   • COLONOSCOPY N/A 3/27/2018    Procedure: COLONOSCOPY FOR SCREENING  CPTCODE:60319;  Surgeon: Drew Esparza III, MD;  Location: [facility];  Service: Gastroenterology   • SINUS SURGERY     • SINUS SURGERY     • SLEEVE GASTROPLASTY         Social History     Socioeconomic History   • Marital status:    Tobacco Use   • Smoking status: Former Smoker     Packs/day: 0.25     Years: 4.00     Pack years: 1.00   • Smokeless tobacco: Never Used   Vaping Use   • Vaping Use: Never used   Substance and Sexual Activity   • Alcohol use: No   • Drug use: No   • Sexual activity: Defer       Family History   Problem Relation Age of Onset   • Anemia Mother    • Hypertension Mother    • Cancer Mother    • COPD Father    • Heart disease Father    • Breast cancer Neg Hx        No Known Allergies    Current Outpatient Medications   Medication Sig Dispense Refill   • BIOTIN PO Take 100 mg by mouth.     • celecoxib (CeleBREX) 200 MG capsule Take 1 capsule by mouth 2 (Two) Times a Day. 180 capsule 3   • Cholecalciferol (VITAMIN D3) 52208 units capsule Take 1 capsule by mouth 2 (Two) Times a Week. 24 capsule 3   • cyanocobalamin 1000 MCG/ML injection Administer 1 ml (1,000 mcg) intramuscularly once weekly 12 mL 2   • desloratadine-pseudoephedrine (CLARINEX-D 12-hour) 2.5-120 MG per tablet take 1 tablet by mouth 2  times every day 30 tablet 0   • doxycycline (ADOXA) 100 MG tablet Take 1 tablet (100 mg) by mouth every 12 hours for 10 days 20 tablet 2   • doxycycline (ADOXA) 100 MG tablet Take 1 tablet (100 mg) by mouth every 12 hours for 10 days 20 tablet 0   • DULoxetine (CYMBALTA) 60 MG capsule Take 1 capsule by mouth Daily. 90 capsule 3   • DULoxetine (CYMBALTA) 60 MG capsule Take 1 capsule by mouth Daily. 90 capsule 3   • fenofibrate (TRICOR) 145 MG tablet Take 1 tablet by mouth Daily. 90 tablet 3   • fenofibrate (TRICOR) 145 MG tablet Take 1 tablet by mouth daily 90 tablet 2   • fenofibrate (TRICOR) 145 MG tablet Take 1 tablet by mouth once daily 90 tablet 2   • glucose blood test strip USE 1 STRIP 2 TIMES DAILY AS DIRECTED 180 each 6   • HYDROcodone-acetaminophen (NORCO)  MG per tablet Take 1 tablet by mouth every 4 hours as needed for severe pain due to kidney stone 20 tablet 0   • ibuprofen (ADVIL,MOTRIN) 800 MG tablet Take 1 tablet  by mouth 3 times per day with food or milk as needed 270 tablet 2   • ibuprofen (ADVIL,MOTRIN) 800 MG tablet Take 1 tablet by mouth 3 times per day with food or milk as needed 270 tablet 2   • imatinib (GLEEVEC) 400 MG chemo tablet Take 1 tablet by mouth Daily. 30 tablet 5   • Insulin Glargine (BASAGLAR KWIKPEN) 100 UNIT/ML injection pen Inject 90 units under the skin once daily. 30 mL 4   • Insulin Glargine (Lantus SoloStar) 100 UNIT/ML injection pen Inject 100 Units under the skin into the appropriate area as directed Daily. 18 mL 3   • Insulin Glargine (Lantus SoloStar) 100 UNIT/ML injection pen Inject 45 Units under the skin into the appropriate area as directed 2 (two) times a day. 30 mL 0   • Insulin Glargine (Lantus SoloStar) 100 UNIT/ML injection pen Inject 100 Units under the skin into the appropriate area as directed Daily. 30 mL 0   • Insulin Glargine, 2 Unit Dial, (Toujeo Max SoloStar) 300 UNIT/ML solution pen-injector injection Inject 60 Units under the skin into the  "appropriate area as directed 2 (Two) Times a Day. 36 mL 2   • Insulin Syringe-Needle U-100 (TRUEplus Insulin Syringe) 31G X 5/16\" 0.5 ML misc Use to inject 3 times a day 90 each 4   • LANTUS SOLOSTAR 100 UNIT/ML injection pen Inject 45 Units under the skin into the appropriate area as directed 2 (Two) Times a Day. 36 mL 6   • multivitamin (THERAGRAN) tablet tablet Take  by mouth Daily.     • pantoprazole (PROTONIX) 40 MG EC tablet Take 1 tablet by mouth daily 90 tablet 2   • pantoprazole (PROTONIX) 40 MG EC tablet Take 1 tablet (40 mg) by mouth daily 90 tablet 2   • pantoprazole (PROTONIX) 40 MG EC tablet Take 1 tablet by mouth daily 90 tablet 2   • SUMAtriptan (IMITREX) 100 MG tablet Take 1 tablet by mouth 2 times per day at least 2 hours between doses as needed. 27 tablet 2   • triamcinolone (KENALOG) 0.1 % cream Apply 1 application topically to affected area daily as needed 80 g 3     No current facility-administered medications for this visit.         ASSESSMENT/PLAN:      Aric is a 56 y.o. female contacted today regarding refills of Gleevec 400mg specialty medication(s).    Reviewed and verified with patient:       Specialty medication(s) and dose(s) confirmed: yes    Refill Questions    Flowsheet Row Most Recent Value   Changes to allergies? No   Changes to medications? No   New conditions since last clinic visit No   Unplanned office visit, urgent care, ED, or hospital admission in the last 4 weeks  No   How does patient/caregiver feel medication is working? Excellent   Financial problems or insurance changes  No   If yes, describe changes in insurance or financial issues. none   Since the previous refill, were any specialty medication doses or scheduled injections missed or delayed?  No   If yes, please provide the amount 0   Why were doses missed? n/a   Does this patient require a clinical escalation to a pharmacist? No                Medication Adherence    Adherence tools used: directed education   "        Follow-up: 30 day(s)     Tiffanie Shelton, Pharmacy Technician  Specialty Pharmacy Technician

## 2022-09-26 ENCOUNTER — SPECIALTY PHARMACY (OUTPATIENT)
Dept: PHARMACY | Facility: HOSPITAL | Age: 57
End: 2022-09-26

## 2022-09-26 NOTE — PROGRESS NOTES
Specialty Pharmacy Refill Coordination Note      Name:  Aric Haro  :  1965  Date:  2022         Past Medical History:   Diagnosis Date   • Anemia    • Cancer (HCC)     leukemia   • Diabetes mellitus (HCC)    • Elevated cholesterol    • GERD (gastroesophageal reflux disease)    • Hypertension    • PONV (postoperative nausea and vomiting)        Past Surgical History:   Procedure Laterality Date   • BLEPHAROPLASTY Bilateral 2018    Procedure: BLEPHAROPLASTY BOTH EYES UPPER EYELIDS (PT REQUESTED TERESITA WILKES;  Surgeon: Chris Haile MD;  Location: [facility];  Service: Ophthalmology   • COLONOSCOPY N/A 3/27/2018    Procedure: COLONOSCOPY FOR SCREENING  CPTCODE:18369;  Surgeon: Drew Esparza III, MD;  Location: [facility];  Service: Gastroenterology   • SINUS SURGERY     • SINUS SURGERY     • SLEEVE GASTROPLASTY         Social History     Socioeconomic History   • Marital status:    Tobacco Use   • Smoking status: Former Smoker     Packs/day: 0.25     Years: 4.00     Pack years: 1.00   • Smokeless tobacco: Never Used   Vaping Use   • Vaping Use: Never used   Substance and Sexual Activity   • Alcohol use: No   • Drug use: No   • Sexual activity: Defer       Family History   Problem Relation Age of Onset   • Anemia Mother    • Hypertension Mother    • Cancer Mother    • COPD Father    • Heart disease Father    • Breast cancer Neg Hx        No Known Allergies    Current Outpatient Medications   Medication Sig Dispense Refill   • BIOTIN PO Take 100 mg by mouth.     • celecoxib (CeleBREX) 200 MG capsule Take 1 capsule by mouth 2 (Two) Times a Day. 180 capsule 3   • Cholecalciferol (VITAMIN D3) 89305 units capsule Take 1 capsule by mouth 2 (Two) Times a Week. 24 capsule 3   • cyanocobalamin 1000 MCG/ML injection Administer 1 ml (1,000 mcg) intramuscularly once weekly 12 mL 2   • desloratadine-pseudoephedrine (CLARINEX-D 12-hour) 2.5-120 MG per tablet take 1 tablet by mouth 2  times every day 30 tablet 0   • doxycycline (ADOXA) 100 MG tablet Take 1 tablet (100 mg) by mouth every 12 hours for 10 days 20 tablet 2   • doxycycline (ADOXA) 100 MG tablet Take 1 tablet (100 mg) by mouth every 12 hours for 10 days 20 tablet 0   • DULoxetine (CYMBALTA) 60 MG capsule Take 1 capsule by mouth Daily. 90 capsule 3   • DULoxetine (CYMBALTA) 60 MG capsule Take 1 capsule by mouth Daily. 90 capsule 3   • fenofibrate (TRICOR) 145 MG tablet Take 1 tablet by mouth Daily. 90 tablet 3   • fenofibrate (TRICOR) 145 MG tablet Take 1 tablet by mouth daily 90 tablet 2   • fenofibrate (TRICOR) 145 MG tablet Take 1 tablet by mouth once daily 90 tablet 2   • glucose blood test strip USE 1 STRIP 2 TIMES DAILY AS DIRECTED 180 each 6   • HYDROcodone-acetaminophen (NORCO)  MG per tablet Take 1 tablet by mouth every 4 hours as needed for severe pain due to kidney stone 20 tablet 0   • ibuprofen (ADVIL,MOTRIN) 800 MG tablet Take 1 tablet  by mouth 3 times per day with food or milk as needed 270 tablet 2   • ibuprofen (ADVIL,MOTRIN) 800 MG tablet Take 1 tablet by mouth 3 times per day with food or milk as needed 270 tablet 2   • imatinib (GLEEVEC) 400 MG chemo tablet Take 1 tablet by mouth Daily. 30 tablet 5   • Insulin Glargine (BASAGLAR KWIKPEN) 100 UNIT/ML injection pen Inject 90 units under the skin once daily. 30 mL 4   • Insulin Glargine (Lantus SoloStar) 100 UNIT/ML injection pen Inject 100 Units under the skin into the appropriate area as directed Daily. 18 mL 3   • Insulin Glargine (Lantus SoloStar) 100 UNIT/ML injection pen Inject 45 Units under the skin into the appropriate area as directed 2 (two) times a day. 30 mL 0   • Insulin Glargine (Lantus SoloStar) 100 UNIT/ML injection pen Inject 100 Units under the skin into the appropriate area as directed Daily. 30 mL 0   • Insulin Glargine, 2 Unit Dial, (Toujeo Max SoloStar) 300 UNIT/ML solution pen-injector injection Inject 60 Units under the skin into the  "appropriate area as directed 2 (Two) Times a Day. 36 mL 2   • Insulin Syringe-Needle U-100 (TRUEplus Insulin Syringe) 31G X 5/16\" 0.5 ML misc Use to inject 3 times a day 90 each 4   • LANTUS SOLOSTAR 100 UNIT/ML injection pen Inject 45 Units under the skin into the appropriate area as directed 2 (Two) Times a Day. 36 mL 6   • multivitamin (THERAGRAN) tablet tablet Take  by mouth Daily.     • pantoprazole (PROTONIX) 40 MG EC tablet Take 1 tablet by mouth daily 90 tablet 2   • pantoprazole (PROTONIX) 40 MG EC tablet Take 1 tablet (40 mg) by mouth daily 90 tablet 2   • pantoprazole (PROTONIX) 40 MG EC tablet Take 1 tablet by mouth daily 90 tablet 2   • SUMAtriptan (IMITREX) 100 MG tablet Take 1 tablet by mouth 2 times per day at least 2 hours between doses as needed. 27 tablet 2   • triamcinolone (KENALOG) 0.1 % cream Apply 1 application topically to affected area daily as needed 80 g 3     No current facility-administered medications for this visit.         ASSESSMENT/PLAN:      Aric is a 56 y.o. female contacted today regarding refills of  Gleevec 400mg specialty medication(s).    Reviewed and verified with patient:       Specialty medication(s) and dose(s) confirmed: yes    Refill Questions    Flowsheet Row Most Recent Value   Changes to allergies? No   Changes to medications? No   New conditions since last clinic visit No   Unplanned office visit, urgent care, ED, or hospital admission in the last 4 weeks  No   How does patient/caregiver feel medication is working? Very good   Financial problems or insurance changes  No   If yes, describe changes in insurance or financial issues. none   Since the previous refill, were any specialty medication doses or scheduled injections missed or delayed?  No   If yes, please provide the amount 0   Why were doses missed? n/a   Does this patient require a clinical escalation to a pharmacist? No                Medication Adherence    Adherence tools used: directed education   "        Follow-up: 30 day(s)     Tiffanie Shelton, Pharmacy Technician  Specialty Pharmacy Technician

## 2022-10-03 ENCOUNTER — LAB (OUTPATIENT)
Dept: ONCOLOGY | Facility: CLINIC | Age: 57
End: 2022-10-03

## 2022-10-03 VITALS
HEART RATE: 93 BPM | DIASTOLIC BLOOD PRESSURE: 81 MMHG | SYSTOLIC BLOOD PRESSURE: 134 MMHG | RESPIRATION RATE: 18 BRPM | OXYGEN SATURATION: 98 % | TEMPERATURE: 97.1 F

## 2022-10-03 DIAGNOSIS — D50.9 IRON DEFICIENCY ANEMIA, UNSPECIFIED IRON DEFICIENCY ANEMIA TYPE: ICD-10-CM

## 2022-10-03 DIAGNOSIS — C92.10 CML (CHRONIC MYELOCYTIC LEUKEMIA): ICD-10-CM

## 2022-10-03 LAB
ALBUMIN SERPL-MCNC: 4.15 G/DL (ref 3.5–5.2)
ALBUMIN/GLOB SERPL: 1.6 G/DL
ALP SERPL-CCNC: 64 U/L (ref 39–117)
ALT SERPL W P-5'-P-CCNC: 17 U/L (ref 1–33)
ANION GAP SERPL CALCULATED.3IONS-SCNC: 10.6 MMOL/L (ref 5–15)
AST SERPL-CCNC: 18 U/L (ref 1–32)
BASOPHILS # BLD AUTO: 0.05 10*3/MM3 (ref 0–0.2)
BASOPHILS NFR BLD AUTO: 0.8 % (ref 0–1.5)
BILIRUB SERPL-MCNC: 0.4 MG/DL (ref 0–1.2)
BUN SERPL-MCNC: 14 MG/DL (ref 6–20)
BUN/CREAT SERPL: 17.7 (ref 7–25)
CALCIUM SPEC-SCNC: 9.2 MG/DL (ref 8.6–10.5)
CHLORIDE SERPL-SCNC: 107 MMOL/L (ref 98–107)
CO2 SERPL-SCNC: 25.4 MMOL/L (ref 22–29)
CREAT SERPL-MCNC: 0.79 MG/DL (ref 0.57–1)
DEPRECATED RDW RBC AUTO: 46.5 FL (ref 37–54)
EGFRCR SERPLBLD CKD-EPI 2021: 87.9 ML/MIN/1.73
EOSINOPHIL # BLD AUTO: 0.27 10*3/MM3 (ref 0–0.4)
EOSINOPHIL NFR BLD AUTO: 4.1 % (ref 0.3–6.2)
ERYTHROCYTE [DISTWIDTH] IN BLOOD BY AUTOMATED COUNT: 13.7 % (ref 12.3–15.4)
GLOBULIN UR ELPH-MCNC: 2.7 GM/DL
GLUCOSE SERPL-MCNC: 131 MG/DL (ref 65–99)
HCT VFR BLD AUTO: 38.3 % (ref 34–46.6)
HGB BLD-MCNC: 12.7 G/DL (ref 12–15.9)
IMM GRANULOCYTES # BLD AUTO: 0.01 10*3/MM3 (ref 0–0.05)
IMM GRANULOCYTES NFR BLD AUTO: 0.2 % (ref 0–0.5)
LDH SERPL-CCNC: 176 U/L (ref 135–214)
LYMPHOCYTES # BLD AUTO: 2.26 10*3/MM3 (ref 0.7–3.1)
LYMPHOCYTES NFR BLD AUTO: 34.1 % (ref 19.6–45.3)
MCH RBC QN AUTO: 30.5 PG (ref 26.6–33)
MCHC RBC AUTO-ENTMCNC: 33.2 G/DL (ref 31.5–35.7)
MCV RBC AUTO: 91.8 FL (ref 79–97)
MONOCYTES # BLD AUTO: 0.63 10*3/MM3 (ref 0.1–0.9)
MONOCYTES NFR BLD AUTO: 9.5 % (ref 5–12)
NEUTROPHILS NFR BLD AUTO: 3.41 10*3/MM3 (ref 1.7–7)
NEUTROPHILS NFR BLD AUTO: 51.3 % (ref 42.7–76)
NRBC BLD AUTO-RTO: 0 /100 WBC (ref 0–0.2)
PLATELET # BLD AUTO: 246 10*3/MM3 (ref 140–450)
PMV BLD AUTO: 10.1 FL (ref 6–12)
POTASSIUM SERPL-SCNC: 4.3 MMOL/L (ref 3.5–5.2)
PROT SERPL-MCNC: 6.8 G/DL (ref 6–8.5)
RBC # BLD AUTO: 4.17 10*6/MM3 (ref 3.77–5.28)
SODIUM SERPL-SCNC: 143 MMOL/L (ref 136–145)
URATE SERPL-MCNC: 6.2 MG/DL (ref 2.4–5.7)
WBC NRBC COR # BLD: 6.63 10*3/MM3 (ref 3.4–10.8)

## 2022-10-03 PROCEDURE — 36415 COLL VENOUS BLD VENIPUNCTURE: CPT

## 2022-10-03 PROCEDURE — 81207 BCR/ABL1 GENE MINOR BP: CPT

## 2022-10-03 PROCEDURE — 84550 ASSAY OF BLOOD/URIC ACID: CPT | Performed by: INTERNAL MEDICINE

## 2022-10-03 PROCEDURE — 85025 COMPLETE CBC W/AUTO DIFF WBC: CPT | Performed by: INTERNAL MEDICINE

## 2022-10-03 PROCEDURE — 81206 BCR/ABL1 GENE MAJOR BP: CPT

## 2022-10-03 PROCEDURE — 83615 LACTATE (LD) (LDH) ENZYME: CPT | Performed by: INTERNAL MEDICINE

## 2022-10-03 PROCEDURE — 80053 COMPREHEN METABOLIC PANEL: CPT | Performed by: INTERNAL MEDICINE

## 2022-10-18 NOTE — PROGRESS NOTES
Aric Haro  [id number]  1965  10/19/2022      Referring Provider:   EILEEN Andrade    Reason for Follow Up:   1. Chronic Phase - Chronic Myelogenous Leukemia  2. Leukocytosis  3. Thrombocytosis     Chief Complaint:  CML      History of Present Illness:  Aric Haro is a very pleasant 57 y.o.  female who presents in follow up appointment for further management and treatment of chronic phase chronic myelogenous leukemia (CML).    Ms. Haro began experiencing extreme fatigue where she was sleeping multiple hours during the day when she initially began following with me in April 2017. 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 On laboratory evaluation she was found to have a total white blood cell count of 22.35 and a platelet count 470 thousand. In March 2016 she was also noted to have a leukocytosis (although not as elevated) as well as a thrombocytosis, however reports that these were likely secondary to other medical issues at the time her blood work was drawn. She denied of any significant weight loss however has been gradually losing weight since gastric sleeve procedure. She denied of any fevers or frequent infections but did note fluctuating neck lymphadenopathy as well as early satiety and taste in change. She denied of any abnormal or spontaneous bleeding. I did obtain a BCR ABL PCR to further assess her leukocytosis and thrombocytosis and she was positive for the b2a2/b3a2 (p210) and e1a2 (p190) fusion gene transcripts consistent with a diagnosis for CML. After reviewing the toxicities of each tyrosine kinase inhibitor we decided to start Dasatinib treatment. She had a difficult time tolerating the Dasatinib as she was unable to take her PPI with this medication. Since she had to be taken off of her PPI she had worsening reflux symptoms as well  as severe nausea, vomiting, dehydration, and headaches during this period. Given the toxicities she was experiencing she was taken off Dasatinib and this was changed to Gleevec treatment. Since starting Gleevac 400mg daily she has tolerated this much better without significant side effects. The only side effect that she has experienced is intermittent pedal edema along with some hand swelling and muscle cramps which are now intermittant.     Treatment History:  Started Dasatinib on 05/15/17 - experienced nausea, severe reflux, headaches while on medication and had taken 9 doses. This was discontinued due to intolerability and she was thereafter started on Imatinib.   Started Imatinib on 5/26/17      Interim History:  From the start of Dasatinib and later Gleevec she has had a good response and normalization in her complete blood counts with normalization of BCR ABL PCR. She denies of any fevers, infections, lymphadenopathy, chest pain, edema, dyspnea, nausea. She continues to experience intermittent leg cramps.       The following portions of the patient's history were reviewed and updated as appropriate: allergies, current medications, past family history, past medical history, past social history, past surgical history and problem list.      No Known Allergies    Past Medical History:   Diagnosis Date   • Anemia    • Cancer (HCC)     leukemia   • Diabetes mellitus (HCC)    • Elevated cholesterol    • GERD (gastroesophageal reflux disease)    • Hypertension    • PONV (postoperative nausea and vomiting)        Past Surgical History:   Procedure Laterality Date   • BLEPHAROPLASTY Bilateral 6/22/2018    Procedure: BLEPHAROPLASTY BOTH EYES UPPER EYELIDS (PT REQUESTED TERESITA WILKES;  Surgeon: Chris Haile MD;  Location: [facility];  Service: Ophthalmology   • COLONOSCOPY N/A 3/27/2018    Procedure: COLONOSCOPY FOR SCREENING  CPTCODE:08686;  Surgeon: Drew Esparza III, MD;  Location: [facility];  Service:  Gastroenterology   • SINUS SURGERY     • SINUS SURGERY     • SLEEVE GASTROPLASTY         Social History     Socioeconomic History   • Marital status:    Tobacco Use   • Smoking status: Former     Packs/day: 0.25     Years: 4.00     Pack years: 1.00     Types: Cigarettes   • Smokeless tobacco: Never   Vaping Use   • Vaping Use: Never used   Substance and Sexual Activity   • Alcohol use: No   • Drug use: No   • Sexual activity: Defer   She lives with her daughter. She denies of any alcohol or illicit drug use. Previous social tobacco use more then 20 years ago.  Recent job change.       Family History   Problem Relation Age of Onset   • Anemia Mother    • Hypertension Mother    • Cancer Mother    • COPD Father    • Heart disease Father    • Breast cancer Neg Hx    Maternal Uncle - CLL  Mother - Malignancy of [place]          Current Outpatient Medications:   •  BIOTIN PO, Take 100 mg by mouth., Disp: , Rfl:   •  celecoxib (CeleBREX) 200 MG capsule, Take 1 capsule by mouth 2 (Two) Times a Day., Disp: 180 capsule, Rfl: 3  •  cyanocobalamin 1000 MCG/ML injection, Administer 1 ml (1,000 mcg) intramuscularly once weekly, Disp: 12 mL, Rfl: 2  •  desloratadine-pseudoephedrine (CLARINEX-D 12-hour) 2.5-120 MG per tablet, take 1 tablet by mouth 2 times every day, Disp: 30 tablet, Rfl: 0  •  DULoxetine (CYMBALTA) 60 MG capsule, Take 1 capsule by mouth Daily., Disp: 90 capsule, Rfl: 3  •  DULoxetine (CYMBALTA) 60 MG capsule, Take 1 capsule by mouth Daily., Disp: 90 capsule, Rfl: 3  •  fenofibrate (TRICOR) 145 MG tablet, Take 1 tablet by mouth Daily., Disp: 90 tablet, Rfl: 3  •  fenofibrate (TRICOR) 145 MG tablet, Take 1 tablet by mouth daily, Disp: 90 tablet, Rfl: 2  •  fenofibrate (TRICOR) 145 MG tablet, Take 1 tablet by mouth once daily, Disp: 90 tablet, Rfl: 2  •  HYDROcodone-acetaminophen (NORCO)  MG per tablet, Take 1 tablet by mouth every 4 hours as needed for severe pain due to kidney stone, Disp: 20  "tablet, Rfl: 0  •  ibuprofen (ADVIL,MOTRIN) 800 MG tablet, Take 1 tablet  by mouth 3 times per day with food or milk as needed, Disp: 270 tablet, Rfl: 2  •  ibuprofen (ADVIL,MOTRIN) 800 MG tablet, Take 1 tablet by mouth 3 times per day with food or milk as needed, Disp: 270 tablet, Rfl: 2  •  imatinib (GLEEVEC) 400 MG chemo tablet, Take 1 tablet by mouth Daily., Disp: 30 tablet, Rfl: 5  •  Insulin Glargine (BASAGLAR KWIKPEN) 100 UNIT/ML injection pen, Inject 90 units under the skin once daily., Disp: 30 mL, Rfl: 4  •  Insulin Glargine (Lantus SoloStar) 100 UNIT/ML injection pen, Inject 100 Units under the skin into the appropriate area as directed Daily., Disp: 18 mL, Rfl: 3  •  Insulin Glargine (Lantus SoloStar) 100 UNIT/ML injection pen, Inject 45 Units under the skin into the appropriate area as directed 2 (two) times a day., Disp: 30 mL, Rfl: 0  •  Insulin Glargine (Lantus SoloStar) 100 UNIT/ML injection pen, Inject 100 Units under the skin into the appropriate area as directed Daily., Disp: 30 mL, Rfl: 0  •  Insulin Glargine, 2 Unit Dial, (Toujeo Max SoloStar) 300 UNIT/ML solution pen-injector injection, Inject 60 Units under the skin into the appropriate area as directed 2 (Two) Times a Day., Disp: 36 mL, Rfl: 2  •  Insulin Syringe-Needle U-100 (TRUEplus Insulin Syringe) 31G X 5/16\" 0.5 ML misc, Use to inject 3 times a day, Disp: 90 each, Rfl: 4  •  multivitamin (THERAGRAN) tablet tablet, Take  by mouth Daily., Disp: , Rfl:   •  pantoprazole (PROTONIX) 40 MG EC tablet, Take 1 tablet by mouth daily, Disp: 90 tablet, Rfl: 2  •  pantoprazole (PROTONIX) 40 MG EC tablet, Take 1 tablet (40 mg) by mouth daily, Disp: 90 tablet, Rfl: 2  •  pantoprazole (PROTONIX) 40 MG EC tablet, Take 1 tablet by mouth daily, Disp: 90 tablet, Rfl: 2  •  SUMAtriptan (IMITREX) 100 MG tablet, Take 1 tablet by mouth 2 times per day at least 2 hours between doses as needed., Disp: 27 tablet, Rfl: 2  •  triamcinolone (KENALOG) 0.1 % cream, " Apply 1 application topically to affected area daily as needed, Disp: 80 g, Rfl: 3  •  Cholecalciferol (VITAMIN D3) 04944 units capsule, Take 1 capsule by mouth 2 (Two) Times a Week., Disp: 24 capsule, Rfl: 3  •  doxycycline (ADOXA) 100 MG tablet, Take 1 tablet (100 mg) by mouth every 12 hours for 10 days, Disp: 20 tablet, Rfl: 2  •  doxycycline (ADOXA) 100 MG tablet, Take 1 tablet (100 mg) by mouth every 12 hours for 10 days, Disp: 20 tablet, Rfl: 0  •  glucose blood test strip, USE 1 STRIP 2 TIMES DAILY AS DIRECTED, Disp: 180 each, Rfl: 6  •  LANTUS SOLOSTAR 100 UNIT/ML injection pen, Inject 45 Units under the skin into the appropriate area as directed 2 (Two) Times a Day., Disp: 36 mL, Rfl: 6        Review of Systems  A comprehensive 14-point review of systems performed.  Significant findings as mentioned above.  All other systems reviewed and are negative. No fevers, night sweats, lymphadenopathy, or weight loss. Positive intermittent lower extremity myalgias.         Physical Exam:  Vital Signs: These were reviewed and listed as per patient’s electronic medical chart  Vitals:    10/19/22 0846   BP: 129/78   Pulse: 103   Resp: 18   Temp: 97.5 °F (36.4 °C)   SpO2: 98%     General: Awake, alert and oriented, in no distress, obese  HEENT: Head is atraumatic, normocephalic, extraocular movements full, no scleral icterus, mask in place  Neck: supple, no jvd, lymphadenopathy or masses  Cardiovascular: regular rhythm, tachycardic, without murmurs, rubs or gallops  Pulmonary: non-labored, clear to auscultation bilaterally, no wheezing  Abdomen: soft, non-tender, non-distended, normal active bowel sounds present  Extremities: No clubbing, cyanosis or edema  Lymph: No cervical or supraclavicular adenopathy  Neurologic: Mental status as above, alert, awake and oriented, grossly non-focal exam  Skin: warm, dry, intact, no ecchymosis  Patient was examined on 10/19/22 and changes are reflected and noted above.        Labs /  Studies:    Lab on 10/03/2022   Component Date Value   • Glucose 10/03/2022 131 (H)    • BUN 10/03/2022 14    • Creatinine 10/03/2022 0.79    • Sodium 10/03/2022 143    • Potassium 10/03/2022 4.3    • Chloride 10/03/2022 107    • CO2 10/03/2022 25.4    • Calcium 10/03/2022 9.2    • Total Protein 10/03/2022 6.8    • Albumin 10/03/2022 4.15    • ALT (SGPT) 10/03/2022 17    • AST (SGOT) 10/03/2022 18    • Alkaline Phosphatase 10/03/2022 64    • Total Bilirubin 10/03/2022 0.4    • Globulin 10/03/2022 2.7    • A/G Ratio 10/03/2022 1.6    • BUN/Creatinine Ratio 10/03/2022 17.7    • Anion Gap 10/03/2022 10.6    • eGFR 10/03/2022 87.9    • e13a2 (b2a2) Transcript 10/03/2022 Comment    • e14a2 (b3a2) Transcript 10/03/2022 Comment    • E1A2 transcript 10/03/2022 Comment    • Interpretation 10/03/2022 Negative    • Director Review 10/03/2022 Comment    • Background: 10/03/2022 Comment    • Methodology: 10/03/2022 Comment    • LDH 10/03/2022 176    • Uric Acid 10/03/2022 6.2 (H)    • WBC 10/03/2022 6.63    • RBC 10/03/2022 4.17    • Hemoglobin 10/03/2022 12.7    • Hematocrit 10/03/2022 38.3    • MCV 10/03/2022 91.8    • MCH 10/03/2022 30.5    • MCHC 10/03/2022 33.2    • RDW 10/03/2022 13.7    • RDW-SD 10/03/2022 46.5    • MPV 10/03/2022 10.1    • Platelets 10/03/2022 246    • Neutrophil % 10/03/2022 51.3    • Lymphocyte % 10/03/2022 34.1    • Monocyte % 10/03/2022 9.5    • Eosinophil % 10/03/2022 4.1    • Basophil % 10/03/2022 0.8    • Immature Grans % 10/03/2022 0.2    • Neutrophils, Absolute 10/03/2022 3.41    • Lymphocytes, Absolute 10/03/2022 2.26    • Monocytes, Absolute 10/03/2022 0.63    • Eosinophils, Absolute 10/03/2022 0.27    • Basophils, Absolute 10/03/2022 0.05    • Immature Grans, Absolute 10/03/2022 0.01    • nRBC 10/03/2022 0.0    Lab on 06/08/2022   Component Date Value   • Glucose 06/08/2022 173 (H)    • BUN 06/08/2022 11    • Creatinine 06/08/2022 0.76    • Sodium 06/08/2022 139    • Potassium 06/08/2022 4.2     • Chloride 2022 104    • CO2 2022 24.4    • Calcium 2022 9.1    • Total Protein 2022 6.6    • Albumin 2022 3.93    • ALT (SGPT) 2022 23    • AST (SGOT) 2022 26    • Alkaline Phosphatase 2022 61    • Total Bilirubin 2022 0.5    • Globulin 2022 2.7    • A/G Ratio 2022 1.5    • BUN/Creatinine Ratio 2022 14.5    • Anion Gap 2022 10.6    • eGFR 2022 92.1    • LDH 2022 209    • Uric Acid 2022 5.2    • e13a2 (b2a2) Transcript 2022 Comment    • e14a2 (b3a2) Transcript 2022 Comment    • E1A2 transcript 2022 Comment    • Interpretation 2022 Negative    • Director Review 2022 Comment    • Background: 2022 Comment    • Methodology: 2022 Comment    • WBC 2022 5.59    • RBC 2022 4.12    • Hemoglobin 2022 12.4    • Hematocrit 2022 37.3    • MCV 2022 90.5    • MCH 2022 30.1    • MCHC 2022 33.2    • RDW 2022 13.4    • RDW-SD 2022 43.8    • MPV 2022 10.4    • Platelets 2022 233    • Neutrophil % 2022 49.1    • Lymphocyte % 2022 34.9    • Monocyte % 2022 10.9    • Eosinophil % 2022 3.8    • Basophil % 2022 0.9    • Immature Grans % 2022 0.4    • Neutrophils, Absolute 2022 2.75    • Lymphocytes, Absolute 2022 1.95    • Monocytes, Absolute 2022 0.61    • Eosinophils, Absolute 2022 0.21    • Basophils, Absolute 2022 0.05    • Immature Grans, Absolute 2022 0.02    • nRBC 2022 0.0             [id number]: US ABDOMEN COMPLETE: Visualized pancreas is unremarkable. The imaged portion of the abdominal aorta is nondilated. The liver is homogeneous. There is no intrahepatic ductal dilatation or focal hepatic mass. The imaged portion of the hepatic vessels and inferior vena cava are patent. The gallbladder has been removed. The common bile duct is normal,  measuring 5.7 mm. The kidneys demonstrate no evidence of hydronephrosis or solid renal mass. The spleen is homogeneous and measures 13 cm. IMPRESSION: Spleen measures 13 cm and is homogeneous.           PATHOLOGY:    05/15/17: Bone Marrow Biopsy & Aspirate                     04/25/17: BCR ABL PCR        08/30/17: BCR ABL PCR        12/13/17: BCR ABL PCR        03/20/18: BCR ABL PCR:                                                                                                         06/20/18:                                08/2020:          12/3/20           03/10/22:             Assessment & Plan :  Aric Haro is a very pleasant 56 y.o.  female who presents in follow up appointment for further management and treatment of chronic phase chronic myelogenous leukemia.    1. Chronic Myelogenous Leukemia - CML (chronic phase)    2. Iron deficiency - no longer on iron   3. Healthcare Maintenance    Peripheral smear concerning for an underlying myeloproliferative disorder. Her primary provider obtained a flow cytometry which did not reveal any significant abnormalities. I obtained a quantitative BCR ABL PCR positive for the b2a2/b3a2 (p210) and e1a2 (p190) fusion gene transcripts consistent with a diagnosis for CML. Bone marrow biopsy performed on initial diagnosis 5/17/17 and prior to starting Dasatinib treatment with results above and consistent with chronic phase CML.     To further evaluate for a myeloproliferative disorder I did also assess for JAK2 (V617F and exon 12)/MPL/CALR mutations which were negative. ESR, CRP, EMMANUEL, Rheumatoid factor, as well as an acute hepatitis panel and HIV test which were all negative. No iron deficiency seen. Abdominal ultrasound significant for a spleen size 13.9cm.     For treatment of her CML she was started on Dasatinib 100mg oral daily. She does have a history significant for pancreatitis, therefore, I held on using Nilotinib. Patient however was unable to tolerate  Dasatinib secondary to worsening reflux while being off of her PPI, and experiencing severe nausea, vomiting, and dehydration. She was then started on Imatinib 400mg daily and is overall tolerating this well. I have previously advised her of Ibuprofen and Tylenol use for her myalgias. She does also experience intermittent lower extremity edema (which she denies today). I did order baseline Echo which showed an intact EF. I also repeated echocardiogram to further evaluate and repeat echo showed an intact EF 61-65% which is stable.    She has had a complete hematological response, and BCR ABL PCR has normalized since start of Gleevac. This will need to continue to be monitored every three months to assess ongoing molecular response, today's level is <0.0032%. The blood counts have now stabilized therefore will continue to monitor every 3 months given stability.    She underwent a colonoscopy February 2018 with Dr. Esparza who recommended repeat colonoscopy in 3-5 years due to polyps that were removed. This was discussed with her as it has been three years since her last colonoscopy. She has also received her Influenza and Hepatitis A vaccine. Iron has been discontinued and iron studies have been normal. Will refer to gastroenterology for repeat scope as she has not had this repeated as of yet.     4. Gleevac associated myalgias  - Discussed calcium supplementation. Will check magnesium level at next visit.     4. ACO Quality measures  -  Aric Haro does not use tobacco products.  -  Aric Haro did not receive 2022 flu vaccine. Will get at PCPs office.   -  Aric Haro reports a pain score of 0.  Given her pain assessment as noted, treatment options were discussed and the following options were decided upon as a follow-up plan to address the patient's pain: continuation of current treatment plan for pain.  -  Current outpatient and discharge medications have been reconciled for the patient.  Reviewed by: Evita  MD Maggie      I will have the patient return for follow up visit in three months with labs (CBC, CMP, LDH, Uric acid, BCR ABL PCR, iron panel, ferritin, magnesium) one to two weeks prior. Lab orders have been placed. She understands that should she have any questions or concerns prior to her appointment she should give us a call at any time and I would be happy to see her sooner. It was a pleasure to see this patient in clinic today, thank you for allowing me to participate in the care of this patient.        Evita Serrano MD  10/19/22  09:15 EDT

## 2022-10-19 ENCOUNTER — OFFICE VISIT (OUTPATIENT)
Dept: ONCOLOGY | Facility: CLINIC | Age: 57
End: 2022-10-19

## 2022-10-19 VITALS
DIASTOLIC BLOOD PRESSURE: 78 MMHG | WEIGHT: 260.6 LBS | HEIGHT: 66 IN | BODY MASS INDEX: 41.88 KG/M2 | TEMPERATURE: 97.5 F | OXYGEN SATURATION: 98 % | SYSTOLIC BLOOD PRESSURE: 129 MMHG | RESPIRATION RATE: 18 BRPM | HEART RATE: 103 BPM

## 2022-10-19 DIAGNOSIS — D50.9 IRON DEFICIENCY ANEMIA, UNSPECIFIED IRON DEFICIENCY ANEMIA TYPE: ICD-10-CM

## 2022-10-19 DIAGNOSIS — C92.10 CML (CHRONIC MYELOCYTIC LEUKEMIA): Primary | ICD-10-CM

## 2022-10-19 DIAGNOSIS — K63.5 POLYP OF COLON, UNSPECIFIED PART OF COLON, UNSPECIFIED TYPE: ICD-10-CM

## 2022-10-19 DIAGNOSIS — M79.10 MYALGIA: ICD-10-CM

## 2022-10-19 PROCEDURE — 99214 OFFICE O/P EST MOD 30 MIN: CPT | Performed by: INTERNAL MEDICINE

## 2022-10-20 ENCOUNTER — SPECIALTY PHARMACY (OUTPATIENT)
Dept: PHARMACY | Facility: HOSPITAL | Age: 57
End: 2022-10-20

## 2022-10-20 NOTE — PROGRESS NOTES
Specialty Pharmacy Patient Management Program  Oncology Reassessment     Aric Haro is a 57 y.o. female with CML seen by an Oncology provider and enrolled in the Oncology Patient Management program offered by [facility].  A follow-up outreach was conducted, including assessment of continued therapy appropriateness, medication adherence, and side effect incidence and management for Gleevac.       Relevant Past Medical History and Comorbidities  Relevant medical history and concomitant health conditions were discussed with the patient. The patient's chart has been reviewed for relevant past medical history and comorbid health conditions and updated as necessary.   Past Medical History:   Diagnosis Date   • Anemia    • Cancer (HCC)     leukemia   • Diabetes mellitus (HCC)    • Elevated cholesterol    • GERD (gastroesophageal reflux disease)    • Hypertension    • PONV (postoperative nausea and vomiting)      Social History     Socioeconomic History   • Marital status:    Tobacco Use   • Smoking status: Former     Packs/day: 0.25     Years: 4.00     Pack years: 1.00     Types: Cigarettes   • Smokeless tobacco: Never   Vaping Use   • Vaping Use: Never used   Substance and Sexual Activity   • Alcohol use: No   • Drug use: No   • Sexual activity: Defer       Allergies  Known allergies and reactions were discussed with the patient. The patient's chart has been reviewed for allergy information and updated as necessary.   Patient has no known allergies.    Relevant Laboratory Values  Lab Results   Component Value Date    GLUCOSE 131 (H) 10/03/2022    CALCIUM 9.2 10/03/2022     10/03/2022    K 4.3 10/03/2022    CO2 25.4 10/03/2022     10/03/2022    BUN 14 10/03/2022    CREATININE 0.79 10/03/2022    EGFRIFNONA 73 12/13/2021    BCR 17.7 10/03/2022    ANIONGAP 10.6 10/03/2022     Lab Results   Component Value Date    WBC 6.63 10/03/2022    RBC 4.17 10/03/2022    HGB 12.7  10/03/2022    HCT 38.3 10/03/2022    MCV 91.8 10/03/2022    MCH 30.5 10/03/2022    MCHC 33.2 10/03/2022    RDW 13.7 10/03/2022    RDWSD 46.5 10/03/2022    MPV 10.1 10/03/2022     10/03/2022    NEUTRORELPCT 51.3 10/03/2022    LYMPHORELPCT 34.1 10/03/2022    MONORELPCT 9.5 10/03/2022    EOSRELPCT 4.1 10/03/2022    BASORELPCT 0.8 10/03/2022    AUTOIGPER 0.2 10/03/2022    NEUTROABS 3.41 10/03/2022    LYMPHSABS 2.26 10/03/2022    MONOSABS 0.63 10/03/2022    EOSABS 0.27 10/03/2022    BASOSABS 0.05 10/03/2022    AUTOIGNUM 0.01 10/03/2022    NRBC 0.0 10/03/2022        Current Medication List  This medication list has been reviewed with the patient and evaluated for any interactions or necessary modifications/recommendations, and updated to include all prescription medications, OTC medications, and supplements the patient is currently taking.  This list reflects what is contained in the patient's profile, which has also been marked as reviewed to communicate to other providers it is the most up to date version of the patient's current medication therapy.     Current Outpatient Medications:   •  BIOTIN PO, Take 100 mg by mouth., Disp: , Rfl:   •  celecoxib (CeleBREX) 200 MG capsule, Take 1 capsule by mouth 2 (Two) Times a Day., Disp: 180 capsule, Rfl: 3  •  Cholecalciferol (VITAMIN D3) 65636 units capsule, Take 1 capsule by mouth 2 (Two) Times a Week., Disp: 24 capsule, Rfl: 3  •  cyanocobalamin 1000 MCG/ML injection, Administer 1 ml (1,000 mcg) intramuscularly once weekly, Disp: 12 mL, Rfl: 2  •  desloratadine-pseudoephedrine (CLARINEX-D 12-hour) 2.5-120 MG per tablet, take 1 tablet by mouth 2 times every day, Disp: 30 tablet, Rfl: 0  •  doxycycline (ADOXA) 100 MG tablet, Take 1 tablet (100 mg) by mouth every 12 hours for 10 days, Disp: 20 tablet, Rfl: 2  •  doxycycline (ADOXA) 100 MG tablet, Take 1 tablet (100 mg) by mouth every 12 hours for 10 days, Disp: 20 tablet, Rfl: 0  •  DULoxetine (CYMBALTA) 60 MG capsule,  Take 1 capsule by mouth Daily., Disp: 90 capsule, Rfl: 3  •  DULoxetine (CYMBALTA) 60 MG capsule, Take 1 capsule by mouth Daily., Disp: 90 capsule, Rfl: 3  •  fenofibrate (TRICOR) 145 MG tablet, Take 1 tablet by mouth Daily., Disp: 90 tablet, Rfl: 3  •  fenofibrate (TRICOR) 145 MG tablet, Take 1 tablet by mouth daily, Disp: 90 tablet, Rfl: 2  •  fenofibrate (TRICOR) 145 MG tablet, Take 1 tablet by mouth once daily, Disp: 90 tablet, Rfl: 2  •  glucose blood test strip, USE 1 STRIP 2 TIMES DAILY AS DIRECTED, Disp: 180 each, Rfl: 6  •  HYDROcodone-acetaminophen (NORCO)  MG per tablet, Take 1 tablet by mouth every 4 hours as needed for severe pain due to kidney stone, Disp: 20 tablet, Rfl: 0  •  ibuprofen (ADVIL,MOTRIN) 800 MG tablet, Take 1 tablet  by mouth 3 times per day with food or milk as needed, Disp: 270 tablet, Rfl: 2  •  ibuprofen (ADVIL,MOTRIN) 800 MG tablet, Take 1 tablet by mouth 3 times per day with food or milk as needed, Disp: 270 tablet, Rfl: 2  •  imatinib (GLEEVEC) 400 MG chemo tablet, Take 1 tablet by mouth Daily., Disp: 30 tablet, Rfl: 5  •  Insulin Glargine (BASAGLAR KWIKPEN) 100 UNIT/ML injection pen, Inject 90 units under the skin once daily., Disp: 30 mL, Rfl: 4  •  Insulin Glargine (Lantus SoloStar) 100 UNIT/ML injection pen, Inject 100 Units under the skin into the appropriate area as directed Daily., Disp: 18 mL, Rfl: 3  •  Insulin Glargine (Lantus SoloStar) 100 UNIT/ML injection pen, Inject 45 Units under the skin into the appropriate area as directed 2 (two) times a day., Disp: 30 mL, Rfl: 0  •  Insulin Glargine (Lantus SoloStar) 100 UNIT/ML injection pen, Inject 100 Units under the skin into the appropriate area as directed Daily., Disp: 30 mL, Rfl: 0  •  Insulin Glargine, 2 Unit Dial, (Toujeo Max SoloStar) 300 UNIT/ML solution pen-injector injection, Inject 60 Units under the skin into the appropriate area as directed 2 (Two) Times a Day., Disp: 36 mL, Rfl: 2  •  Insulin  "Syringe-Needle U-100 (TRUEplus Insulin Syringe) 31G X 5/16\" 0.5 ML misc, Use to inject 3 times a day, Disp: 90 each, Rfl: 4  •  LANTUS SOLOSTAR 100 UNIT/ML injection pen, Inject 45 Units under the skin into the appropriate area as directed 2 (Two) Times a Day., Disp: 36 mL, Rfl: 6  •  multivitamin (THERAGRAN) tablet tablet, Take  by mouth Daily., Disp: , Rfl:   •  pantoprazole (PROTONIX) 40 MG EC tablet, Take 1 tablet by mouth daily, Disp: 90 tablet, Rfl: 2  •  pantoprazole (PROTONIX) 40 MG EC tablet, Take 1 tablet (40 mg) by mouth daily, Disp: 90 tablet, Rfl: 2  •  pantoprazole (PROTONIX) 40 MG EC tablet, Take 1 tablet by mouth daily, Disp: 90 tablet, Rfl: 2  •  SUMAtriptan (IMITREX) 100 MG tablet, Take 1 tablet by mouth 2 times per day at least 2 hours between doses as needed., Disp: 27 tablet, Rfl: 2  •  triamcinolone (KENALOG) 0.1 % cream, Apply 1 application topically to affected area daily as needed, Disp: 80 g, Rfl: 3    Drug Interactions  None    Adverse Drug Reactions  • Adverse Reactions Experienced: None   Plan for ADR Management: Not required. Patient is tolerating well.     Hospitalizations and Urgent Care Since Last Assessment  • Hospitalizations or Admissions: 0    • ED Visits: 0   • Urgent Office Visits: 0     Adherence and Self-Administration  • Approximate Number of Doses Missed Since Last Assessment: 0   • Ongoing or New Barriers to Patient Adherence and/or Self-Administration: None   • Methods for Supporting Patient Adherence and/or Self-Administration: Provided direct patient education. Care coordinator to continue providing once-monthly outreach calls to assist with adherence and coordinate medication refills. Pharmacist to continue providing clinical assessments every 6 months and will manage clinical concerns as they arise.      Goals of Therapy  • Progress Toward Meeting Patient-Identified Goals of Therapy:  On-track  o Patient-Identified Goals  - Consistently take medications as " prescribed  - Patient will adhere to medication regimen  - Patient will report any medication side effects to healthcare provider    • Progress Toward Meeting Clinical Goals or Therapeutic Targets: On-track  o Clinical Goals or Therapeutic Targets  - Support patient understanding of medication regimen  - Ensure patient knows the pharmacy contact information  - Schedule regular follow-up to monitor the treatment serious adverse events  - Schedule regular follow-up to confirm medication adherence  - Schedule regular follow-up to monitor disease progression or stabilty    Quality of Life Change Assessment   Quality of Life related to the patient's specialty therapy was discussed with the patient. The QOL segment of this outreach has been reviewed and updated.   • Quality of Life Score Change: 5    Reassessment Plan & Follow-Up  1. Pharmacist to continue to perform regular reassessments no more than (6) months from the previous assessment.  2. Care Coordinator to facilitate future refill outreaches, coordinate prescription delivery, and escalate clinical questions to pharmacist.     Additional Plans, Therapy Recommendations or Therapy Problems to Be Addressed: None at this time.  Recent Provider Changes:  None.    Attestation  I attest that the specialty medication(s) addressed above are appropriate for the patient based on my reassessment.  If the prescribed therapy is at any point deemed not appropriate based on the current or future assessments, a consultation will be initiated with the patient's specialty care provider to determine the best course of action. The revised plan of therapy will be documented along with any additional patient education provided.     Rudolph Acosta, [place]  Oncology Clinical Pharmacist  10/20/2022  13:51 EDT

## 2022-10-26 ENCOUNTER — SPECIALTY PHARMACY (OUTPATIENT)
Dept: PHARMACY | Facility: HOSPITAL | Age: 57
End: 2022-10-26

## 2022-10-26 NOTE — PROGRESS NOTES
Specialty Pharmacy Refill Coordination Note      Name:  Aric Haro  :  1965  Date:  10/26/2022         Past Medical History:   Diagnosis Date   • Anemia    • Cancer (HCC)     leukemia   • Diabetes mellitus (HCC)    • Elevated cholesterol    • GERD (gastroesophageal reflux disease)    • Hypertension    • PONV (postoperative nausea and vomiting)        Past Surgical History:   Procedure Laterality Date   • BLEPHAROPLASTY Bilateral 2018    Procedure: BLEPHAROPLASTY BOTH EYES UPPER EYELIDS (PT REQUESTED TERESITA WILKES;  Surgeon: Chris Haile MD;  Location: [facility];  Service: Ophthalmology   • COLONOSCOPY N/A 3/27/2018    Procedure: COLONOSCOPY FOR SCREENING  CPTCODE:32930;  Surgeon: Drew Esparza III, MD;  Location: [facility];  Service: Gastroenterology   • SINUS SURGERY     • SINUS SURGERY     • SLEEVE GASTROPLASTY         Social History     Socioeconomic History   • Marital status:    Tobacco Use   • Smoking status: Former     Packs/day: 0.25     Years: 4.00     Pack years: 1.00     Types: Cigarettes   • Smokeless tobacco: Never   Vaping Use   • Vaping Use: Never used   Substance and Sexual Activity   • Alcohol use: No   • Drug use: No   • Sexual activity: Defer       Family History   Problem Relation Age of Onset   • Anemia Mother    • Hypertension Mother    • Cancer Mother    • COPD Father    • Heart disease Father    • Breast cancer Neg Hx        No Known Allergies    Current Outpatient Medications   Medication Sig Dispense Refill   • BIOTIN PO Take 100 mg by mouth.     • celecoxib (CeleBREX) 200 MG capsule Take 1 capsule by mouth 2 (Two) Times a Day. 180 capsule 3   • Cholecalciferol (VITAMIN D3) 73502 units capsule Take 1 capsule by mouth 2 (Two) Times a Week. 24 capsule 3   • cyanocobalamin 1000 MCG/ML injection Administer 1 ml (1,000 mcg) intramuscularly once weekly 12 mL 2   • desloratadine-pseudoephedrine (CLARINEX-D 12-hour) 2.5-120 MG per tablet take 1 tablet  by mouth 2 times every day 30 tablet 0   • doxycycline (ADOXA) 100 MG tablet Take 1 tablet (100 mg) by mouth every 12 hours for 10 days 20 tablet 2   • doxycycline (ADOXA) 100 MG tablet Take 1 tablet (100 mg) by mouth every 12 hours for 10 days 20 tablet 0   • DULoxetine (CYMBALTA) 60 MG capsule Take 1 capsule by mouth Daily. 90 capsule 3   • DULoxetine (CYMBALTA) 60 MG capsule Take 1 capsule by mouth Daily. 90 capsule 3   • fenofibrate (TRICOR) 145 MG tablet Take 1 tablet by mouth Daily. 90 tablet 3   • fenofibrate (TRICOR) 145 MG tablet Take 1 tablet by mouth daily 90 tablet 2   • fenofibrate (TRICOR) 145 MG tablet Take 1 tablet by mouth once daily 90 tablet 2   • glucose blood test strip USE 1 STRIP 2 TIMES DAILY AS DIRECTED 180 each 6   • HYDROcodone-acetaminophen (NORCO)  MG per tablet Take 1 tablet by mouth every 4 hours as needed for severe pain due to kidney stone 20 tablet 0   • ibuprofen (ADVIL,MOTRIN) 800 MG tablet Take 1 tablet  by mouth 3 times per day with food or milk as needed 270 tablet 2   • ibuprofen (ADVIL,MOTRIN) 800 MG tablet Take 1 tablet by mouth 3 times per day with food or milk as needed 270 tablet 2   • imatinib (GLEEVEC) 400 MG chemo tablet Take 1 tablet by mouth Daily. 30 tablet 5   • Insulin Glargine (BASAGLAR KWIKPEN) 100 UNIT/ML injection pen Inject 90 units under the skin once daily. 30 mL 4   • Insulin Glargine (Lantus SoloStar) 100 UNIT/ML injection pen Inject 100 Units under the skin into the appropriate area as directed Daily. 18 mL 3   • Insulin Glargine (Lantus SoloStar) 100 UNIT/ML injection pen Inject 45 Units under the skin into the appropriate area as directed 2 (two) times a day. 30 mL 0   • Insulin Glargine (Lantus SoloStar) 100 UNIT/ML injection pen Inject 100 Units under the skin into the appropriate area as directed Daily. 30 mL 0   • Insulin Glargine, 2 Unit Dial, (Toujeo Max SoloStar) 300 UNIT/ML solution pen-injector injection Inject 60 Units under the skin  "into the appropriate area as directed 2 (Two) Times a Day. 36 mL 2   • Insulin Syringe-Needle U-100 (TRUEplus Insulin Syringe) 31G X 5/16\" 0.5 ML misc Use to inject 3 times a day 90 each 4   • LANTUS SOLOSTAR 100 UNIT/ML injection pen Inject 45 Units under the skin into the appropriate area as directed 2 (Two) Times a Day. 36 mL 6   • multivitamin (THERAGRAN) tablet tablet Take  by mouth Daily.     • pantoprazole (PROTONIX) 40 MG EC tablet Take 1 tablet by mouth daily 90 tablet 2   • pantoprazole (PROTONIX) 40 MG EC tablet Take 1 tablet (40 mg) by mouth daily 90 tablet 2   • pantoprazole (PROTONIX) 40 MG EC tablet Take 1 tablet by mouth daily 90 tablet 2   • SUMAtriptan (IMITREX) 100 MG tablet Take 1 tablet by mouth 2 times per day at least 2 hours between doses as needed. 27 tablet 2   • triamcinolone (KENALOG) 0.1 % cream Apply 1 application topically to affected area daily as needed 80 g 3     No current facility-administered medications for this visit.         ASSESSMENT/PLAN:      Aric is a 57 y.o. female contacted today regarding refills of  Gleevec 400mg specialty medication(s).    Reviewed and verified with patient:       Specialty medication(s) and dose(s) confirmed: yes    Refill Questions    Flowsheet Row Most Recent Value   Changes to allergies? No   Changes to medications? No   New conditions since last clinic visit No   Unplanned office visit, urgent care, ED, or hospital admission in the last 4 weeks  No   How does patient/caregiver feel medication is working? Very good   Financial problems or insurance changes  No   If yes, describe changes in insurance or financial issues. none   Since the previous refill, were any specialty medication doses or scheduled injections missed or delayed?  No   If yes, please provide the amount 0   Why were doses missed? n.a   Does this patient require a clinical escalation to a pharmacist? No                Medication Adherence    Adherence tools used: directed " education          Follow-up: 30 day(s)     Tiffanie Shelton, Pharmacy Technician  Specialty Pharmacy Technician

## 2022-11-04 ENCOUNTER — OFFICE VISIT (OUTPATIENT)
Dept: GASTROENTEROLOGY | Facility: CLINIC | Age: 57
End: 2022-11-04

## 2022-11-04 VITALS
HEIGHT: 66 IN | BODY MASS INDEX: 41.14 KG/M2 | WEIGHT: 256 LBS | SYSTOLIC BLOOD PRESSURE: 157 MMHG | HEART RATE: 87 BPM | DIASTOLIC BLOOD PRESSURE: 82 MMHG | OXYGEN SATURATION: 99 %

## 2022-11-04 DIAGNOSIS — R19.7 DIARRHEA, UNSPECIFIED TYPE: ICD-10-CM

## 2022-11-04 DIAGNOSIS — Z12.11 ENCOUNTER FOR SCREENING FOR MALIGNANT NEOPLASM OF COLON: Primary | ICD-10-CM

## 2022-11-04 PROBLEM — Z86.010 HISTORY OF COLON POLYPS: Status: ACTIVE | Noted: 2022-11-04

## 2022-11-04 PROCEDURE — 99204 OFFICE O/P NEW MOD 45 MIN: CPT | Performed by: PHYSICIAN ASSISTANT

## 2022-11-04 RX ORDER — POLYETHYLENE GLYCOL 3350 17 G/17G
510 POWDER, FOR SOLUTION ORAL TAKE AS DIRECTED
Qty: 510 G | Refills: 0 | Status: SHIPPED | OUTPATIENT
Start: 2022-11-04 | End: 2022-12-20 | Stop reason: HOSPADM

## 2022-11-04 RX ORDER — MONTELUKAST SODIUM 4 MG/1
1 TABLET, CHEWABLE ORAL DAILY
Qty: 15 TABLET | Refills: 0 | Status: SHIPPED | OUTPATIENT
Start: 2022-11-04 | End: 2022-11-21 | Stop reason: SDUPTHER

## 2022-11-04 RX ORDER — BISACODYL 5 MG/1
20 TABLET, DELAYED RELEASE ORAL ONCE
Qty: 4 TABLET | Refills: 0 | Status: SHIPPED | OUTPATIENT
Start: 2022-11-04 | End: 2022-11-04

## 2022-11-04 NOTE — PROGRESS NOTES
Chief Complaint   Patient presents with   • History of Polyps       Aric Haro is a 57 y.o. female who presents to the office today for evaluation of History of Polyps      HPI  This is a new patient who presents with a history of colon polyps, abdominal pain, and diarrhea. She is referred from Dr. Serrano where she is followed for chronic myelogenous leukemia.     The patient cannot recall the date of her last colonoscopy; however, she states there was one benign polyp identified. She does endorse a positive family with cancer of the GI junction in her mother.  However denies any colon cancer.  The patient reports a former history of pancreatitis/pancreatic insufficiency and is status post cholecystectomy and gastric sleeve; therefore, she has daily diarrhea, which she considers normal, given these 3 factors. She has never tried Colestid.     Review of Systems   Constitutional: Negative for fever and unexpected weight change.   HENT: Negative for sore throat and trouble swallowing.    Eyes: Negative.    Respiratory: Negative for chest tightness.    Cardiovascular: Negative for chest pain.   Gastrointestinal: Positive for abdominal distention, abdominal pain and diarrhea. Negative for anal bleeding, blood in stool, constipation, nausea, rectal pain and vomiting.   Endocrine: Negative.    Genitourinary: Negative for difficulty urinating.   Musculoskeletal: Negative for back pain.   Skin: Negative.    Allergic/Immunologic: Negative.    Neurological: Negative for dizziness, facial asymmetry and headaches.   Hematological: Does not bruise/bleed easily.   Psychiatric/Behavioral: Positive for sleep disturbance.       ACTIVE PROBLEMS:   Specialty Problems        Gastroenterology Problems    Pancreatitis        Polyp of colon           PAST MEDICAL HISTORY:  Past Medical History:   Diagnosis Date   • Anemia    • Cancer (HCC)     leukemia   • Diabetes mellitus (HCC)    • Elevated cholesterol    • GERD  (gastroesophageal reflux disease)    • Hypertension    • PONV (postoperative nausea and vomiting)        SURGICAL HISTORY:  Past Surgical History:   Procedure Laterality Date   • BLEPHAROPLASTY Bilateral 6/22/2018    Procedure: BLEPHAROPLASTY BOTH EYES UPPER EYELIDS (PT REQUESTED TERESITA KATRIN;  Surgeon: Chris Haile MD;  Location: [facility];  Service: Ophthalmology   • COLONOSCOPY N/A 3/27/2018    Procedure: COLONOSCOPY FOR SCREENING  CPTCODE:43051;  Surgeon: Drew Esparza III, MD;  Location: [facility];  Service: Gastroenterology   • SINUS SURGERY     • SINUS SURGERY     • SLEEVE GASTROPLASTY         FAMILY HISTORY:  Family History   Problem Relation Age of Onset   • Anemia Mother    • Hypertension Mother    • Cancer Mother    • COPD Father    • Heart disease Father    • Breast cancer Neg Hx        SOCIAL HISTORY:  Social History     Tobacco Use   • Smoking status: Former     Packs/day: 0.25     Years: 4.00     Pack years: 1.00     Types: Cigarettes   • Smokeless tobacco: Never   Substance Use Topics   • Alcohol use: No       CURRENT MEDICATION:    Current Outpatient Medications:   •  BIOTIN PO, Take 100 mg by mouth., Disp: , Rfl:   •  celecoxib (CeleBREX) 200 MG capsule, Take 1 capsule by mouth 2 (Two) Times a Day., Disp: 180 capsule, Rfl: 3  •  cyanocobalamin 1000 MCG/ML injection, Administer 1 ml (1,000 mcg) intramuscularly once weekly, Disp: 12 mL, Rfl: 2  •  desloratadine-pseudoephedrine (CLARINEX-D 12-hour) 2.5-120 MG per tablet, take 1 tablet by mouth 2 times every day, Disp: 30 tablet, Rfl: 0  •  doxycycline (ADOXA) 100 MG tablet, Take 1 tablet (100 mg) by mouth every 12 hours for 10 days, Disp: 20 tablet, Rfl: 2  •  doxycycline (ADOXA) 100 MG tablet, Take 1 tablet (100 mg) by mouth every 12 hours for 10 days, Disp: 20 tablet, Rfl: 0  •  DULoxetine (CYMBALTA) 60 MG capsule, Take 1 capsule by mouth Daily., Disp: 90 capsule, Rfl: 3  •  DULoxetine (CYMBALTA) 60 MG capsule, Take 1 capsule by  "mouth Daily., Disp: 90 capsule, Rfl: 3  •  fenofibrate (TRICOR) 145 MG tablet, Take 1 tablet by mouth Daily., Disp: 90 tablet, Rfl: 3  •  fenofibrate (TRICOR) 145 MG tablet, Take 1 tablet by mouth daily, Disp: 90 tablet, Rfl: 2  •  fenofibrate (TRICOR) 145 MG tablet, Take 1 tablet by mouth once daily, Disp: 90 tablet, Rfl: 2  •  HYDROcodone-acetaminophen (NORCO)  MG per tablet, Take 1 tablet by mouth every 4 hours as needed for severe pain due to kidney stone, Disp: 20 tablet, Rfl: 0  •  ibuprofen (ADVIL,MOTRIN) 800 MG tablet, Take 1 tablet  by mouth 3 times per day with food or milk as needed, Disp: 270 tablet, Rfl: 2  •  ibuprofen (ADVIL,MOTRIN) 800 MG tablet, Take 1 tablet by mouth 3 times per day with food or milk as needed, Disp: 270 tablet, Rfl: 2  •  Insulin Glargine (BASAGLAR KWIKPEN) 100 UNIT/ML injection pen, Inject 90 units under the skin once daily., Disp: 30 mL, Rfl: 4  •  Insulin Glargine (Lantus SoloStar) 100 UNIT/ML injection pen, Inject 100 Units under the skin into the appropriate area as directed Daily., Disp: 18 mL, Rfl: 3  •  Insulin Glargine (Lantus SoloStar) 100 UNIT/ML injection pen, Inject 45 Units under the skin into the appropriate area as directed 2 (two) times a day., Disp: 30 mL, Rfl: 0  •  Insulin Glargine (Lantus SoloStar) 100 UNIT/ML injection pen, Inject 100 Units under the skin into the appropriate area as directed Daily., Disp: 30 mL, Rfl: 0  •  Insulin Glargine, 2 Unit Dial, (Toujeo Max SoloStar) 300 UNIT/ML solution pen-injector injection, Inject 60 Units under the skin into the appropriate area as directed 2 (Two) Times a Day., Disp: 36 mL, Rfl: 2  •  Insulin Syringe-Needle U-100 (TRUEplus Insulin Syringe) 31G X 5/16\" 0.5 ML misc, Use to inject 3 times a day, Disp: 90 each, Rfl: 4  •  multivitamin (THERAGRAN) tablet tablet, Take  by mouth Daily., Disp: , Rfl:   •  pantoprazole (PROTONIX) 40 MG EC tablet, Take 1 tablet by mouth daily, Disp: 90 tablet, Rfl: 2  •  " "pantoprazole (PROTONIX) 40 MG EC tablet, Take 1 tablet (40 mg) by mouth daily, Disp: 90 tablet, Rfl: 2  •  pantoprazole (PROTONIX) 40 MG EC tablet, Take 1 tablet by mouth daily, Disp: 90 tablet, Rfl: 2  •  SUMAtriptan (IMITREX) 100 MG tablet, Take 1 tablet by mouth 2 times per day at least 2 hours between doses as needed., Disp: 27 tablet, Rfl: 2  •  triamcinolone (KENALOG) 0.1 % cream, Apply 1 application topically to affected area daily as needed, Disp: 80 g, Rfl: 3  •  Cholecalciferol (VITAMIN D3) 53666 units capsule, Take 1 capsule by mouth 2 (Two) Times a Week., Disp: 24 capsule, Rfl: 3  •  colestipol (COLESTID) 1 g tablet, Take 1 tablet by mouth Daily., Disp: 30 tablet, Rfl: 0  •  cyclobenzaprine (FLEXERIL) 10 MG tablet, Take 1/2 tablet by mouth 3 (Three) Times a Day As Needed for Muscle Spasms., Disp: 30 tablet, Rfl: 0  •  glucose blood test strip, USE 1 STRIP 2 TIMES DAILY AS DIRECTED, Disp: 180 each, Rfl: 6  •  imatinib (GLEEVEC) 400 MG chemo tablet, Take 1 tablet by mouth Daily., Disp: 30 tablet, Rfl: 5  •  LANTUS SOLOSTAR 100 UNIT/ML injection pen, Inject 45 Units under the skin into the appropriate area as directed 2 (Two) Times a Day., Disp: 36 mL, Rfl: 6  •  polyethylene glycol (MIRALAX) 17 GM/SCOOP powder, Take 510 grams by mouth as directed (place 15 cap fulls of powder in 32 oz of liquid of choice and drink at 4:00 and 10:00 PM)., Disp: 510 g, Rfl: 0    ALLERGIES:  Patient has no known allergies.    VISIT VITALS:  /82   Pulse 87   Ht 167.6 cm (66\")   Wt 116 kg (256 lb)   LMP 08/11/2017   SpO2 99%   BMI 41.32 kg/m²   Physical Exam  Constitutional:       General: She is not in acute distress.     Appearance: Normal appearance. She is well-developed.   HENT:      Head: Normocephalic and atraumatic.   Eyes:      Pupils: Pupils are equal, round, and reactive to light.   Cardiovascular:      Rate and Rhythm: Normal rate and regular rhythm.      Heart sounds: Normal heart sounds.   Pulmonary: "      Effort: Pulmonary effort is normal. No respiratory distress.      Breath sounds: Normal breath sounds. No wheezing, rhonchi or rales.   Abdominal:      General: Abdomen is flat. Bowel sounds are normal. There is no distension.      Palpations: Abdomen is soft. There is no mass.      Tenderness: There is no abdominal tenderness. There is no guarding or rebound.      Hernia: No hernia is present.   Musculoskeletal:         General: No swelling. Normal range of motion.      Cervical back: Normal range of motion and neck supple.      Right lower leg: No edema.      Left lower leg: No edema.   Skin:     General: Skin is warm and dry.   Neurological:      Mental Status: She is alert and oriented to person, place, and time.   Psychiatric:         Attention and Perception: Attention normal.         Mood and Affect: Mood normal.         Speech: Speech normal.         Behavior: Behavior normal. Behavior is cooperative.         Thought Content: Thought content normal.         Assessment       Diagnosis Plan   1. Encounter for screening for malignant neoplasm of colon  bisacodyl (DULCOLAX) 5 MG EC tablet    polyethylene glycol (MIRALAX) 17 GM/SCOOP powder    Case Request    Follow Anesthesia Guidelines / Protocol    Obtain Informed Consent    Case Request      2. Diarrhea, unspecified type  bisacodyl (DULCOLAX) 5 MG EC tablet    polyethylene glycol (MIRALAX) 17 GM/SCOOP powder        1. Encounter for screening for malignant neoplasm of colon    - Colonoscopy ordered. Bowel prep discussed and instructions provided.   - bisacodyl (Dulcolax) 5 mg   - polyethylene glycol (MiraLAX) 17 gm/scoop powder    2. Diarrhea, unspecified type    - Colonoscopy ordered. Bowel prep discussed and instructions provided.   - colestipol (Colestid) 1 g tablet for presumptive bile acid diarrhea. Patient will call for a long-term prescription if this is effective.  - bisacodyl (Dulcolax) 5 mg   - polyethylene glycol (MiraLAX) 17 gm/scoop  powder    3. History of colon polyps    - Colonoscopy ordered. Bowel prep discussed and instructions provided.   - bisacodyl (Dulcolax) 5 mg   - polyethylene glycol (MiraLAX) 17 gm/scoop powder      Return After procedure.               This document has been electronically signed by Mj Estevez PA-C  December 15, 2022 13:50 EST    Part of this note may be an electronic transcription/translation of spoken language to printed text using the Dragon Dictation System.    Transcribed from ambient dictation for Mj Estevez PA-C by Fanny Méndez.  11/04/22   09:10 EDT    Patient or patient representative verbalized consent to the visit recording.  I have personally performed the services described in this document as transcribed by the above individual, and it is both accurate and complete.

## 2022-11-07 RX ORDER — CYCLOBENZAPRINE HCL 10 MG
5 TABLET ORAL 3 TIMES DAILY PRN
Qty: 30 TABLET | Refills: 0 | Status: SHIPPED | OUTPATIENT
Start: 2022-11-07

## 2022-11-07 NOTE — PROGRESS NOTES
Patient called in regards to severe muscle cramping. Will start Flexeril and patient was advised to start calcium supplement. She states that she is well hydrated and keeping up with hydration.

## 2022-11-21 ENCOUNTER — SPECIALTY PHARMACY (OUTPATIENT)
Dept: PHARMACY | Facility: HOSPITAL | Age: 57
End: 2022-11-21

## 2022-11-21 DIAGNOSIS — R19.7 DIARRHEA, UNSPECIFIED TYPE: ICD-10-CM

## 2022-11-21 RX ORDER — MONTELUKAST SODIUM 4 MG/1
1 TABLET, CHEWABLE ORAL DAILY
Qty: 30 TABLET | Refills: 0 | Status: SHIPPED | OUTPATIENT
Start: 2022-11-21

## 2022-11-21 NOTE — PROGRESS NOTES
Specialty Pharmacy Refill Coordination Note      Name:  Aric Haro  :  1965  Date:  2022         Past Medical History:   Diagnosis Date   • Anemia    • Cancer (HCC)     leukemia   • Diabetes mellitus (HCC)    • Elevated cholesterol    • GERD (gastroesophageal reflux disease)    • Hypertension    • PONV (postoperative nausea and vomiting)        Past Surgical History:   Procedure Laterality Date   • BLEPHAROPLASTY Bilateral 2018    Procedure: BLEPHAROPLASTY BOTH EYES UPPER EYELIDS (PT REQUESTED TERESITA WILKES;  Surgeon: Chris Haile MD;  Location: [facility];  Service: Ophthalmology   • COLONOSCOPY N/A 3/27/2018    Procedure: COLONOSCOPY FOR SCREENING  CPTCODE:90799;  Surgeon: Drew Esparza III, MD;  Location: [facility];  Service: Gastroenterology   • SINUS SURGERY     • SINUS SURGERY     • SLEEVE GASTROPLASTY         Social History     Socioeconomic History   • Marital status:    Tobacco Use   • Smoking status: Former     Packs/day: 0.25     Years: 4.00     Pack years: 1.00     Types: Cigarettes   • Smokeless tobacco: Never   Vaping Use   • Vaping Use: Never used   Substance and Sexual Activity   • Alcohol use: No   • Drug use: No   • Sexual activity: Defer       Family History   Problem Relation Age of Onset   • Anemia Mother    • Hypertension Mother    • Cancer Mother    • COPD Father    • Heart disease Father    • Breast cancer Neg Hx        No Known Allergies    Current Outpatient Medications   Medication Sig Dispense Refill   • BIOTIN PO Take 100 mg by mouth.     • celecoxib (CeleBREX) 200 MG capsule Take 1 capsule by mouth 2 (Two) Times a Day. 180 capsule 3   • Cholecalciferol (VITAMIN D3) 62178 units capsule Take 1 capsule by mouth 2 (Two) Times a Week. 24 capsule 3   • colestipol (COLESTID) 1 g tablet Take 1 tablet by mouth Daily. 15 tablet 0   • cyanocobalamin 1000 MCG/ML injection Administer 1 ml (1,000 mcg) intramuscularly once weekly 12 mL 2   •  cyclobenzaprine (FLEXERIL) 10 MG tablet Take 1/2 tablet by mouth 3 (Three) Times a Day As Needed for Muscle Spasms. 30 tablet 0   • desloratadine-pseudoephedrine (CLARINEX-D 12-hour) 2.5-120 MG per tablet take 1 tablet by mouth 2 times every day 30 tablet 0   • doxycycline (ADOXA) 100 MG tablet Take 1 tablet (100 mg) by mouth every 12 hours for 10 days 20 tablet 2   • doxycycline (ADOXA) 100 MG tablet Take 1 tablet (100 mg) by mouth every 12 hours for 10 days 20 tablet 0   • DULoxetine (CYMBALTA) 60 MG capsule Take 1 capsule by mouth Daily. 90 capsule 3   • DULoxetine (CYMBALTA) 60 MG capsule Take 1 capsule by mouth Daily. 90 capsule 3   • fenofibrate (TRICOR) 145 MG tablet Take 1 tablet by mouth Daily. 90 tablet 3   • fenofibrate (TRICOR) 145 MG tablet Take 1 tablet by mouth daily 90 tablet 2   • fenofibrate (TRICOR) 145 MG tablet Take 1 tablet by mouth once daily 90 tablet 2   • glucose blood test strip USE 1 STRIP 2 TIMES DAILY AS DIRECTED 180 each 6   • HYDROcodone-acetaminophen (NORCO)  MG per tablet Take 1 tablet by mouth every 4 hours as needed for severe pain due to kidney stone 20 tablet 0   • ibuprofen (ADVIL,MOTRIN) 800 MG tablet Take 1 tablet  by mouth 3 times per day with food or milk as needed 270 tablet 2   • ibuprofen (ADVIL,MOTRIN) 800 MG tablet Take 1 tablet by mouth 3 times per day with food or milk as needed 270 tablet 2   • imatinib (GLEEVEC) 400 MG chemo tablet Take 1 tablet by mouth Daily. 30 tablet 5   • Insulin Glargine (BASAGLAR KWIKPEN) 100 UNIT/ML injection pen Inject 90 units under the skin once daily. 30 mL 4   • Insulin Glargine (Lantus SoloStar) 100 UNIT/ML injection pen Inject 100 Units under the skin into the appropriate area as directed Daily. 18 mL 3   • Insulin Glargine (Lantus SoloStar) 100 UNIT/ML injection pen Inject 45 Units under the skin into the appropriate area as directed 2 (two) times a day. 30 mL 0   • Insulin Glargine (Lantus SoloStar) 100 UNIT/ML injection pen  "Inject 100 Units under the skin into the appropriate area as directed Daily. 30 mL 0   • Insulin Glargine, 2 Unit Dial, (Toujeo Max SoloStar) 300 UNIT/ML solution pen-injector injection Inject 60 Units under the skin into the appropriate area as directed 2 (Two) Times a Day. 36 mL 2   • Insulin Syringe-Needle U-100 (TRUEplus Insulin Syringe) 31G X 5/16\" 0.5 ML misc Use to inject 3 times a day 90 each 4   • LANTUS SOLOSTAR 100 UNIT/ML injection pen Inject 45 Units under the skin into the appropriate area as directed 2 (Two) Times a Day. 36 mL 6   • multivitamin (THERAGRAN) tablet tablet Take  by mouth Daily.     • pantoprazole (PROTONIX) 40 MG EC tablet Take 1 tablet by mouth daily 90 tablet 2   • pantoprazole (PROTONIX) 40 MG EC tablet Take 1 tablet (40 mg) by mouth daily 90 tablet 2   • pantoprazole (PROTONIX) 40 MG EC tablet Take 1 tablet by mouth daily 90 tablet 2   • polyethylene glycol (MIRALAX) 17 GM/SCOOP powder Take 510 grams by mouth as directed (place 15 cap fulls of powder in 32 oz of liquid of choice and drink at 4:00 and 10:00 PM). 510 g 0   • SUMAtriptan (IMITREX) 100 MG tablet Take 1 tablet by mouth 2 times per day at least 2 hours between doses as needed. 27 tablet 2   • triamcinolone (KENALOG) 0.1 % cream Apply 1 application topically to affected area daily as needed 80 g 3     No current facility-administered medications for this visit.         ASSESSMENT/PLAN:      Aric is a 57 y.o. female contacted today regarding refills of  Gleevec 400mg tablets  specialty medication(s).    Reviewed and verified with patient:       Specialty medication(s) and dose(s) confirmed: yes    Refill Questions    Flowsheet Row Most Recent Value   Changes to allergies? No   Changes to medications? No   Unplanned office visit, urgent care, ED, or hospital admission in the last 4 weeks  No   How does patient/caregiver feel medication is working? Very good   Financial problems or insurance changes  No   If yes, describe " changes in insurance or financial issues. no   Since the previous refill, were any specialty medication doses or scheduled injections missed or delayed?  No   If yes, please provide the amount 0   Why were doses missed? no   Does this patient require a clinical escalation to a pharmacist? No                Medication Adherence    Adherence tools used: directed education          Follow-up: 30 day(s)     Kelly Mcnamara, Pharmacy Technician  Specialty Pharmacy Technician

## 2022-12-15 ENCOUNTER — SPECIALTY PHARMACY (OUTPATIENT)
Dept: PHARMACY | Facility: HOSPITAL | Age: 57
End: 2022-12-15

## 2022-12-15 DIAGNOSIS — C92.10 CML (CHRONIC MYELOCYTIC LEUKEMIA): ICD-10-CM

## 2022-12-15 RX ORDER — IMATINIB MESYLATE 400 MG/1
400 TABLET, FILM COATED ORAL DAILY
Qty: 30 TABLET | Refills: 5 | Status: SHIPPED | OUTPATIENT
Start: 2022-12-15

## 2022-12-15 NOTE — PROGRESS NOTES
Specialty Pharmacy Refill Coordination Note      Name:  Aric Haro  :  1965  Date:  12/15/2022         Past Medical History:   Diagnosis Date   • Anemia    • Cancer (HCC)     leukemia   • Diabetes mellitus (HCC)    • Elevated cholesterol    • GERD (gastroesophageal reflux disease)    • Hypertension    • PONV (postoperative nausea and vomiting)        Past Surgical History:   Procedure Laterality Date   • BLEPHAROPLASTY Bilateral 2018    Procedure: BLEPHAROPLASTY BOTH EYES UPPER EYELIDS (PT REQUESTED TERESITA WILKES;  Surgeon: Chris Haile MD;  Location: [facility];  Service: Ophthalmology   • COLONOSCOPY N/A 3/27/2018    Procedure: COLONOSCOPY FOR SCREENING  CPTCODE:98944;  Surgeon: Drew Esparza III, MD;  Location: [facility];  Service: Gastroenterology   • SINUS SURGERY     • SINUS SURGERY     • SLEEVE GASTROPLASTY         Social History     Socioeconomic History   • Marital status:    Tobacco Use   • Smoking status: Former     Packs/day: 0.25     Years: 4.00     Pack years: 1.00     Types: Cigarettes   • Smokeless tobacco: Never   Vaping Use   • Vaping Use: Never used   Substance and Sexual Activity   • Alcohol use: No   • Drug use: No   • Sexual activity: Defer       Family History   Problem Relation Age of Onset   • Anemia Mother    • Hypertension Mother    • Cancer Mother    • COPD Father    • Heart disease Father    • Breast cancer Neg Hx        No Known Allergies    Current Outpatient Medications   Medication Sig Dispense Refill   • BIOTIN PO Take 100 mg by mouth.     • celecoxib (CeleBREX) 200 MG capsule Take 1 capsule by mouth 2 (Two) Times a Day. 180 capsule 3   • Cholecalciferol (VITAMIN D3) 23585 units capsule Take 1 capsule by mouth 2 (Two) Times a Week. 24 capsule 3   • colestipol (COLESTID) 1 g tablet Take 1 tablet by mouth Daily. 30 tablet 0   • cyanocobalamin 1000 MCG/ML injection Administer 1 ml (1,000 mcg) intramuscularly once weekly 12 mL 2   •  cyclobenzaprine (FLEXERIL) 10 MG tablet Take 1/2 tablet by mouth 3 (Three) Times a Day As Needed for Muscle Spasms. 30 tablet 0   • desloratadine-pseudoephedrine (CLARINEX-D 12-hour) 2.5-120 MG per tablet take 1 tablet by mouth 2 times every day 30 tablet 0   • doxycycline (ADOXA) 100 MG tablet Take 1 tablet (100 mg) by mouth every 12 hours for 10 days 20 tablet 2   • doxycycline (ADOXA) 100 MG tablet Take 1 tablet (100 mg) by mouth every 12 hours for 10 days 20 tablet 0   • DULoxetine (CYMBALTA) 60 MG capsule Take 1 capsule by mouth Daily. 90 capsule 3   • DULoxetine (CYMBALTA) 60 MG capsule Take 1 capsule by mouth Daily. 90 capsule 3   • fenofibrate (TRICOR) 145 MG tablet Take 1 tablet by mouth Daily. 90 tablet 3   • fenofibrate (TRICOR) 145 MG tablet Take 1 tablet by mouth daily 90 tablet 2   • fenofibrate (TRICOR) 145 MG tablet Take 1 tablet by mouth once daily 90 tablet 2   • glucose blood test strip USE 1 STRIP 2 TIMES DAILY AS DIRECTED 180 each 6   • HYDROcodone-acetaminophen (NORCO)  MG per tablet Take 1 tablet by mouth every 4 hours as needed for severe pain due to kidney stone 20 tablet 0   • ibuprofen (ADVIL,MOTRIN) 800 MG tablet Take 1 tablet  by mouth 3 times per day with food or milk as needed 270 tablet 2   • ibuprofen (ADVIL,MOTRIN) 800 MG tablet Take 1 tablet by mouth 3 times per day with food or milk as needed 270 tablet 2   • imatinib (GLEEVEC) 400 MG chemo tablet Take 1 tablet by mouth Daily. 30 tablet 5   • Insulin Glargine (BASAGLAR KWIKPEN) 100 UNIT/ML injection pen Inject 90 units under the skin once daily. 30 mL 4   • Insulin Glargine (Lantus SoloStar) 100 UNIT/ML injection pen Inject 100 Units under the skin into the appropriate area as directed Daily. 18 mL 3   • Insulin Glargine (Lantus SoloStar) 100 UNIT/ML injection pen Inject 45 Units under the skin into the appropriate area as directed 2 (two) times a day. 30 mL 0   • Insulin Glargine (Lantus SoloStar) 100 UNIT/ML injection pen  "Inject 100 Units under the skin into the appropriate area as directed Daily. 30 mL 0   • Insulin Glargine, 2 Unit Dial, (Toujeo Max SoloStar) 300 UNIT/ML solution pen-injector injection Inject 60 Units under the skin into the appropriate area as directed 2 (Two) Times a Day. 36 mL 2   • Insulin Syringe-Needle U-100 (TRUEplus Insulin Syringe) 31G X 5/16\" 0.5 ML misc Use to inject 3 times a day 90 each 4   • LANTUS SOLOSTAR 100 UNIT/ML injection pen Inject 45 Units under the skin into the appropriate area as directed 2 (Two) Times a Day. 36 mL 6   • multivitamin (THERAGRAN) tablet tablet Take  by mouth Daily.     • pantoprazole (PROTONIX) 40 MG EC tablet Take 1 tablet by mouth daily 90 tablet 2   • pantoprazole (PROTONIX) 40 MG EC tablet Take 1 tablet (40 mg) by mouth daily 90 tablet 2   • pantoprazole (PROTONIX) 40 MG EC tablet Take 1 tablet by mouth daily 90 tablet 2   • polyethylene glycol (MIRALAX) 17 GM/SCOOP powder Take 510 grams by mouth as directed (place 15 cap fulls of powder in 32 oz of liquid of choice and drink at 4:00 and 10:00 PM). 510 g 0   • SUMAtriptan (IMITREX) 100 MG tablet Take 1 tablet by mouth 2 times per day at least 2 hours between doses as needed. 27 tablet 2   • triamcinolone (KENALOG) 0.1 % cream Apply 1 application topically to affected area daily as needed 80 g 3     No current facility-administered medications for this visit.         ASSESSMENT/PLAN:      Aric is a 57 y.o. female contacted today regarding refills of  Gleevec 400mg tablet  specialty medication(s).    Reviewed and verified with patient:       Specialty medication(s) and dose(s) confirmed: yes    Refill Questions    Flowsheet Row Most Recent Value   Changes to allergies? No   Changes to medications? No   New conditions since last clinic visit No   Unplanned office visit, urgent care, ED, or hospital admission in the last 4 weeks  No   How does patient/caregiver feel medication is working? Very good   Financial problems " or insurance changes  No   If yes, describe changes in insurance or financial issues. no   Since the previous refill, were any specialty medication doses or scheduled injections missed or delayed?  No   If yes, please provide the amount 0   Why were doses missed? no   Does this patient require a clinical escalation to a pharmacist? No                Medication Adherence    Adherence tools used: directed education          Follow-up: 30 day(s)     Kelly Mcnamara, Pharmacy Technician  Specialty Pharmacy Technician

## 2022-12-20 ENCOUNTER — ANESTHESIA EVENT (OUTPATIENT)
Dept: PERIOP | Facility: HOSPITAL | Age: 57
End: 2022-12-20

## 2022-12-20 ENCOUNTER — ANESTHESIA (OUTPATIENT)
Dept: PERIOP | Facility: HOSPITAL | Age: 57
End: 2022-12-20

## 2022-12-20 ENCOUNTER — HOSPITAL ENCOUNTER (OUTPATIENT)
Facility: HOSPITAL | Age: 57
Setting detail: HOSPITAL OUTPATIENT SURGERY
Discharge: HOME | End: 2022-12-20
Attending: INTERNAL MEDICINE | Admitting: INTERNAL MEDICINE

## 2022-12-20 VITALS
TEMPERATURE: 97.1 F | HEART RATE: 85 BPM | DIASTOLIC BLOOD PRESSURE: 83 MMHG | HEIGHT: 66 IN | SYSTOLIC BLOOD PRESSURE: 152 MMHG | OXYGEN SATURATION: 100 % | RESPIRATION RATE: 18 BRPM | WEIGHT: 248 LBS | BODY MASS INDEX: 39.86 KG/M2

## 2022-12-20 DIAGNOSIS — Z12.11 ENCOUNTER FOR SCREENING FOR MALIGNANT NEOPLASM OF COLON: ICD-10-CM

## 2022-12-20 DIAGNOSIS — Z86.010 HISTORY OF COLON POLYPS: ICD-10-CM

## 2022-12-20 PROCEDURE — 25010000002 PROPOFOL 10 MG/ML EMULSION: Performed by: NURSE ANESTHETIST, CERTIFIED REGISTERED

## 2022-12-20 PROCEDURE — 25010000002 MIDAZOLAM PER 1MG: Performed by: NURSE ANESTHETIST, CERTIFIED REGISTERED

## 2022-12-20 PROCEDURE — 45385 COLONOSCOPY W/LESION REMOVAL: CPT | Performed by: INTERNAL MEDICINE

## 2022-12-20 RX ORDER — IPRATROPIUM BROMIDE AND ALBUTEROL SULFATE 2.5; .5 MG/3ML; MG/3ML
3 SOLUTION RESPIRATORY (INHALATION) ONCE AS NEEDED
Status: DISCONTINUED | OUTPATIENT
Start: 2022-12-20 | End: 2022-12-20 | Stop reason: HOSPADM

## 2022-12-20 RX ORDER — MEPERIDINE HYDROCHLORIDE 25 MG/ML
12.5 INJECTION INTRAMUSCULAR; INTRAVENOUS; SUBCUTANEOUS
Status: DISCONTINUED | OUTPATIENT
Start: 2022-12-20 | End: 2022-12-20 | Stop reason: HOSPADM

## 2022-12-20 RX ORDER — SODIUM CHLORIDE 0.9 % (FLUSH) 0.9 %
10 SYRINGE (ML) INJECTION EVERY 12 HOURS SCHEDULED
Status: DISCONTINUED | OUTPATIENT
Start: 2022-12-20 | End: 2022-12-20 | Stop reason: HOSPADM

## 2022-12-20 RX ORDER — MIDAZOLAM HYDROCHLORIDE 2 MG/2ML
INJECTION, SOLUTION INTRAMUSCULAR; INTRAVENOUS AS NEEDED
Status: DISCONTINUED | OUTPATIENT
Start: 2022-12-20 | End: 2022-12-20 | Stop reason: SURG

## 2022-12-20 RX ORDER — SODIUM CHLORIDE 9 MG/ML
40 INJECTION, SOLUTION INTRAVENOUS AS NEEDED
Status: DISCONTINUED | OUTPATIENT
Start: 2022-12-20 | End: 2022-12-20 | Stop reason: HOSPADM

## 2022-12-20 RX ORDER — FENTANYL CITRATE 50 UG/ML
50 INJECTION, SOLUTION INTRAMUSCULAR; INTRAVENOUS
Status: DISCONTINUED | OUTPATIENT
Start: 2022-12-20 | End: 2022-12-20 | Stop reason: HOSPADM

## 2022-12-20 RX ORDER — MIDAZOLAM HYDROCHLORIDE 1 MG/ML
1 INJECTION INTRAMUSCULAR; INTRAVENOUS
Status: DISCONTINUED | OUTPATIENT
Start: 2022-12-20 | End: 2022-12-20 | Stop reason: HOSPADM

## 2022-12-20 RX ORDER — OXYCODONE HYDROCHLORIDE AND ACETAMINOPHEN 5; 325 MG/1; MG/1
1 TABLET ORAL ONCE AS NEEDED
Status: DISCONTINUED | OUTPATIENT
Start: 2022-12-20 | End: 2022-12-20 | Stop reason: HOSPADM

## 2022-12-20 RX ORDER — SODIUM CHLORIDE 0.9 % (FLUSH) 0.9 %
10 SYRINGE (ML) INJECTION AS NEEDED
Status: DISCONTINUED | OUTPATIENT
Start: 2022-12-20 | End: 2022-12-20 | Stop reason: HOSPADM

## 2022-12-20 RX ORDER — ONDANSETRON 2 MG/ML
4 INJECTION INTRAMUSCULAR; INTRAVENOUS AS NEEDED
Status: DISCONTINUED | OUTPATIENT
Start: 2022-12-20 | End: 2022-12-20 | Stop reason: HOSPADM

## 2022-12-20 RX ORDER — SODIUM CHLORIDE, SODIUM LACTATE, POTASSIUM CHLORIDE, CALCIUM CHLORIDE 600; 310; 30; 20 MG/100ML; MG/100ML; MG/100ML; MG/100ML
100 INJECTION, SOLUTION INTRAVENOUS ONCE AS NEEDED
Status: DISCONTINUED | OUTPATIENT
Start: 2022-12-20 | End: 2022-12-20 | Stop reason: HOSPADM

## 2022-12-20 RX ORDER — SODIUM CHLORIDE, SODIUM LACTATE, POTASSIUM CHLORIDE, CALCIUM CHLORIDE 600; 310; 30; 20 MG/100ML; MG/100ML; MG/100ML; MG/100ML
125 INJECTION, SOLUTION INTRAVENOUS ONCE
Status: COMPLETED | OUTPATIENT
Start: 2022-12-20 | End: 2022-12-20

## 2022-12-20 RX ORDER — LIDOCAINE HYDROCHLORIDE 20 MG/ML
INJECTION, SOLUTION EPIDURAL; INFILTRATION; INTRACAUDAL; PERINEURAL AS NEEDED
Status: DISCONTINUED | OUTPATIENT
Start: 2022-12-20 | End: 2022-12-20 | Stop reason: SURG

## 2022-12-20 RX ADMIN — SODIUM CHLORIDE, POTASSIUM CHLORIDE, SODIUM LACTATE AND CALCIUM CHLORIDE: 600; 310; 30; 20 INJECTION, SOLUTION INTRAVENOUS at 11:34

## 2022-12-20 RX ADMIN — MIDAZOLAM HYDROCHLORIDE 2 MG: 1 INJECTION, SOLUTION INTRAMUSCULAR; INTRAVENOUS at 11:34

## 2022-12-20 RX ADMIN — LIDOCAINE HYDROCHLORIDE 60 MG: 20 INJECTION, SOLUTION EPIDURAL; INFILTRATION; INTRACAUDAL; PERINEURAL at 11:37

## 2022-12-20 RX ADMIN — PROPOFOL 180 MCG/KG/MIN: 10 INJECTION, EMULSION INTRAVENOUS at 11:37

## 2022-12-20 NOTE — ANESTHESIA POSTPROCEDURE EVALUATION
Patient: Aric Haro    Procedure Summary     Date: 12/20/22 Room / Location: [facility] OR [address] [place] [address] COR OR    Anesthesia Start: 1134 Anesthesia Stop: 1208    Procedure: COLONOSCOPY Diagnosis:       Encounter for screening for malignant neoplasm of colon      History of colon polyps      (Encounter for screening for malignant neoplasm of colon [Z12.11])      (History of colon polyps [Z86.010])    Surgeons: Danni Naranjo MD Provider: Cirilo Goel MD    Anesthesia Type: general ASA Status: 3          Anesthesia Type: general    Vitals  Vitals Value Taken Time   /83 12/20/22 1240   Temp 97.1 °F (36.2 °C) 12/20/22 1210   Pulse 85 12/20/22 1240   Resp 18 12/20/22 1240   SpO2 100 % 12/20/22 1240           Post Anesthesia Care and Evaluation    Patient location during evaluation: bedside  Patient participation: complete - patient participated  Level of consciousness: awake and alert  Pain score: 1  Pain management: adequate    Airway patency: patent  Anesthetic complications: No anesthetic complications  PONV Status: none  Cardiovascular status: acceptable  Respiratory status: acceptable  Hydration status: acceptable

## 2022-12-20 NOTE — H&P
[facility]    Patient Identification:  Name:  Aric Haro  Age:  57 y.o.  Sex:  female  :  1965  MRN:  [id number]   Visit Number:  [id number]  Primary Care Physician:  Nahomi Bass APRN       Chief complaint: Personal history of colon polyps and diarrhea    History of presenting illness:  57 y.o. female presents today for colonoscopy for screening.  She states she did have a colonoscopy a long long time ago greater than 10 years at which time a benign polyp was removed.  Patient has a history of chronic myelogenous leukemia.  She is followed by Dr. Srerano in [facility]-on[facility].  It appears that her mother had esophageal cancer.  There is no family history of colon cancer.  Apparently the patient has a history of pancreatic insufficiency and as a result has intermittent diarrhea.  She is also status postcholecystectomy and gastric sleeve.  Currently, she has noted that the diarrhea is not as bad.  She feels that this is due to stress related to planning her daughter's wedding which was this past weekend.  She states the diarrhea is chronic and is normal for her.  ---------------------------------------------------------------------------------------------------------------------   Review of Systems   Constitutional: Negative for activity change, appetite change, chills, fatigue, fever and unexpected weight change.   HENT: Negative for mouth sores and trouble swallowing.    Eyes: Negative for photophobia, pain and redness.   Respiratory: Negative for cough and choking.    Cardiovascular: Negative for chest pain and leg swelling.   Gastrointestinal: Negative for abdominal distention, abdominal pain, anal bleeding, constipation, diarrhea, nausea, rectal pain and vomiting.   Endocrine: Negative for cold intolerance, heat intolerance and polyphagia.   Genitourinary: Negative for difficulty urinating and dysuria.   Musculoskeletal: Negative for arthralgias,  joint swelling and myalgias.   Skin: Negative for rash and wound.   Allergic/Immunologic: Negative for environmental allergies and food allergies.   Neurological: Negative for dizziness and light-headedness.   Hematological: Negative for adenopathy. Does not bruise/bleed easily.   Psychiatric/Behavioral: Negative for sleep disturbance. The patient is not nervous/anxious.       ---------------------------------------------------------------------------------------------------------------------   Past Medical History:   Diagnosis Date   • Anemia    • Cancer (HCC)     leukemia   • Diabetes mellitus (HCC)    • Elevated cholesterol    • GERD (gastroesophageal reflux disease)    • Hypertension    • PONV (postoperative nausea and vomiting)    • Sleep apnea      Past Surgical History:   Procedure Laterality Date   • BLEPHAROPLASTY Bilateral 06/22/2018    Procedure: BLEPHAROPLASTY BOTH EYES UPPER EYELIDS (PT REQUESTED TERESITA WILKES;  Surgeon: Chris Haile MD;  Location: [facility];  Service: Ophthalmology   • BONE MARROW BIOPSY     • COLONOSCOPY N/A 03/27/2018    Procedure: COLONOSCOPY FOR SCREENING  CPTCODE:96545;  Surgeon: Drew Esparza III, MD;  Location: [facility];  Service: Gastroenterology   • SINUS SURGERY     • SINUS SURGERY     • SLEEVE GASTROPLASTY       Family History   Problem Relation Age of Onset   • Anemia Mother    • Hypertension Mother    • Cancer Mother    • COPD Father    • Heart disease Father    • Breast cancer Neg Hx      Social History     Socioeconomic History   • Marital status:    Tobacco Use   • Smoking status: Former     Packs/day: 0.25     Years: 4.00     Pack years: 1.00     Types: Cigarettes   • Smokeless tobacco: Never   Vaping Use   • Vaping Use: Never used   Substance and Sexual Activity   • Alcohol use: No   • Drug use: No   • Sexual activity: Defer      ---------------------------------------------------------------------------------------------------------------------   Allergies:  Patient has no known allergies.  ---------------------------------------------------------------------------------------------------------------------   Prior to Admission Medications     Prescriptions Last Dose Informant Patient Reported? Taking?    BIOTIN PO Past Week  Yes Yes    Take 100 mg by mouth.    celecoxib (CeleBREX) 200 MG capsule 12/19/2022  No Yes    Take 1 capsule by mouth 2 (Two) Times a Day.    cyanocobalamin 1000 MCG/ML injection Past Month  No Yes    Administer 1 ml (1,000 mcg) intramuscularly once weekly    desloratadine-pseudoephedrine (CLARINEX-D 12-hour) 2.5-120 MG per tablet Past Week  No Yes    take 1 tablet by mouth 2 times every day    doxycycline (ADOXA) 100 MG tablet More than a month  No No    Take 1 tablet (100 mg) by mouth every 12 hours for 10 days    doxycycline (ADOXA) 100 MG tablet   No No    Take 1 tablet (100 mg) by mouth every 12 hours for 10 days    DULoxetine (CYMBALTA) 60 MG capsule 12/19/2022  No Yes    Take 1 capsule by mouth Daily.    DULoxetine (CYMBALTA) 60 MG capsule   No No    Take 1 capsule by mouth Daily.    fenofibrate (TRICOR) 145 MG tablet 12/19/2022  No Yes    Take 1 tablet by mouth Daily.    fenofibrate (TRICOR) 145 MG tablet   No No    Take 1 tablet by mouth daily    fenofibrate (TRICOR) 145 MG tablet   No No    Take 1 tablet by mouth once daily    ibuprofen (ADVIL,MOTRIN) 800 MG tablet Past Week  No Yes    Take 1 tablet  by mouth 3 times per day with food or milk as needed    ibuprofen (ADVIL,MOTRIN) 800 MG tablet   No No    Take 1 tablet by mouth 3 times per day with food or milk as needed    Insulin Glargine (BASAGLAR KWIKPEN) 100 UNIT/ML injection pen 12/19/2022  No Yes    Inject 90 units under the skin once daily.    Insulin Glargine (Lantus SoloStar) 100 UNIT/ML injection pen   No No    Inject 100 Units under the skin  "into the appropriate area as directed Daily.    Insulin Glargine (Lantus SoloStar) 100 UNIT/ML injection pen   No No    Inject 45 Units under the skin into the appropriate area as directed 2 (two) times a day.    Insulin Glargine (Lantus SoloStar) 100 UNIT/ML injection pen   No No    Inject 100 Units under the skin into the appropriate area as directed Daily.    Insulin Glargine, 2 Unit Dial, (Toujeo Max SoloStar) 300 UNIT/ML solution pen-injector injection 12/19/2022  No Yes    Inject 60 Units under the skin into the appropriate area as directed 2 (Two) Times a Day.    Insulin Syringe-Needle U-100 (TRUEplus Insulin Syringe) 31G X 5/16\" 0.5 ML misc 12/19/2022  No Yes    Use to inject 3 times a day    multivitamin (THERAGRAN) tablet tablet Past Month  Yes Yes    Take  by mouth Daily.    pantoprazole (PROTONIX) 40 MG EC tablet 12/19/2022  No Yes    Take 1 tablet by mouth daily    pantoprazole (PROTONIX) 40 MG EC tablet   No No    Take 1 tablet (40 mg) by mouth daily    pantoprazole (PROTONIX) 40 MG EC tablet   No No    Take 1 tablet by mouth daily    SUMAtriptan (IMITREX) 100 MG tablet Past Month  No Yes    Take 1 tablet by mouth 2 times per day at least 2 hours between doses as needed.    triamcinolone (KENALOG) 0.1 % cream Past Week  No Yes    Apply 1 application topically to affected area daily as needed    colestipol (COLESTID) 1 g tablet Unknown  No No    Take 1 tablet by mouth Daily.    cyclobenzaprine (FLEXERIL) 10 MG tablet Past Week  No Yes    Take 1/2 tablet by mouth 3 (Three) Times a Day As Needed for Muscle Spasms.    imatinib (GLEEVEC) 400 MG chemo tablet 12/19/2022  No Yes    Take 1 tablet by mouth Daily.    polyethylene glycol (MIRALAX) 17 GM/SCOOP powder 12/19/2022  No Yes    Take 510 grams by mouth as directed (place 15 cap fulls of powder in 32 oz of liquid of choice and drink at 4:00 and 10:00 PM).        Hospital Scheduled Meds:  lactated ringers, 125 mL/hr, Intravenous, Once  sodium chloride, 10 " mL, Intravenous, Q12H         ---------------------------------------------------------------------------------------------------------------------   Vital Signs:  Temp:  [98.2 °F (36.8 °C)] 98.2 °F (36.8 °C)  Heart Rate:  [93] 93  Resp:  [18] 18  BP: (170)/(76) 170/76      12/20/22  0946   Weight: 112 kg (248 lb)     Body mass index is 40.03 kg/m².  ---------------------------------------------------------------------------------------------------------------------   Physical Exam:  Constitutional:  Well-developed and well-nourished.  No respiratory distress.  Obese   HENT:  Head: Normocephalic and atraumatic.  Mouth:  Moist mucous membranes.    Eyes:  Conjunctivae and EOM are normal.  Pupils are equal, round, and reactive to light.  No scleral icterus.  Neck:  Neck supple.  No JVD present.    Cardiovascular:  Normal rate, regular rhythm and normal heart sounds with no murmur.  Pulmonary/Chest:  No respiratory distress, no wheezes, no crackles, with normal breath sounds and good air movement.  Abdominal:  Soft.  Bowel sounds are normal.  No distension and no tenderness.   Musculoskeletal:  No edema, no tenderness, and no deformity.  No red or swollen joints anywhere.    Neurological:  Alert and oriented to person, place, and time.  No cranial nerve deficit.  No tongue deviation.  No facial droop.  No slurred speech.   Skin:  Skin is warm and dry.  No rash noted.  No pallor.   Psychiatric:  Normal mood and affect.  Behavior is normal.  Judgment and thought content normal.   Peripheral vascular:  No edema and strong pulses on all 4 extremities.  Genitourinary:  ---------------------------------------------------------------------------------------------------------------------            Invalid input(s): PROTCrCl cannot be calculated (Patient's most recent lab result is older than the maximum 30 days allowed.).  No results found for: AMMONIA          Lab Results   Component Value Date    HGBA1C 8.40 (H) 03/10/2022      Lab Results   Component Value Date    TSH 2.050 03/10/2022    FREET4 1.31 03/10/2022     Lab Results   Component Value Date    PREGTESTUR Negative 03/18/2016     Pain Management Panel    There is no flowsheet data to display.       No results found for: BLOODCX  No results found for: URINECX  No results found for: WOUNDCX  No results found for: STOOLCX      ---------------------------------------------------------------------------------------------------------------------  Imaging Results (Last 7 Days)     ** No results found for the last 168 hours. **          I have personally reviewed the radiology images and read the final radiology report.  ---------------------------------------------------------------------------------------------------------------------  Assessment and Plan:  Proceed with screening colonoscopy.    Danni Naranjo MD  12/20/22  10:26 EST

## 2022-12-20 NOTE — ANESTHESIA POSTPROCEDURE EVALUATION
Patient: Aric Haro    Procedure Summary     Date: 12/20/22 Room / Location: [facility] OR [address] [place] [address] COR OR    Anesthesia Start: 1134 Anesthesia Stop: 1208    Procedure: COLONOSCOPY Diagnosis:       Encounter for screening for malignant neoplasm of colon      History of colon polyps      (Encounter for screening for malignant neoplasm of colon [Z12.11])      (History of colon polyps [Z86.010])    Surgeons: Danni Naranjo MD Provider: Cirilo Goel MD    Anesthesia Type: general ASA Status: 3          Anesthesia Type: general    Vitals  Vitals Value Taken Time   /77 12/20/22 1220   Temp 97.1 °F (36.2 °C) 12/20/22 1210   Pulse 80 12/20/22 1220   Resp 18 12/20/22 1220   SpO2 100 % 12/20/22 1220           Post Anesthesia Care and Evaluation    Patient location during evaluation: PHASE II  Patient participation: complete - patient participated  Level of consciousness: awake and alert  Pain score: 1  Pain management: adequate    Airway patency: patent  Anesthetic complications: No anesthetic complications  PONV Status: controlled  Cardiovascular status: acceptable  Respiratory status: acceptable  Hydration status: acceptable

## 2022-12-21 LAB — REF LAB TEST METHOD: NORMAL

## 2022-12-21 NOTE — PROGRESS NOTES
At the time of your recent colonoscopy, two polyps were removed from the colon.  The polyps were tubular adenomas.  The polyp in the rectum was removed in a piecemeal fashion.  Because of this, we will repeat colonoscopy in 6 months to make sure that the polyp was completely removed.

## 2023-01-05 ENCOUNTER — LAB (OUTPATIENT)
Dept: ONCOLOGY | Facility: CLINIC | Age: 58
End: 2023-01-05
Payer: COMMERCIAL

## 2023-01-05 VITALS
SYSTOLIC BLOOD PRESSURE: 147 MMHG | RESPIRATION RATE: 18 BRPM | TEMPERATURE: 97.3 F | DIASTOLIC BLOOD PRESSURE: 78 MMHG | OXYGEN SATURATION: 98 % | HEART RATE: 94 BPM

## 2023-01-05 DIAGNOSIS — D50.9 IRON DEFICIENCY ANEMIA, UNSPECIFIED IRON DEFICIENCY ANEMIA TYPE: ICD-10-CM

## 2023-01-05 DIAGNOSIS — C92.10 CML (CHRONIC MYELOCYTIC LEUKEMIA): ICD-10-CM

## 2023-01-05 LAB
ALBUMIN SERPL-MCNC: 4.2 G/DL (ref 3.5–5.2)
ALBUMIN/GLOB SERPL: 1.6 G/DL
ALP SERPL-CCNC: 61 U/L (ref 39–117)
ALT SERPL W P-5'-P-CCNC: 21 U/L (ref 1–33)
ANION GAP SERPL CALCULATED.3IONS-SCNC: 11 MMOL/L (ref 5–15)
AST SERPL-CCNC: 17 U/L (ref 1–32)
BASOPHILS # BLD AUTO: 0.06 10*3/MM3 (ref 0–0.2)
BASOPHILS NFR BLD AUTO: 1 % (ref 0–1.5)
BILIRUB SERPL-MCNC: 0.4 MG/DL (ref 0–1.2)
BUN SERPL-MCNC: 18 MG/DL (ref 6–20)
BUN/CREAT SERPL: 20.5 (ref 7–25)
CALCIUM SPEC-SCNC: 9.6 MG/DL (ref 8.6–10.5)
CHLORIDE SERPL-SCNC: 101 MMOL/L (ref 98–107)
CO2 SERPL-SCNC: 27 MMOL/L (ref 22–29)
CREAT SERPL-MCNC: 0.88 MG/DL (ref 0.57–1)
DEPRECATED RDW RBC AUTO: 46.3 FL (ref 37–54)
EGFRCR SERPLBLD CKD-EPI 2021: 76.8 ML/MIN/1.73
EOSINOPHIL # BLD AUTO: 0.19 10*3/MM3 (ref 0–0.4)
EOSINOPHIL NFR BLD AUTO: 3 % (ref 0.3–6.2)
ERYTHROCYTE [DISTWIDTH] IN BLOOD BY AUTOMATED COUNT: 13.5 % (ref 12.3–15.4)
FERRITIN SERPL-MCNC: 72.93 NG/ML (ref 13–150)
GLOBULIN UR ELPH-MCNC: 2.7 GM/DL
GLUCOSE SERPL-MCNC: 206 MG/DL (ref 65–99)
HCT VFR BLD AUTO: 39.7 % (ref 34–46.6)
HGB BLD-MCNC: 13.3 G/DL (ref 12–15.9)
IMM GRANULOCYTES # BLD AUTO: 0.01 10*3/MM3 (ref 0–0.05)
IMM GRANULOCYTES NFR BLD AUTO: 0.2 % (ref 0–0.5)
IRON 24H UR-MRATE: 76 MCG/DL (ref 37–145)
IRON SATN MFR SERPL: 16 % (ref 20–50)
LDH SERPL-CCNC: 184 U/L (ref 135–214)
LYMPHOCYTES # BLD AUTO: 1.82 10*3/MM3 (ref 0.7–3.1)
LYMPHOCYTES NFR BLD AUTO: 29 % (ref 19.6–45.3)
MAGNESIUM SERPL-MCNC: 1.8 MG/DL (ref 1.6–2.6)
MCH RBC QN AUTO: 30.8 PG (ref 26.6–33)
MCHC RBC AUTO-ENTMCNC: 33.5 G/DL (ref 31.5–35.7)
MCV RBC AUTO: 91.9 FL (ref 79–97)
MONOCYTES # BLD AUTO: 0.53 10*3/MM3 (ref 0.1–0.9)
MONOCYTES NFR BLD AUTO: 8.5 % (ref 5–12)
NEUTROPHILS NFR BLD AUTO: 3.66 10*3/MM3 (ref 1.7–7)
NEUTROPHILS NFR BLD AUTO: 58.3 % (ref 42.7–76)
NRBC BLD AUTO-RTO: 0 /100 WBC (ref 0–0.2)
PLATELET # BLD AUTO: 263 10*3/MM3 (ref 140–450)
PMV BLD AUTO: 10.5 FL (ref 6–12)
POTASSIUM SERPL-SCNC: 4.4 MMOL/L (ref 3.5–5.2)
PROT SERPL-MCNC: 6.9 G/DL (ref 6–8.5)
RBC # BLD AUTO: 4.32 10*6/MM3 (ref 3.77–5.28)
SODIUM SERPL-SCNC: 139 MMOL/L (ref 136–145)
TIBC SERPL-MCNC: 480 MCG/DL (ref 298–536)
TRANSFERRIN SERPL-MCNC: 322 MG/DL (ref 200–360)
URATE SERPL-MCNC: 6 MG/DL (ref 2.4–5.7)
WBC NRBC COR # BLD: 6.27 10*3/MM3 (ref 3.4–10.8)

## 2023-01-05 PROCEDURE — 84550 ASSAY OF BLOOD/URIC ACID: CPT | Performed by: INTERNAL MEDICINE

## 2023-01-05 PROCEDURE — 83540 ASSAY OF IRON: CPT | Performed by: INTERNAL MEDICINE

## 2023-01-05 PROCEDURE — 81207 BCR/ABL1 GENE MINOR BP: CPT

## 2023-01-05 PROCEDURE — 82728 ASSAY OF FERRITIN: CPT | Performed by: INTERNAL MEDICINE

## 2023-01-05 PROCEDURE — 83615 LACTATE (LD) (LDH) ENZYME: CPT | Performed by: INTERNAL MEDICINE

## 2023-01-05 PROCEDURE — 84466 ASSAY OF TRANSFERRIN: CPT | Performed by: INTERNAL MEDICINE

## 2023-01-05 PROCEDURE — 81206 BCR/ABL1 GENE MAJOR BP: CPT

## 2023-01-05 PROCEDURE — 80053 COMPREHEN METABOLIC PANEL: CPT | Performed by: INTERNAL MEDICINE

## 2023-01-05 PROCEDURE — 85025 COMPLETE CBC W/AUTO DIFF WBC: CPT | Performed by: INTERNAL MEDICINE

## 2023-01-05 PROCEDURE — 83735 ASSAY OF MAGNESIUM: CPT | Performed by: INTERNAL MEDICINE

## 2023-01-11 ENCOUNTER — SPECIALTY PHARMACY (OUTPATIENT)
Dept: PHARMACY | Facility: HOSPITAL | Age: 58
End: 2023-01-11
Payer: COMMERCIAL

## 2023-01-11 NOTE — PROGRESS NOTES
Specialty Pharmacy Refill Coordination Note      Name:  Aric Haro  :  1965  Date:  2023         Past Medical History:   Diagnosis Date   • Anemia    • Cancer (HCC)     leukemia   • Diabetes mellitus (HCC)    • Elevated cholesterol    • GERD (gastroesophageal reflux disease)    • Hypertension    • PONV (postoperative nausea and vomiting)    • Sleep apnea        Past Surgical History:   Procedure Laterality Date   • BLEPHAROPLASTY Bilateral 2018    Procedure: BLEPHAROPLASTY BOTH EYES UPPER EYELIDS (PT REQUESTED TERESITA WILKES;  Surgeon: Chris Haile MD;  Location: [facility];  Service: Ophthalmology   • BONE MARROW BIOPSY     • COLONOSCOPY N/A 2018    Procedure: COLONOSCOPY FOR SCREENING  CPTCODE:58993;  Surgeon: Drew Esparza III, MD;  Location: [facility];  Service: Gastroenterology   • COLONOSCOPY N/A 2022    Procedure: COLONOSCOPY;  Surgeon: Danni Naranjo MD;  Location: [facility];  Service: Gastroenterology;  Laterality: N/A;   • SINUS SURGERY     • SINUS SURGERY     • SLEEVE GASTROPLASTY         Social History     Socioeconomic History   • Marital status:    Tobacco Use   • Smoking status: Former     Packs/day: 0.25     Years: 4.00     Pack years: 1.00     Types: Cigarettes   • Smokeless tobacco: Never   Vaping Use   • Vaping Use: Never used   Substance and Sexual Activity   • Alcohol use: No   • Drug use: No   • Sexual activity: Defer       Family History   Problem Relation Age of Onset   • Anemia Mother    • Hypertension Mother    • Cancer Mother    • COPD Father    • Heart disease Father    • Breast cancer Neg Hx        No Known Allergies    Current Outpatient Medications   Medication Sig Dispense Refill   • BIOTIN PO Take 100 mg by mouth.     • celecoxib (CeleBREX) 200 MG capsule Take 1 capsule by mouth 2 (Two) Times a Day. 180 capsule 3   • colestipol (COLESTID) 1 g tablet Take 1 tablet by mouth Daily. 30 tablet 0   • cyanocobalamin  1000 MCG/ML injection Administer 1 ml (1,000 mcg) intramuscularly once weekly 12 mL 2   • cyclobenzaprine (FLEXERIL) 10 MG tablet Take 1/2 tablet by mouth 3 (Three) Times a Day As Needed for Muscle Spasms. 30 tablet 0   • desloratadine-pseudoephedrine (CLARINEX-D 12-hour) 2.5-120 MG per tablet take 1 tablet by mouth 2 times every day 30 tablet 0   • doxycycline (ADOXA) 100 MG tablet Take 1 tablet (100 mg) by mouth every 12 hours for 10 days 20 tablet 2   • doxycycline (ADOXA) 100 MG tablet Take 1 tablet (100 mg) by mouth every 12 hours for 10 days 20 tablet 0   • DULoxetine (CYMBALTA) 60 MG capsule Take 1 capsule by mouth Daily. 90 capsule 3   • DULoxetine (CYMBALTA) 60 MG capsule Take 1 capsule by mouth Daily. 90 capsule 3   • fenofibrate (TRICOR) 145 MG tablet Take 1 tablet by mouth Daily. 90 tablet 3   • fenofibrate (TRICOR) 145 MG tablet Take 1 tablet by mouth daily 90 tablet 2   • fenofibrate (TRICOR) 145 MG tablet Take 1 tablet by mouth once daily 90 tablet 2   • ibuprofen (ADVIL,MOTRIN) 800 MG tablet Take 1 tablet  by mouth 3 times per day with food or milk as needed 270 tablet 2   • ibuprofen (ADVIL,MOTRIN) 800 MG tablet Take 1 tablet by mouth 3 times per day with food or milk as needed 270 tablet 2   • imatinib (GLEEVEC) 400 MG chemo tablet Take 1 tablet by mouth Daily. 30 tablet 5   • Insulin Glargine (BASAGLAR KWIKPEN) 100 UNIT/ML injection pen Inject 90 units under the skin once daily. 30 mL 4   • Insulin Glargine (Lantus SoloStar) 100 UNIT/ML injection pen Inject 100 Units under the skin into the appropriate area as directed Daily. 18 mL 3   • Insulin Glargine (Lantus SoloStar) 100 UNIT/ML injection pen Inject 45 Units under the skin into the appropriate area as directed 2 (two) times a day. 30 mL 0   • Insulin Glargine (Lantus SoloStar) 100 UNIT/ML injection pen Inject 100 Units under the skin into the appropriate area as directed Daily. 30 mL 0   • Insulin Glargine, 2 Unit Dial, (Toujeo Max SoloStar)  "300 UNIT/ML solution pen-injector injection Inject 60 Units under the skin into the appropriate area as directed 2 (Two) Times a Day. 36 mL 2   • Insulin Syringe-Needle U-100 (TRUEplus Insulin Syringe) 31G X 5/16\" 0.5 ML misc Use to inject 3 times a day 90 each 4   • multivitamin (THERAGRAN) tablet tablet Take  by mouth Daily.     • pantoprazole (PROTONIX) 40 MG EC tablet Take 1 tablet by mouth daily 90 tablet 2   • pantoprazole (PROTONIX) 40 MG EC tablet Take 1 tablet (40 mg) by mouth daily 90 tablet 2   • pantoprazole (PROTONIX) 40 MG EC tablet Take 1 tablet by mouth daily 90 tablet 2   • SUMAtriptan (IMITREX) 100 MG tablet Take 1 tablet by mouth 2 times per day at least 2 hours between doses as needed. 27 tablet 2   • triamcinolone (KENALOG) 0.1 % cream Apply 1 application topically to affected area daily as needed 80 g 3     No current facility-administered medications for this visit.         ASSESSMENT/PLAN:      Aric is a 57 y.o. female contacted today regarding refills of  Gleevec 400mg tablet specialty medication(s).    Reviewed and verified with patient:       Specialty medication(s) and dose(s) confirmed: yes    Refill Questions    Flowsheet Row Most Recent Value   Changes to allergies? No   Changes to medications? No   New conditions since last clinic visit No   Unplanned office visit, urgent care, ED, or hospital admission in the last 4 weeks  No   How does patient/caregiver feel medication is working? Very good   Financial problems or insurance changes  No   If yes, describe changes in insurance or financial issues. no   Since the previous refill, were any specialty medication doses or scheduled injections missed or delayed?  No   If yes, please provide the amount 0   Why were doses missed? no   Does this patient require a clinical escalation to a pharmacist? No                Medication Adherence    Adherence tools used: directed education          Follow-up: 30 day(s)     Kelly Mcnamara, Pharmacy " Technician  Specialty Pharmacy Technician

## 2023-01-24 ENCOUNTER — OFFICE VISIT (OUTPATIENT)
Dept: ONCOLOGY | Facility: CLINIC | Age: 58
End: 2023-01-24
Payer: COMMERCIAL

## 2023-01-24 VITALS
HEART RATE: 83 BPM | WEIGHT: 249.2 LBS | RESPIRATION RATE: 18 BRPM | HEIGHT: 66 IN | BODY MASS INDEX: 40.05 KG/M2 | SYSTOLIC BLOOD PRESSURE: 150 MMHG | TEMPERATURE: 97.3 F | OXYGEN SATURATION: 97 % | DIASTOLIC BLOOD PRESSURE: 82 MMHG

## 2023-01-24 DIAGNOSIS — C92.10 CML (CHRONIC MYELOCYTIC LEUKEMIA): Primary | ICD-10-CM

## 2023-01-24 DIAGNOSIS — D50.9 IRON DEFICIENCY ANEMIA, UNSPECIFIED IRON DEFICIENCY ANEMIA TYPE: ICD-10-CM

## 2023-01-24 PROCEDURE — 99215 OFFICE O/P EST HI 40 MIN: CPT | Performed by: INTERNAL MEDICINE

## 2023-01-24 NOTE — PROGRESS NOTES
Aric Haro  [id number]  1965  1/24/2023      Referring Provider:   EILEEN Andrade    Reason for Follow Up:   1. Chronic Phase - Chronic Myelogenous Leukemia  2. Leukocytosis  3. Thrombocytosis     Chief Complaint:  CML      History of Present Illness:  Aric Haro is a very pleasant 57 y.o.  female who presents in follow up appointment for further management and treatment of chronic phase chronic myelogenous leukemia (CML).    Ms. Haro began experiencing extreme fatigue where she was sleeping multiple hours during the day when she initially began following with me in April 2017. 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 On laboratory evaluation she was found to have a total white blood cell count of 22.35 and a platelet count 470 thousand. In March 2016 she was also noted to have a leukocytosis (although not as elevated) as well as a thrombocytosis, however reports that these were likely secondary to other medical issues at the time her blood work was drawn. She denied of any significant weight loss however has been gradually losing weight since gastric sleeve procedure. She denied of any fevers or frequent infections but did note fluctuating neck lymphadenopathy as well as early satiety and taste in change. She denied of any abnormal or spontaneous bleeding. I did obtain a BCR ABL PCR to further assess her leukocytosis and thrombocytosis and she was positive for the b2a2/b3a2 (p210) and e1a2 (p190) fusion gene transcripts consistent with a diagnosis for CML. After reviewing the toxicities of each tyrosine kinase inhibitor we decided to start Dasatinib treatment. She had a difficult time tolerating the Dasatinib as she was unable to take her PPI with this medication. Since she had to be taken off of her PPI she had worsening reflux symptoms as well as  severe nausea, vomiting, dehydration, and headaches during this period. Given the toxicities she was experiencing she was taken off Dasatinib and this was changed to Gleevec treatment. Since starting Gleevac 400mg daily she has tolerated this much better without significant side effects. The only side effect that she has experienced is intermittent pedal edema along with some hand swelling and muscle cramps which have been intermittant.     Treatment History:  Started Dasatinib on 05/15/17 - experienced nausea, severe reflux, headaches while on medication and had taken 9 doses. This was discontinued due to intolerability and she was thereafter started on Imatinib.   Started Imatinib on 5/26/17      Interim History:  From the start of Dasatinib and later Gleevec she has had a good response and normalization in her complete blood counts with normalization of BCR ABL PCR. She denies of any fevers, infections, lymphadenopathy, chest pain, edema, dyspnea, nausea. She experienced severe muscle cramping in November and has had to intermittently take Flexeril and reports that calcium supplement has improved symptoms. She states that she is well hydrated and keeping up with hydration. She is trying to lose weight and has lost six pounds since her last visit.         The following portions of the patient's history were reviewed and updated as appropriate: allergies, current medications, past family history, past medical history, past social history, past surgical history and problem list.      No Known Allergies    Past Medical History:   Diagnosis Date   • Anemia    • Cancer (HCC)     leukemia   • Diabetes mellitus (HCC)    • Elevated cholesterol    • GERD (gastroesophageal reflux disease)    • Hypertension    • PONV (postoperative nausea and vomiting)    • Sleep apnea        Past Surgical History:   Procedure Laterality Date   • BLEPHAROPLASTY Bilateral 06/22/2018    Procedure: BLEPHAROPLASTY BOTH EYES UPPER EYELIDS (PT  REQUESTED TERESITA WILKES;  Surgeon: Chris Haile MD;  Location: [facility] OR;  Service: Ophthalmology   • BONE MARROW BIOPSY     • COLONOSCOPY N/A 03/27/2018    Procedure: COLONOSCOPY FOR SCREENING  CPTCODE:75713;  Surgeon: Drew Esparza III, MD;  Location: [facility] OR;  Service: Gastroenterology   • COLONOSCOPY N/A 12/20/2022    Procedure: COLONOSCOPY;  Surgeon: Danni Naranjo MD;  Location: [facility] OR;  Service: Gastroenterology;  Laterality: N/A;   • SINUS SURGERY     • SINUS SURGERY     • SLEEVE GASTROPLASTY         Social History     Socioeconomic History   • Marital status:    Tobacco Use   • Smoking status: Former     Packs/day: 0.25     Years: 4.00     Pack years: 1.00     Types: Cigarettes   • Smokeless tobacco: Never   Vaping Use   • Vaping Use: Never used   Substance and Sexual Activity   • Alcohol use: No   • Drug use: No   • Sexual activity: Defer   She lives with her daughter. She denies of any alcohol or illicit drug use. Previous social tobacco use more then 20 years ago.  Recent job change.       Family History   Problem Relation Age of Onset   • Anemia Mother    • Hypertension Mother    • Cancer Mother    • COPD Father    • Heart disease Father    • Breast cancer Neg Hx    Maternal Uncle - CLL  Mother - Malignancy of [place]          Current Outpatient Medications:   •  BIOTIN PO, Take 100 mg by mouth., Disp: , Rfl:   •  celecoxib (CeleBREX) 200 MG capsule, Take 1 capsule by mouth 2 (Two) Times a Day., Disp: 180 capsule, Rfl: 3  •  colestipol (COLESTID) 1 g tablet, Take 1 tablet by mouth Daily., Disp: 30 tablet, Rfl: 0  •  cyanocobalamin 1000 MCG/ML injection, Administer 1 ml (1,000 mcg) intramuscularly once weekly, Disp: 12 mL, Rfl: 2  •  cyclobenzaprine (FLEXERIL) 10 MG tablet, Take 1/2 tablet by mouth 3 (Three) Times a Day As Needed for Muscle Spasms., Disp: 30 tablet, Rfl: 0  •  desloratadine-pseudoephedrine (CLARINEX-D 12-hour) 2.5-120 MG per tablet, take 1  tablet by mouth 2 times every day, Disp: 30 tablet, Rfl: 0  •  doxycycline (ADOXA) 100 MG tablet, Take 1 tablet (100 mg) by mouth every 12 hours for 10 days, Disp: 20 tablet, Rfl: 2  •  doxycycline (ADOXA) 100 MG tablet, Take 1 tablet (100 mg) by mouth every 12 hours for 10 days, Disp: 20 tablet, Rfl: 0  •  DULoxetine (CYMBALTA) 60 MG capsule, Take 1 capsule by mouth Daily., Disp: 90 capsule, Rfl: 3  •  DULoxetine (CYMBALTA) 60 MG capsule, Take 1 capsule by mouth Daily., Disp: 90 capsule, Rfl: 3  •  fenofibrate (TRICOR) 145 MG tablet, Take 1 tablet by mouth Daily., Disp: 90 tablet, Rfl: 3  •  fenofibrate (TRICOR) 145 MG tablet, Take 1 tablet by mouth daily, Disp: 90 tablet, Rfl: 2  •  fenofibrate (TRICOR) 145 MG tablet, Take 1 tablet by mouth once daily, Disp: 90 tablet, Rfl: 2  •  ibuprofen (ADVIL,MOTRIN) 800 MG tablet, Take 1 tablet  by mouth 3 times per day with food or milk as needed, Disp: 270 tablet, Rfl: 2  •  ibuprofen (ADVIL,MOTRIN) 800 MG tablet, Take 1 tablet by mouth 3 times per day with food or milk as needed, Disp: 270 tablet, Rfl: 2  •  imatinib (GLEEVEC) 400 MG chemo tablet, Take 1 tablet by mouth Daily., Disp: 30 tablet, Rfl: 5  •  Insulin Glargine (BASAGLAR KWIKPEN) 100 UNIT/ML injection pen, Inject 90 units under the skin once daily., Disp: 30 mL, Rfl: 4  •  Insulin Glargine (Lantus SoloStar) 100 UNIT/ML injection pen, Inject 100 Units under the skin into the appropriate area as directed Daily., Disp: 18 mL, Rfl: 3  •  Insulin Glargine (Lantus SoloStar) 100 UNIT/ML injection pen, Inject 45 Units under the skin into the appropriate area as directed 2 (two) times a day., Disp: 30 mL, Rfl: 0  •  Insulin Glargine (Lantus SoloStar) 100 UNIT/ML injection pen, Inject 100 Units under the skin into the appropriate area as directed Daily., Disp: 30 mL, Rfl: 0  •  Insulin Glargine, 2 Unit Dial, (Toujeo Max SoloStar) 300 UNIT/ML solution pen-injector injection, Inject 60 Units under the skin into the  "appropriate area as directed 2 (Two) Times a Day., Disp: 36 mL, Rfl: 2  •  Insulin Syringe-Needle U-100 (TRUEplus Insulin Syringe) 31G X 5/16\" 0.5 ML misc, Use to inject 3 times a day, Disp: 90 each, Rfl: 4  •  multivitamin (THERAGRAN) tablet tablet, Take  by mouth Daily., Disp: , Rfl:   •  pantoprazole (PROTONIX) 40 MG EC tablet, Take 1 tablet by mouth daily, Disp: 90 tablet, Rfl: 2  •  pantoprazole (PROTONIX) 40 MG EC tablet, Take 1 tablet (40 mg) by mouth daily, Disp: 90 tablet, Rfl: 2  •  pantoprazole (PROTONIX) 40 MG EC tablet, Take 1 tablet by mouth daily, Disp: 90 tablet, Rfl: 2  •  SUMAtriptan (IMITREX) 100 MG tablet, Take 1 tablet by mouth 2 times per day at least 2 hours between doses as needed., Disp: 27 tablet, Rfl: 2  •  triamcinolone (KENALOG) 0.1 % cream, Apply 1 application topically to affected area daily as needed, Disp: 80 g, Rfl: 3        Review of Systems  A comprehensive 14-point review of systems performed.  Significant findings as mentioned above.  All other systems reviewed and are negative. No fevers, night sweats, lymphadenopathy, Positive intentional weight loss. Positive intermittent lower extremity myalgias.         Physical Exam:  Vital Signs: These were reviewed and listed as per patient’s electronic medical chart  Vitals:    01/24/23 0901   BP: 150/82   Pulse: 83   Resp: 18   Temp: 97.3 °F (36.3 °C)   SpO2: 97%     General: Awake, alert and oriented, in no distress, obese  HEENT: Head is atraumatic, normocephalic, extraocular movements full, no scleral icterus, mask in place  Neck: supple, no jvd, lymphadenopathy or masses  Cardiovascular: regular rhythm, tachycardic, without murmurs, rubs or gallops  Pulmonary: non-labored, clear to auscultation bilaterally, no wheezing  Abdomen: soft, non-tender, non-distended, normal active bowel sounds present  Extremities: No clubbing, cyanosis or edema  Lymph: No cervical or supraclavicular adenopathy  Neurologic: Mental status as above, alert, " awake and oriented, grossly non-focal exam  Skin: warm, dry, intact, no ecchymosis  Patient was examined on 01/24/23 and changes are reflected and noted above.        Labs / Studies:    Lab on 01/05/2023   Component Date Value   • Glucose 01/05/2023 206 (H)    • BUN 01/05/2023 18    • Creatinine 01/05/2023 0.88    • Sodium 01/05/2023 139    • Potassium 01/05/2023 4.4    • Chloride 01/05/2023 101    • CO2 01/05/2023 27.0    • Calcium 01/05/2023 9.6    • Total Protein 01/05/2023 6.9    • Albumin 01/05/2023 4.2    • ALT (SGPT) 01/05/2023 21    • AST (SGOT) 01/05/2023 17    • Alkaline Phosphatase 01/05/2023 61    • Total Bilirubin 01/05/2023 0.4    • Globulin 01/05/2023 2.7    • A/G Ratio 01/05/2023 1.6    • BUN/Creatinine Ratio 01/05/2023 20.5    • Anion Gap 01/05/2023 11.0    • eGFR 01/05/2023 76.8    • LDH 01/05/2023 184    • Uric Acid 01/05/2023 6.0 (H)    • e13a2 (b2a2) Transcript 01/05/2023 Comment    • e14a2 (b3a2) Transcript 01/05/2023 Comment    • E1A2 transcript 01/05/2023 Comment    • Interpretation 01/05/2023 Negative    • Director Review 01/05/2023 Comment    • Background: 01/05/2023 Comment    • Methodology: 01/05/2023 Comment    • Iron 01/05/2023 76    • Iron Saturation 01/05/2023 16 (L)    • Transferrin 01/05/2023 322    • TIBC 01/05/2023 480    • Ferritin 01/05/2023 72.93    • Magnesium 01/05/2023 1.8    • WBC 01/05/2023 6.27    • RBC 01/05/2023 4.32    • Hemoglobin 01/05/2023 13.3    • Hematocrit 01/05/2023 39.7    • MCV 01/05/2023 91.9    • MCH 01/05/2023 30.8    • MCHC 01/05/2023 33.5    • RDW 01/05/2023 13.5    • RDW-SD 01/05/2023 46.3    • MPV 01/05/2023 10.5    • Platelets 01/05/2023 263    • Neutrophil % 01/05/2023 58.3    • Lymphocyte % 01/05/2023 29.0    • Monocyte % 01/05/2023 8.5    • Eosinophil % 01/05/2023 3.0    • Basophil % 01/05/2023 1.0    • Immature Grans % 01/05/2023 0.2    • Neutrophils, Absolute 01/05/2023 3.66    • Lymphocytes, Absolute 01/05/2023 1.82    • Monocytes, Absolute  2023 0.53    • Eosinophils, Absolute 2023 0.19    • Basophils, Absolute 2023 0.06    • Immature Grans, Absolute 2023 0.01    • nRBC 2023 0.0    Admission on 2022, Discharged on 2022   Component Date Value   • Reference Lab Report 2022                      Value:[facility]  [address] [place] [address]  Phone: [phone] or [phone]  Fax: [phone]  Rinku Sorenson M.D., Medical Director    PATIENT NAME                           LABORATORY NO.  [id number]  MAXIMILIAN CHOI                        [id number]  [id number]                         AGE              SEX  SSN           CLIENT REF #  [facility] RICARDO              57      1965  F    [id number]   [id number]    [address]                     REQUESTING M.D.     ATTENDING [place] [address]                   DIMITRY CHACON  DATE COLLECTED      DATE RECEIVED      DATE REPORTED  2022    DIAGNOSIS:  A.   ASCENDING COLON POLYP:  Adenomatous polyp (tubular adenoma)  Negative for high-grade dysplasia  B.   RECTAL POLYP:  Adenomatous polyp (tubular adenoma)  Negative for high-grade dysplasia    CRISTOPHER    CLINICAL HISTORY:  Encounter for screening for malignant neoplasm of colon,                           history of colon  polyps    SPECIMENS RECEIVED:  A.  ASCENDING COLON POLYP  B.  RECTAL POLYP    MICROSCOPIC DESCRIPTION:  Tissue blocks are prepared and slides are examined microscopically on all  specimens. See diagnosis for details.    Professional interpretation rendered by Bib Sen M.D., F.C.A.P. at [employer], [employer], [address] [place] [address].    GROSS DESCRIPTION:  A.  Pieces: Multiple.  Aggregate dimensions: 0.8 x 0.8 x 0.1 cm.  Cassettes:  One, entirely submitted.  BW  B.     Specimen consists of multiple portions of tan soft tissue measuring 1.2  x 1.0 x 0.3 cm in aggregate.  The  specimen is filtered and submitted  entirely in 1 cassette.    REVIEWED, DIAGNOSED AND ELECTRONICALLY  SIGNED BY:    Bib Sen M.D., F.C.A.P.  CPT CODES:  88305x2     Lab on 10/03/2022   Component Date Value   • Glucose 10/03/2022 131 (H)    • BUN 10/03/2022 14    • Creatinine 10/03/2022 0.79    • Sodium 10/03/2022 143    • Potassium 10/03/2022 4.3    • Chloride 10/03/2022 107    • CO2 10/03/2022 25.4    • Calcium 10/03/2022 9.2    • Total Protein 10/03/2022 6.8    • Albumin 10/03/2022 4.15    • ALT (SGPT) 10/03/2022 17    • AST (SGOT) 10/03/2022 18    • Alkaline Phosphatase 10/03/2022 64    • Total Bilirubin 10/03/2022 0.4    • Globulin 10/03/2022 2.7    • A/G Ratio 10/03/2022 1.6    • BUN/Creatinine Ratio 10/03/2022 17.7    • Anion Gap 10/03/2022 10.6    • eGFR 10/03/2022 87.9    • e13a2 (b2a2) Transcript 10/03/2022 Comment    • e14a2 (b3a2) Transcript 10/03/2022 Comment    • E1A2 transcript 10/03/2022 Comment    • Interpretation 10/03/2022 Negative    • Director Review 10/03/2022 Comment    • Background: 10/03/2022 Comment    • Methodology: 10/03/2022 Comment    • LDH 10/03/2022 176    • Uric Acid 10/03/2022 6.2 (H)    • WBC 10/03/2022 6.63    • RBC 10/03/2022 4.17    • Hemoglobin 10/03/2022 12.7    • Hematocrit 10/03/2022 38.3    • MCV 10/03/2022 91.8    • MCH 10/03/2022 30.5    • MCHC 10/03/2022 33.2    • RDW 10/03/2022 13.7    • RDW-SD 10/03/2022 46.5    • MPV 10/03/2022 10.1    • Platelets 10/03/2022 246    • Neutrophil % 10/03/2022 51.3    • Lymphocyte % 10/03/2022 34.1    • Monocyte % 10/03/2022 9.5    • Eosinophil % 10/03/2022 4.1    • Basophil % 10/03/2022 0.8    • Immature Grans % 10/03/2022 0.2    • Neutrophils, Absolute 10/03/2022 3.41    • Lymphocytes, Absolute 10/03/2022 2.26    • Monocytes, Absolute 10/03/2022 0.63    • Eosinophils, Absolute 10/03/2022 0.27    • Basophils, Absolute 10/03/2022 0.05    • Immature Grans, Absolute 10/03/2022 0.01    • nRBC 10/03/2022 0.0              IMAGIN17: US ABDOMEN COMPLETE: Visualized pancreas is unremarkable. The imaged portion of the abdominal aorta is nondilated. The liver is homogeneous. There is no intrahepatic ductal dilatation or focal hepatic mass. The imaged portion of the hepatic vessels and inferior vena cava are patent. The gallbladder has been removed. The common bile duct is normal, measuring 5.7 mm. The kidneys demonstrate no evidence of hydronephrosis or solid renal mass. The spleen is homogeneous and measures 13 cm. IMPRESSION: Spleen measures 13 cm and is homogeneous.           PATHOLOGY:    05/15/17: Bone Marrow Biopsy & Aspirate                     17: BCR ABL PCR        17: BCR ABL PCR        17: BCR ABL PCR        18: BCR ABL PCR:                                                                                                         18:                                2020:          12/3/20           03/10/22:             Assessment & Plan :  Aric Haro is a very pleasant 56 y.o.  female who presents in follow up appointment for further management and treatment of chronic phase chronic myelogenous leukemia.    1. Chronic Myelogenous Leukemia - CML (chronic phase)    - Peripheral smear concerning for an underlying myeloproliferative disorder. Her primary provider obtained a flow cytometry which did not reveal any significant abnormalities. I obtained a quantitative BCR ABL PCR positive for the b2a2/b3a2 (p210) and e1a2 (p190) fusion gene transcripts consistent with a diagnosis for CML. Bone marrow biopsy performed on initial diagnosis 17 and prior to starting Dasatinib treatment with results above and consistent with chronic phase CML.   - To further evaluate for a myeloproliferative disorder I did also assess for JAK2 (V617F and exon 12)/MPL/CALR mutations which were negative. ESR, CRP, EMMANUEL, Rheumatoid factor, as well as an acute hepatitis panel and HIV test which  were all negative. No iron deficiency seen. Abdominal ultrasound significant for a spleen size 13.9cm.   - For treatment of her CML she was started on Dasatinib 100mg oral daily. She does have a history significant for pancreatitis, therefore, I held on using Nilotinib. Patient however was unable to tolerate Dasatinib secondary to worsening reflux while being off of her PPI, and experiencing severe nausea, vomiting, and dehydration. She was then started on Imatinib 400mg daily and is overall tolerating this well. I have previously advised her of Ibuprofen and Tylenol use for her myalgias. She does also experience intermittent lower extremity edema (which she denies today). I did order baseline Echo which showed an intact EF. I also repeated echocardiogram to further evaluate and repeat echo showed an intact EF 61-65% which is stable.  - She has had a complete hematological response, and BCR ABL PCR has normalized since start of Gleevac. This will need to continue to be monitored every three months to assess ongoing molecular response, today's level is <0.0032%. The blood counts have now stabilized therefore will continue to monitor every 3 months given stability.   - Today we spent a lot of time discussing the possibility of stopping Gleevac given her muscle cramping and desire to stop Gleevac. There have been several studies that have shown that TKIs can be discontinued safely in 40% to 65% of patients who achieve a deep molecular response. She understands however there are no clinical decision tools that can accurately predict which patient can sustain MMR after TKI cessation, and most guidelines recommend monthly BCR-ABL1 transcript level monitoring for the first 12 months after stopping TKI to detect early loss of major molecular response (MMR). In a retrospective analysis of data from chronic-phase CML patients who received TKI therapy for a minimum of 3 years of TKI therapy and at least 2 years of sustained  molecular response. Of the 130 patients who stopped their TKIs, 55% remained off therapy at 12 months. Loss of MMR resulted in restarting the TKI in 87% of molecular relapses during the first 12 months. This was discussed at length with Ms. Haro and understands the potential risks of coming off therapy including relapse and possibly not responding to treatment on restart. At present she would like to further discuss with her family and will inform our clinic should she decide to come off of Gleevac therapy for close monitoring with once monthly labs.     2. Healthcare Maintenance  - She underwent a colonoscopy February 2018 with Dr. Esparza who recommended repeat colonoscopy in 3-5 years due to polyps that were removed. She underwent colonoscopy with Dr. Keller in December and had two polyps removed and another that was removed piecemeal.  - Recommended repeat colonoscopy in six months (June 2023).   - She has also received her Hepatitis A vaccine.     3. Iron deficiency - no longer on iron   - Iron has been discontinued and iron studies have been normal.     4. Gleevac associated myalgias  - She was started on calcium supplementation. Magnesium level normal. Flexeril as needed.    5. ACO Quality measures  -  Aric Haro does not use tobacco products.  -  Aric Haro did not receive 2022 flu vaccine. Declined.  -  Aric Haro reports a pain score of 0.  Given her pain assessment as noted, treatment options were discussed and the following options were decided upon as a follow-up plan to address the patient's pain: continuation of current treatment plan for pain.  -  Current outpatient and discharge medications have been reconciled for the patient.  Reviewed by: Evita Serrano MD      I will have the patient return for follow up visit in three months with labs (CBC, CMP, LDH, Uric acid, BCR ABL PCR, iron panel, ferritin) one to two weeks prior. Lab orders have been placed. If she decides to come off  Ean will need monthly labs to monitor closely. She understands that should she have any questions or concerns prior to her appointment she should give us a call at any time and I would be happy to see her sooner. It was a pleasure to see this patient in clinic today, thank you for allowing me to participate in the care of this patient.    A total of 41 minutes were spent coordinating this patient’s care in clinic today; more than 50% of this time was with the patient, reviewing their medical history and counseling on the current treatment and followup plan. All questions were answered to their satisfaction.      Evita Serrano MD  01/24/23  12:09 EST

## 2023-02-07 ENCOUNTER — SPECIALTY PHARMACY (OUTPATIENT)
Dept: PHARMACY | Facility: HOSPITAL | Age: 58
End: 2023-02-07
Payer: COMMERCIAL

## 2023-02-07 NOTE — PROGRESS NOTES
Specialty Pharmacy Refill Coordination Note      Name:  Aric Haro  :  1965  Date:  2023         Past Medical History:   Diagnosis Date   • Anemia    • Cancer (HCC)     leukemia   • Diabetes mellitus (HCC)    • Elevated cholesterol    • GERD (gastroesophageal reflux disease)    • Hypertension    • PONV (postoperative nausea and vomiting)    • Sleep apnea        Past Surgical History:   Procedure Laterality Date   • BLEPHAROPLASTY Bilateral 2018    Procedure: BLEPHAROPLASTY BOTH EYES UPPER EYELIDS (PT REQUESTED TERESITA WILKES;  Surgeon: Chris Haile MD;  Location: [facility];  Service: Ophthalmology   • BONE MARROW BIOPSY     • COLONOSCOPY N/A 2018    Procedure: COLONOSCOPY FOR SCREENING  CPTCODE:62195;  Surgeon: Drew Esparza III, MD;  Location: [facility];  Service: Gastroenterology   • COLONOSCOPY N/A 2022    Procedure: COLONOSCOPY;  Surgeon: Danni Naranjo MD;  Location: [facility];  Service: Gastroenterology;  Laterality: N/A;   • SINUS SURGERY     • SINUS SURGERY     • SLEEVE GASTROPLASTY         Social History     Socioeconomic History   • Marital status:    Tobacco Use   • Smoking status: Former     Packs/day: 0.25     Years: 4.00     Pack years: 1.00     Types: Cigarettes   • Smokeless tobacco: Never   Vaping Use   • Vaping Use: Never used   Substance and Sexual Activity   • Alcohol use: No   • Drug use: No   • Sexual activity: Defer       Family History   Problem Relation Age of Onset   • Anemia Mother    • Hypertension Mother    • Cancer Mother    • COPD Father    • Heart disease Father    • Breast cancer Neg Hx        No Known Allergies    Current Outpatient Medications   Medication Sig Dispense Refill   • BIOTIN PO Take 100 mg by mouth.     • celecoxib (CeleBREX) 200 MG capsule Take 1 capsule by mouth 2 (Two) Times a Day. 180 capsule 3   • colestipol (COLESTID) 1 g tablet Take 1 tablet by mouth Daily. 30 tablet 0   • cyanocobalamin  1000 MCG/ML injection Administer 1 ml (1,000 mcg) intramuscularly once weekly 12 mL 2   • cyclobenzaprine (FLEXERIL) 10 MG tablet Take 1/2 tablet by mouth 3 (Three) Times a Day As Needed for Muscle Spasms. 30 tablet 0   • desloratadine-pseudoephedrine (CLARINEX-D 12-hour) 2.5-120 MG per tablet take 1 tablet by mouth 2 times every day 30 tablet 0   • doxycycline (ADOXA) 100 MG tablet Take 1 tablet (100 mg) by mouth every 12 hours for 10 days 20 tablet 2   • doxycycline (ADOXA) 100 MG tablet Take 1 tablet (100 mg) by mouth every 12 hours for 10 days 20 tablet 0   • DULoxetine (CYMBALTA) 60 MG capsule Take 1 capsule by mouth Daily. 90 capsule 3   • DULoxetine (CYMBALTA) 60 MG capsule Take 1 capsule by mouth Daily. 90 capsule 3   • fenofibrate (TRICOR) 145 MG tablet Take 1 tablet by mouth Daily. 90 tablet 3   • fenofibrate (TRICOR) 145 MG tablet Take 1 tablet by mouth daily 90 tablet 2   • fenofibrate (TRICOR) 145 MG tablet Take 1 tablet by mouth once daily 90 tablet 2   • ibuprofen (ADVIL,MOTRIN) 800 MG tablet Take 1 tablet  by mouth 3 times per day with food or milk as needed 270 tablet 2   • ibuprofen (ADVIL,MOTRIN) 800 MG tablet Take 1 tablet by mouth 3 times per day with food or milk as needed 270 tablet 2   • imatinib (GLEEVEC) 400 MG chemo tablet Take 1 tablet by mouth Daily. 30 tablet 5   • Insulin Glargine (BASAGLAR KWIKPEN) 100 UNIT/ML injection pen Inject 90 units under the skin once daily. 30 mL 4   • Insulin Glargine (Lantus SoloStar) 100 UNIT/ML injection pen Inject 100 Units under the skin into the appropriate area as directed Daily. 18 mL 3   • Insulin Glargine (Lantus SoloStar) 100 UNIT/ML injection pen Inject 45 Units under the skin into the appropriate area as directed 2 (two) times a day. 30 mL 0   • Insulin Glargine (Lantus SoloStar) 100 UNIT/ML injection pen Inject 100 Units under the skin into the appropriate area as directed Daily. 30 mL 0   • Insulin Glargine, 2 Unit Dial, (Toujeo Max SoloStar)  "300 UNIT/ML solution pen-injector injection Inject 60 Units under the skin into the appropriate area as directed 2 (Two) Times a Day. 36 mL 2   • Insulin Syringe-Needle U-100 (TRUEplus Insulin Syringe) 31G X 5/16\" 0.5 ML misc Use to inject 3 times a day 90 each 4   • multivitamin (THERAGRAN) tablet tablet Take  by mouth Daily.     • pantoprazole (PROTONIX) 40 MG EC tablet Take 1 tablet by mouth daily 90 tablet 2   • pantoprazole (PROTONIX) 40 MG EC tablet Take 1 tablet (40 mg) by mouth daily 90 tablet 2   • pantoprazole (PROTONIX) 40 MG EC tablet Take 1 tablet by mouth daily 90 tablet 2   • SUMAtriptan (IMITREX) 100 MG tablet Take 1 tablet by mouth 2 times per day at least 2 hours between doses as needed. 27 tablet 2   • triamcinolone (KENALOG) 0.1 % cream Apply 1 application topically to affected area daily as needed 80 g 3     No current facility-administered medications for this visit.         ASSESSMENT/PLAN:      Aric is a 57 y.o. female contacted today regarding refills of  Gleevec 400, fenofibrate 145, protonix 40mg tablets  specialty medication(s).    Reviewed and verified with patient:       Specialty medication(s) and dose(s) confirmed: yes    Refill Questions    Flowsheet Row Most Recent Value   Changes to allergies? No   Changes to medications? No   New conditions since last clinic visit No   Unplanned office visit, urgent care, ED, or hospital admission in the last 4 weeks  No   How does patient/caregiver feel medication is working? Very good   Financial problems or insurance changes  No   If yes, describe changes in insurance or financial issues. no   Since the previous refill, were any specialty medication doses or scheduled injections missed or delayed?  No   If yes, please provide the amount 0   Why were doses missed? n/a   Does this patient require a clinical escalation to a pharmacist? No                Medication Adherence    Adherence tools used: directed education          Follow-up: 30 " day(s)     Kelly Mcnamara, Pharmacy Technician  Specialty Pharmacy Technician

## 2023-03-03 ENCOUNTER — SPECIALTY PHARMACY (OUTPATIENT)
Dept: PHARMACY | Facility: HOSPITAL | Age: 58
End: 2023-03-03
Payer: COMMERCIAL

## 2023-03-03 NOTE — PROGRESS NOTES
Specialty Pharmacy Refill Coordination Note      Name:  Aric Haro  :  1965  Date:  3/3/2023         Past Medical History:   Diagnosis Date   • Anemia    • Cancer (HCC)     leukemia   • Diabetes mellitus (HCC)    • Elevated cholesterol    • GERD (gastroesophageal reflux disease)    • Hypertension    • PONV (postoperative nausea and vomiting)    • Sleep apnea        Past Surgical History:   Procedure Laterality Date   • BLEPHAROPLASTY Bilateral 2018    Procedure: BLEPHAROPLASTY BOTH EYES UPPER EYELIDS (PT REQUESTED TERESITA WILKES;  Surgeon: Chris Haile MD;  Location: [facility];  Service: Ophthalmology   • BONE MARROW BIOPSY     • COLONOSCOPY N/A 2018    Procedure: COLONOSCOPY FOR SCREENING  CPTCODE:25780;  Surgeon: Drew Esparza III, MD;  Location: [facility];  Service: Gastroenterology   • COLONOSCOPY N/A 2022    Procedure: COLONOSCOPY;  Surgeon: Danni Naranjo MD;  Location: [facility];  Service: Gastroenterology;  Laterality: N/A;   • SINUS SURGERY     • SINUS SURGERY     • SLEEVE GASTROPLASTY         Social History     Socioeconomic History   • Marital status:    Tobacco Use   • Smoking status: Former     Packs/day: 0.25     Years: 4.00     Pack years: 1.00     Types: Cigarettes   • Smokeless tobacco: Never   Vaping Use   • Vaping Use: Never used   Substance and Sexual Activity   • Alcohol use: No   • Drug use: No   • Sexual activity: Defer       Family History   Problem Relation Age of Onset   • Anemia Mother    • Hypertension Mother    • Cancer Mother    • COPD Father    • Heart disease Father    • Breast cancer Neg Hx        No Known Allergies    Current Outpatient Medications   Medication Sig Dispense Refill   • BIOTIN PO Take 100 mg by mouth.     • celecoxib (CeleBREX) 200 MG capsule Take 1 capsule by mouth 2 (Two) Times a Day. 180 capsule 3   • colestipol (COLESTID) 1 g tablet Take 1 tablet by mouth Daily. 30 tablet 0   • cyanocobalamin  1000 MCG/ML injection Administer 1 ml (1,000 mcg) intramuscularly once weekly 12 mL 2   • cyclobenzaprine (FLEXERIL) 10 MG tablet Take 1/2 tablet by mouth 3 (Three) Times a Day As Needed for Muscle Spasms. 30 tablet 0   • desloratadine-pseudoephedrine (CLARINEX-D 12-hour) 2.5-120 MG per tablet take 1 tablet by mouth 2 times every day 30 tablet 0   • doxycycline (ADOXA) 100 MG tablet Take 1 tablet (100 mg) by mouth every 12 hours for 10 days 20 tablet 2   • doxycycline (ADOXA) 100 MG tablet Take 1 tablet (100 mg) by mouth every 12 hours for 10 days 20 tablet 0   • DULoxetine (CYMBALTA) 60 MG capsule Take 1 capsule by mouth Daily. 90 capsule 3   • DULoxetine (CYMBALTA) 60 MG capsule Take 1 capsule by mouth Daily. 90 capsule 3   • fenofibrate (TRICOR) 145 MG tablet Take 1 tablet by mouth Daily. 90 tablet 3   • fenofibrate (TRICOR) 145 MG tablet Take 1 tablet by mouth daily 90 tablet 2   • fenofibrate (TRICOR) 145 MG tablet Take 1 tablet by mouth once daily 90 tablet 2   • ibuprofen (ADVIL,MOTRIN) 800 MG tablet Take 1 tablet  by mouth 3 times per day with food or milk as needed 270 tablet 2   • ibuprofen (ADVIL,MOTRIN) 800 MG tablet Take 1 tablet by mouth 3 times per day with food or milk as needed 270 tablet 2   • imatinib (GLEEVEC) 400 MG chemo tablet Take 1 tablet by mouth Daily. 30 tablet 5   • Insulin Glargine (BASAGLAR KWIKPEN) 100 UNIT/ML injection pen Inject 90 units under the skin once daily. 30 mL 4   • Insulin Glargine (Lantus SoloStar) 100 UNIT/ML injection pen Inject 100 Units under the skin into the appropriate area as directed Daily. 18 mL 3   • Insulin Glargine (Lantus SoloStar) 100 UNIT/ML injection pen Inject 45 Units under the skin into the appropriate area as directed 2 (two) times a day. 30 mL 0   • Insulin Glargine (Lantus SoloStar) 100 UNIT/ML injection pen Inject 100 Units under the skin into the appropriate area as directed Daily. 30 mL 0   • Insulin Glargine, 2 Unit Dial, (Toujeo Max SoloStar)  "300 UNIT/ML solution pen-injector injection Inject 60 Units under the skin into the appropriate area as directed 2 (Two) Times a Day. 36 mL 2   • Insulin Syringe-Needle U-100 (TRUEplus Insulin Syringe) 31G X 5/16\" 0.5 ML misc Use to inject 3 times a day 90 each 4   • multivitamin (THERAGRAN) tablet tablet Take  by mouth Daily.     • pantoprazole (PROTONIX) 40 MG EC tablet Take 1 tablet by mouth daily 90 tablet 2   • pantoprazole (PROTONIX) 40 MG EC tablet Take 1 tablet (40 mg) by mouth daily 90 tablet 2   • pantoprazole (PROTONIX) 40 MG EC tablet Take 1 tablet by mouth daily 90 tablet 2   • SUMAtriptan (IMITREX) 100 MG tablet Take 1 tablet by mouth 2 times per day at least 2 hours between doses as needed. 27 tablet 2   • triamcinolone (KENALOG) 0.1 % cream Apply 1 application topically to affected area daily as needed 80 g 3     No current facility-administered medications for this visit.         ASSESSMENT/PLAN:      Aric is a 57 y.o. female contacted today regarding refills of  Gleevec 400mg chemo tab specialty medication(s).    Reviewed and verified with patient:       Specialty medication(s) and dose(s) confirmed: yes    Refill Questions    Flowsheet Row Most Recent Value   Changes to allergies? No   Changes to medications? No   New conditions since last clinic visit No   Unplanned office visit, urgent care, ED, or hospital admission in the last 4 weeks  No   How does patient/caregiver feel medication is working? Very good   Financial problems or insurance changes  No   If yes, describe changes in insurance or financial issues. no   Since the previous refill, were any specialty medication doses or scheduled injections missed or delayed?  No   If yes, please provide the amount 0   Why were doses missed? n/a   Does this patient require a clinical escalation to a pharmacist? No                Medication Adherence    Adherence tools used: directed education          Follow-up: 30 day(s)     Kelly Mcnamara, " Pharmacy Technician  Specialty Pharmacy Technician

## 2023-03-27 ENCOUNTER — SPECIALTY PHARMACY (OUTPATIENT)
Dept: PHARMACY | Facility: HOSPITAL | Age: 58
End: 2023-03-27
Payer: COMMERCIAL

## 2023-03-27 NOTE — PROGRESS NOTES
Specialty Pharmacy Refill Coordination Note      Name:  Aric Haro  :  1965  Date:  3/27/2023         Past Medical History:   Diagnosis Date   • Anemia    • Cancer (HCC)     leukemia   • Diabetes mellitus (HCC)    • Elevated cholesterol    • GERD (gastroesophageal reflux disease)    • Hypertension    • PONV (postoperative nausea and vomiting)    • Sleep apnea        Past Surgical History:   Procedure Laterality Date   • BLEPHAROPLASTY Bilateral 2018    Procedure: BLEPHAROPLASTY BOTH EYES UPPER EYELIDS (PT REQUESTED TERESITA WILKES;  Surgeon: Chris Haile MD;  Location: [facility];  Service: Ophthalmology   • BONE MARROW BIOPSY     • COLONOSCOPY N/A 2018    Procedure: COLONOSCOPY FOR SCREENING  CPTCODE:54144;  Surgeon: Drew Esparza III, MD;  Location: [facility];  Service: Gastroenterology   • COLONOSCOPY N/A 2022    Procedure: COLONOSCOPY;  Surgeon: Danni Naranjo MD;  Location: [facility];  Service: Gastroenterology;  Laterality: N/A;   • SINUS SURGERY     • SINUS SURGERY     • SLEEVE GASTROPLASTY         Social History     Socioeconomic History   • Marital status:    Tobacco Use   • Smoking status: Former     Packs/day: 0.25     Years: 4.00     Pack years: 1.00     Types: Cigarettes   • Smokeless tobacco: Never   Vaping Use   • Vaping Use: Never used   Substance and Sexual Activity   • Alcohol use: No   • Drug use: No   • Sexual activity: Defer       Family History   Problem Relation Age of Onset   • Anemia Mother    • Hypertension Mother    • Cancer Mother    • COPD Father    • Heart disease Father    • Breast cancer Neg Hx        No Known Allergies    Current Outpatient Medications   Medication Sig Dispense Refill   • BIOTIN PO Take 100 mg by mouth.     • celecoxib (CeleBREX) 200 MG capsule Take 1 capsule by mouth 2 (Two) Times a Day. 180 capsule 3   • colestipol (COLESTID) 1 g tablet Take 1 tablet by mouth Daily. 30 tablet 0   • cyanocobalamin  1000 MCG/ML injection Administer 1 ml (1,000 mcg) intramuscularly once weekly 12 mL 2   • cyclobenzaprine (FLEXERIL) 10 MG tablet Take 1/2 tablet by mouth 3 (Three) Times a Day As Needed for Muscle Spasms. 30 tablet 0   • desloratadine-pseudoephedrine (CLARINEX-D 12-hour) 2.5-120 MG per tablet take 1 tablet by mouth 2 times every day 30 tablet 0   • doxycycline (ADOXA) 100 MG tablet Take 1 tablet (100 mg) by mouth every 12 hours for 10 days 20 tablet 2   • doxycycline (ADOXA) 100 MG tablet Take 1 tablet (100 mg) by mouth every 12 hours for 10 days 20 tablet 0   • DULoxetine (CYMBALTA) 60 MG capsule Take 1 capsule by mouth Daily. 90 capsule 3   • DULoxetine (CYMBALTA) 60 MG capsule Take 1 capsule by mouth Daily. 90 capsule 3   • fenofibrate (TRICOR) 145 MG tablet Take 1 tablet by mouth Daily. 90 tablet 3   • fenofibrate (TRICOR) 145 MG tablet Take 1 tablet by mouth daily 90 tablet 2   • fenofibrate (TRICOR) 145 MG tablet Take 1 tablet by mouth once daily 90 tablet 2   • ibuprofen (ADVIL,MOTRIN) 800 MG tablet Take 1 tablet  by mouth 3 times per day with food or milk as needed 270 tablet 2   • ibuprofen (ADVIL,MOTRIN) 800 MG tablet Take 1 tablet by mouth 3 times per day with food or milk as needed 270 tablet 2   • imatinib (GLEEVEC) 400 MG chemo tablet Take 1 tablet by mouth Daily. 30 tablet 5   • Insulin Glargine (BASAGLAR KWIKPEN) 100 UNIT/ML injection pen Inject 90 units under the skin once daily. 30 mL 4   • Insulin Glargine (Lantus SoloStar) 100 UNIT/ML injection pen Inject 100 Units under the skin into the appropriate area as directed Daily. 18 mL 3   • Insulin Glargine (Lantus SoloStar) 100 UNIT/ML injection pen Inject 45 Units under the skin into the appropriate area as directed 2 (two) times a day. 30 mL 0   • Insulin Glargine (Lantus SoloStar) 100 UNIT/ML injection pen Inject 100 Units under the skin into the appropriate area as directed Daily. 30 mL 0   • Insulin Glargine, 2 Unit Dial, (Toujeo Max SoloStar)  "300 UNIT/ML solution pen-injector injection Inject 60 Units under the skin into the appropriate area as directed 2 (Two) Times a Day. 36 mL 2   • Insulin Syringe-Needle U-100 (TRUEplus Insulin Syringe) 31G X 5/16\" 0.5 ML misc Use to inject 3 times a day 90 each 4   • multivitamin (THERAGRAN) tablet tablet Take  by mouth Daily.     • pantoprazole (PROTONIX) 40 MG EC tablet Take 1 tablet by mouth daily 90 tablet 2   • pantoprazole (PROTONIX) 40 MG EC tablet Take 1 tablet (40 mg) by mouth daily 90 tablet 2   • pantoprazole (PROTONIX) 40 MG EC tablet Take 1 tablet by mouth daily 90 tablet 2   • SUMAtriptan (IMITREX) 100 MG tablet Take 1 tablet by mouth 2 times per day at least 2 hours between doses as needed. 27 tablet 2   • triamcinolone (KENALOG) 0.1 % cream Apply 1 application topically to affected area daily as needed 80 g 3     No current facility-administered medications for this visit.         ASSESSMENT/PLAN:      Aric \"KIRSSY\" is a 57 y.o. female contacted today regarding refills of  Gleevec 400mg tablet  specialty medication(s).    Reviewed and verified with patient:       Specialty medication(s) and dose(s) confirmed: yes    Refill Questions    Flowsheet Row Most Recent Value   Changes to allergies? No   Changes to medications? No   New conditions since last clinic visit No   Unplanned office visit, urgent care, ED, or hospital admission in the last 4 weeks  No   How does patient/caregiver feel medication is working? Very good   Financial problems or insurance changes  No   If yes, describe changes in insurance or financial issues. no   Since the previous refill, were any specialty medication doses or scheduled injections missed or delayed?  No   If yes, please provide the amount 0   Why were doses missed? n/a   Does this patient require a clinical escalation to a pharmacist? No                Medication Adherence    Adherence tools used: directed education          Follow-up: 30 day(s)     Kelly Mcnamara, " Pharmacy Technician  Specialty Pharmacy Technician

## 2023-04-07 ENCOUNTER — SPECIALTY PHARMACY (OUTPATIENT)
Dept: PHARMACY | Facility: HOSPITAL | Age: 58
End: 2023-04-07
Payer: COMMERCIAL

## 2023-04-07 DIAGNOSIS — C92.10 CML (CHRONIC MYELOCYTIC LEUKEMIA): ICD-10-CM

## 2023-04-07 RX ORDER — IMATINIB MESYLATE 400 MG/1
400 TABLET, FILM COATED ORAL DAILY
Qty: 30 TABLET | Refills: 5 | Status: SHIPPED | OUTPATIENT
Start: 2023-04-07

## 2023-04-07 NOTE — PROGRESS NOTES
Specialty Pharmacy Patient Management Program  Oncology Reassessment     Aric Haro is a 57 y.o. female with CML seen by an Oncology provider and enrolled in the Oncology Patient Management program offered by [facility].  A follow-up outreach was conducted, including assessment of continued therapy appropriateness, medication adherence, and side effect incidence and management for Gleevec.       Relevant Past Medical History and Comorbidities  Relevant medical history and concomitant health conditions were discussed with the patient. The patient's chart has been reviewed for relevant past medical history and comorbid health conditions and updated as necessary.   Past Medical History:   Diagnosis Date   • Anemia    • Cancer     leukemia   • Diabetes mellitus    • Elevated cholesterol    • GERD (gastroesophageal reflux disease)    • Hypertension    • PONV (postoperative nausea and vomiting)    • Sleep apnea      Social History     Socioeconomic History   • Marital status:    Tobacco Use   • Smoking status: Former     Packs/day: 0.25     Years: 4.00     Pack years: 1.00     Types: Cigarettes   • Smokeless tobacco: Never   Vaping Use   • Vaping Use: Never used   Substance and Sexual Activity   • Alcohol use: No   • Drug use: No   • Sexual activity: Defer       Allergies  Known allergies and reactions were discussed with the patient. The patient's chart has been reviewed for allergy information and updated as necessary.   Patient has no known allergies.    Relevant Laboratory Values  Lab Results   Component Value Date    GLUCOSE 206 (H) 01/05/2023    CALCIUM 9.6 01/05/2023     01/05/2023    K 4.4 01/05/2023    CO2 27.0 01/05/2023     01/05/2023    BUN 18 01/05/2023    CREATININE 0.88 01/05/2023    EGFRIFNONA 73 12/13/2021    BCR 20.5 01/05/2023    ANIONGAP 11.0 01/05/2023     Lab Results   Component Value Date    WBC 6.27 01/05/2023    RBC 4.32 01/05/2023    HGB 13.3  01/05/2023    HCT 39.7 01/05/2023    MCV 91.9 01/05/2023    MCH 30.8 01/05/2023    MCHC 33.5 01/05/2023    RDW 13.5 01/05/2023    RDWSD 46.3 01/05/2023    MPV 10.5 01/05/2023     01/05/2023    NEUTRORELPCT 58.3 01/05/2023    LYMPHORELPCT 29.0 01/05/2023    MONORELPCT 8.5 01/05/2023    EOSRELPCT 3.0 01/05/2023    BASORELPCT 1.0 01/05/2023    AUTOIGPER 0.2 01/05/2023    NEUTROABS 3.66 01/05/2023    LYMPHSABS 1.82 01/05/2023    MONOSABS 0.53 01/05/2023    EOSABS 0.19 01/05/2023    BASOSABS 0.06 01/05/2023    AUTOIGNUM 0.01 01/05/2023    NRBC 0.0 01/05/2023        Current Medication List  This medication list has been reviewed with the patient and evaluated for any interactions or necessary modifications/recommendations, and updated to include all prescription medications, OTC medications, and supplements the patient is currently taking.  This list reflects what is contained in the patient's profile, which has also been marked as reviewed to communicate to other providers it is the most up to date version of the patient's current medication therapy.     Current Outpatient Medications:   •  imatinib (GLEEVEC) 400 MG chemo tablet, Take 1 tablet by mouth Daily., Disp: 30 tablet, Rfl: 5  •  BIOTIN PO, Take 100 mg by mouth., Disp: , Rfl:   •  celecoxib (CeleBREX) 200 MG capsule, Take 1 capsule by mouth 2 (Two) Times a Day., Disp: 180 capsule, Rfl: 3  •  colestipol (COLESTID) 1 g tablet, Take 1 tablet by mouth Daily., Disp: 30 tablet, Rfl: 0  •  cyanocobalamin 1000 MCG/ML injection, Administer 1 ml (1,000 mcg) intramuscularly once weekly, Disp: 12 mL, Rfl: 2  •  cyclobenzaprine (FLEXERIL) 10 MG tablet, Take 1/2 tablet by mouth 3 (Three) Times a Day As Needed for Muscle Spasms., Disp: 30 tablet, Rfl: 0  •  desloratadine-pseudoephedrine (CLARINEX-D 12-hour) 2.5-120 MG per tablet, take 1 tablet by mouth 2 times every day, Disp: 30 tablet, Rfl: 0  •  doxycycline (ADOXA) 100 MG tablet, Take 1 tablet (100 mg) by mouth every  "12 hours for 10 days, Disp: 20 tablet, Rfl: 2  •  doxycycline (ADOXA) 100 MG tablet, Take 1 tablet (100 mg) by mouth every 12 hours for 10 days, Disp: 20 tablet, Rfl: 0  •  DULoxetine (CYMBALTA) 60 MG capsule, Take 1 capsule by mouth Daily., Disp: 90 capsule, Rfl: 3  •  DULoxetine (CYMBALTA) 60 MG capsule, Take 1 capsule by mouth Daily., Disp: 90 capsule, Rfl: 3  •  fenofibrate (TRICOR) 145 MG tablet, Take 1 tablet by mouth Daily., Disp: 90 tablet, Rfl: 3  •  fenofibrate (TRICOR) 145 MG tablet, Take 1 tablet by mouth daily, Disp: 90 tablet, Rfl: 2  •  fenofibrate (TRICOR) 145 MG tablet, Take 1 tablet by mouth once daily, Disp: 90 tablet, Rfl: 2  •  ibuprofen (ADVIL,MOTRIN) 800 MG tablet, Take 1 tablet  by mouth 3 times per day with food or milk as needed, Disp: 270 tablet, Rfl: 2  •  ibuprofen (ADVIL,MOTRIN) 800 MG tablet, Take 1 tablet by mouth 3 times per day with food or milk as needed, Disp: 270 tablet, Rfl: 2  •  Insulin Glargine (BASAGLAR KWIKPEN) 100 UNIT/ML injection pen, Inject 90 units under the skin once daily., Disp: 30 mL, Rfl: 4  •  Insulin Glargine (Lantus SoloStar) 100 UNIT/ML injection pen, Inject 100 Units under the skin into the appropriate area as directed Daily., Disp: 18 mL, Rfl: 3  •  Insulin Glargine (Lantus SoloStar) 100 UNIT/ML injection pen, Inject 45 Units under the skin into the appropriate area as directed 2 (two) times a day., Disp: 30 mL, Rfl: 0  •  Insulin Glargine (Lantus SoloStar) 100 UNIT/ML injection pen, Inject 100 Units under the skin into the appropriate area as directed Daily., Disp: 30 mL, Rfl: 0  •  Insulin Glargine, 2 Unit Dial, (Toujeo Max SoloStar) 300 UNIT/ML solution pen-injector injection, Inject 60 Units under the skin into the appropriate area as directed 2 (Two) Times a Day., Disp: 36 mL, Rfl: 2  •  Insulin Syringe-Needle U-100 (TRUEplus Insulin Syringe) 31G X 5/16\" 0.5 ML misc, Use to inject 3 times a day, Disp: 90 each, Rfl: 4  •  multivitamin (THERAGRAN) tablet " tablet, Take  by mouth Daily., Disp: , Rfl:   •  pantoprazole (PROTONIX) 40 MG EC tablet, Take 1 tablet by mouth daily, Disp: 90 tablet, Rfl: 2  •  pantoprazole (PROTONIX) 40 MG EC tablet, Take 1 tablet (40 mg) by mouth daily, Disp: 90 tablet, Rfl: 2  •  pantoprazole (PROTONIX) 40 MG EC tablet, Take 1 tablet by mouth daily, Disp: 90 tablet, Rfl: 2  •  SUMAtriptan (IMITREX) 100 MG tablet, Take 1 tablet by mouth 2 times per day at least 2 hours between doses as needed., Disp: 27 tablet, Rfl: 2  •  triamcinolone (KENALOG) 0.1 % cream, Apply 1 application topically to affected area daily as needed, Disp: 80 g, Rfl: 3    Drug Interactions  DDI report generated; no significant interactions.     Adverse Drug Reactions  • Adverse Reactions Experienced: Myalgias.   Plan for ADR Management: Patient has been previously advised by her medical oncologist, Dr. Serrano, to take ibuprofen and acetaminophen for myalgia control. Continue this, as well as her calcium supplement.    Hospitalizations and Urgent Care Since Last Assessment  • Hospitalizations or Admissions: 0    • ED Visits: 0   • Urgent Office Visits: 0     Adherence and Self-Administration  • Approximate Number of Doses Missed Since Last Assessment: 0   • Ongoing or New Barriers to Patient Adherence and/or Self-Administration: None  • Methods for Supporting Patient Adherence and/or Self-Administration: Provided direct education. Care coordinator to continue providing once-monthly outreach calls to assist with adherence and coordinate medication refills. Pharmacist to provide clinical assessments every 6 months and will assist in the management of clinical issues as they arise.      Goals of Therapy  • Progress Toward Meeting Patient-Identified Goals of Therapy:  On-track  o Patient-Identified Goals  - Consistently take medications as prescribed  - Patient will adhere to medication regimen  - Patient will report any medication side effects to healthcare  provider    • Progress Toward Meeting Clinical Goals or Therapeutic Targets: On-track  o Clinical Goals or Therapeutic Targets  - Support patient understanding of medication regimen  - Ensure patient knows the pharmacy contact information  - Schedule regular follow-up to monitor the treatment serious adverse events  - Schedule regular follow-up to confirm medication adherence  - Schedule regular follow-up to monitor disease progression or stabilty    Quality of Life Change Assessment   Quality of Life related to the patient's specialty therapy was discussed with the patient. The QOL segment of this outreach has been reviewed and updated.   • Quality of Life Score Change: 5    Reassessment Plan & Follow-Up  1. Pharmacist to continue to perform regular reassessments no more than (6) months from the previous assessment.  2. Care Coordinator to facilitate future refill outreaches, coordinate prescription delivery, and escalate clinical questions to pharmacist.     Additional Plans, Therapy Recommendations or Therapy Problems to Be Addressed: None. Patient continues to have undetectable BCR-ABL.  Recent Provider Changes:  None.    Attestation  I attest that the specialty medication(s) addressed above are appropriate for the patient based on my reassessment.  If the prescribed therapy is at any point deemed not appropriate based on the current or future assessments, a consultation will be initiated with the patient's specialty care provider to determine the best course of action. The revised plan of therapy will be documented along with any additional patient education provided.     Rudolph Acosta, [place]  Oncology Clinical Pharmacist  4/7/2023  13:11 EDT

## 2023-04-18 ENCOUNTER — LAB (OUTPATIENT)
Dept: ONCOLOGY | Facility: CLINIC | Age: 58
End: 2023-04-18
Payer: COMMERCIAL

## 2023-04-18 ENCOUNTER — LAB (OUTPATIENT)
Dept: LAB | Facility: HOSPITAL | Age: 58
End: 2023-04-18
Payer: COMMERCIAL

## 2023-04-18 ENCOUNTER — TRANSCRIBE ORDERS (OUTPATIENT)
Dept: ADMINISTRATIVE | Facility: HOSPITAL | Age: 58
End: 2023-04-18
Payer: COMMERCIAL

## 2023-04-18 DIAGNOSIS — C92.10 CML (CHRONIC MYELOCYTIC LEUKEMIA): ICD-10-CM

## 2023-04-18 DIAGNOSIS — M35.9 DIFFUSE DISEASE OF CONNECTIVE TISSUE: ICD-10-CM

## 2023-04-18 DIAGNOSIS — E78.2 MIXED HYPERLIPIDEMIA: ICD-10-CM

## 2023-04-18 DIAGNOSIS — D50.9 IRON DEFICIENCY ANEMIA, UNSPECIFIED IRON DEFICIENCY ANEMIA TYPE: ICD-10-CM

## 2023-04-18 DIAGNOSIS — D51.9 ANEMIA DUE TO VITAMIN B12 DEFICIENCY, UNSPECIFIED B12 DEFICIENCY TYPE: ICD-10-CM

## 2023-04-18 DIAGNOSIS — M25.50 PAIN IN JOINT, MULTIPLE SITES: ICD-10-CM

## 2023-04-18 DIAGNOSIS — E11.9 DIABETES MELLITUS WITHOUT COMPLICATION: ICD-10-CM

## 2023-04-18 DIAGNOSIS — R53.82 CHRONIC FATIGUE: ICD-10-CM

## 2023-04-18 DIAGNOSIS — I10 ESSENTIAL HYPERTENSION, MALIGNANT: Primary | ICD-10-CM

## 2023-04-18 DIAGNOSIS — I10 ESSENTIAL HYPERTENSION, MALIGNANT: ICD-10-CM

## 2023-04-18 DIAGNOSIS — E55.9 VITAMIN D2 DEFICIENCY: ICD-10-CM

## 2023-04-18 LAB
25(OH)D3 SERPL-MCNC: 21.9 NG/ML (ref 30–100)
ALBUMIN SERPL-MCNC: 4.1 G/DL (ref 3.5–5.2)
ALBUMIN SERPL-MCNC: 4.2 G/DL (ref 3.5–5.2)
ALBUMIN/GLOB SERPL: 1.4 G/DL
ALBUMIN/GLOB SERPL: 1.5 G/DL
ALP SERPL-CCNC: 65 U/L (ref 39–117)
ALP SERPL-CCNC: 65 U/L (ref 39–117)
ALT SERPL W P-5'-P-CCNC: 19 U/L (ref 1–33)
ALT SERPL W P-5'-P-CCNC: 21 U/L (ref 1–33)
ANION GAP SERPL CALCULATED.3IONS-SCNC: 10.4 MMOL/L (ref 5–15)
ANION GAP SERPL CALCULATED.3IONS-SCNC: 13 MMOL/L (ref 5–15)
AST SERPL-CCNC: 18 U/L (ref 1–32)
AST SERPL-CCNC: 21 U/L (ref 1–32)
BASOPHILS # BLD AUTO: 0.07 10*3/MM3 (ref 0–0.2)
BASOPHILS NFR BLD AUTO: 1 % (ref 0–1.5)
BILIRUB SERPL-MCNC: 0.3 MG/DL (ref 0–1.2)
BILIRUB SERPL-MCNC: 0.4 MG/DL (ref 0–1.2)
BUN SERPL-MCNC: 17 MG/DL (ref 6–20)
BUN SERPL-MCNC: 17 MG/DL (ref 6–20)
BUN/CREAT SERPL: 21.3 (ref 7–25)
BUN/CREAT SERPL: 23.9 (ref 7–25)
CALCIUM SPEC-SCNC: 9.3 MG/DL (ref 8.6–10.5)
CALCIUM SPEC-SCNC: 9.3 MG/DL (ref 8.6–10.5)
CHLORIDE SERPL-SCNC: 103 MMOL/L (ref 98–107)
CHLORIDE SERPL-SCNC: 106 MMOL/L (ref 98–107)
CHOLEST SERPL-MCNC: 184 MG/DL (ref 0–200)
CHROMATIN AB SERPL-ACNC: <10 IU/ML (ref 0–14)
CO2 SERPL-SCNC: 23 MMOL/L (ref 22–29)
CO2 SERPL-SCNC: 26.6 MMOL/L (ref 22–29)
CREAT SERPL-MCNC: 0.71 MG/DL (ref 0.57–1)
CREAT SERPL-MCNC: 0.8 MG/DL (ref 0.57–1)
CRP SERPL-MCNC: 1.62 MG/DL (ref 0–0.5)
DEPRECATED RDW RBC AUTO: 45.1 FL (ref 37–54)
EGFRCR SERPLBLD CKD-EPI 2021: 86.1 ML/MIN/1.73
EGFRCR SERPLBLD CKD-EPI 2021: 99.3 ML/MIN/1.73
EOSINOPHIL # BLD AUTO: 0.23 10*3/MM3 (ref 0–0.4)
EOSINOPHIL NFR BLD AUTO: 3.2 % (ref 0.3–6.2)
ERYTHROCYTE [DISTWIDTH] IN BLOOD BY AUTOMATED COUNT: 13.2 % (ref 12.3–15.4)
ERYTHROCYTE [SEDIMENTATION RATE] IN BLOOD: 30 MM/HR (ref 0–30)
FERRITIN SERPL-MCNC: 64.77 NG/ML (ref 13–150)
GLOBULIN UR ELPH-MCNC: 2.8 GM/DL
GLOBULIN UR ELPH-MCNC: 2.9 GM/DL
GLUCOSE SERPL-MCNC: 201 MG/DL (ref 65–99)
GLUCOSE SERPL-MCNC: 208 MG/DL (ref 65–99)
HBA1C MFR BLD: 8 % (ref 4.8–5.6)
HCT VFR BLD AUTO: 40.3 % (ref 34–46.6)
HDLC SERPL-MCNC: 51 MG/DL (ref 40–60)
HGB BLD-MCNC: 13.3 G/DL (ref 12–15.9)
IMM GRANULOCYTES # BLD AUTO: 0.02 10*3/MM3 (ref 0–0.05)
IMM GRANULOCYTES NFR BLD AUTO: 0.3 % (ref 0–0.5)
IRON 24H UR-MRATE: 71 MCG/DL (ref 37–145)
IRON SATN MFR SERPL: 15 % (ref 20–50)
LDH SERPL-CCNC: 173 U/L (ref 135–214)
LDLC SERPL CALC-MCNC: 73 MG/DL (ref 0–100)
LDLC/HDLC SERPL: 1.12 {RATIO}
LYMPHOCYTES # BLD AUTO: 1.97 10*3/MM3 (ref 0.7–3.1)
LYMPHOCYTES NFR BLD AUTO: 27 % (ref 19.6–45.3)
MCH RBC QN AUTO: 31.1 PG (ref 26.6–33)
MCHC RBC AUTO-ENTMCNC: 33 G/DL (ref 31.5–35.7)
MCV RBC AUTO: 94.4 FL (ref 79–97)
MONOCYTES # BLD AUTO: 0.63 10*3/MM3 (ref 0.1–0.9)
MONOCYTES NFR BLD AUTO: 8.6 % (ref 5–12)
NEUTROPHILS NFR BLD AUTO: 4.37 10*3/MM3 (ref 1.7–7)
NEUTROPHILS NFR BLD AUTO: 59.9 % (ref 42.7–76)
NRBC BLD AUTO-RTO: 0 /100 WBC (ref 0–0.2)
PLATELET # BLD AUTO: 258 10*3/MM3 (ref 140–450)
PMV BLD AUTO: 10.4 FL (ref 6–12)
POTASSIUM SERPL-SCNC: 4.2 MMOL/L (ref 3.5–5.2)
POTASSIUM SERPL-SCNC: 4.4 MMOL/L (ref 3.5–5.2)
PROT SERPL-MCNC: 7 G/DL (ref 6–8.5)
PROT SERPL-MCNC: 7 G/DL (ref 6–8.5)
RBC # BLD AUTO: 4.27 10*6/MM3 (ref 3.77–5.28)
SODIUM SERPL-SCNC: 139 MMOL/L (ref 136–145)
SODIUM SERPL-SCNC: 143 MMOL/L (ref 136–145)
T3 SERPL-MCNC: 113 NG/DL (ref 80–200)
T4 FREE SERPL-MCNC: 1.44 NG/DL (ref 0.93–1.7)
TIBC SERPL-MCNC: 460 MCG/DL (ref 298–536)
TRANSFERRIN SERPL-MCNC: 309 MG/DL (ref 200–360)
TRIGL SERPL-MCNC: 379 MG/DL (ref 0–150)
TSH SERPL DL<=0.05 MIU/L-ACNC: 1.49 UIU/ML (ref 0.27–4.2)
URATE SERPL-MCNC: 6 MG/DL (ref 2.4–5.7)
VIT B12 BLD-MCNC: 560 PG/ML (ref 211–946)
VLDLC SERPL-MCNC: 60 MG/DL (ref 5–40)
WBC NRBC COR # BLD: 7.29 10*3/MM3 (ref 3.4–10.8)

## 2023-04-18 PROCEDURE — 85610 PROTHROMBIN TIME: CPT

## 2023-04-18 PROCEDURE — 84439 ASSAY OF FREE THYROXINE: CPT

## 2023-04-18 PROCEDURE — 84466 ASSAY OF TRANSFERRIN: CPT | Performed by: INTERNAL MEDICINE

## 2023-04-18 PROCEDURE — 86200 CCP ANTIBODY: CPT

## 2023-04-18 PROCEDURE — 80053 COMPREHEN METABOLIC PANEL: CPT

## 2023-04-18 PROCEDURE — 82728 ASSAY OF FERRITIN: CPT | Performed by: INTERNAL MEDICINE

## 2023-04-18 PROCEDURE — 85730 THROMBOPLASTIN TIME PARTIAL: CPT

## 2023-04-18 PROCEDURE — 85670 THROMBIN TIME PLASMA: CPT

## 2023-04-18 PROCEDURE — 84443 ASSAY THYROID STIM HORMONE: CPT

## 2023-04-18 PROCEDURE — 85732 THROMBOPLASTIN TIME PARTIAL: CPT

## 2023-04-18 PROCEDURE — 86431 RHEUMATOID FACTOR QUANT: CPT

## 2023-04-18 PROCEDURE — 85025 COMPLETE CBC W/AUTO DIFF WBC: CPT | Performed by: INTERNAL MEDICINE

## 2023-04-18 PROCEDURE — 84480 ASSAY TRIIODOTHYRONINE (T3): CPT

## 2023-04-18 PROCEDURE — 82607 VITAMIN B-12: CPT

## 2023-04-18 PROCEDURE — 86038 ANTINUCLEAR ANTIBODIES: CPT

## 2023-04-18 PROCEDURE — 85598 HEXAGNAL PHOSPH PLTLT NEUTRL: CPT

## 2023-04-18 PROCEDURE — 86140 C-REACTIVE PROTEIN: CPT

## 2023-04-18 PROCEDURE — 85613 RUSSELL VIPER VENOM DILUTED: CPT

## 2023-04-18 PROCEDURE — 85652 RBC SED RATE AUTOMATED: CPT

## 2023-04-18 PROCEDURE — 86146 BETA-2 GLYCOPROTEIN ANTIBODY: CPT

## 2023-04-18 PROCEDURE — 36415 COLL VENOUS BLD VENIPUNCTURE: CPT

## 2023-04-18 PROCEDURE — 85597 PHOSPHOLIPID PLTLT NEUTRALIZ: CPT

## 2023-04-18 PROCEDURE — 83540 ASSAY OF IRON: CPT | Performed by: INTERNAL MEDICINE

## 2023-04-18 PROCEDURE — 83036 HEMOGLOBIN GLYCOSYLATED A1C: CPT

## 2023-04-18 PROCEDURE — 80061 LIPID PANEL: CPT

## 2023-04-18 PROCEDURE — 86147 CARDIOLIPIN ANTIBODY EA IG: CPT

## 2023-04-18 PROCEDURE — 82306 VITAMIN D 25 HYDROXY: CPT

## 2023-04-18 PROCEDURE — 80053 COMPREHEN METABOLIC PANEL: CPT | Performed by: INTERNAL MEDICINE

## 2023-04-18 PROCEDURE — 83615 LACTATE (LD) (LDH) ENZYME: CPT | Performed by: INTERNAL MEDICINE

## 2023-04-18 PROCEDURE — 84550 ASSAY OF BLOOD/URIC ACID: CPT | Performed by: INTERNAL MEDICINE

## 2023-04-18 NOTE — PROGRESS NOTES
Venipuncture Blood Specimen Collection  Venipuncture performed in left arm by Ishaan Dunham MA with good hemostasis. Patient tolerated the procedure well without complications.   04/18/23   Ishaan Dunham MA

## 2023-04-19 LAB
ANA SER QL: NEGATIVE
CCP IGA+IGG SERPL IA-ACNC: 8 UNITS (ref 0–19)

## 2023-04-20 ENCOUNTER — SPECIALTY PHARMACY (OUTPATIENT)
Dept: PHARMACY | Facility: HOSPITAL | Age: 58
End: 2023-04-20
Payer: COMMERCIAL

## 2023-04-20 NOTE — PROGRESS NOTES
Specialty Pharmacy Refill Coordination Note      Name:  Aric Haro  :  1965  Date:  2023         Past Medical History:   Diagnosis Date   • Anemia    • Cancer     leukemia   • Diabetes mellitus    • Elevated cholesterol    • GERD (gastroesophageal reflux disease)    • Hypertension    • PONV (postoperative nausea and vomiting)    • Sleep apnea        Past Surgical History:   Procedure Laterality Date   • BLEPHAROPLASTY Bilateral 2018    Procedure: BLEPHAROPLASTY BOTH EYES UPPER EYELIDS (PT REQUESTED TERESITA WILKES;  Surgeon: Chris Haile MD;  Location: [facility];  Service: Ophthalmology   • BONE MARROW BIOPSY     • COLONOSCOPY N/A 2018    Procedure: COLONOSCOPY FOR SCREENING  CPTCODE:01388;  Surgeon: Drew Esparza III, MD;  Location: [facility] OR;  Service: Gastroenterology   • COLONOSCOPY N/A 2022    Procedure: COLONOSCOPY;  Surgeon: Danni Naranjo MD;  Location: [facility];  Service: Gastroenterology;  Laterality: N/A;   • SINUS SURGERY     • SINUS SURGERY     • SLEEVE GASTROPLASTY         Social History     Socioeconomic History   • Marital status:    Tobacco Use   • Smoking status: Former     Packs/day: 0.25     Years: 4.00     Pack years: 1.00     Types: Cigarettes   • Smokeless tobacco: Never   Vaping Use   • Vaping Use: Never used   Substance and Sexual Activity   • Alcohol use: No   • Drug use: No   • Sexual activity: Defer       Family History   Problem Relation Age of Onset   • Anemia Mother    • Hypertension Mother    • Cancer Mother    • COPD Father    • Heart disease Father    • Breast cancer Neg Hx        No Known Allergies    Current Outpatient Medications   Medication Sig Dispense Refill   • BIOTIN PO Take 100 mg by mouth.     • celecoxib (CeleBREX) 200 MG capsule Take 1 capsule by mouth 2 (Two) Times a Day. 180 capsule 3   • colestipol (COLESTID) 1 g tablet Take 1 tablet by mouth Daily. 30 tablet 0   • cyanocobalamin 1000 MCG/ML  injection Administer 1 ml (1,000 mcg) intramuscularly once weekly 12 mL 2   • cyclobenzaprine (FLEXERIL) 10 MG tablet Take 1/2 tablet by mouth 3 (Three) Times a Day As Needed for Muscle Spasms. 30 tablet 0   • desloratadine-pseudoephedrine (CLARINEX-D 12-hour) 2.5-120 MG per tablet take 1 tablet by mouth 2 times every day 30 tablet 0   • doxycycline (ADOXA) 100 MG tablet Take 1 tablet (100 mg) by mouth every 12 hours for 10 days 20 tablet 2   • doxycycline (ADOXA) 100 MG tablet Take 1 tablet (100 mg) by mouth every 12 hours for 10 days 20 tablet 0   • DULoxetine (CYMBALTA) 60 MG capsule Take 1 capsule by mouth Daily. 90 capsule 3   • DULoxetine (CYMBALTA) 60 MG capsule Take 1 capsule by mouth Daily. 90 capsule 3   • fenofibrate (TRICOR) 145 MG tablet Take 1 tablet by mouth Daily. 90 tablet 3   • fenofibrate (TRICOR) 145 MG tablet Take 1 tablet by mouth daily 90 tablet 2   • fenofibrate (TRICOR) 145 MG tablet Take 1 tablet by mouth once daily 90 tablet 2   • ibuprofen (ADVIL,MOTRIN) 800 MG tablet Take 1 tablet  by mouth 3 times per day with food or milk as needed 270 tablet 2   • ibuprofen (ADVIL,MOTRIN) 800 MG tablet Take 1 tablet by mouth 3 times per day with food or milk as needed 270 tablet 2   • imatinib (GLEEVEC) 400 MG chemo tablet Take 1 tablet by mouth Daily. 30 tablet 5   • Insulin Glargine (BASAGLAR KWIKPEN) 100 UNIT/ML injection pen Inject 90 units under the skin once daily. 30 mL 4   • Insulin Glargine (Lantus SoloStar) 100 UNIT/ML injection pen Inject 100 Units under the skin into the appropriate area as directed Daily. 18 mL 3   • Insulin Glargine (Lantus SoloStar) 100 UNIT/ML injection pen Inject 45 Units under the skin into the appropriate area as directed 2 (two) times a day. 30 mL 0   • Insulin Glargine (Lantus SoloStar) 100 UNIT/ML injection pen Inject 100 Units under the skin into the appropriate area as directed Daily. 30 mL 0   • Insulin Glargine, 2 Unit Dial, (Toujeo Max SoloStar) 300 UNIT/ML  "solution pen-injector injection Inject 60 Units under the skin into the appropriate area as directed 2 (Two) Times a Day. 36 mL 2   • Insulin Syringe-Needle U-100 (TRUEplus Insulin Syringe) 31G X 5/16\" 0.5 ML misc Use to inject 3 times a day 90 each 4   • multivitamin (THERAGRAN) tablet tablet Take  by mouth Daily.     • pantoprazole (PROTONIX) 40 MG EC tablet Take 1 tablet by mouth daily 90 tablet 2   • pantoprazole (PROTONIX) 40 MG EC tablet Take 1 tablet (40 mg) by mouth daily 90 tablet 2   • pantoprazole (PROTONIX) 40 MG EC tablet Take 1 tablet by mouth daily 90 tablet 2   • SUMAtriptan (IMITREX) 100 MG tablet Take 1 tablet by mouth 2 times per day at least 2 hours between doses as needed. 27 tablet 2   • triamcinolone (KENALOG) 0.1 % cream Apply 1 application topically to affected area daily as needed 80 g 3     No current facility-administered medications for this visit.         ASSESSMENT/PLAN:      Aric \"KRISSY\" is a 57 y.o. female contacted today regarding refills of  Gleevec 400mg chemo tablet   specialty medication(s).    Reviewed and verified with patient:       Specialty medication(s) and dose(s) confirmed: yes    Refill Questions    Flowsheet Row Most Recent Value   Changes to allergies? No   Changes to medications? No   New conditions since last clinic visit No   How does patient/caregiver feel medication is working? Very good   Financial problems or insurance changes  No   If yes, describe changes in insurance or financial issues. no   Since the previous refill, were any specialty medication doses or scheduled injections missed or delayed?  No   If yes, please provide the amount 0   Why were doses missed? n/a   Does this patient require a clinical escalation to a pharmacist? No                Medication Adherence    Adherence tools used: directed education          Follow-up: 30 day(s)     Kelly Mcnamara, Pharmacy Technician  Specialty Pharmacy Technician             "

## 2023-04-25 ENCOUNTER — OFFICE VISIT (OUTPATIENT)
Dept: ONCOLOGY | Facility: CLINIC | Age: 58
End: 2023-04-25
Payer: COMMERCIAL

## 2023-04-25 VITALS
HEART RATE: 84 BPM | TEMPERATURE: 97 F | DIASTOLIC BLOOD PRESSURE: 88 MMHG | BODY MASS INDEX: 37.93 KG/M2 | SYSTOLIC BLOOD PRESSURE: 136 MMHG | WEIGHT: 236 LBS | HEIGHT: 66 IN | OXYGEN SATURATION: 98 %

## 2023-04-25 DIAGNOSIS — D50.9 IRON DEFICIENCY ANEMIA, UNSPECIFIED IRON DEFICIENCY ANEMIA TYPE: ICD-10-CM

## 2023-04-25 DIAGNOSIS — C92.10 CML (CHRONIC MYELOCYTIC LEUKEMIA): Primary | ICD-10-CM

## 2023-04-25 RX ORDER — CHLORAL HYDRATE 500 MG
CAPSULE ORAL
COMMUNITY

## 2023-04-25 NOTE — LETTER
April 25, 2023       No Recipients    Patient: Aric Haro   YOB: 1965   Date of Visit: 4/25/2023       Dear Dr. Rose Recipients:    Thank you for referring Aric Haro to me for evaluation. Below are the relevant portions of my assessment and plan of care.    If you have questions, please do not hesitate to call me. I look forward to following Aric along with you.         Sincerely,        Evita Serrano MD        CC:   No Recipients    Evita Serrano MD  04/25/23 1403  Sign when Signing Visit  Aric Haro  [id number]  1965  4/25/2023      Referring Provider:   EILEEN Andrade    Reason for Follow Up:   1. Chronic Phase - Chronic Myelogenous Leukemia  2. Leukocytosis  3. Thrombocytosis     Chief Complaint:  CML      History of Present Illness:  Aric Haro is a very pleasant 57 y.o.  female who presents in follow up appointment for further management and treatment of chronic phase chronic myelogenous leukemia (CML).    Ms. Haro began experiencing extreme fatigue where she was sleeping multiple hours during the day when she initially began following with me in April 2017. 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 On laboratory evaluation she was found to have a total white blood cell count of 22.35 and a platelet count 470 thousand. In March 2016 she was also noted to have a leukocytosis (although not as elevated) as well as a thrombocytosis, however reports that these were likely secondary to other medical issues at the time her blood work was drawn. She denied of any significant weight loss however has been gradually losing weight since gastric sleeve procedure. She denied of any fevers or frequent infections but did note fluctuating neck lymphadenopathy as well as early satiety and taste in change. She denied of any  abnormal or spontaneous bleeding. I did obtain a BCR ABL PCR to further assess her leukocytosis and thrombocytosis and she was positive for the b2a2/b3a2 (p210) and e1a2 (p190) fusion gene transcripts consistent with a diagnosis for CML. After reviewing the toxicities of each tyrosine kinase inhibitor we decided to start Dasatinib treatment. She had a difficult time tolerating the Dasatinib as she was unable to take her PPI with this medication. Since she had to be taken off of her PPI she had worsening reflux symptoms as well as severe nausea, vomiting, dehydration, and headaches during this period. Given the toxicities she was experiencing she was taken off Dasatinib and this was changed to Gleevec treatment. Since starting Gleevac 400mg daily she has tolerated this much better without significant side effects. Side effect that she has experienced is intermittent pedal edema along with some hand swelling and muscle cramps which have been intermittant.     Treatment History:  Started Dasatinib on 05/15/17 - experienced nausea, severe reflux, headaches while on medication and had taken 9 doses. This was discontinued due to intolerability and she was thereafter started on Imatinib.   Started Imatinib on 5/26/17      Interim History:  From the start of Dasatinib and later Gleevec she has had a good response and normalization in her complete blood counts with normalization of BCR ABL PCR. She denies of any fevers, infections, lymphadenopathy, chest pain, edema, dyspnea, nausea. She experienced severe muscle cramping in November 2022 and has had to intermittently take Flexeril and reports that calcium supplement has improved symptoms. She states that with drinking Gatorade this has significantly helped with her muscle cramping. She states that she is well hydrated and keeping up with hydration. She continues to have intentional weight loss and was recently started on Jardiance and Metformin 12.5/1000mg XR and insulin is  being decreased.      The following portions of the patient's history were reviewed and updated as appropriate: allergies, current medications, past family history, past medical history, past social history, past surgical history and problem list.      No Known Allergies    Past Medical History:   Diagnosis Date   • Anemia    • Cancer     leukemia   • Diabetes mellitus    • Elevated cholesterol    • GERD (gastroesophageal reflux disease)    • Hypertension    • PONV (postoperative nausea and vomiting)    • Sleep apnea        Past Surgical History:   Procedure Laterality Date   • BLEPHAROPLASTY Bilateral 06/22/2018    Procedure: BLEPHAROPLASTY BOTH EYES UPPER EYELIDS (PT REQUESTED TERESITA WILKES;  Surgeon: Chris Haile MD;  Location: [facility];  Service: Ophthalmology   • BONE MARROW BIOPSY     • COLONOSCOPY N/A 03/27/2018    Procedure: COLONOSCOPY FOR SCREENING  CPTCODE:16344;  Surgeon: Drew Esparza III, MD;  Location: [facility];  Service: Gastroenterology   • COLONOSCOPY N/A 12/20/2022    Procedure: COLONOSCOPY;  Surgeon: Danni Naranjo MD;  Location: [facility];  Service: Gastroenterology;  Laterality: N/A;   • SINUS SURGERY     • SINUS SURGERY     • SLEEVE GASTROPLASTY         Social History     Socioeconomic History   • Marital status:    Tobacco Use   • Smoking status: Former     Packs/day: 0.25     Years: 4.00     Pack years: 1.00     Types: Cigarettes   • Smokeless tobacco: Never   Vaping Use   • Vaping Use: Never used   Substance and Sexual Activity   • Alcohol use: No   • Drug use: No   • Sexual activity: Defer   She lives with her daughter. She denies of any alcohol or illicit drug use. Previous social tobacco use more then 20 years ago.  Recent job change.       Family History   Problem Relation Age of Onset   • Anemia Mother    • Hypertension Mother    • Cancer Mother    • COPD Father    • Heart disease Father    • Breast cancer Neg Hx    Maternal Uncle - CLL  Mother -  Malignancy of [place]          Current Outpatient Medications:   •  BIOTIN PO, Take 100 mg by mouth., Disp: , Rfl:   •  celecoxib (CeleBREX) 200 MG capsule, Take 1 capsule by mouth 2 (Two) Times a Day., Disp: 180 capsule, Rfl: 3  •  colestipol (COLESTID) 1 g tablet, Take 1 tablet by mouth Daily., Disp: 30 tablet, Rfl: 0  •  cyanocobalamin 1000 MCG/ML injection, Administer 1 ml (1,000 mcg) intramuscularly once weekly, Disp: 12 mL, Rfl: 2  •  cyclobenzaprine (FLEXERIL) 10 MG tablet, Take 1/2 tablet by mouth 3 (Three) Times a Day As Needed for Muscle Spasms., Disp: 30 tablet, Rfl: 0  •  desloratadine-pseudoephedrine (CLARINEX-D 12-hour) 2.5-120 MG per tablet, take 1 tablet by mouth 2 times every day, Disp: 30 tablet, Rfl: 0  •  doxycycline (ADOXA) 100 MG tablet, Take 1 tablet (100 mg) by mouth every 12 hours for 10 days, Disp: 20 tablet, Rfl: 2  •  doxycycline (ADOXA) 100 MG tablet, Take 1 tablet (100 mg) by mouth every 12 hours for 10 days, Disp: 20 tablet, Rfl: 0  •  DULoxetine (CYMBALTA) 60 MG capsule, Take 1 capsule by mouth Daily., Disp: 90 capsule, Rfl: 3  •  DULoxetine (CYMBALTA) 60 MG capsule, Take 1 capsule by mouth Daily., Disp: 90 capsule, Rfl: 3  •  fenofibrate (TRICOR) 145 MG tablet, Take 1 tablet by mouth Daily., Disp: 90 tablet, Rfl: 3  •  fenofibrate (TRICOR) 145 MG tablet, Take 1 tablet by mouth daily, Disp: 90 tablet, Rfl: 2  •  fenofibrate (TRICOR) 145 MG tablet, Take 1 tablet by mouth once daily, Disp: 90 tablet, Rfl: 2  •  ibuprofen (ADVIL,MOTRIN) 800 MG tablet, Take 1 tablet  by mouth 3 times per day with food or milk as needed, Disp: 270 tablet, Rfl: 2  •  ibuprofen (ADVIL,MOTRIN) 800 MG tablet, Take 1 tablet by mouth 3 times per day with food or milk as needed, Disp: 270 tablet, Rfl: 2  •  imatinib (GLEEVEC) 400 MG chemo tablet, Take 1 tablet by mouth Daily., Disp: 30 tablet, Rfl: 5  •  Insulin Glargine (BASAGLAR KWIKPEN) 100 UNIT/ML injection pen, Inject 90 units under the skin once daily.,  "Disp: 30 mL, Rfl: 4  •  Insulin Glargine (Lantus SoloStar) 100 UNIT/ML injection pen, Inject 100 Units under the skin into the appropriate area as directed Daily., Disp: 18 mL, Rfl: 3  •  Insulin Glargine (Lantus SoloStar) 100 UNIT/ML injection pen, Inject 45 Units under the skin into the appropriate area as directed 2 (two) times a day., Disp: 30 mL, Rfl: 0  •  Insulin Glargine (Lantus SoloStar) 100 UNIT/ML injection pen, Inject 100 Units under the skin into the appropriate area as directed Daily., Disp: 30 mL, Rfl: 0  •  Insulin Glargine, 2 Unit Dial, (Toujeo Max SoloStar) 300 UNIT/ML solution pen-injector injection, Inject 60 Units under the skin into the appropriate area as directed 2 (Two) Times a Day., Disp: 36 mL, Rfl: 2  •  Insulin Syringe-Needle U-100 (TRUEplus Insulin Syringe) 31G X 5/16\" 0.5 ML misc, Use to inject 3 times a day, Disp: 90 each, Rfl: 4  •  multivitamin (THERAGRAN) tablet tablet, Take  by mouth Daily., Disp: , Rfl:   •  Omega-3 1000 MG capsule, Take  by mouth., Disp: , Rfl:   •  pantoprazole (PROTONIX) 40 MG EC tablet, Take 1 tablet by mouth daily, Disp: 90 tablet, Rfl: 2  •  pantoprazole (PROTONIX) 40 MG EC tablet, Take 1 tablet (40 mg) by mouth daily, Disp: 90 tablet, Rfl: 2  •  pantoprazole (PROTONIX) 40 MG EC tablet, Take 1 tablet by mouth daily, Disp: 90 tablet, Rfl: 2  •  SUMAtriptan (IMITREX) 100 MG tablet, Take 1 tablet by mouth 2 times per day at least 2 hours between doses as needed., Disp: 27 tablet, Rfl: 2  •  triamcinolone (KENALOG) 0.1 % cream, Apply 1 application topically to affected area daily as needed, Disp: 80 g, Rfl: 3  •  vitamin D3 125 MCG (5000 UT) capsule capsule, Take 1 capsule by mouth Daily., Disp: , Rfl:   Fish Oil for elevated triglycerides         Review of Systems  A comprehensive 14-point review of systems performed.  Significant findings as mentioned above.  All other systems reviewed and are negative. Positive intentional weight loss. Myalgias have " improved.        Physical Exam:  Vital Signs: These were reviewed and listed as per patient’s electronic medical chart  Vitals:    04/25/23 0944   BP: 136/88   Pulse: 84   Temp: 97 °F (36.1 °C)   SpO2: 98%     General: Awake, alert and oriented, in no distress, obese  HEENT: Head is atraumatic, normocephalic, extraocular movements full, no scleral icterus  Neck: supple, no jvd, lymphadenopathy or masses  Cardiovascular: regular rhythm, tachycardic, without murmurs, rubs or gallops  Pulmonary: non-labored, clear to auscultation bilaterally, no wheezing  Abdomen: soft, non-tender, non-distended, normal active bowel sounds present  Extremities: No clubbing, cyanosis or edema  Lymph: No cervical or supraclavicular adenopathy  Neurologic: Mental status as above, alert, awake and oriented, grossly non-focal exam  Skin: warm, dry, intact, no ecchymosis  Patient was examined on 04/25/23 and changes are reflected and noted above.            Labs / Studies:    Lab on 04/18/2023   Component Date Value   • Total Cholesterol 04/18/2023 184    • Triglycerides 04/18/2023 379 (H)    • HDL Cholesterol 04/18/2023 51    • LDL Cholesterol  04/18/2023 73    • VLDL Cholesterol 04/18/2023 60 (H)    • LDL/HDL Ratio 04/18/2023 1.12    • Hemoglobin A1C 04/18/2023 8.00 (H)    • CCP Antibodies IgG/IgA 04/18/2023 8    • C-Reactive Protein 04/18/2023 1.62 (H)    • T3, Total 04/18/2023 113.0    • Free T4 04/18/2023 1.44    • TSH 04/18/2023 1.490    • Vitamin B-12 04/18/2023 560    • 25 Hydroxy, Vitamin D 04/18/2023 21.9 (L)    • Rheumatoid Factor Quanti* 04/18/2023 <10.0    • EMMANUEL Direct 04/18/2023 Negative    • Sed Rate 04/18/2023 30    • Glucose 04/18/2023 201 (H)    • BUN 04/18/2023 17    • Creatinine 04/18/2023 0.71    • Sodium 04/18/2023 139    • Potassium 04/18/2023 4.2    • Chloride 04/18/2023 103    • CO2 04/18/2023 23.0    • Calcium 04/18/2023 9.3    • Total Protein 04/18/2023 7.0    • Albumin 04/18/2023 4.2    • ALT (SGPT) 04/18/2023 21     • AST (SGOT) 04/18/2023 21    • Alkaline Phosphatase 04/18/2023 65    • Total Bilirubin 04/18/2023 0.3    • Globulin 04/18/2023 2.8    • A/G Ratio 04/18/2023 1.5    • BUN/Creatinine Ratio 04/18/2023 23.9    • Anion Gap 04/18/2023 13.0    • eGFR 04/18/2023 99.3    Lab on 04/18/2023   Component Date Value   • Glucose 04/18/2023 208 (H)    • BUN 04/18/2023 17    • Creatinine 04/18/2023 0.80    • Sodium 04/18/2023 143    • Potassium 04/18/2023 4.4    • Chloride 04/18/2023 106    • CO2 04/18/2023 26.6    • Calcium 04/18/2023 9.3    • Total Protein 04/18/2023 7.0    • Albumin 04/18/2023 4.1    • ALT (SGPT) 04/18/2023 19    • AST (SGOT) 04/18/2023 18    • Alkaline Phosphatase 04/18/2023 65    • Total Bilirubin 04/18/2023 0.4    • Globulin 04/18/2023 2.9    • A/G Ratio 04/18/2023 1.4    • BUN/Creatinine Ratio 04/18/2023 21.3    • Anion Gap 04/18/2023 10.4    • eGFR 04/18/2023 86.1    • LDH 04/18/2023 173    • Uric Acid 04/18/2023 6.0 (H)    • e13a2 (b2a2) Transcript 04/18/2023 Comment    • e14a2 (b3a2) Transcript 04/18/2023 Comment    • E1A2 transcript 04/18/2023 Comment    • Interpretation 04/18/2023 Negative    • Director Review 04/18/2023 Comment    • Background: 04/18/2023 Comment    • Methodology: 04/18/2023 Comment    • Iron 04/18/2023 71    • Iron Saturation 04/18/2023 15 (L)    • Transferrin 04/18/2023 309    • TIBC 04/18/2023 460    • Ferritin 04/18/2023 64.77    • WBC 04/18/2023 7.29    • RBC 04/18/2023 4.27    • Hemoglobin 04/18/2023 13.3    • Hematocrit 04/18/2023 40.3    • MCV 04/18/2023 94.4    • MCH 04/18/2023 31.1    • MCHC 04/18/2023 33.0    • RDW 04/18/2023 13.2    • RDW-SD 04/18/2023 45.1    • MPV 04/18/2023 10.4    • Platelets 04/18/2023 258    • Neutrophil % 04/18/2023 59.9    • Lymphocyte % 04/18/2023 27.0    • Monocyte % 04/18/2023 8.6    • Eosinophil % 04/18/2023 3.2    • Basophil % 04/18/2023 1.0    • Immature Grans % 04/18/2023 0.3    • Neutrophils, Absolute 04/18/2023 4.37    • Lymphocytes,  Absolute 04/18/2023 1.97    • Monocytes, Absolute 04/18/2023 0.63    • Eosinophils, Absolute 04/18/2023 0.23    • Basophils, Absolute 04/18/2023 0.07    • Immature Grans, Absolute 04/18/2023 0.02    • nRBC 04/18/2023 0.0    Lab on 01/05/2023   Component Date Value   • Glucose 01/05/2023 206 (H)    • BUN 01/05/2023 18    • Creatinine 01/05/2023 0.88    • Sodium 01/05/2023 139    • Potassium 01/05/2023 4.4    • Chloride 01/05/2023 101    • CO2 01/05/2023 27.0    • Calcium 01/05/2023 9.6    • Total Protein 01/05/2023 6.9    • Albumin 01/05/2023 4.2    • ALT (SGPT) 01/05/2023 21    • AST (SGOT) 01/05/2023 17    • Alkaline Phosphatase 01/05/2023 61    • Total Bilirubin 01/05/2023 0.4    • Globulin 01/05/2023 2.7    • A/G Ratio 01/05/2023 1.6    • BUN/Creatinine Ratio 01/05/2023 20.5    • Anion Gap 01/05/2023 11.0    • eGFR 01/05/2023 76.8    • LDH 01/05/2023 184    • Uric Acid 01/05/2023 6.0 (H)    • e13a2 (b2a2) Transcript 01/05/2023 Comment    • e14a2 (b3a2) Transcript 01/05/2023 Comment    • E1A2 transcript 01/05/2023 Comment    • Interpretation 01/05/2023 Negative    • Director Review 01/05/2023 Comment    • Background: 01/05/2023 Comment    • Methodology: 01/05/2023 Comment    • Iron 01/05/2023 76    • Iron Saturation 01/05/2023 16 (L)    • Transferrin 01/05/2023 322    • TIBC 01/05/2023 480    • Ferritin 01/05/2023 72.93    • Magnesium 01/05/2023 1.8    • WBC 01/05/2023 6.27    • RBC 01/05/2023 4.32    • Hemoglobin 01/05/2023 13.3    • Hematocrit 01/05/2023 39.7    • MCV 01/05/2023 91.9    • MCH 01/05/2023 30.8    • MCHC 01/05/2023 33.5    • RDW 01/05/2023 13.5    • RDW-SD 01/05/2023 46.3    • MPV 01/05/2023 10.5    • Platelets 01/05/2023 263    • Neutrophil % 01/05/2023 58.3    • Lymphocyte % 01/05/2023 29.0    • Monocyte % 01/05/2023 8.5    • Eosinophil % 01/05/2023 3.0    • Basophil % 01/05/2023 1.0    • Immature Grans % 01/05/2023 0.2    • Neutrophils, Absolute 01/05/2023 3.66    • Lymphocytes, Absolute  2023 1.82    • Monocytes, Absolute 2023 0.53    • Eosinophils, Absolute 2023 0.19    • Basophils, Absolute 2023 0.06    • Immature Grans, Absolute 2023 0.01    • nRBC 2023 0.0    Admission on 2022, Discharged on 2022   Component Date Value   • Reference Lab Report 2022                      Value:[facility]  [address] [place] [address]  Phone: [phone] or [phone]  Fax: [phone]  Rinku Sorenson M.D., Medical Director    PATIENT NAME                           LABORATORY NO.  MAXIMILIAN SADLER                        [id number]  [id number]                         AGE              SEX  SSN           CLIENT REF #  [facility] RICARDO              57      1965  F    [id number]   [id number]    [address]                     REQUESTING M.D.     ATTENDING [place] [address]                   INES DIMITRY  DATE COLLECTED      DATE RECEIVED      DATE REPORTED  2022    DIAGNOSIS:  A.   ASCENDING COLON POLYP:  Adenomatous polyp (tubular adenoma)  Negative for high-grade dysplasia  B.   RECTAL POLYP:  Adenomatous polyp (tubular adenoma)  Negative for high-grade dysplasia    CRISTOPHER    CLINICAL HISTORY:  Encounter for screening for malignant neoplasm of colon,                           history of colon  polyps    SPECIMENS RECEIVED:  A.  ASCENDING COLON POLYP  B.  RECTAL POLYP    MICROSCOPIC DESCRIPTION:  Tissue blocks are prepared and slides are examined microscopically on all  specimens. See diagnosis for details.    Professional interpretation rendered by Bib Sen M.D., F.C.A.P. at [facility], [address] [place] [address].    GROSS DESCRIPTION:  A.  Pieces: Multiple.  Aggregate dimensions: 0.8 x 0.8 x 0.1 cm.  Cassettes:  One, entirely submitted.  BW  B.     Specimen consists of multiple portions of tan soft tissue measuring  1.2  x 1.0 x 0.3 cm in aggregate.  The specimen is filtered and submitted  entirely in 1 cassette.    REVIEWED, DIAGNOSED AND ELECTRONICALLY  SIGNED BY:    Bib Sen M.D., [email or web address].  CPT CODES:  [id number]              IMAGIN17: US ABDOMEN COMPLETE: Visualized pancreas is unremarkable. The imaged portion of the abdominal aorta is nondilated. The liver is homogeneous. There is no intrahepatic ductal dilatation or focal hepatic mass. The imaged portion of the hepatic vessels and inferior vena cava are patent. The gallbladder has been removed. The common bile duct is normal, measuring 5.7 mm. The kidneys demonstrate no evidence of hydronephrosis or solid renal mass. The spleen is homogeneous and measures 13 cm. IMPRESSION: Spleen measures 13 cm and is homogeneous.           PATHOLOGY:    05/15/17: Bone Marrow Biopsy & Aspirate                     17: BCR ABL PCR        17: BCR ABL PCR        17: BCR ABL PCR        18: BCR ABL PCR:                                                                                                         18:                                2020:          12/3/20           03/10/22:             Assessment & Plan :  Aric Haro is a very pleasant 56 y.o.  female who presents in follow up appointment for further management and treatment of chronic phase chronic myelogenous leukemia.    1. Chronic Myelogenous Leukemia - CML (chronic phase)    - Peripheral smear concerning for an underlying myeloproliferative disorder. Her primary provider obtained a flow cytometry which did not reveal any significant abnormalities. I obtained a quantitative BCR ABL PCR positive for the b2a2/b3a2 (p210) and e1a2 (p190) fusion gene transcripts consistent with a diagnosis for CML. Bone marrow biopsy performed on initial diagnosis 17 and prior to starting Dasatinib treatment with results above and consistent with chronic phase CML.   - To further evaluate  for a myeloproliferative disorder I did also assess for JAK2 (V617F and exon 12)/MPL/CALR mutations which were negative. ESR, CRP, EMMANUEL, Rheumatoid factor, as well as an acute hepatitis panel and HIV test which were all negative. No iron deficiency seen. Abdominal ultrasound significant for a spleen size 13.9cm.   - For treatment of her CML she was started on Dasatinib 100mg oral daily. She does have a history significant for pancreatitis, therefore, I held on using Nilotinib. Patient however was unable to tolerate Dasatinib secondary to worsening reflux while being off of her PPI, and experiencing severe nausea, vomiting, and dehydration. She was then started on Imatinib 400mg daily and is overall tolerating this well. I have previously advised her of Ibuprofen and Tylenol use for her myalgias. She does also experience intermittent lower extremity edema (which she denies today). I did order baseline Echo which showed an intact EF. I also repeated echocardiogram to further evaluate and repeat echo showed an intact EF 61-65% which is stable.  - She has had a complete hematological response, and BCR ABL PCR has normalized since start of Gleevac. This will need to continue to be monitored every three months to assess ongoing molecular response, today's level is <0.0032%. The blood counts have now stabilized therefore will continue to monitor every 3 months given stability.   - Today we spent a lot of time discussing the possibility of stopping Gleevac given her muscle cramping and desire to stop Gleevac. There have been several studies that have shown that TKIs can be discontinued safely in 40% to 65% of patients who achieve a deep molecular response. She understands however there are no clinical decision tools that can accurately predict which patient can sustain MMR after TKI cessation, and most guidelines recommend monthly BCR-ABL1 transcript level monitoring for the first 12 months after stopping TKI to detect early  loss of major molecular response (MMR). In a retrospective analysis of data from chronic-phase CML patients who received TKI therapy for a minimum of 3 years of TKI therapy and at least 2 years of sustained molecular response. Of the 130 patients who stopped their TKIs, 55% remained off therapy at 12 months. Loss of MMR resulted in restarting the TKI in 87% of molecular relapses during the first 12 months. This was discussed at length with Ms. Haro and understands the potential risks of coming off therapy including relapse and possibly not responding to treatment on restart. At present she would like to further discuss with her family and will inform our clinic should she decide to come off of Gleevac therapy for close monitoring with once monthly labs but right now would like to stay on Gleevac until her next appointment. She has been on Gleevac since May 2017.     2. Healthcare Maintenance  - She underwent a colonoscopy February 2018 with Dr. Esparza who recommended repeat colonoscopy in 3-5 years due to polyps that were removed. She underwent colonoscopy with Dr. Keller in December and had two polyps removed and another that was removed piecemeal.  - Recommended repeat colonoscopy in six months (June 2023).   - She has also received her Hepatitis A vaccine.     3. Iron deficiency - no longer on iron   - Iron has been discontinued and iron studies have been normal.     4. Gleevac associated myalgias  - She was started on calcium supplementation. Magnesium level normal. Flexeril as needed.    5. ACO Quality measures  -  Aric Haro does not use tobacco products.  -  Aric Haro did not receive 2022 flu vaccine. Declined.  -  Aric Haro reports a pain score of 0.  Given her pain assessment as noted, treatment options were discussed and the following options were decided upon as a follow-up plan to address the patient's pain: continuation of current treatment plan for pain.  -  Current outpatient and  discharge medications have been reconciled for the patient.  Reviewed by: Evita Serrano MD      I will have the patient return for follow up visit in three months with labs (CBC, CMP, LDH, Uric acid, BCR ABL PCR) one to two weeks prior. Lab orders have been placed. If she decides to come off Gleevac will need monthly labs to monitor closely. She understands that should she have any questions or concerns prior to her appointment she should give us a call at any time and I would be happy to see her sooner. It was a pleasure to see this patient in clinic today, thank you for allowing me to participate in the care of this patient.    A total of 42 minutes were spent coordinating this patient’s care in clinic today; more than 50% of this time was with the patient, reviewing their medical history and counseling on the current treatment and followup plan. All questions were answered to their satisfaction.      Evita Serrano MD  04/25/23  14:03 EDT

## 2023-04-25 NOTE — PROGRESS NOTES
Aric Haro  [id number]  1965  4/25/2023      Referring Provider:   EILEEN Andrade    Reason for Follow Up:   1. Chronic Phase - Chronic Myelogenous Leukemia  2. Leukocytosis  3. Thrombocytosis     Chief Complaint:  CML      History of Present Illness:  Aric Haro is a very pleasant 57 y.o.  female who presents in follow up appointment for further management and treatment of chronic phase chronic myelogenous leukemia (CML).    Ms. Haro began experiencing extreme fatigue where she was sleeping multiple hours during the day when she initially began following with me in April 2017. 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 On laboratory evaluation she was found to have a total white blood cell count of 22.35 and a platelet count 470 thousand. In March 2016 she was also noted to have a leukocytosis (although not as elevated) as well as a thrombocytosis, however reports that these were likely secondary to other medical issues at the time her blood work was drawn. She denied of any significant weight loss however has been gradually losing weight since gastric sleeve procedure. She denied of any fevers or frequent infections but did note fluctuating neck lymphadenopathy as well as early satiety and taste in change. She denied of any abnormal or spontaneous bleeding. I did obtain a BCR ABL PCR to further assess her leukocytosis and thrombocytosis and she was positive for the b2a2/b3a2 (p210) and e1a2 (p190) fusion gene transcripts consistent with a diagnosis for CML. After reviewing the toxicities of each tyrosine kinase inhibitor we decided to start Dasatinib treatment. She had a difficult time tolerating the Dasatinib as she was unable to take her PPI with this medication. Since she had to be taken off of her PPI she had worsening reflux symptoms as well as  severe nausea, vomiting, dehydration, and headaches during this period. Given the toxicities she was experiencing she was taken off Dasatinib and this was changed to Gleevec treatment. Since starting Gleevac 400mg daily she has tolerated this much better without significant side effects. Side effect that she has experienced is intermittent pedal edema along with some hand swelling and muscle cramps which have been intermittant.     Treatment History:  Started Dasatinib on 05/15/17 - experienced nausea, severe reflux, headaches while on medication and had taken 9 doses. This was discontinued due to intolerability and she was thereafter started on Imatinib.   Started Imatinib on 5/26/17      Interim History:  From the start of Dasatinib and later Gleevec she has had a good response and normalization in her complete blood counts with normalization of BCR ABL PCR. She denies of any fevers, infections, lymphadenopathy, chest pain, edema, dyspnea, nausea. She experienced severe muscle cramping in November 2022 and has had to intermittently take Flexeril and reports that calcium supplement has improved symptoms. She states that with drinking Gatorade this has significantly helped with her muscle cramping. She states that she is well hydrated and keeping up with hydration. She continues to have intentional weight loss and was recently started on Jardiance and Metformin 12.5/1000mg XR and insulin is being decreased.      The following portions of the patient's history were reviewed and updated as appropriate: allergies, current medications, past family history, past medical history, past social history, past surgical history and problem list.      No Known Allergies    Past Medical History:   Diagnosis Date   • Anemia    • Cancer     leukemia   • Diabetes mellitus    • Elevated cholesterol    • GERD (gastroesophageal reflux disease)    • Hypertension    • PONV (postoperative nausea and vomiting)    • Sleep apnea        Past  Surgical History:   Procedure Laterality Date   • BLEPHAROPLASTY Bilateral 06/22/2018    Procedure: BLEPHAROPLASTY BOTH EYES UPPER EYELIDS (PT REQUESTED TERESITA WILKES;  Surgeon: Chris Haile MD;  Location: [facility] OR;  Service: Ophthalmology   • BONE MARROW BIOPSY     • COLONOSCOPY N/A 03/27/2018    Procedure: COLONOSCOPY FOR SCREENING  CPTCODE:62858;  Surgeon: Drew Esparza III, MD;  Location: [facility] OR;  Service: Gastroenterology   • COLONOSCOPY N/A 12/20/2022    Procedure: COLONOSCOPY;  Surgeon: Danni Naranjo MD;  Location: [facility] OR;  Service: Gastroenterology;  Laterality: N/A;   • SINUS SURGERY     • SINUS SURGERY     • SLEEVE GASTROPLASTY         Social History     Socioeconomic History   • Marital status:    Tobacco Use   • Smoking status: Former     Packs/day: 0.25     Years: 4.00     Pack years: 1.00     Types: Cigarettes   • Smokeless tobacco: Never   Vaping Use   • Vaping Use: Never used   Substance and Sexual Activity   • Alcohol use: No   • Drug use: No   • Sexual activity: Defer   She lives with her daughter. She denies of any alcohol or illicit drug use. Previous social tobacco use more then 20 years ago.  Recent job change.       Family History   Problem Relation Age of Onset   • Anemia Mother    • Hypertension Mother    • Cancer Mother    • COPD Father    • Heart disease Father    • Breast cancer Neg Hx    Maternal Uncle - CLL  Mother - Malignancy of [place]          Current Outpatient Medications:   •  BIOTIN PO, Take 100 mg by mouth., Disp: , Rfl:   •  celecoxib (CeleBREX) 200 MG capsule, Take 1 capsule by mouth 2 (Two) Times a Day., Disp: 180 capsule, Rfl: 3  •  colestipol (COLESTID) 1 g tablet, Take 1 tablet by mouth Daily., Disp: 30 tablet, Rfl: 0  •  cyanocobalamin 1000 MCG/ML injection, Administer 1 ml (1,000 mcg) intramuscularly once weekly, Disp: 12 mL, Rfl: 2  •  cyclobenzaprine (FLEXERIL) 10 MG tablet, Take 1/2 tablet by mouth 3 (Three) Times a  Day As Needed for Muscle Spasms., Disp: 30 tablet, Rfl: 0  •  desloratadine-pseudoephedrine (CLARINEX-D 12-hour) 2.5-120 MG per tablet, take 1 tablet by mouth 2 times every day, Disp: 30 tablet, Rfl: 0  •  doxycycline (ADOXA) 100 MG tablet, Take 1 tablet (100 mg) by mouth every 12 hours for 10 days, Disp: 20 tablet, Rfl: 2  •  doxycycline (ADOXA) 100 MG tablet, Take 1 tablet (100 mg) by mouth every 12 hours for 10 days, Disp: 20 tablet, Rfl: 0  •  DULoxetine (CYMBALTA) 60 MG capsule, Take 1 capsule by mouth Daily., Disp: 90 capsule, Rfl: 3  •  DULoxetine (CYMBALTA) 60 MG capsule, Take 1 capsule by mouth Daily., Disp: 90 capsule, Rfl: 3  •  fenofibrate (TRICOR) 145 MG tablet, Take 1 tablet by mouth Daily., Disp: 90 tablet, Rfl: 3  •  fenofibrate (TRICOR) 145 MG tablet, Take 1 tablet by mouth daily, Disp: 90 tablet, Rfl: 2  •  fenofibrate (TRICOR) 145 MG tablet, Take 1 tablet by mouth once daily, Disp: 90 tablet, Rfl: 2  •  ibuprofen (ADVIL,MOTRIN) 800 MG tablet, Take 1 tablet  by mouth 3 times per day with food or milk as needed, Disp: 270 tablet, Rfl: 2  •  ibuprofen (ADVIL,MOTRIN) 800 MG tablet, Take 1 tablet by mouth 3 times per day with food or milk as needed, Disp: 270 tablet, Rfl: 2  •  imatinib (GLEEVEC) 400 MG chemo tablet, Take 1 tablet by mouth Daily., Disp: 30 tablet, Rfl: 5  •  Insulin Glargine (BASAGLAR KWIKPEN) 100 UNIT/ML injection pen, Inject 90 units under the skin once daily., Disp: 30 mL, Rfl: 4  •  Insulin Glargine (Lantus SoloStar) 100 UNIT/ML injection pen, Inject 100 Units under the skin into the appropriate area as directed Daily., Disp: 18 mL, Rfl: 3  •  Insulin Glargine (Lantus SoloStar) 100 UNIT/ML injection pen, Inject 45 Units under the skin into the appropriate area as directed 2 (two) times a day., Disp: 30 mL, Rfl: 0  •  Insulin Glargine (Lantus SoloStar) 100 UNIT/ML injection pen, Inject 100 Units under the skin into the appropriate area as directed Daily., Disp: 30 mL, Rfl: 0  •   "Insulin Glargine, 2 Unit Dial, (Toujeo Max SoloStar) 300 UNIT/ML solution pen-injector injection, Inject 60 Units under the skin into the appropriate area as directed 2 (Two) Times a Day., Disp: 36 mL, Rfl: 2  •  Insulin Syringe-Needle U-100 (TRUEplus Insulin Syringe) 31G X 5/16\" 0.5 ML misc, Use to inject 3 times a day, Disp: 90 each, Rfl: 4  •  multivitamin (THERAGRAN) tablet tablet, Take  by mouth Daily., Disp: , Rfl:   •  Omega-3 1000 MG capsule, Take  by mouth., Disp: , Rfl:   •  pantoprazole (PROTONIX) 40 MG EC tablet, Take 1 tablet by mouth daily, Disp: 90 tablet, Rfl: 2  •  pantoprazole (PROTONIX) 40 MG EC tablet, Take 1 tablet (40 mg) by mouth daily, Disp: 90 tablet, Rfl: 2  •  pantoprazole (PROTONIX) 40 MG EC tablet, Take 1 tablet by mouth daily, Disp: 90 tablet, Rfl: 2  •  SUMAtriptan (IMITREX) 100 MG tablet, Take 1 tablet by mouth 2 times per day at least 2 hours between doses as needed., Disp: 27 tablet, Rfl: 2  •  triamcinolone (KENALOG) 0.1 % cream, Apply 1 application topically to affected area daily as needed, Disp: 80 g, Rfl: 3  •  vitamin D3 125 MCG (5000 UT) capsule capsule, Take 1 capsule by mouth Daily., Disp: , Rfl:   Fish Oil for elevated triglycerides         Review of Systems  A comprehensive 14-point review of systems performed.  Significant findings as mentioned above.  All other systems reviewed and are negative. Positive intentional weight loss. Myalgias have improved.        Physical Exam:  Vital Signs: These were reviewed and listed as per patient’s electronic medical chart  Vitals:    04/25/23 0944   BP: 136/88   Pulse: 84   Temp: 97 °F (36.1 °C)   SpO2: 98%     General: Awake, alert and oriented, in no distress, obese  HEENT: Head is atraumatic, normocephalic, extraocular movements full, no scleral icterus  Neck: supple, no jvd, lymphadenopathy or masses  Cardiovascular: regular rhythm, tachycardic, without murmurs, rubs or gallops  Pulmonary: non-labored, clear to auscultation " bilaterally, no wheezing  Abdomen: soft, non-tender, non-distended, normal active bowel sounds present  Extremities: No clubbing, cyanosis or edema  Lymph: No cervical or supraclavicular adenopathy  Neurologic: Mental status as above, alert, awake and oriented, grossly non-focal exam  Skin: warm, dry, intact, no ecchymosis  Patient was examined on 04/25/23 and changes are reflected and noted above.            Labs / Studies:    Lab on 04/18/2023   Component Date Value   • Total Cholesterol 04/18/2023 184    • Triglycerides 04/18/2023 379 (H)    • HDL Cholesterol 04/18/2023 51    • LDL Cholesterol  04/18/2023 73    • VLDL Cholesterol 04/18/2023 60 (H)    • LDL/HDL Ratio 04/18/2023 1.12    • Hemoglobin A1C 04/18/2023 8.00 (H)    • CCP Antibodies IgG/IgA 04/18/2023 8    • C-Reactive Protein 04/18/2023 1.62 (H)    • T3, Total 04/18/2023 113.0    • Free T4 04/18/2023 1.44    • TSH 04/18/2023 1.490    • Vitamin B-12 04/18/2023 560    • 25 Hydroxy, Vitamin D 04/18/2023 21.9 (L)    • Rheumatoid Factor Quanti* 04/18/2023 <10.0    • EMMANUEL Direct 04/18/2023 Negative    • Sed Rate 04/18/2023 30    • Glucose 04/18/2023 201 (H)    • BUN 04/18/2023 17    • Creatinine 04/18/2023 0.71    • Sodium 04/18/2023 139    • Potassium 04/18/2023 4.2    • Chloride 04/18/2023 103    • CO2 04/18/2023 23.0    • Calcium 04/18/2023 9.3    • Total Protein 04/18/2023 7.0    • Albumin 04/18/2023 4.2    • ALT (SGPT) 04/18/2023 21    • AST (SGOT) 04/18/2023 21    • Alkaline Phosphatase 04/18/2023 65    • Total Bilirubin 04/18/2023 0.3    • Globulin 04/18/2023 2.8    • A/G Ratio 04/18/2023 1.5    • BUN/Creatinine Ratio 04/18/2023 23.9    • Anion Gap 04/18/2023 13.0    • eGFR 04/18/2023 99.3    Lab on 04/18/2023   Component Date Value   • Glucose 04/18/2023 208 (H)    • BUN 04/18/2023 17    • Creatinine 04/18/2023 0.80    • Sodium 04/18/2023 143    • Potassium 04/18/2023 4.4    • Chloride 04/18/2023 106    • CO2 04/18/2023 26.6    • Calcium 04/18/2023 9.3     • Total Protein 04/18/2023 7.0    • Albumin 04/18/2023 4.1    • ALT (SGPT) 04/18/2023 19    • AST (SGOT) 04/18/2023 18    • Alkaline Phosphatase 04/18/2023 65    • Total Bilirubin 04/18/2023 0.4    • Globulin 04/18/2023 2.9    • A/G Ratio 04/18/2023 1.4    • BUN/Creatinine Ratio 04/18/2023 21.3    • Anion Gap 04/18/2023 10.4    • eGFR 04/18/2023 86.1    • LDH 04/18/2023 173    • Uric Acid 04/18/2023 6.0 (H)    • e13a2 (b2a2) Transcript 04/18/2023 Comment    • e14a2 (b3a2) Transcript 04/18/2023 Comment    • E1A2 transcript 04/18/2023 Comment    • Interpretation 04/18/2023 Negative    • Director Review 04/18/2023 Comment    • Background: 04/18/2023 Comment    • Methodology: 04/18/2023 Comment    • Iron 04/18/2023 71    • Iron Saturation 04/18/2023 15 (L)    • Transferrin 04/18/2023 309    • TIBC 04/18/2023 460    • Ferritin 04/18/2023 64.77    • WBC 04/18/2023 7.29    • RBC 04/18/2023 4.27    • Hemoglobin 04/18/2023 13.3    • Hematocrit 04/18/2023 40.3    • MCV 04/18/2023 94.4    • MCH 04/18/2023 31.1    • MCHC 04/18/2023 33.0    • RDW 04/18/2023 13.2    • RDW-SD 04/18/2023 45.1    • MPV 04/18/2023 10.4    • Platelets 04/18/2023 258    • Neutrophil % 04/18/2023 59.9    • Lymphocyte % 04/18/2023 27.0    • Monocyte % 04/18/2023 8.6    • Eosinophil % 04/18/2023 3.2    • Basophil % 04/18/2023 1.0    • Immature Grans % 04/18/2023 0.3    • Neutrophils, Absolute 04/18/2023 4.37    • Lymphocytes, Absolute 04/18/2023 1.97    • Monocytes, Absolute 04/18/2023 0.63    • Eosinophils, Absolute 04/18/2023 0.23    • Basophils, Absolute 04/18/2023 0.07    • Immature Grans, Absolute 04/18/2023 0.02    • nRBC 04/18/2023 0.0    Lab on 01/05/2023   Component Date Value   • Glucose 01/05/2023 206 (H)    • BUN 01/05/2023 18    • Creatinine 01/05/2023 0.88    • Sodium 01/05/2023 139    • Potassium 01/05/2023 4.4    • Chloride 01/05/2023 101    • CO2 01/05/2023 27.0    • Calcium 01/05/2023 9.6    • Total Protein 01/05/2023 6.9    • Albumin  01/05/2023 4.2    • ALT (SGPT) 01/05/2023 21    • AST (SGOT) 01/05/2023 17    • Alkaline Phosphatase 01/05/2023 61    • Total Bilirubin 01/05/2023 0.4    • Globulin 01/05/2023 2.7    • A/G Ratio 01/05/2023 1.6    • BUN/Creatinine Ratio 01/05/2023 20.5    • Anion Gap 01/05/2023 11.0    • eGFR 01/05/2023 76.8    • LDH 01/05/2023 184    • Uric Acid 01/05/2023 6.0 (H)    • e13a2 (b2a2) Transcript 01/05/2023 Comment    • e14a2 (b3a2) Transcript 01/05/2023 Comment    • E1A2 transcript 01/05/2023 Comment    • Interpretation 01/05/2023 Negative    • Director Review 01/05/2023 Comment    • Background: 01/05/2023 Comment    • Methodology: 01/05/2023 Comment    • Iron 01/05/2023 76    • Iron Saturation 01/05/2023 16 (L)    • Transferrin 01/05/2023 322    • TIBC 01/05/2023 480    • Ferritin 01/05/2023 72.93    • Magnesium 01/05/2023 1.8    • WBC 01/05/2023 6.27    • RBC 01/05/2023 4.32    • Hemoglobin 01/05/2023 13.3    • Hematocrit 01/05/2023 39.7    • MCV 01/05/2023 91.9    • MCH 01/05/2023 30.8    • MCHC 01/05/2023 33.5    • RDW 01/05/2023 13.5    • RDW-SD 01/05/2023 46.3    • MPV 01/05/2023 10.5    • Platelets 01/05/2023 263    • Neutrophil % 01/05/2023 58.3    • Lymphocyte % 01/05/2023 29.0    • Monocyte % 01/05/2023 8.5    • Eosinophil % 01/05/2023 3.0    • Basophil % 01/05/2023 1.0    • Immature Grans % 01/05/2023 0.2    • Neutrophils, Absolute 01/05/2023 3.66    • Lymphocytes, Absolute 01/05/2023 1.82    • Monocytes, Absolute 01/05/2023 0.53    • Eosinophils, Absolute 01/05/2023 0.19    • Basophils, Absolute 01/05/2023 0.06    • Immature Grans, Absolute 01/05/2023 0.01    • nRBC 01/05/2023 0.0    Admission on 12/20/2022, Discharged on 12/20/2022   Component Date Value   • Reference Lab Report 12/20/2022                      Value:[facility]  [address] [place] [address]  Phone: [phone] or [phone]  Fax: [phone]  Rinku Sorenson M.D., Medical Director    PATIENT NAME                            LABORATORY [id number]  [address]IMILIAN CHOI                        [id number]  [id number]                         AGE              SEX  SSN           CLIENT REF #  [facility]              57      1965  F    [id number]   [id number]    [address]                     REQUESTING M.D.     ATTENDING M.D.     COPY TOJOAQUIN LLANOS [id number]                   DIMITRY CHACON  DATE COLLECTED      DATE RECEIVED      DATE REPORTED  2022    DIAGNOSIS:  A.   ASCENDING COLON POLYP:  Adenomatous polyp (tubular adenoma)  Negative for high-grade dysplasia  B.   RECTAL POLYP:  Adenomatous polyp (tubular adenoma)  Negative for high-grade dysplasia    CRISTOPHER    CLINICAL HISTORY:  Encounter for screening for malignant neoplasm of colon,                           history of colon  polyps    SPECIMENS RECEIVED:  A.  ASCENDING COLON POLYP  B.  RECTAL POLYP    MICROSCOPIC DESCRIPTION:  Tissue blocks are prepared and slides are examined microscopically on all  specimens. See diagnosis for details.    Professional interpretation rendered by Bib Sen M.D., F.C.A.P. at [facility] [employer], [address] [place] [address].    GROSS DESCRIPTION:  A.  Pieces: Multiple.  Aggregate dimensions: 0.8 x 0.8 x 0.1 cm.  Cassettes:  One, entirely submitted.  BW  B.     Specimen consists of multiple portions of tan soft tissue measuring 1.2  x 1.0 x 0.3 cm in aggregate.  The specimen is filtered and submitted  entirely in 1 cassette.    REVIEWED, DIAGNOSED AND ELECTRONICALLY  SIGNED BY:    Bib Sen M.D., F.C.A.P.  CPT CODES:  [id number]              IMAGIN17: US ABDOMEN COMPLETE: Visualized pancreas is unremarkable. The imaged portion of the abdominal aorta is nondilated. The liver is homogeneous. There is no intrahepatic ductal dilatation or focal hepatic mass. The imaged portion of the hepatic vessels and inferior vena cava are patent. The gallbladder has  been removed. The common bile duct is normal, measuring 5.7 mm. The kidneys demonstrate no evidence of hydronephrosis or solid renal mass. The spleen is homogeneous and measures 13 cm. IMPRESSION: Spleen measures 13 cm and is homogeneous.           PATHOLOGY:    05/15/17: Bone Marrow Biopsy & Aspirate                     04/25/17: BCR ABL PCR        08/30/17: BCR ABL PCR        12/13/17: BCR ABL PCR        03/20/18: BCR ABL PCR:                                                                                                         06/20/18:                                08/2020:          12/3/20           03/10/22:             Assessment & Plan :  Aric Haro is a very pleasant 56 y.o.  female who presents in follow up appointment for further management and treatment of chronic phase chronic myelogenous leukemia.    1. Chronic Myelogenous Leukemia - CML (chronic phase)    - Peripheral smear concerning for an underlying myeloproliferative disorder. Her primary provider obtained a flow cytometry which did not reveal any significant abnormalities. I obtained a quantitative BCR ABL PCR positive for the b2a2/b3a2 (p210) and e1a2 (p190) fusion gene transcripts consistent with a diagnosis for CML. Bone marrow biopsy performed on initial diagnosis 5/17/17 and prior to starting Dasatinib treatment with results above and consistent with chronic phase CML.   - To further evaluate for a myeloproliferative disorder I did also assess for JAK2 (V617F and exon 12)/MPL/CALR mutations which were negative. ESR, CRP, EMMANUEL, Rheumatoid factor, as well as an acute hepatitis panel and HIV test which were all negative. No iron deficiency seen. Abdominal ultrasound significant for a spleen size 13.9cm.   - For treatment of her CML she was started on Dasatinib 100mg oral daily. She does have a history significant for pancreatitis, therefore, I held on using Nilotinib. Patient however was unable to tolerate Dasatinib secondary to  worsening reflux while being off of her PPI, and experiencing severe nausea, vomiting, and dehydration. She was then started on Imatinib 400mg daily and is overall tolerating this well. I have previously advised her of Ibuprofen and Tylenol use for her myalgias. She does also experience intermittent lower extremity edema (which she denies today). I did order baseline Echo which showed an intact EF. I also repeated echocardiogram to further evaluate and repeat echo showed an intact EF 61-65% which is stable.  - She has had a complete hematological response, and BCR ABL PCR has normalized since start of Gleevac. This will need to continue to be monitored every three months to assess ongoing molecular response, today's level is <0.0032%. The blood counts have now stabilized therefore will continue to monitor every 3 months given stability.   - Today we spent a lot of time discussing the possibility of stopping Gleevac given her muscle cramping and desire to stop Gleevac. There have been several studies that have shown that TKIs can be discontinued safely in 40% to 65% of patients who achieve a deep molecular response. She understands however there are no clinical decision tools that can accurately predict which patient can sustain MMR after TKI cessation, and most guidelines recommend monthly BCR-ABL1 transcript level monitoring for the first 12 months after stopping TKI to detect early loss of major molecular response (MMR). In a retrospective analysis of data from chronic-phase CML patients who received TKI therapy for a minimum of 3 years of TKI therapy and at least 2 years of sustained molecular response. Of the 130 patients who stopped their TKIs, 55% remained off therapy at 12 months. Loss of MMR resulted in restarting the TKI in 87% of molecular relapses during the first 12 months. This was discussed at length with Ms. Haro and understands the potential risks of coming off therapy including relapse and possibly  not responding to treatment on restart. At present she would like to further discuss with her family and will inform our clinic should she decide to come off of Gleevac therapy for close monitoring with once monthly labs but right now would like to stay on Gleevac until her next appointment. She has been on Gleevac since May 2017.     2. Healthcare Maintenance  - She underwent a colonoscopy February 2018 with Dr. Esparza who recommended repeat colonoscopy in 3-5 years due to polyps that were removed. She underwent colonoscopy with Dr. Keller in December and had two polyps removed and another that was removed piecemeal.  - Recommended repeat colonoscopy in six months (June 2023).   - She has also received her Hepatitis A vaccine.     3. Iron deficiency - no longer on iron   - Iron has been discontinued and iron studies have been normal.     4. Gleevac associated myalgias  - She was started on calcium supplementation. Magnesium level normal. Flexeril as needed.    5. ACO Quality measures  -  Aric Haro does not use tobacco products.  -  Aric Haro did not receive 2022 flu vaccine. Declined.  -  Aric Haro reports a pain score of 0.  Given her pain assessment as noted, treatment options were discussed and the following options were decided upon as a follow-up plan to address the patient's pain: continuation of current treatment plan for pain.  -  Current outpatient and discharge medications have been reconciled for the patient.  Reviewed by: Evita Serrano MD      I will have the patient return for follow up visit in three months with labs (CBC, CMP, LDH, Uric acid, BCR ABL PCR) one to two weeks prior. Lab orders have been placed. If she decides to come off Gleevac will need monthly labs to monitor closely. She understands that should she have any questions or concerns prior to her appointment she should give us a call at any time and I would be happy to see her sooner. It was a pleasure to see this  patient in clinic today, thank you for allowing me to participate in the care of this patient.    A total of 42 minutes were spent coordinating this patient’s care in clinic today; more than 50% of this time was with the patient, reviewing their medical history and counseling on the current treatment and followup plan. All questions were answered to their satisfaction.      Evita Serrano MD  04/25/23  14:03 EDT

## 2023-05-02 LAB
APTT HEX PL PPP: 3 SEC
APTT IMM NP PPP: NORMAL SEC
APTT PPP 1:1 SALINE: NORMAL SEC
APTT PPP: 24.7 SEC
B2 GLYCOPROT1 IGA SER-ACNC: <10 SAU
B2 GLYCOPROT1 IGG SER-ACNC: <10 SGU
B2 GLYCOPROT1 IGM SER-ACNC: <10 SMU
CARDIOLIPIN IGG SER IA-ACNC: <10 GPL
CARDIOLIPIN IGM SER IA-ACNC: <10 MPL
CONFIRM APTT: 0 SEC
CONFIRM DRVVT: NORMAL SEC
DRVVT SCREEN TO CONFIRM RATIO: NORMAL RATIO
INR PPP: 0.9 RATIO
LABORATORY COMMENT REPORT: NORMAL
PROTHROMBIN TIME: 9.7 SEC
SCREEN DRVVT: 32.3 SEC
THROMBIN TIME: 16.1 SEC

## 2023-07-25 ENCOUNTER — LAB (OUTPATIENT)
Dept: ONCOLOGY | Facility: CLINIC | Age: 58
End: 2023-07-25
Payer: COMMERCIAL

## 2023-07-25 DIAGNOSIS — E55.9 VITAMIN D INSUFFICIENCY: ICD-10-CM

## 2023-07-25 DIAGNOSIS — I51.9 MYXEDEMA HEART DISEASE: ICD-10-CM

## 2023-07-25 DIAGNOSIS — D50.9 IRON DEFICIENCY ANEMIA, UNSPECIFIED IRON DEFICIENCY ANEMIA TYPE: ICD-10-CM

## 2023-07-25 DIAGNOSIS — R73.9 BLOOD GLUCOSE ELEVATED: ICD-10-CM

## 2023-07-25 DIAGNOSIS — E03.9 MYXEDEMA HEART DISEASE: ICD-10-CM

## 2023-07-25 DIAGNOSIS — C92.10 CML (CHRONIC MYELOCYTIC LEUKEMIA): ICD-10-CM

## 2023-07-25 DIAGNOSIS — E78.5 HYPERLIPIDEMIA, UNSPECIFIED HYPERLIPIDEMIA TYPE: Primary | ICD-10-CM

## 2023-07-25 DIAGNOSIS — E53.9 DEFICIENCY OF VITAMIN B: ICD-10-CM

## 2023-07-25 LAB
25(OH)D3 SERPL-MCNC: 48 NG/ML (ref 30–100)
ALBUMIN SERPL-MCNC: 4.5 G/DL (ref 3.5–5.2)
ALBUMIN/GLOB SERPL: 1.5 G/DL
ALP SERPL-CCNC: 76 U/L (ref 39–117)
ALT SERPL W P-5'-P-CCNC: 17 U/L (ref 1–33)
ANION GAP SERPL CALCULATED.3IONS-SCNC: 11.1 MMOL/L (ref 5–15)
AST SERPL-CCNC: 15 U/L (ref 1–32)
BASOPHILS # BLD AUTO: 0.04 10*3/MM3 (ref 0–0.2)
BASOPHILS NFR BLD AUTO: 0.5 % (ref 0–1.5)
BILIRUB SERPL-MCNC: 0.4 MG/DL (ref 0–1.2)
BUN SERPL-MCNC: 16 MG/DL (ref 6–20)
BUN/CREAT SERPL: 21.1 (ref 7–25)
CALCIUM SPEC-SCNC: 9.8 MG/DL (ref 8.6–10.5)
CHLORIDE SERPL-SCNC: 104 MMOL/L (ref 98–107)
CHOLEST SERPL-MCNC: 197 MG/DL (ref 0–200)
CO2 SERPL-SCNC: 27.9 MMOL/L (ref 22–29)
CREAT SERPL-MCNC: 0.76 MG/DL (ref 0.57–1)
DEPRECATED RDW RBC AUTO: 41.7 FL (ref 37–54)
EGFRCR SERPLBLD CKD-EPI 2021: 91.5 ML/MIN/1.73
EOSINOPHIL # BLD AUTO: 0.04 10*3/MM3 (ref 0–0.4)
EOSINOPHIL NFR BLD AUTO: 0.5 % (ref 0.3–6.2)
ERYTHROCYTE [DISTWIDTH] IN BLOOD BY AUTOMATED COUNT: 12.4 % (ref 12.3–15.4)
GLOBULIN UR ELPH-MCNC: 3 GM/DL
GLUCOSE SERPL-MCNC: 172 MG/DL (ref 65–99)
HBA1C MFR BLD: 8.3 % (ref 4.8–5.6)
HCT VFR BLD AUTO: 43.7 % (ref 34–46.6)
HDLC SERPL-MCNC: 66 MG/DL (ref 40–60)
HGB BLD-MCNC: 14.4 G/DL (ref 12–15.9)
IMM GRANULOCYTES # BLD AUTO: 0.05 10*3/MM3 (ref 0–0.05)
IMM GRANULOCYTES NFR BLD AUTO: 0.6 % (ref 0–0.5)
LDH SERPL-CCNC: 167 U/L (ref 135–214)
LDLC SERPL CALC-MCNC: 110 MG/DL (ref 0–100)
LDLC/HDLC SERPL: 1.62 {RATIO}
LYMPHOCYTES # BLD AUTO: 1.77 10*3/MM3 (ref 0.7–3.1)
LYMPHOCYTES NFR BLD AUTO: 20.4 % (ref 19.6–45.3)
MCH RBC QN AUTO: 30 PG (ref 26.6–33)
MCHC RBC AUTO-ENTMCNC: 33 G/DL (ref 31.5–35.7)
MCV RBC AUTO: 91 FL (ref 79–97)
MONOCYTES # BLD AUTO: 0.73 10*3/MM3 (ref 0.1–0.9)
MONOCYTES NFR BLD AUTO: 8.4 % (ref 5–12)
NEUTROPHILS NFR BLD AUTO: 6.04 10*3/MM3 (ref 1.7–7)
NEUTROPHILS NFR BLD AUTO: 69.6 % (ref 42.7–76)
NRBC BLD AUTO-RTO: 0 /100 WBC (ref 0–0.2)
PLATELET # BLD AUTO: 294 10*3/MM3 (ref 140–450)
PMV BLD AUTO: 10.9 FL (ref 6–12)
POTASSIUM SERPL-SCNC: 4.8 MMOL/L (ref 3.5–5.2)
PROT SERPL-MCNC: 7.5 G/DL (ref 6–8.5)
RBC # BLD AUTO: 4.8 10*6/MM3 (ref 3.77–5.28)
SODIUM SERPL-SCNC: 143 MMOL/L (ref 136–145)
T3FREE SERPL-MCNC: 2.28 PG/ML (ref 2–4.4)
T4 FREE SERPL-MCNC: 1.51 NG/DL (ref 0.93–1.7)
TRIGL SERPL-MCNC: 120 MG/DL (ref 0–150)
TSH SERPL DL<=0.05 MIU/L-ACNC: 0.62 UIU/ML (ref 0.27–4.2)
URATE SERPL-MCNC: 5.8 MG/DL (ref 2.4–5.7)
VIT B12 BLD-MCNC: 571 PG/ML (ref 211–946)
VLDLC SERPL-MCNC: 21 MG/DL (ref 5–40)
WBC NRBC COR # BLD: 8.67 10*3/MM3 (ref 3.4–10.8)

## 2023-07-25 PROCEDURE — 80053 COMPREHEN METABOLIC PANEL: CPT | Performed by: INTERNAL MEDICINE

## 2023-07-25 PROCEDURE — 84481 FREE ASSAY (FT-3): CPT | Performed by: NURSE PRACTITIONER

## 2023-07-25 PROCEDURE — 84550 ASSAY OF BLOOD/URIC ACID: CPT | Performed by: INTERNAL MEDICINE

## 2023-07-25 PROCEDURE — 84439 ASSAY OF FREE THYROXINE: CPT | Performed by: NURSE PRACTITIONER

## 2023-07-25 PROCEDURE — 80061 LIPID PANEL: CPT | Performed by: NURSE PRACTITIONER

## 2023-07-25 PROCEDURE — 83036 HEMOGLOBIN GLYCOSYLATED A1C: CPT | Performed by: NURSE PRACTITIONER

## 2023-07-25 PROCEDURE — 84443 ASSAY THYROID STIM HORMONE: CPT | Performed by: NURSE PRACTITIONER

## 2023-07-25 PROCEDURE — 82607 VITAMIN B-12: CPT | Performed by: NURSE PRACTITIONER

## 2023-07-25 PROCEDURE — 83615 LACTATE (LD) (LDH) ENZYME: CPT | Performed by: INTERNAL MEDICINE

## 2023-07-25 PROCEDURE — 85025 COMPLETE CBC W/AUTO DIFF WBC: CPT | Performed by: INTERNAL MEDICINE

## 2023-07-25 PROCEDURE — 82306 VITAMIN D 25 HYDROXY: CPT | Performed by: NURSE PRACTITIONER

## 2023-07-25 PROCEDURE — 36415 COLL VENOUS BLD VENIPUNCTURE: CPT | Performed by: INTERNAL MEDICINE

## 2023-07-25 NOTE — PROGRESS NOTES
Venipuncture Blood Specimen Collection  Venipuncture performed in left arm by Ishaan Dunham MA with good hemostasis. Patient tolerated the procedure well without complications.   07/25/23   Isahan Dunham MA

## 2023-08-08 NOTE — PROGRESS NOTES
Aric Haro  [id number]  1965  8/11/2023      Referring Provider:   EILEEN Andrade    Reason for Follow Up:   1. Chronic Phase - Chronic Myelogenous Leukemia  2. Leukocytosis  3. Thrombocytosis     Chief Complaint:  CML      History of Present Illness:  Aric Haro is a very pleasant 57 y.o.  female who presents in follow up appointment for further management and treatment of chronic phase chronic myelogenous leukemia (CML).    Ms. Haro began experiencing extreme fatigue where she was sleeping multiple hours during the day when she initially began following with me in April 2017. 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 On laboratory evaluation she was found to have a total white blood cell count of 22.35 and a platelet count 470 thousand. In March 2016 she was also noted to have a leukocytosis (although not as elevated) as well as a thrombocytosis, however reports that these were likely secondary to other medical issues at the time her blood work was drawn. She denied of any significant weight loss however has been gradually losing weight since gastric sleeve procedure. She denied of any fevers or frequent infections but did note fluctuating neck lymphadenopathy as well as early satiety and taste in change. She denied of any abnormal or spontaneous bleeding. I did obtain a BCR ABL PCR to further assess her leukocytosis and thrombocytosis and she was positive for the b2a2/b3a2 (p210) and e1a2 (p190) fusion gene transcripts consistent with a diagnosis for CML. After reviewing the toxicities of each tyrosine kinase inhibitor we decided to start Dasatinib treatment. She had a difficult time tolerating the Dasatinib as she was unable to take her PPI with this medication. Since she had to be taken off of her PPI she had worsening reflux symptoms as well as  severe nausea, vomiting, dehydration, and headaches during this period. Given the toxicities she was experiencing she was taken off Dasatinib and this was changed to Gleevec treatment. Since starting Gleevac 400mg daily she has tolerated this much better without significant side effects. She has had a good response and normalization in her complete blood counts with normalization of BCR ABL PCR. Side effect that she has experienced is intermittent pedal edema along with some hand swelling and muscle cramps which have been intermittant. She experienced severe muscle cramping in November 2022 and has had to intermittently take Flexeril and reports that calcium supplement has improved symptoms. She states that with drinking Gatorade this has significantly helped with her muscle cramping.     Treatment History:  Started Dasatinib on 05/15/17 - experienced nausea, severe reflux, headaches while on medication and had taken 9 doses. This was discontinued due to intolerability and she was thereafter started on Imatinib.   Started Imatinib on 5/26/17. She discontinued on June 30th 2023.       Interim History:  Since her last visit she stopped Gleevac 6/30/23 and notes that she has been having more normal bowel movement without diarrhea. However she continues to have muscle cramping that has not resolved since discontinuation of Gleevac. She continues to have intentional weight loss and was recently started on Jardiance and Metformin 12.5/1000mg XR and insulin is being decreased.        The following portions of the patient's history were reviewed and updated as appropriate: allergies, current medications, past family history, past medical history, past social history, past surgical history and problem list.      No Known Allergies        Past Medical History:   Diagnosis Date    Anemia     Cancer     leukemia    Diabetes mellitus     Elevated cholesterol     GERD (gastroesophageal reflux disease)     Hypertension     PONV  (postoperative nausea and vomiting)     Sleep apnea          Past Surgical History:   Procedure Laterality Date    BLEPHAROPLASTY Bilateral 06/22/2018    Procedure: BLEPHAROPLASTY BOTH EYES UPPER EYELIDS (PT REQUESTED TERESITA WILKES;  Surgeon: Chris Haile MD;  Location: [facility] OR;  Service: Ophthalmology    BONE MARROW BIOPSY      COLONOSCOPY N/A 03/27/2018    Procedure: COLONOSCOPY FOR SCREENING  CPTCODE:29096;  Surgeon: Drew Esparza III, MD;  Location: [facility] OR;  Service: Gastroenterology    COLONOSCOPY N/A 12/20/2022    Procedure: COLONOSCOPY;  Surgeon: Danni Naranjo MD;  Location: [facility] OR;  Service: Gastroenterology;  Laterality: N/A;    SINUS SURGERY      SINUS SURGERY      SLEEVE GASTROPLASTY           Social History     Socioeconomic History    Marital status:    Tobacco Use    Smoking status: Former     Packs/day: 0.25     Years: 4.00     Pack years: 1.00     Types: Cigarettes    Smokeless tobacco: Never   Vaping Use    Vaping Use: Never used   Substance and Sexual Activity    Alcohol use: No    Drug use: No    Sexual activity: Defer   She lives with her daughter. She denies of any alcohol or illicit drug use. Previous social tobacco use more then 20 years ago.  Recent job change.         Family History   Problem Relation Age of Onset    Anemia Mother     Hypertension Mother     Cancer Mother     COPD Father     Heart disease Father     Breast cancer Neg Hx    Maternal Uncle - CLL  Mother - Malignancy of [place]          Current Outpatient Medications:     BIOTIN PO, Take 100 mg by mouth., Disp: , Rfl:     celecoxib (CeleBREX) 200 MG capsule, Take 1 capsule by mouth 2 (Two) Times a Day., Disp: 180 capsule, Rfl: 3    colestipol (COLESTID) 1 g tablet, Take 1 tablet by mouth Daily., Disp: 30 tablet, Rfl: 0    cyanocobalamin 1000 MCG/ML injection, Administer 1 ml (1,000 mcg) intramuscularly once weekly, Disp: 12 mL, Rfl: 2    cyclobenzaprine (FLEXERIL) 10 MG tablet,  Take 1/2 tablet by mouth 3 (Three) Times a Day As Needed for Muscle Spasms., Disp: 30 tablet, Rfl: 0    desloratadine-pseudoephedrine (CLARINEX-D 12-hour) 2.5-120 MG per tablet, take 1 tablet by mouth 2 times every day, Disp: 30 tablet, Rfl: 0    doxycycline (ADOXA) 100 MG tablet, Take 1 tablet (100 mg) by mouth every 12 hours for 10 days, Disp: 20 tablet, Rfl: 2    doxycycline (ADOXA) 100 MG tablet, Take 1 tablet (100 mg) by mouth every 12 hours for 10 days, Disp: 20 tablet, Rfl: 0    DULoxetine (CYMBALTA) 60 MG capsule, Take 1 capsule by mouth Daily., Disp: 90 capsule, Rfl: 3    DULoxetine (CYMBALTA) 60 MG capsule, Take 1 capsule by mouth Daily., Disp: 90 capsule, Rfl: 3    fenofibrate (TRICOR) 145 MG tablet, Take 1 tablet by mouth Daily., Disp: 90 tablet, Rfl: 3    fenofibrate (TRICOR) 145 MG tablet, Take 1 tablet by mouth daily, Disp: 90 tablet, Rfl: 2    fenofibrate (TRICOR) 145 MG tablet, Take 1 tablet by mouth once daily, Disp: 90 tablet, Rfl: 2    ibuprofen (ADVIL,MOTRIN) 800 MG tablet, Take 1 tablet  by mouth 3 times per day with food or milk as needed, Disp: 270 tablet, Rfl: 2    ibuprofen (ADVIL,MOTRIN) 800 MG tablet, Take 1 tablet by mouth 3 times per day with food or milk as needed, Disp: 270 tablet, Rfl: 2    Insulin Degludec (Tresiba FlexTouch) 200 UNIT/ML solution pen-injector pen injection, Inject 30 Units under the skin into the appropriate area as directed Daily., Disp: 9 mL, Rfl: 3    Insulin Glargine (BASAGLAR KWIKPEN) 100 UNIT/ML injection pen, Inject 90 units under the skin once daily., Disp: 30 mL, Rfl: 4    Insulin Glargine (Lantus SoloStar) 100 UNIT/ML injection pen, Inject 100 Units under the skin into the appropriate area as directed Daily., Disp: 18 mL, Rfl: 3    Insulin Glargine (Lantus SoloStar) 100 UNIT/ML injection pen, Inject 45 Units under the skin into the appropriate area as directed 2 (two) times a day., Disp: 30 mL, Rfl: 0    Insulin Glargine (Lantus SoloStar) 100 UNIT/ML  "injection pen, Inject 100 Units under the skin into the appropriate area as directed Daily., Disp: 30 mL, Rfl: 0    Insulin Glargine, 2 Unit Dial, (Toujeo Marvel SoloStar) 300 UNIT/ML solution pen-injector injection, Inject 60 Units under the skin into the appropriate area as directed 2 (Two) Times a Day., Disp: 36 mL, Rfl: 2    Insulin Syringe-Needle U-100 (TRUEplus Insulin Syringe) 31G X 5/16\" 0.5 ML misc, Use to inject 3 times a day, Disp: 90 each, Rfl: 4    multivitamin (THERAGRAN) tablet tablet, Take  by mouth Daily., Disp: , Rfl:     Omega-3 1000 MG capsule, Take  by mouth., Disp: , Rfl:     pantoprazole (PROTONIX) 40 MG EC tablet, Take 1 tablet by mouth daily, Disp: 90 tablet, Rfl: 2    pantoprazole (PROTONIX) 40 MG EC tablet, Take 1 tablet (40 mg) by mouth daily, Disp: 90 tablet, Rfl: 2    pantoprazole (PROTONIX) 40 MG EC tablet, Take 1 tablet (40 mg) by mouth daily, Disp: 90 tablet, Rfl: 2    predniSONE (DELTASONE) 10 MG tablet, Take 1 tablet by mouth Daily as directed, Disp: 30 tablet, Rfl: 0    SUMAtriptan (IMITREX) 100 MG tablet, Take 1 tablet by mouth 2 times per day at least 2 hours between doses as needed., Disp: 27 tablet, Rfl: 2    triamcinolone (KENALOG) 0.1 % cream, Apply 1 application topically to affected area daily as needed, Disp: 80 g, Rfl: 3    vitamin D3 125 MCG (5000 UT) capsule capsule, Take 1 capsule by mouth Daily., Disp: , Rfl:     imatinib (GLEEVEC) 400 MG chemo tablet, Take 1 tablet by mouth Daily. (Patient not taking: Reported on 8/11/2023), Disp: 30 tablet, Rfl: 5  Fish Oil for elevated triglycerides         Review of Systems  A comprehensive 14-point review of systems performed.  Significant findings as mentioned above.  All other systems reviewed and are negative. Positive intentional weight loss. Ongoing muscle cramps.         Physical Exam:  Vital Signs: These were reviewed and listed as per patient's electronic medical chart  Vitals:    08/11/23 0910   BP: 124/72   Pulse: 109 "   Resp: 18   Temp: 97.3 øF (36.3 øC)   SpO2: 98%   General: Awake, alert and oriented, in no distress, elevated BMI  HEENT: Head is atraumatic, normocephalic, extraocular movements full, no scleral icterus  Neck: supple, no jvd, lymphadenopathy or masses  Cardiovascular: regular rhythm, tachycardic, without murmurs, rubs or gallops  Pulmonary: non-labored, clear to auscultation bilaterally, no wheezing  Abdomen: soft, non-tender, non-distended, normal active bowel sounds present  Extremities: No clubbing, cyanosis or edema  Lymph: No cervical or supraclavicular adenopathy  Neurologic: Mental status as above, alert, awake and oriented, grossly non-focal exam  Skin: warm, dry, intact, no ecchymosis  Patient was examined on 23 and changes are reflected and noted above.        PROCEDURES:  22 COLONOSCOPY          IMAGIN17: US ABDOMEN COMPLETE:   Visualized pancreas is unremarkable. The imaged portion of the abdominal aorta is nondilated. The liver is homogeneous. There is no intrahepatic ductal dilatation or focal hepatic mass. The imaged portion of the hepatic vessels and inferior vena cava are patent. The gallbladder has been removed. The common bile duct is normal, measuring 5.7 mm. The kidneys demonstrate no evidence of hydronephrosis or solid renal mass. The spleen is homogeneous and measures 13 cm.   IMPRESSION: Spleen measures 13 cm and is homogeneous.     Mammo screening digital tomosynthesis bilateral w CAD (2017 15:38)   FINDINGS:  The breasts are heterogeneously dense, which may obscure small masses. The fibroglandular pattern appears stable.  There is no mass, worrisome microcalcifications, or architectural distortion to suggest development of malignancy.  IMPRESSION:No findings suspicious for malignancy.   BI-RADS CATEGORY:  1, NEGATIVE        LABS/STUDIES:  Lab on 2023   Component Date Value    Glucose 2023 172 (H)     BUN 2023 16     Creatinine 2023 0.76      Sodium 07/25/2023 143     Potassium 07/25/2023 4.8     Chloride 07/25/2023 104     CO2 07/25/2023 27.9     Calcium 07/25/2023 9.8     Total Protein 07/25/2023 7.5     Albumin 07/25/2023 4.5     ALT (SGPT) 07/25/2023 17     AST (SGOT) 07/25/2023 15     Alkaline Phosphatase 07/25/2023 76     Total Bilirubin 07/25/2023 0.4     Globulin 07/25/2023 3.0     A/G Ratio 07/25/2023 1.5     BUN/Creatinine Ratio 07/25/2023 21.1     Anion Gap 07/25/2023 11.1     eGFR 07/25/2023 91.5     LDH 07/25/2023 167     Uric Acid 07/25/2023 5.8 (H)     e13a2 (b2a2) Transcript 07/25/2023 Comment     e14a2 (b3a2) Transcript 07/25/2023 Comment     E1A2 transcript 07/25/2023 Comment     Interpretation 07/25/2023 Negative     Director Review 07/25/2023 Comment     Background: 07/25/2023 Comment     Methodology: 07/25/2023 Comment     Total Cholesterol 07/25/2023 197     Triglycerides 07/25/2023 120     HDL Cholesterol 07/25/2023 66 (H)     LDL Cholesterol  07/25/2023 110 (H)     VLDL Cholesterol 07/25/2023 21     LDL/HDL Ratio 07/25/2023 1.62     Hemoglobin A1C 07/25/2023 8.30 (H)     TSH 07/25/2023 0.622     T3, Free 07/25/2023 2.28     Free T4 07/25/2023 1.51     25 Hydroxy, Vitamin D 07/25/2023 48.0     Vitamin B-12 07/25/2023 571     WBC 07/25/2023 8.67     RBC 07/25/2023 4.80     Hemoglobin 07/25/2023 14.4     Hematocrit 07/25/2023 43.7     MCV 07/25/2023 91.0     MCH 07/25/2023 30.0     MCHC 07/25/2023 33.0     RDW 07/25/2023 12.4     RDW-SD 07/25/2023 41.7     MPV 07/25/2023 10.9     Platelets 07/25/2023 294     Neutrophil % 07/25/2023 69.6     Lymphocyte % 07/25/2023 20.4     Monocyte % 07/25/2023 8.4     Eosinophil % 07/25/2023 0.5     Basophil % 07/25/2023 0.5     Immature Grans % 07/25/2023 0.6 (H)     Neutrophils, Absolute 07/25/2023 6.04     Lymphocytes, Absolute 07/25/2023 1.77     Monocytes, Absolute 07/25/2023 0.73     Eosinophils, Absolute 07/25/2023 0.04     Basophils, Absolute 07/25/2023 0.04     Immature Grans, Absolute  07/25/2023 0.05     nRBC 07/25/2023 0.0    Lab on 04/18/2023   Component Date Value    Total Cholesterol 04/18/2023 184     Triglycerides 04/18/2023 379 (H)     HDL Cholesterol 04/18/2023 51     LDL Cholesterol  04/18/2023 73     VLDL Cholesterol 04/18/2023 60 (H)     LDL/HDL Ratio 04/18/2023 1.12     Hemoglobin A1C 04/18/2023 8.00 (H)     CCP Antibodies IgG/IgA 04/18/2023 8     C-Reactive Protein 04/18/2023 1.62 (H)     T3, Total 04/18/2023 113.0     Free T4 04/18/2023 1.44     TSH 04/18/2023 1.490     Vitamin B-12 04/18/2023 560     25 Hydroxy, Vitamin D 04/18/2023 21.9 (L)     Rheumatoid Factor Quanti* 04/18/2023 <10.0     EMMANUEL Direct 04/18/2023 Negative     Protime 04/18/2023 9.7     INR 04/18/2023 0.9     aPTT 04/18/2023 24.7     APTT 1:1 NP 04/18/2023 TNP     APTT 1:1 Saline 04/18/2023 TNP     Thrombin Time 04/18/2023 16.1     DRVVT Screen Seconds 04/18/2023 32.3     DRVVT Confirm Seconds 04/18/2023 TNP     DRVVT/Confirm Ratio 04/18/2023 TNP     Hex Phosph Neut Test 04/18/2023 3     Platelet Neutralization 04/18/2023 0.0     Anticardiolipin IgG 04/18/2023 <10     Anticardiolipin IgM 04/18/2023 <10     Beta-2 Glyco 1 IgG 04/18/2023 <10     Beta-2 Glyco 1 IgM 04/18/2023 <10     Beta-2 Glyco 1 IgA 04/18/2023 <10     LAC Interpretation 04/18/2023 Comment     Sed Rate 04/18/2023 30     Glucose 04/18/2023 201 (H)     BUN 04/18/2023 17     Creatinine 04/18/2023 0.71     Sodium 04/18/2023 139     Potassium 04/18/2023 4.2     Chloride 04/18/2023 103     CO2 04/18/2023 23.0     Calcium 04/18/2023 9.3     Total Protein 04/18/2023 7.0     Albumin 04/18/2023 4.2     ALT (SGPT) 04/18/2023 21     AST (SGOT) 04/18/2023 21     Alkaline Phosphatase 04/18/2023 65     Total Bilirubin 04/18/2023 0.3     Globulin 04/18/2023 2.8     A/G Ratio 04/18/2023 1.5     BUN/Creatinine Ratio 04/18/2023 23.9     Anion Gap 04/18/2023 13.0     eGFR 04/18/2023 99.3    Lab on 04/18/2023   Component Date Value    Glucose 04/18/2023 208 (H)     BUN  04/18/2023 17     Creatinine 04/18/2023 0.80     Sodium 04/18/2023 143     Potassium 04/18/2023 4.4     Chloride 04/18/2023 106     CO2 04/18/2023 26.6     Calcium 04/18/2023 9.3     Total Protein 04/18/2023 7.0     Albumin 04/18/2023 4.1     ALT (SGPT) 04/18/2023 19     AST (SGOT) 04/18/2023 18     Alkaline Phosphatase 04/18/2023 65     Total Bilirubin 04/18/2023 0.4     Globulin 04/18/2023 2.9     A/G Ratio 04/18/2023 1.4     BUN/Creatinine Ratio 04/18/2023 21.3     Anion Gap 04/18/2023 10.4     eGFR 04/18/2023 86.1     LDH 04/18/2023 173     Uric Acid 04/18/2023 6.0 (H)     e13a2 (b2a2) Transcript 04/18/2023 Comment     e14a2 (b3a2) Transcript 04/18/2023 Comment     E1A2 transcript 04/18/2023 Comment     Interpretation 04/18/2023 Negative     Director Review 04/18/2023 Comment     Background: 04/18/2023 Comment     Methodology: 04/18/2023 Comment     Iron 04/18/2023 71     Iron Saturation (TSAT) 04/18/2023 15 (L)     Transferrin 04/18/2023 309     TIBC 04/18/2023 460     Ferritin 04/18/2023 64.77     WBC 04/18/2023 7.29     RBC 04/18/2023 4.27     Hemoglobin 04/18/2023 13.3     Hematocrit 04/18/2023 40.3     MCV 04/18/2023 94.4     MCH 04/18/2023 31.1     MCHC 04/18/2023 33.0     RDW 04/18/2023 13.2     RDW-SD 04/18/2023 45.1     MPV 04/18/2023 10.4     Platelets 04/18/2023 258     Neutrophil % 04/18/2023 59.9     Lymphocyte % 04/18/2023 27.0     Monocyte % 04/18/2023 8.6     Eosinophil % 04/18/2023 3.2     Basophil % 04/18/2023 1.0     Immature Grans % 04/18/2023 0.3     Neutrophils, Absolute 04/18/2023 4.37     Lymphocytes, Absolute 04/18/2023 1.97     Monocytes, Absolute 04/18/2023 0.63     Eosinophils, Absolute 04/18/2023 0.23     Basophils, Absolute 04/18/2023 0.07     Immature Grans, Absolute 04/18/2023 0.02     nRBC 04/18/2023 0.0          PATHOLOGY:  05/15/17: Bone Marrow Biopsy & Aspirate                     04/25/17 08/30/17 12/13/17 03/20/18                                                                                                  06/20/18                                08/17/2020              03/10/22       6/8/22      10/3/22      12/20/22      1/5/23      4/18/23      7/25/23          Assessment & Plan :  Aric Haro is a very pleasant 57 y.o.  female who presents in follow up appointment for further management and treatment of chronic phase chronic myelogenous leukemia.    1. Chronic Myelogenous Leukemia - CML (chronic phase)    - Peripheral smear concerning for an underlying myeloproliferative disorder. Her primary provider obtained a flow cytometry which did not reveal any significant abnormalities. I obtained a quantitative BCR ABL PCR positive for the b2a2/b3a2 (p210) and e1a2 (p190) fusion gene transcripts consistent with a diagnosis for CML. Bone marrow biopsy performed on initial diagnosis 5/17/17 and prior to starting Dasatinib treatment with results above and consistent with chronic phase CML.   - To further evaluate for a myeloproliferative disorder I did also assess for JAK2 (V617F and exon 12)/MPL/CALR mutations which were negative. ESR, CRP, EMMANUEL, Rheumatoid factor, as well as an acute hepatitis panel and HIV test which were all negative. No iron deficiency seen. Abdominal ultrasound significant for a spleen size 13.9cm.   - For treatment of her CML she was started on Dasatinib 100mg oral daily. She does have a history significant for pancreatitis, therefore, I held on using Nilotinib. Patient however was unable to tolerate Dasatinib secondary to worsening reflux while being off of her PPI, and experiencing severe nausea, vomiting, and dehydration. She was then started on Imatinib 400mg daily and is overall tolerating this well. I have previously advised her of Ibuprofen and Tylenol use for her myalgias. She does also experience intermittent lower extremity edema (which she denies today). I did order baseline Echo which showed an intact EF. I also repeated  echocardiogram to further evaluate and repeat echo showed an intact EF 61-65% which is stable.  - She has had a complete hematological response, and BCR ABL PCR has normalized since start of Gleevac. This will need to continue to be monitored every three months to assess ongoing molecular response, today's level is <0.0032%. The blood counts have now stabilized therefore will continue to monitor every 3 months given stability.   - During her previous visit we spent a lot of time discussing the possibility of stopping Gleevac given her muscle cramping and desire to stop Gleevac. There have been several studies that have shown that TKIs can be discontinued safely in 40% to 65% of patients who achieve a deep molecular response. She understands however there are no clinical decision tools that can accurately predict which patient can sustain MMR after TKI cessation, and most guidelines recommend monthly BCR-ABL1 transcript level monitoring for the first 12 months after stopping TKI to detect early loss of major molecular response (MMR). In a retrospective analysis of data from chronic-phase CML patients who received TKI therapy for a minimum of 3 years of TKI therapy and at least 2 years of sustained molecular response. Of the 130 patients who stopped their TKIs, 55% remained off therapy at 12 months. Loss of MMR resulted in restarting the TKI in 87% of molecular relapses during the first 12 months. This was discussed at length with Ms. Haro and understands the potential risks of coming off therapy including relapse and possibly not responding to treatment on restart.   - Since her last visit she decided to stop her Gleevac on June 30th 2023. Unfortunately her myalgias did not improve. She will need CBC and BCR ABL PCR monitoring once monthly. She had been on Gleevac since May 2017.     2. Healthcare Maintenance  - She underwent a colonoscopy February 2018 with Dr. Esparza who recommended repeat colonoscopy in 3-5 years  due to polyps that were removed. She underwent colonoscopy with Dr. Keller in December and had two polyps removed and another that was removed piecemeal.  - Recommended repeat colonoscopy in six months (June 2023) which she has not yet had, will re-refer.   - She has also received her Hepatitis A vaccine.     3. Iron deficiency - no longer on iron   - Iron has been discontinued and iron studies have been normal.     4. Myalgias  - This was initially felt to be due to Gleevac and she was started on calcium supplementation. Magnesium level normal. She was also prescribed Flexeril as needed.  - However, this has not improved since discontinuation of Gleevac. She was advised on hydration and to further discuss with her PCP whether other medications could be contributing.     5. ACO Quality measures  -  Aric Haro does not use tobacco products.  -  Aric Haro did not receive 2022 flu vaccine. Declined.  -  Aric Haro reports a pain score of 0.  Given her pain assessment as noted, treatment options were discussed and the following options were decided upon as a follow-up plan to address the patient's pain: continuation of current treatment plan for pain.  -  Current outpatient and discharge medications have been reconciled for the patient.  Reviewed by: Evita Serrano MD      I will have the patient return for labs monthly (CBC, BCR ABL PCR) and follow up visit in three months with labs (CBC, CMP, LDH, Uric acid, BCR ABL PCR) one to two weeks prior. Lab orders have been placed. If she decides to come off Gleevac will need monthly labs to monitor closely. She understands that should she have any questions or concerns prior to her appointment she should give us a call at any time and I would be happy to see her sooner. It was a pleasure to see this patient in clinic today, thank you for allowing me to participate in the care of this patient.    A total of 40 minutes were spent coordinating this patient's  care in clinic today; more than 50% of this time was with the patient, reviewing their medical history and counseling on the current treatment and followup plan. All questions were answered to their satisfaction.      Evita Serrano MD  08/11/23  13:18 EDT

## 2023-08-11 ENCOUNTER — OFFICE VISIT (OUTPATIENT)
Dept: ONCOLOGY | Facility: CLINIC | Age: 58
End: 2023-08-11
Payer: COMMERCIAL

## 2023-08-11 VITALS
HEIGHT: 66 IN | BODY MASS INDEX: 36.74 KG/M2 | DIASTOLIC BLOOD PRESSURE: 72 MMHG | HEART RATE: 109 BPM | TEMPERATURE: 97.3 F | OXYGEN SATURATION: 98 % | SYSTOLIC BLOOD PRESSURE: 124 MMHG | WEIGHT: 228.6 LBS | RESPIRATION RATE: 18 BRPM

## 2023-08-11 DIAGNOSIS — C92.10 CML (CHRONIC MYELOCYTIC LEUKEMIA): Primary | ICD-10-CM

## 2023-08-11 DIAGNOSIS — M79.10 MYALGIA: ICD-10-CM

## 2023-08-11 DIAGNOSIS — E61.1 IRON DEFICIENCY: ICD-10-CM

## 2023-09-11 ENCOUNTER — LAB (OUTPATIENT)
Dept: ONCOLOGY | Facility: CLINIC | Age: 58
End: 2023-09-11
Payer: COMMERCIAL

## 2023-09-11 DIAGNOSIS — C92.10 CML (CHRONIC MYELOCYTIC LEUKEMIA): ICD-10-CM

## 2023-09-11 LAB
BASOPHILS # BLD AUTO: 0.08 10*3/MM3 (ref 0–0.2)
BASOPHILS NFR BLD AUTO: 1 % (ref 0–1.5)
DEPRECATED RDW RBC AUTO: 40.2 FL (ref 37–54)
EOSINOPHIL # BLD AUTO: 0.16 10*3/MM3 (ref 0–0.4)
EOSINOPHIL NFR BLD AUTO: 1.9 % (ref 0.3–6.2)
ERYTHROCYTE [DISTWIDTH] IN BLOOD BY AUTOMATED COUNT: 12.3 % (ref 12.3–15.4)
HCT VFR BLD AUTO: 43.6 % (ref 34–46.6)
HGB BLD-MCNC: 14.2 G/DL (ref 12–15.9)
IMM GRANULOCYTES # BLD AUTO: 0.04 10*3/MM3 (ref 0–0.05)
IMM GRANULOCYTES NFR BLD AUTO: 0.5 % (ref 0–0.5)
LYMPHOCYTES # BLD AUTO: 2.54 10*3/MM3 (ref 0.7–3.1)
LYMPHOCYTES NFR BLD AUTO: 30.7 % (ref 19.6–45.3)
MCH RBC QN AUTO: 29.2 PG (ref 26.6–33)
MCHC RBC AUTO-ENTMCNC: 32.6 G/DL (ref 31.5–35.7)
MCV RBC AUTO: 89.5 FL (ref 79–97)
MONOCYTES # BLD AUTO: 0.81 10*3/MM3 (ref 0.1–0.9)
MONOCYTES NFR BLD AUTO: 9.8 % (ref 5–12)
NEUTROPHILS NFR BLD AUTO: 4.65 10*3/MM3 (ref 1.7–7)
NEUTROPHILS NFR BLD AUTO: 56.1 % (ref 42.7–76)
NRBC BLD AUTO-RTO: 0 /100 WBC (ref 0–0.2)
PLATELET # BLD AUTO: 278 10*3/MM3 (ref 140–450)
PMV BLD AUTO: 10.9 FL (ref 6–12)
RBC # BLD AUTO: 4.87 10*6/MM3 (ref 3.77–5.28)
WBC NRBC COR # BLD: 8.28 10*3/MM3 (ref 3.4–10.8)

## 2023-09-11 PROCEDURE — 85025 COMPLETE CBC W/AUTO DIFF WBC: CPT | Performed by: INTERNAL MEDICINE

## 2023-09-11 NOTE — PROGRESS NOTES
Venipuncture Blood Specimen Collection  Venipuncture performed in left arm by Ishaan Dunham MA with good hemostasis. Patient tolerated the procedure well without complications.   09/11/23   Ishaan Dunham MA

## 2023-10-11 ENCOUNTER — LAB (OUTPATIENT)
Dept: ONCOLOGY | Facility: CLINIC | Age: 58
End: 2023-10-11
Payer: COMMERCIAL

## 2023-10-11 VITALS
RESPIRATION RATE: 18 BRPM | OXYGEN SATURATION: 98 % | DIASTOLIC BLOOD PRESSURE: 77 MMHG | HEART RATE: 84 BPM | TEMPERATURE: 97.3 F | SYSTOLIC BLOOD PRESSURE: 133 MMHG

## 2023-10-11 DIAGNOSIS — C92.10 CML (CHRONIC MYELOCYTIC LEUKEMIA): ICD-10-CM

## 2023-10-11 LAB
BASOPHILS # BLD AUTO: 0.07 10*3/MM3 (ref 0–0.2)
BASOPHILS NFR BLD AUTO: 1 % (ref 0–1.5)
DEPRECATED RDW RBC AUTO: 40.4 FL (ref 37–54)
EOSINOPHIL # BLD AUTO: 0.24 10*3/MM3 (ref 0–0.4)
EOSINOPHIL NFR BLD AUTO: 3.3 % (ref 0.3–6.2)
ERYTHROCYTE [DISTWIDTH] IN BLOOD BY AUTOMATED COUNT: 12.4 % (ref 12.3–15.4)
HCT VFR BLD AUTO: 43.3 % (ref 34–46.6)
HGB BLD-MCNC: 13.8 G/DL (ref 12–15.9)
IMM GRANULOCYTES # BLD AUTO: 0.02 10*3/MM3 (ref 0–0.05)
IMM GRANULOCYTES NFR BLD AUTO: 0.3 % (ref 0–0.5)
LYMPHOCYTES # BLD AUTO: 2.01 10*3/MM3 (ref 0.7–3.1)
LYMPHOCYTES NFR BLD AUTO: 27.7 % (ref 19.6–45.3)
MCH RBC QN AUTO: 28.2 PG (ref 26.6–33)
MCHC RBC AUTO-ENTMCNC: 31.9 G/DL (ref 31.5–35.7)
MCV RBC AUTO: 88.4 FL (ref 79–97)
MONOCYTES # BLD AUTO: 0.8 10*3/MM3 (ref 0.1–0.9)
MONOCYTES NFR BLD AUTO: 11 % (ref 5–12)
NEUTROPHILS NFR BLD AUTO: 4.12 10*3/MM3 (ref 1.7–7)
NEUTROPHILS NFR BLD AUTO: 56.7 % (ref 42.7–76)
NRBC BLD AUTO-RTO: 0 /100 WBC (ref 0–0.2)
PLATELET # BLD AUTO: 275 10*3/MM3 (ref 140–450)
PMV BLD AUTO: 11 FL (ref 6–12)
RBC # BLD AUTO: 4.9 10*6/MM3 (ref 3.77–5.28)
WBC NRBC COR # BLD: 7.26 10*3/MM3 (ref 3.4–10.8)

## 2023-10-11 PROCEDURE — 85025 COMPLETE CBC W/AUTO DIFF WBC: CPT | Performed by: INTERNAL MEDICINE

## 2023-10-11 NOTE — PROGRESS NOTES
Venipuncture Blood Specimen Collection  Venipuncture performed in left arm by Zoe Bergman MA with good hemostasis. Patient tolerated the procedure well without complications.   10/11/23   Zoe Bergman MA

## 2023-11-03 DIAGNOSIS — D51.9 ANEMIA DUE TO VITAMIN B12 DEFICIENCY, UNSPECIFIED B12 DEFICIENCY TYPE: ICD-10-CM

## 2023-11-03 DIAGNOSIS — E61.1 IRON DEFICIENCY: ICD-10-CM

## 2023-11-03 DIAGNOSIS — C92.10 CML (CHRONIC MYELOCYTIC LEUKEMIA): Primary | ICD-10-CM

## 2023-11-08 ENCOUNTER — TELEPHONE (OUTPATIENT)
Dept: ONCOLOGY | Facility: CLINIC | Age: 58
End: 2023-11-08

## 2023-11-08 NOTE — TELEPHONE ENCOUNTER
"  Caller: Aric Haro \"[employer]\"    Relationship to patient: Self    Best call back number: [phone]    Chief complaint: HAS TO WORK CAN'T TAKE OFF    Type of visit: LAB & F/U 2     Requested date: CALL TO R/S     If rescheduling, when is the original appointment: 11/9/2023    Additional notes:      "

## 2023-11-14 ENCOUNTER — LAB (OUTPATIENT)
Dept: ONCOLOGY | Facility: CLINIC | Age: 58
End: 2023-11-14
Payer: COMMERCIAL

## 2023-11-14 ENCOUNTER — OFFICE VISIT (OUTPATIENT)
Dept: ONCOLOGY | Facility: CLINIC | Age: 58
End: 2023-11-14
Payer: COMMERCIAL

## 2023-11-14 VITALS
WEIGHT: 231 LBS | RESPIRATION RATE: 18 BRPM | HEIGHT: 66 IN | BODY MASS INDEX: 37.12 KG/M2 | DIASTOLIC BLOOD PRESSURE: 81 MMHG | TEMPERATURE: 97.1 F | OXYGEN SATURATION: 98 % | HEART RATE: 90 BPM | SYSTOLIC BLOOD PRESSURE: 136 MMHG

## 2023-11-14 DIAGNOSIS — C92.10 CML (CHRONIC MYELOCYTIC LEUKEMIA): ICD-10-CM

## 2023-11-14 DIAGNOSIS — D51.9 ANEMIA DUE TO VITAMIN B12 DEFICIENCY, UNSPECIFIED B12 DEFICIENCY TYPE: ICD-10-CM

## 2023-11-14 DIAGNOSIS — E61.1 IRON DEFICIENCY: ICD-10-CM

## 2023-11-14 DIAGNOSIS — C92.10 CML (CHRONIC MYELOCYTIC LEUKEMIA): Primary | ICD-10-CM

## 2023-11-14 LAB
ALBUMIN SERPL-MCNC: 4.2 G/DL (ref 3.5–5.2)
ALBUMIN/GLOB SERPL: 1.5 G/DL
ALP SERPL-CCNC: 80 U/L (ref 39–117)
ALT SERPL W P-5'-P-CCNC: 13 U/L (ref 1–33)
ANION GAP SERPL CALCULATED.3IONS-SCNC: 11.4 MMOL/L (ref 5–15)
AST SERPL-CCNC: 14 U/L (ref 1–32)
BASOPHILS # BLD AUTO: 0.07 10*3/MM3 (ref 0–0.2)
BASOPHILS NFR BLD AUTO: 0.9 % (ref 0–1.5)
BILIRUB SERPL-MCNC: 0.2 MG/DL (ref 0–1.2)
BUN SERPL-MCNC: 27 MG/DL (ref 6–20)
BUN/CREAT SERPL: 27.8 (ref 7–25)
CALCIUM SPEC-SCNC: 9.8 MG/DL (ref 8.6–10.5)
CHLORIDE SERPL-SCNC: 100 MMOL/L (ref 98–107)
CO2 SERPL-SCNC: 21.6 MMOL/L (ref 22–29)
CREAT SERPL-MCNC: 0.97 MG/DL (ref 0.57–1)
DEPRECATED RDW RBC AUTO: 39.9 FL (ref 37–54)
EGFRCR SERPLBLD CKD-EPI 2021: 67.9 ML/MIN/1.73
EOSINOPHIL # BLD AUTO: 0.17 10*3/MM3 (ref 0–0.4)
EOSINOPHIL NFR BLD AUTO: 2.1 % (ref 0.3–6.2)
ERYTHROCYTE [DISTWIDTH] IN BLOOD BY AUTOMATED COUNT: 12.7 % (ref 12.3–15.4)
GLOBULIN UR ELPH-MCNC: 2.8 GM/DL
GLUCOSE SERPL-MCNC: 373 MG/DL (ref 65–99)
HCT VFR BLD AUTO: 41.2 % (ref 34–46.6)
HGB BLD-MCNC: 13.6 G/DL (ref 12–15.9)
IMM GRANULOCYTES # BLD AUTO: 0.04 10*3/MM3 (ref 0–0.05)
IMM GRANULOCYTES NFR BLD AUTO: 0.5 % (ref 0–0.5)
LDH SERPL-CCNC: 167 U/L (ref 135–214)
LYMPHOCYTES # BLD AUTO: 2.22 10*3/MM3 (ref 0.7–3.1)
LYMPHOCYTES NFR BLD AUTO: 27.4 % (ref 19.6–45.3)
MCH RBC QN AUTO: 28.4 PG (ref 26.6–33)
MCHC RBC AUTO-ENTMCNC: 33 G/DL (ref 31.5–35.7)
MCV RBC AUTO: 86 FL (ref 79–97)
MONOCYTES # BLD AUTO: 0.64 10*3/MM3 (ref 0.1–0.9)
MONOCYTES NFR BLD AUTO: 7.9 % (ref 5–12)
NEUTROPHILS NFR BLD AUTO: 4.97 10*3/MM3 (ref 1.7–7)
NEUTROPHILS NFR BLD AUTO: 61.2 % (ref 42.7–76)
NRBC BLD AUTO-RTO: 0 /100 WBC (ref 0–0.2)
PLATELET # BLD AUTO: 282 10*3/MM3 (ref 140–450)
PMV BLD AUTO: 11.5 FL (ref 6–12)
POTASSIUM SERPL-SCNC: 4.3 MMOL/L (ref 3.5–5.2)
PROT SERPL-MCNC: 7 G/DL (ref 6–8.5)
RBC # BLD AUTO: 4.79 10*6/MM3 (ref 3.77–5.28)
SODIUM SERPL-SCNC: 133 MMOL/L (ref 136–145)
URATE SERPL-MCNC: 6.7 MG/DL (ref 2.4–5.7)
WBC NRBC COR # BLD: 8.11 10*3/MM3 (ref 3.4–10.8)

## 2023-11-14 PROCEDURE — 80053 COMPREHEN METABOLIC PANEL: CPT | Performed by: INTERNAL MEDICINE

## 2023-11-14 PROCEDURE — 84550 ASSAY OF BLOOD/URIC ACID: CPT | Performed by: INTERNAL MEDICINE

## 2023-11-14 PROCEDURE — 99214 OFFICE O/P EST MOD 30 MIN: CPT | Performed by: INTERNAL MEDICINE

## 2023-11-14 PROCEDURE — 83615 LACTATE (LD) (LDH) ENZYME: CPT | Performed by: INTERNAL MEDICINE

## 2023-11-14 PROCEDURE — 85025 COMPLETE CBC W/AUTO DIFF WBC: CPT | Performed by: INTERNAL MEDICINE

## 2023-11-14 NOTE — PROGRESS NOTES
Venipuncture Blood Specimen Collection  Venipuncture performed in left arm  by Radha Roy MA with good hemostasis. Patient tolerated the procedure well without complications.   11/14/23   Radha Roy MA

## 2023-11-14 NOTE — PROGRESS NOTES
Aric Haro  [id number]  1965  11/14/2023      Referring Provider:   EILEEN Andrade    Reason for Follow Up:   1. Chronic Phase - Chronic Myelogenous Leukemia  2. Leukocytosis  3. Thrombocytosis     Chief Complaint:  CML      History of Present Illness:  Aric Haro is a very pleasant 58 y.o.  female who presents in follow up appointment for further management and treatment of chronic phase chronic myelogenous leukemia (CML).    Ms. Haro began experiencing extreme fatigue where she was sleeping multiple hours during the day when she initially began following with me in April 2017. 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 On laboratory evaluation she was found to have a total white blood cell count of 22.35 and a platelet count 470 thousand. In March 2016 she was also noted to have a leukocytosis (although not as elevated) as well as a thrombocytosis, however reports that these were likely secondary to other medical issues at the time her blood work was drawn. She denied of any significant weight loss however has been gradually losing weight since gastric sleeve procedure. She denied of any fevers or frequent infections but did note fluctuating neck lymphadenopathy as well as early satiety and taste in change. She denied of any abnormal or spontaneous bleeding. I did obtain a BCR ABL PCR to further assess her leukocytosis and thrombocytosis and she was positive for the b2a2/b3a2 (p210) and e1a2 (p190) fusion gene transcripts consistent with a diagnosis for CML. After reviewing the toxicities of each tyrosine kinase inhibitor we decided to start Dasatinib treatment. She had a difficult time tolerating the Dasatinib as she was unable to take her PPI with this medication. Since she had to be taken off of her PPI she had worsening reflux symptoms as well  as severe nausea, vomiting, dehydration, and headaches during this period. Given the toxicities she was experiencing she was taken off Dasatinib and this was changed to Gleevec treatment. Since starting Gleevac 400mg daily she has tolerated this much better without significant side effects. She has had a good response and normalization in her complete blood counts with normalization of BCR ABL PCR. Side effect that she has experienced is intermittent pedal edema along with some hand swelling and muscle cramps which have been intermittant. She experienced severe muscle cramping in November 2022 and has had to intermittently take Flexeril and reports that calcium supplement has improved symptoms. She states that with drinking Gatorade this has significantly helped with her muscle cramping.     Treatment History:  Started Dasatinib on 05/15/17 - experienced nausea, severe reflux, headaches while on medication and had taken 9 doses. This was discontinued due to intolerability and she was thereafter started on Imatinib.   Started Imatinib on 5/26/17. She discontinued on June 30th 2023.       Interim History:  Since her last visit she stopped Gleevac 6/30/23 and notes that she has been having more normal bowel movements without diarrhea. She also notes that overall her muscle cramps and swelling have improved. She remains on monthly B12 injections. She has been feeling more fatigued but has been working two jobs.         The following portions of the patient's history were reviewed and updated as appropriate: allergies, current medications, past family history, past medical history, past social history, past surgical history and problem list.      No Known Allergies        Past Medical History:   Diagnosis Date    Anemia     Cancer     leukemia    Diabetes mellitus     Elevated cholesterol     GERD (gastroesophageal reflux disease)     Hypertension     PONV (postoperative nausea and vomiting)     Sleep apnea          Past  Surgical History:   Procedure Laterality Date    BLEPHAROPLASTY Bilateral 06/22/2018    Procedure: BLEPHAROPLASTY BOTH EYES UPPER EYELIDS (PT REQUESTED TERESITA WILKES;  Surgeon: Chris Haile MD;  Location: [facility] OR;  Service: Ophthalmology    BONE MARROW BIOPSY      COLONOSCOPY N/A 03/27/2018    Procedure: COLONOSCOPY FOR SCREENING  CPTCODE:69325;  Surgeon: Drew Esparza III, MD;  Location: [facility] OR;  Service: Gastroenterology    COLONOSCOPY N/A 12/20/2022    Procedure: COLONOSCOPY;  Surgeon: Danni Naranjo MD;  Location: [facility] OR;  Service: Gastroenterology;  Laterality: N/A;    SINUS SURGERY      SINUS SURGERY      SLEEVE GASTROPLASTY           Social History     Socioeconomic History    Marital status:    Tobacco Use    Smoking status: Former     Packs/day: 0.25     Years: 4.00     Additional pack years: 0.00     Total pack years: 1.00     Types: Cigarettes    Smokeless tobacco: Never   Vaping Use    Vaping Use: Never used   Substance and Sexual Activity    Alcohol use: No    Drug use: No    Sexual activity: Defer   She lives with her daughter. She denies of any alcohol or illicit drug use. Previous social tobacco use more then 20 years ago.  Recent job change.         Family History   Problem Relation Age of Onset    Anemia Mother     Hypertension Mother     Cancer Mother     COPD Father     Heart disease Father     Breast cancer Neg Hx    Maternal Uncle - CLL  Mother - Malignancy of [place]          Current Outpatient Medications:     BIOTIN PO, Take 100 mg by mouth., Disp: , Rfl:     celecoxib (CeleBREX) 200 MG capsule, Take 1 capsule by mouth 2 (Two) Times a Day., Disp: 180 capsule, Rfl: 3    colestipol (COLESTID) 1 g tablet, Take 1 tablet by mouth Daily., Disp: 30 tablet, Rfl: 0    cyanocobalamin 1000 MCG/ML injection, Administer 1 ml (1,000 mcg) intramuscularly once weekly, Disp: 12 mL, Rfl: 2    cyclobenzaprine (FLEXERIL) 10 MG tablet, Take 1/2 tablet by mouth 3  (Three) Times a Day As Needed for Muscle Spasms., Disp: 30 tablet, Rfl: 0    desloratadine-pseudoephedrine (CLARINEX-D 12-hour) 2.5-120 MG per tablet, take 1 tablet by mouth 2 times every day, Disp: 30 tablet, Rfl: 0    doxycycline (ADOXA) 100 MG tablet, Take 1 tablet (100 mg) by mouth every 12 hours for 10 days, Disp: 20 tablet, Rfl: 2    doxycycline (ADOXA) 100 MG tablet, Take 1 tablet (100 mg) by mouth every 12 hours for 10 days, Disp: 20 tablet, Rfl: 0    DULoxetine (CYMBALTA) 60 MG capsule, Take 1 capsule by mouth Daily., Disp: 90 capsule, Rfl: 3    DULoxetine (CYMBALTA) 60 MG capsule, Take 1 capsule by mouth Daily., Disp: 90 capsule, Rfl: 3    fenofibrate (TRICOR) 145 MG tablet, Take 1 tablet by mouth Daily., Disp: 90 tablet, Rfl: 3    fenofibrate (TRICOR) 145 MG tablet, Take 1 tablet by mouth daily, Disp: 90 tablet, Rfl: 2    fenofibrate (TRICOR) 145 MG tablet, Take 1 tablet by mouth once daily, Disp: 90 tablet, Rfl: 2    ibuprofen (ADVIL,MOTRIN) 800 MG tablet, Take 1 tablet  by mouth 3 times per day with food or milk as needed, Disp: 270 tablet, Rfl: 2    ibuprofen (ADVIL,MOTRIN) 800 MG tablet, Take 1 tablet by mouth 3 times per day with food or milk as needed, Disp: 270 tablet, Rfl: 2    imatinib (GLEEVEC) 400 MG chemo tablet, Take 1 tablet by mouth Daily., Disp: 30 tablet, Rfl: 5    Insulin Degludec (Tresiba FlexTouch) 200 UNIT/ML solution pen-injector pen injection, Inject 30 Units under the skin into the appropriate area as directed Daily., Disp: 9 mL, Rfl: 3    Insulin Glargine (BASAGLAR KWIKPEN) 100 UNIT/ML injection pen, Inject 90 units under the skin once daily., Disp: 30 mL, Rfl: 4    Insulin Glargine (Lantus SoloStar) 100 UNIT/ML injection pen, Inject 100 Units under the skin into the appropriate area as directed Daily., Disp: 18 mL, Rfl: 3    Insulin Glargine (Lantus SoloStar) 100 UNIT/ML injection pen, Inject 45 Units under the skin into the appropriate area as directed 2 (two) times a day.,  "Disp: 30 mL, Rfl: 0    Insulin Glargine (Lantus SoloStar) 100 UNIT/ML injection pen, Inject 100 Units under the skin into the appropriate area as directed Daily., Disp: 30 mL, Rfl: 0    Insulin Glargine, 2 Unit Dial, (Toujeo Max SoloStar) 300 UNIT/ML solution pen-injector injection, Inject 60 Units under the skin into the appropriate area as directed 2 (Two) Times a Day., Disp: 36 mL, Rfl: 2    Insulin Syringe-Needle U-100 (TRUEplus Insulin Syringe) 31G X 5/16\" 0.5 ML misc, Use to inject 3 times a day, Disp: 90 each, Rfl: 4    multivitamin (THERAGRAN) tablet tablet, Take  by mouth Daily., Disp: , Rfl:     Omega-3 1000 MG capsule, Take  by mouth., Disp: , Rfl:     pantoprazole (PROTONIX) 40 MG EC tablet, Take 1 tablet by mouth daily, Disp: 90 tablet, Rfl: 2    pantoprazole (PROTONIX) 40 MG EC tablet, Take 1 tablet (40 mg) by mouth daily, Disp: 90 tablet, Rfl: 2    pantoprazole (PROTONIX) 40 MG EC tablet, Take 1 tablet (40 mg) by mouth daily, Disp: 90 tablet, Rfl: 2    predniSONE (DELTASONE) 10 MG tablet, Take 1 tablet by mouth Daily as directed, Disp: 30 tablet, Rfl: 0    SUMAtriptan (IMITREX) 100 MG tablet, Take 1 tablet by mouth 2 times per day at least 2 hours between doses as needed., Disp: 27 tablet, Rfl: 2    triamcinolone (KENALOG) 0.1 % cream, Apply 1 application topically to affected area daily as needed, Disp: 80 g, Rfl: 3    vitamin D3 125 MCG (5000 UT) capsule capsule, Take 1 capsule by mouth Daily., Disp: , Rfl:   Fish Oil for elevated triglycerides         Review of Systems  A comprehensive 14-point review of systems performed.  Significant findings as mentioned above.  All other systems reviewed and are negative.         Physical Exam:  Vital Signs: These were reviewed and listed as per patient’s electronic medical chart  Vitals:    11/14/23 1412   BP: 136/81   Pulse: 90   Resp: 18   Temp: 97.1 °F (36.2 °C)   SpO2: 98%     General: Awake, alert and oriented, in no distress, elevated BMI  HEENT: Head is " atraumatic, normocephalic, extraocular movements full, no scleral icterus  Neck: supple, no jvd, lymphadenopathy or masses  Cardiovascular: regular rhythm, tachycardic, without murmurs, rubs or gallops  Pulmonary: non-labored, clear to auscultation bilaterally, no wheezing  Abdomen: soft, non-tender, non-distended, normal active bowel sounds present  Extremities: No clubbing, cyanosis or edema  Lymph: No cervical or supraclavicular adenopathy  Neurologic: Mental status as above, alert, awake and oriented, grossly non-focal exam  Skin: warm, dry, intact, no ecchymosis  Patient was examined on 23 and changes are reflected and noted above.        PROCEDURES:  22 COLONOSCOPY          IMAGIN17: US ABDOMEN COMPLETE:   Visualized pancreas is unremarkable. The imaged portion of the abdominal aorta is nondilated. The liver is homogeneous. There is no intrahepatic ductal dilatation or focal hepatic mass. The imaged portion of the hepatic vessels and inferior vena cava are patent. The gallbladder has been removed. The common bile duct is normal, measuring 5.7 mm. The kidneys demonstrate no evidence of hydronephrosis or solid renal mass. The spleen is homogeneous and measures 13 cm.   IMPRESSION: Spleen measures 13 cm and is homogeneous.     Mammo screening digital tomosynthesis bilateral w CAD (2017 15:38)   FINDINGS:  The breasts are heterogeneously dense, which may obscure small masses. The fibroglandular pattern appears stable.  There is no mass, worrisome microcalcifications, or architectural distortion to suggest development of malignancy.  IMPRESSION:No findings suspicious for malignancy.   BI-RADS CATEGORY:  1, NEGATIVE        LABS/STUDIES:  Lab on 2023   Component Date Value    Glucose 2023 373 (H)     BUN 2023 27 (H)     Creatinine 2023 0.97     Sodium 2023 133 (L)     Potassium 2023 4.3     Chloride 2023 100     CO2 2023 21.6 (L)     Calcium  11/14/2023 9.8     Total Protein 11/14/2023 7.0     Albumin 11/14/2023 4.2     ALT (SGPT) 11/14/2023 13     AST (SGOT) 11/14/2023 14     Alkaline Phosphatase 11/14/2023 80     Total Bilirubin 11/14/2023 0.2     Globulin 11/14/2023 2.8     A/G Ratio 11/14/2023 1.5     BUN/Creatinine Ratio 11/14/2023 27.8 (H)     Anion Gap 11/14/2023 11.4     eGFR 11/14/2023 67.9     LDH 11/14/2023 167     Uric Acid 11/14/2023 6.7 (H)     WBC 11/14/2023 8.11     RBC 11/14/2023 4.79     Hemoglobin 11/14/2023 13.6     Hematocrit 11/14/2023 41.2     MCV 11/14/2023 86.0     MCH 11/14/2023 28.4     MCHC 11/14/2023 33.0     RDW 11/14/2023 12.7     RDW-SD 11/14/2023 39.9     MPV 11/14/2023 11.5     Platelets 11/14/2023 282     Neutrophil % 11/14/2023 61.2     Lymphocyte % 11/14/2023 27.4     Monocyte % 11/14/2023 7.9     Eosinophil % 11/14/2023 2.1     Basophil % 11/14/2023 0.9     Immature Grans % 11/14/2023 0.5     Neutrophils, Absolute 11/14/2023 4.97     Lymphocytes, Absolute 11/14/2023 2.22     Monocytes, Absolute 11/14/2023 0.64     Eosinophils, Absolute 11/14/2023 0.17     Basophils, Absolute 11/14/2023 0.07     Immature Grans, Absolute 11/14/2023 0.04     nRBC 11/14/2023 0.0    Lab on 10/11/2023   Component Date Value    e13a2 (b2a2) Transcript 10/11/2023 Comment     e14a2 (b3a2) Transcript 10/11/2023 Comment     E1A2 transcript 10/11/2023 Comment     Interpretation 10/11/2023 Negative     Director Review 10/11/2023 Comment     Background: 10/11/2023 Comment     Methodology: 10/11/2023 Comment     WBC 10/11/2023 7.26     RBC 10/11/2023 4.90     Hemoglobin 10/11/2023 13.8     Hematocrit 10/11/2023 43.3     MCV 10/11/2023 88.4     MCH 10/11/2023 28.2     MCHC 10/11/2023 31.9     RDW 10/11/2023 12.4     RDW-SD 10/11/2023 40.4     MPV 10/11/2023 11.0     Platelets 10/11/2023 275     Neutrophil % 10/11/2023 56.7     Lymphocyte % 10/11/2023 27.7     Monocyte % 10/11/2023 11.0     Eosinophil % 10/11/2023 3.3     Basophil % 10/11/2023 1.0      Immature Grans % 10/11/2023 0.3     Neutrophils, Absolute 10/11/2023 4.12     Lymphocytes, Absolute 10/11/2023 2.01     Monocytes, Absolute 10/11/2023 0.80     Eosinophils, Absolute 10/11/2023 0.24     Basophils, Absolute 10/11/2023 0.07     Immature Grans, Absolute 10/11/2023 0.02     nRBC 10/11/2023 0.0    Lab on 09/11/2023   Component Date Value    e13a2 (b2a2) Transcript 09/11/2023 Comment     e14a2 (b3a2) Transcript 09/11/2023 Comment     E1A2 transcript 09/11/2023 Comment     Interpretation 09/11/2023 Negative     Director Review 09/11/2023 Comment     Background: 09/11/2023 Comment     Methodology: 09/11/2023 Comment     WBC 09/11/2023 8.28     RBC 09/11/2023 4.87     Hemoglobin 09/11/2023 14.2     Hematocrit 09/11/2023 43.6     MCV 09/11/2023 89.5     MCH 09/11/2023 29.2     MCHC 09/11/2023 32.6     RDW 09/11/2023 12.3     RDW-SD 09/11/2023 40.2     MPV 09/11/2023 10.9     Platelets 09/11/2023 278     Neutrophil % 09/11/2023 56.1     Lymphocyte % 09/11/2023 30.7     Monocyte % 09/11/2023 9.8     Eosinophil % 09/11/2023 1.9     Basophil % 09/11/2023 1.0     Immature Grans % 09/11/2023 0.5     Neutrophils, Absolute 09/11/2023 4.65     Lymphocytes, Absolute 09/11/2023 2.54     Monocytes, Absolute 09/11/2023 0.81     Eosinophils, Absolute 09/11/2023 0.16     Basophils, Absolute 09/11/2023 0.08     Immature Grans, Absolute 09/11/2023 0.04     nRBC 09/11/2023 0.0    Lab on 07/25/2023   Component Date Value    Glucose 07/25/2023 172 (H)     BUN 07/25/2023 16     Creatinine 07/25/2023 0.76     Sodium 07/25/2023 143     Potassium 07/25/2023 4.8     Chloride 07/25/2023 104     CO2 07/25/2023 27.9     Calcium 07/25/2023 9.8     Total Protein 07/25/2023 7.5     Albumin 07/25/2023 4.5     ALT (SGPT) 07/25/2023 17     AST (SGOT) 07/25/2023 15     Alkaline Phosphatase 07/25/2023 76     Total Bilirubin 07/25/2023 0.4     Globulin 07/25/2023 3.0     A/G Ratio 07/25/2023 1.5     BUN/Creatinine Ratio 07/25/2023 21.1      Anion Gap 07/25/2023 11.1     eGFR 07/25/2023 91.5     LDH 07/25/2023 167     Uric Acid 07/25/2023 5.8 (H)     e13a2 (b2a2) Transcript 07/25/2023 Comment     e14a2 (b3a2) Transcript 07/25/2023 Comment     E1A2 transcript 07/25/2023 Comment     Interpretation 07/25/2023 Negative     Director Review 07/25/2023 Comment     Background: 07/25/2023 Comment     Methodology: 07/25/2023 Comment     Total Cholesterol 07/25/2023 197     Triglycerides 07/25/2023 120     HDL Cholesterol 07/25/2023 66 (H)     LDL Cholesterol  07/25/2023 110 (H)     VLDL Cholesterol 07/25/2023 21     LDL/HDL Ratio 07/25/2023 1.62     Hemoglobin A1C 07/25/2023 8.30 (H)     TSH 07/25/2023 0.622     T3, Free 07/25/2023 2.28     Free T4 07/25/2023 1.51     25 Hydroxy, Vitamin D 07/25/2023 48.0     Vitamin B-12 07/25/2023 571     WBC 07/25/2023 8.67     RBC 07/25/2023 4.80     Hemoglobin 07/25/2023 14.4     Hematocrit 07/25/2023 43.7     MCV 07/25/2023 91.0     MCH 07/25/2023 30.0     MCHC 07/25/2023 33.0     RDW 07/25/2023 12.4     RDW-SD 07/25/2023 41.7     MPV 07/25/2023 10.9     Platelets 07/25/2023 294     Neutrophil % 07/25/2023 69.6     Lymphocyte % 07/25/2023 20.4     Monocyte % 07/25/2023 8.4     Eosinophil % 07/25/2023 0.5     Basophil % 07/25/2023 0.5     Immature Grans % 07/25/2023 0.6 (H)     Neutrophils, Absolute 07/25/2023 6.04     Lymphocytes, Absolute 07/25/2023 1.77     Monocytes, Absolute 07/25/2023 0.73     Eosinophils, Absolute 07/25/2023 0.04     Basophils, Absolute 07/25/2023 0.04     Immature Grans, Absolute 07/25/2023 0.05     nRBC 07/25/2023 0.0          PATHOLOGY:  05/15/17                     04/25/17        08/30/17        12/13/17        03/20/18                                                                                                                               6/20/18      08/17/20              03/10/22        6/8/22      10/3/22      12/20/22      1/5/23      4/18/23      7/25/23      9/11/23      10/11/23          Assessment & Plan :  Aric Haro is a very pleasant 57 y.o.  female who presents in follow up appointment for further management and treatment of chronic phase chronic myelogenous leukemia.    1. Chronic Myelogenous Leukemia - CML (chronic phase)    - Peripheral smear concerning for an underlying myeloproliferative disorder. Her primary provider obtained a flow cytometry which did not reveal any significant abnormalities. I obtained a quantitative BCR ABL PCR positive for the b2a2/b3a2 (p210) and e1a2 (p190) fusion gene transcripts consistent with a diagnosis for CML. Bone marrow biopsy performed on initial diagnosis 5/17/17 and prior to starting Dasatinib treatment with results above and consistent with chronic phase CML.   - To further evaluate for a myeloproliferative disorder I did also assess for JAK2 (V617F and exon 12)/MPL/CALR mutations which were negative. ESR, CRP, EMMANUEL, Rheumatoid factor, as well as an acute hepatitis panel and HIV test which were all negative. No iron deficiency seen. Abdominal ultrasound significant for a spleen size 13.9cm.   - For treatment of her CML she was started on Dasatinib 100mg oral daily. She does have a history significant for pancreatitis, therefore, I held on using Nilotinib. Patient however was unable to tolerate Dasatinib secondary to worsening reflux while being off of her PPI, and experiencing severe nausea, vomiting, and dehydration. She was then started on Imatinib 400mg daily and is overall tolerating this well. I have previously advised her of Ibuprofen and Tylenol use for her myalgias. She does also experience intermittent lower extremity edema (which she denies today). I did order baseline Echo which showed an intact EF. I also repeated echocardiogram to further evaluate and repeat echo showed an intact EF 61-65% which is stable.  - She has  had a complete hematological response, and BCR ABL PCR has normalized since start of Gleevac. This will need to continue to be monitored every three months to assess ongoing molecular response, today's level is <0.0032%. The blood counts have now stabilized therefore will continue to monitor every 3 months given stability.   - During her previous visit we spent a lot of time discussing the possibility of stopping Gleevac given her muscle cramping and desire to stop Gleevac. There have been several studies that have shown that TKIs can be discontinued safely in 40% to 65% of patients who achieve a deep molecular response. She understands however there are no clinical decision tools that can accurately predict which patient can sustain MMR after TKI cessation, and most guidelines recommend monthly BCR-ABL1 transcript level monitoring for the first 12 months after stopping TKI to detect early loss of major molecular response (MMR). In a retrospective analysis of data from chronic-phase CML patients who received TKI therapy for a minimum of 3 years of TKI therapy and at least 2 years of sustained molecular response. Of the 130 patients who stopped their TKIs, 55% remained off therapy at 12 months. Loss of MMR resulted in restarting the TKI in 87% of molecular relapses during the first 12 months. This was discussed at length with Ms. Haro and understands the potential risks of coming off therapy including relapse and possibly not responding to treatment on restart.   - Since her last visit she decided to stop her Gleevac on June 30th 2023. Her myalgias eventually improved and there has been stability in her counts. She will need CBC and BCR ABL PCR monitoring once monthly. She had been on Gleevac since May 2017 prior to her discontinuation June 2023.     2. Healthcare Maintenance  - She underwent a colonoscopy February 2018 with Dr. Esparza who recommended repeat colonoscopy in 3-5 years due to polyps that were removed.  She underwent colonoscopy with Dr. Keller in December and had two polyps removed and another that was removed piecemeal.  - Recommended repeat colonoscopy in six months (June 2023) which she has not yet had, she was re-referred.   - She has also received her Hepatitis A vaccine.     3. Iron deficiency - no longer on iron   - Iron has been discontinued and iron studies have been normal.     4. Myalgias  - This was initially felt to be due to Gleevac and she was started on calcium supplementation. Magnesium level normal. She was also prescribed Flexeril as needed.  - This has improved since discontinuation of Gleevac.     5. ACO Quality measures  -  Aric Haro does not use tobacco products.  -  Aric Haro received 2023 flu vaccine.  -  Aric Haro reports a pain score of 0.  Given her pain assessment as noted, treatment options were discussed and the following options were decided upon as a follow-up plan to address the patient's pain: continuation of current treatment plan for pain.  -  Current outpatient and discharge medications have been reconciled for the patient.  Reviewed by: vEita Serrano MD      I will have the patient return for labs monthly (CBC, BCR ABL PCR) and follow up visit in three months with labs (CBC, CMP, LDH, Uric acid, BCR ABL PCR, B12). She understands that should she have any questions or concerns prior to her appointment she should give us a call at any time and I would be happy to see her sooner. It was a pleasure to see this patient in clinic today, thank you for allowing me to participate in the care of this patient.    A total of 34 minutes were spent coordinating this patient’s care in clinic today; more than 50% of this time was with the patient, reviewing their medical history and counseling on the current treatment and followup plan. All questions were answered to their satisfaction.      Evita Serrano MD   11/14/23  15:12 EST

## 2023-12-14 ENCOUNTER — CLINICAL SUPPORT (OUTPATIENT)
Dept: ONCOLOGY | Facility: CLINIC | Age: 58
End: 2023-12-14
Payer: COMMERCIAL

## 2023-12-14 DIAGNOSIS — C92.10 CML (CHRONIC MYELOCYTIC LEUKEMIA): ICD-10-CM

## 2023-12-14 LAB
BASOPHILS # BLD AUTO: 0.06 10*3/MM3 (ref 0–0.2)
BASOPHILS NFR BLD AUTO: 0.7 % (ref 0–1.5)
DEPRECATED RDW RBC AUTO: 42.6 FL (ref 37–54)
EOSINOPHIL # BLD AUTO: 0.21 10*3/MM3 (ref 0–0.4)
EOSINOPHIL NFR BLD AUTO: 2.5 % (ref 0.3–6.2)
ERYTHROCYTE [DISTWIDTH] IN BLOOD BY AUTOMATED COUNT: 13.1 % (ref 12.3–15.4)
HCT VFR BLD AUTO: 43.4 % (ref 34–46.6)
HGB BLD-MCNC: 13.7 G/DL (ref 12–15.9)
IMM GRANULOCYTES # BLD AUTO: 0.01 10*3/MM3 (ref 0–0.05)
IMM GRANULOCYTES NFR BLD AUTO: 0.1 % (ref 0–0.5)
LYMPHOCYTES # BLD AUTO: 2.64 10*3/MM3 (ref 0.7–3.1)
LYMPHOCYTES NFR BLD AUTO: 31.6 % (ref 19.6–45.3)
MCH RBC QN AUTO: 28 PG (ref 26.6–33)
MCHC RBC AUTO-ENTMCNC: 31.6 G/DL (ref 31.5–35.7)
MCV RBC AUTO: 88.6 FL (ref 79–97)
MONOCYTES # BLD AUTO: 0.85 10*3/MM3 (ref 0.1–0.9)
MONOCYTES NFR BLD AUTO: 10.2 % (ref 5–12)
NEUTROPHILS NFR BLD AUTO: 4.58 10*3/MM3 (ref 1.7–7)
NEUTROPHILS NFR BLD AUTO: 54.9 % (ref 42.7–76)
NRBC BLD AUTO-RTO: 0 /100 WBC (ref 0–0.2)
PLATELET # BLD AUTO: 280 10*3/MM3 (ref 140–450)
PMV BLD AUTO: 11.1 FL (ref 6–12)
RBC # BLD AUTO: 4.9 10*6/MM3 (ref 3.77–5.28)
WBC NRBC COR # BLD AUTO: 8.35 10*3/MM3 (ref 3.4–10.8)

## 2023-12-14 PROCEDURE — 85025 COMPLETE CBC W/AUTO DIFF WBC: CPT | Performed by: INTERNAL MEDICINE

## 2023-12-14 NOTE — PROGRESS NOTES
Venipuncture Blood Specimen Collection  Venipuncture performed in left arm by Ishaan Dunham MA with good hemostasis. Patient tolerated the procedure well without complications.   12/14/23   Ishaan Dunham MA

## 2023-12-15 ENCOUNTER — TRANSCRIBE ORDERS (OUTPATIENT)
Dept: ADMINISTRATIVE | Facility: HOSPITAL | Age: 58
End: 2023-12-15
Payer: COMMERCIAL

## 2023-12-15 DIAGNOSIS — N95.9 UNSPECIFIED MENOPAUSAL AND PERIMENOPAUSAL DISORDER: Primary | ICD-10-CM

## 2023-12-15 DIAGNOSIS — Z12.31 SCREENING MAMMOGRAM, ENCOUNTER FOR: ICD-10-CM

## 2023-12-21 ENCOUNTER — HOSPITAL ENCOUNTER (OUTPATIENT)
Dept: MAMMOGRAPHY | Facility: HOSPITAL | Age: 58
Discharge: HOME | End: 2023-12-21
Payer: COMMERCIAL

## 2023-12-21 ENCOUNTER — HOSPITAL ENCOUNTER (OUTPATIENT)
Dept: BONE DENSITY | Facility: HOSPITAL | Age: 58
Discharge: HOME | End: 2023-12-21
Payer: COMMERCIAL

## 2023-12-21 DIAGNOSIS — N95.9 UNSPECIFIED MENOPAUSAL AND PERIMENOPAUSAL DISORDER: ICD-10-CM

## 2023-12-21 DIAGNOSIS — Z12.31 SCREENING MAMMOGRAM, ENCOUNTER FOR: ICD-10-CM

## 2023-12-21 PROCEDURE — 77067 SCR MAMMO BI INCL CAD: CPT

## 2023-12-21 PROCEDURE — 77063 BREAST TOMOSYNTHESIS BI: CPT

## 2023-12-21 PROCEDURE — 77080 DXA BONE DENSITY AXIAL: CPT

## 2024-01-17 ENCOUNTER — TELEPHONE (OUTPATIENT)
Dept: ONCOLOGY | Facility: CLINIC | Age: 59
End: 2024-01-17
Payer: COMMERCIAL

## 2024-01-17 NOTE — TELEPHONE ENCOUNTER
"  Caller: Aric Haro \"ESTA\"    Relationship: Self    Best call back number: [phone]     What is the best time to reach you: ASAP    Who are you requesting to speak with (clinical staff, provider,  specific staff member): SCHEDULIGN        What was the call regarding: PLEASE CALL PT TO R/S TODAY'S LAB, OFFICE WAS NOT OPEN YET, ON A DELAY.        "

## 2024-01-26 ENCOUNTER — CLINICAL SUPPORT (OUTPATIENT)
Dept: ONCOLOGY | Facility: CLINIC | Age: 59
End: 2024-01-26
Payer: COMMERCIAL

## 2024-01-26 VITALS
TEMPERATURE: 97.1 F | SYSTOLIC BLOOD PRESSURE: 132 MMHG | OXYGEN SATURATION: 98 % | HEART RATE: 90 BPM | DIASTOLIC BLOOD PRESSURE: 75 MMHG | RESPIRATION RATE: 18 BRPM

## 2024-01-26 DIAGNOSIS — E53.9 DEFICIENCY OF VITAMIN B: ICD-10-CM

## 2024-01-26 DIAGNOSIS — C92.10 CML (CHRONIC MYELOCYTIC LEUKEMIA): Primary | ICD-10-CM

## 2024-01-26 LAB
ALBUMIN SERPL-MCNC: 4 G/DL (ref 3.5–5.2)
ALBUMIN/GLOB SERPL: 1.3 G/DL
ALP SERPL-CCNC: 68 U/L (ref 39–117)
ALT SERPL W P-5'-P-CCNC: 19 U/L (ref 1–33)
ANION GAP SERPL CALCULATED.3IONS-SCNC: 9.2 MMOL/L (ref 5–15)
AST SERPL-CCNC: 19 U/L (ref 1–32)
BASOPHILS # BLD AUTO: 0.04 10*3/MM3 (ref 0–0.2)
BASOPHILS NFR BLD AUTO: 0.5 % (ref 0–1.5)
BILIRUB SERPL-MCNC: 0.3 MG/DL (ref 0–1.2)
BUN SERPL-MCNC: 17 MG/DL (ref 6–20)
BUN/CREAT SERPL: 21.3 (ref 7–25)
CALCIUM SPEC-SCNC: 9.4 MG/DL (ref 8.6–10.5)
CHLORIDE SERPL-SCNC: 103 MMOL/L (ref 98–107)
CO2 SERPL-SCNC: 27.8 MMOL/L (ref 22–29)
CREAT SERPL-MCNC: 0.8 MG/DL (ref 0.57–1)
DEPRECATED RDW RBC AUTO: 43.8 FL (ref 37–54)
EGFRCR SERPLBLD CKD-EPI 2021: 85.5 ML/MIN/1.73
EOSINOPHIL # BLD AUTO: 0.08 10*3/MM3 (ref 0–0.4)
EOSINOPHIL NFR BLD AUTO: 1.1 % (ref 0.3–6.2)
ERYTHROCYTE [DISTWIDTH] IN BLOOD BY AUTOMATED COUNT: 13.3 % (ref 12.3–15.4)
GLOBULIN UR ELPH-MCNC: 3 GM/DL
GLUCOSE SERPL-MCNC: 198 MG/DL (ref 65–99)
HCT VFR BLD AUTO: 42.2 % (ref 34–46.6)
HGB BLD-MCNC: 13.5 G/DL (ref 12–15.9)
IMM GRANULOCYTES # BLD AUTO: 0.03 10*3/MM3 (ref 0–0.05)
IMM GRANULOCYTES NFR BLD AUTO: 0.4 % (ref 0–0.5)
LDH SERPL-CCNC: 215 U/L (ref 135–214)
LYMPHOCYTES # BLD AUTO: 1.64 10*3/MM3 (ref 0.7–3.1)
LYMPHOCYTES NFR BLD AUTO: 21.8 % (ref 19.6–45.3)
MCH RBC QN AUTO: 28.3 PG (ref 26.6–33)
MCHC RBC AUTO-ENTMCNC: 32 G/DL (ref 31.5–35.7)
MCV RBC AUTO: 88.5 FL (ref 79–97)
MONOCYTES # BLD AUTO: 0.72 10*3/MM3 (ref 0.1–0.9)
MONOCYTES NFR BLD AUTO: 9.6 % (ref 5–12)
NEUTROPHILS NFR BLD AUTO: 5.01 10*3/MM3 (ref 1.7–7)
NEUTROPHILS NFR BLD AUTO: 66.6 % (ref 42.7–76)
NRBC BLD AUTO-RTO: 0 /100 WBC (ref 0–0.2)
PLATELET # BLD AUTO: 289 10*3/MM3 (ref 140–450)
PMV BLD AUTO: 10.8 FL (ref 6–12)
POTASSIUM SERPL-SCNC: 4 MMOL/L (ref 3.5–5.2)
PROT SERPL-MCNC: 7 G/DL (ref 6–8.5)
RBC # BLD AUTO: 4.77 10*6/MM3 (ref 3.77–5.28)
SODIUM SERPL-SCNC: 140 MMOL/L (ref 136–145)
URATE SERPL-MCNC: 6 MG/DL (ref 2.4–5.7)
VIT B12 BLD-MCNC: 369 PG/ML (ref 211–946)
WBC NRBC COR # BLD AUTO: 7.52 10*3/MM3 (ref 3.4–10.8)

## 2024-01-26 PROCEDURE — 80053 COMPREHEN METABOLIC PANEL: CPT | Performed by: INTERNAL MEDICINE

## 2024-01-26 PROCEDURE — 81207 BCR/ABL1 GENE MINOR BP: CPT | Performed by: INTERNAL MEDICINE

## 2024-01-26 PROCEDURE — 82607 VITAMIN B-12: CPT | Performed by: INTERNAL MEDICINE

## 2024-01-26 PROCEDURE — 85025 COMPLETE CBC W/AUTO DIFF WBC: CPT | Performed by: INTERNAL MEDICINE

## 2024-01-26 PROCEDURE — 83615 LACTATE (LD) (LDH) ENZYME: CPT | Performed by: INTERNAL MEDICINE

## 2024-01-26 PROCEDURE — 81206 BCR/ABL1 GENE MAJOR BP: CPT | Performed by: INTERNAL MEDICINE

## 2024-01-26 PROCEDURE — 84550 ASSAY OF BLOOD/URIC ACID: CPT | Performed by: INTERNAL MEDICINE

## 2024-01-26 NOTE — PROGRESS NOTES
Venipuncture Blood Specimen Collection  Venipuncture performed in right arm by Axel Dinero MA with good hemostasis. Patient tolerated the procedure well without complications.   01/26/24   Axel Dinero MA

## 2024-02-07 DIAGNOSIS — C92.10 CML (CHRONIC MYELOCYTIC LEUKEMIA): Primary | ICD-10-CM

## 2024-02-07 DIAGNOSIS — E53.9 DEFICIENCY OF VITAMIN B: ICD-10-CM

## 2024-02-07 DIAGNOSIS — E61.1 IRON DEFICIENCY: ICD-10-CM

## 2024-02-14 ENCOUNTER — OFFICE VISIT (OUTPATIENT)
Dept: ONCOLOGY | Facility: CLINIC | Age: 59
End: 2024-02-14
Payer: COMMERCIAL

## 2024-02-14 ENCOUNTER — LAB (OUTPATIENT)
Dept: ONCOLOGY | Facility: CLINIC | Age: 59
End: 2024-02-14
Payer: COMMERCIAL

## 2024-02-14 VITALS
OXYGEN SATURATION: 95 % | RESPIRATION RATE: 18 BRPM | DIASTOLIC BLOOD PRESSURE: 77 MMHG | HEIGHT: 66 IN | TEMPERATURE: 97.3 F | BODY MASS INDEX: 37.54 KG/M2 | HEART RATE: 102 BPM | WEIGHT: 233.6 LBS | SYSTOLIC BLOOD PRESSURE: 147 MMHG

## 2024-02-14 DIAGNOSIS — C92.10 CML (CHRONIC MYELOCYTIC LEUKEMIA): Primary | ICD-10-CM

## 2024-02-14 PROCEDURE — 99214 OFFICE O/P EST MOD 30 MIN: CPT | Performed by: INTERNAL MEDICINE

## 2024-02-14 RX ORDER — FLUTICASONE PROPIONATE 50 MCG
SPRAY, SUSPENSION (ML) NASAL
COMMUNITY
Start: 2024-01-18

## 2024-02-14 RX ORDER — MOLNUPIRAVIR 200 MG/1
CAPSULE ORAL
COMMUNITY
Start: 2023-12-05

## 2024-03-14 ENCOUNTER — CLINICAL SUPPORT (OUTPATIENT)
Dept: ONCOLOGY | Facility: CLINIC | Age: 59
End: 2024-03-14
Payer: COMMERCIAL

## 2024-03-14 DIAGNOSIS — C92.10 CML (CHRONIC MYELOCYTIC LEUKEMIA): ICD-10-CM

## 2024-03-14 LAB
BASOPHILS # BLD AUTO: 0.07 10*3/MM3 (ref 0–0.2)
BASOPHILS NFR BLD AUTO: 0.9 % (ref 0–1.5)
DEPRECATED RDW RBC AUTO: 42.3 FL (ref 37–54)
EOSINOPHIL # BLD AUTO: 0.14 10*3/MM3 (ref 0–0.4)
EOSINOPHIL NFR BLD AUTO: 1.7 % (ref 0.3–6.2)
ERYTHROCYTE [DISTWIDTH] IN BLOOD BY AUTOMATED COUNT: 13.2 % (ref 12.3–15.4)
HCT VFR BLD AUTO: 42.3 % (ref 34–46.6)
HGB BLD-MCNC: 13.5 G/DL (ref 12–15.9)
IMM GRANULOCYTES # BLD AUTO: 0.02 10*3/MM3 (ref 0–0.05)
IMM GRANULOCYTES NFR BLD AUTO: 0.2 % (ref 0–0.5)
LYMPHOCYTES # BLD AUTO: 2.2 10*3/MM3 (ref 0.7–3.1)
LYMPHOCYTES NFR BLD AUTO: 27.3 % (ref 19.6–45.3)
MCH RBC QN AUTO: 28 PG (ref 26.6–33)
MCHC RBC AUTO-ENTMCNC: 31.9 G/DL (ref 31.5–35.7)
MCV RBC AUTO: 87.8 FL (ref 79–97)
MONOCYTES # BLD AUTO: 0.83 10*3/MM3 (ref 0.1–0.9)
MONOCYTES NFR BLD AUTO: 10.3 % (ref 5–12)
NEUTROPHILS NFR BLD AUTO: 4.79 10*3/MM3 (ref 1.7–7)
NEUTROPHILS NFR BLD AUTO: 59.6 % (ref 42.7–76)
NRBC BLD AUTO-RTO: 0 /100 WBC (ref 0–0.2)
PLATELET # BLD AUTO: 277 10*3/MM3 (ref 140–450)
PMV BLD AUTO: 11 FL (ref 6–12)
RBC # BLD AUTO: 4.82 10*6/MM3 (ref 3.77–5.28)
WBC NRBC COR # BLD AUTO: 8.05 10*3/MM3 (ref 3.4–10.8)

## 2024-03-14 PROCEDURE — 85025 COMPLETE CBC W/AUTO DIFF WBC: CPT | Performed by: INTERNAL MEDICINE

## 2024-03-14 NOTE — PROGRESS NOTES
Venipuncture Blood Specimen Collection  Venipuncture performed in left arm by Ishaan Dunham MA with good hemostasis. Patient tolerated the procedure well without complications.   03/14/24   Ishaan Dunham MA

## 2024-03-25 LAB
INTERPRETATION: NEGATIVE
LAB DIRECTOR NAME PROVIDER: NORMAL
LABORATORY COMMENT REPORT: NORMAL
REF LAB TEST METHOD: NORMAL
T(ABL1,BCR)B2A2/CONTROL BLD/T: NORMAL %
T(ABL1,BCR)B3A2/CONTROL BLD/T: NORMAL %
T(ABL1,BCR)E1A2/CONTROL BLD/T: NORMAL %

## (undated) DEVICE — SWAB OPTHALMIC VISI COTN PK/10

## (undated) DEVICE — EYE SPEAR / FINE DISSECTOR: Brand: DEROYAL

## (undated) DEVICE — SYR LUERLOK 30CC

## (undated) DEVICE — CONN Y IRR DISP 1P/U

## (undated) DEVICE — TUBING, SUCTION, 1/4" X 20', STRAIGHT: Brand: MEDLINE INDUSTRIES, INC.

## (undated) DEVICE — POLYP TRAP: Brand: TRAPEASE®

## (undated) DEVICE — ENDOGATOR TUBING FOR ENDOGATOR EGP-100 IRRIGATION PUMP,OLYMPUS OFP PUMP, OLYMPUS AFU-100 PUMP AND ERBE EIP2 PUMP: Brand: ENDOGATOR

## (undated) DEVICE — Device: Brand: DEFENDO AIR/WATER/SUCTION AND BIOPSY VALVE

## (undated) DEVICE — Device

## (undated) DEVICE — 1010 S-DRAPE TOWEL DRAPE 10/BX: Brand: STERI-DRAPE™

## (undated) DEVICE — SNAR POLYP CAPTIFLX MICRO OVL 13MM 240CM

## (undated) DEVICE — Device: Brand: ENDOGATOR

## (undated) DEVICE — SINGLE PORT MANIFOLD: Brand: NEPTUNE 2

## (undated) DEVICE — CROUCH CORNEAL PROTECTOR: Brand: BAUSCH + LOMB

## (undated) DEVICE — THE SINGLE USE ETRAP – POLYP TRAP IS USED FOR SUCTION RETRIEVAL OF ENDOSCOPICALLY REMOVED POLYPS.: Brand: ETRAP

## (undated) DEVICE — PK HD AND NK 70

## (undated) DEVICE — GOWN,REINF,POLY,ECL,PP SLV,XL: Brand: MEDLINE

## (undated) DEVICE — ENDOGATOR AUXILIARY WATER JET CONNECTOR: Brand: ENDOGATOR

## (undated) DEVICE — FRCP BX RADJAW4 NDL 2.8 240CM LG OG BX40

## (undated) DEVICE — ULTRACLEAN ACCESSORY ELECTRODE, 1 INCH COATED NEEDLE WITH EXTENDED INSULATION: Brand: ULTRACLEAN